# Patient Record
Sex: FEMALE | Race: OTHER | HISPANIC OR LATINO | Employment: UNEMPLOYED | URBAN - METROPOLITAN AREA
[De-identification: names, ages, dates, MRNs, and addresses within clinical notes are randomized per-mention and may not be internally consistent; named-entity substitution may affect disease eponyms.]

---

## 2017-01-30 ENCOUNTER — GENERIC CONVERSION - ENCOUNTER (OUTPATIENT)
Dept: OTHER | Facility: OTHER | Age: 37
End: 2017-01-30

## 2017-01-30 ENCOUNTER — APPOINTMENT (EMERGENCY)
Dept: RADIOLOGY | Facility: HOSPITAL | Age: 37
End: 2017-01-30
Payer: COMMERCIAL

## 2017-01-30 ENCOUNTER — HOSPITAL ENCOUNTER (OUTPATIENT)
Facility: HOSPITAL | Age: 37
Setting detail: OBSERVATION
LOS: 4 days | Discharge: HOME | End: 2017-02-04
Attending: INTERNAL MEDICINE | Admitting: INTERNAL MEDICINE
Payer: COMMERCIAL

## 2017-01-30 ENCOUNTER — APPOINTMENT (EMERGENCY)
Dept: CT IMAGING | Facility: HOSPITAL | Age: 37
End: 2017-01-30
Payer: COMMERCIAL

## 2017-01-30 DIAGNOSIS — R79.89 ELEVATED LIVER FUNCTION TESTS: Primary | ICD-10-CM

## 2017-01-30 DIAGNOSIS — J06.9 UPPER RESPIRATORY INFECTION: ICD-10-CM

## 2017-01-30 DIAGNOSIS — R10.9 ABDOMINAL PAIN: ICD-10-CM

## 2017-01-30 PROBLEM — R74.01 TRANSAMINITIS: Status: ACTIVE | Noted: 2017-01-30

## 2017-01-30 LAB
ALBUMIN SERPL BCP-MCNC: 3 G/DL (ref 3.5–5)
ALP SERPL-CCNC: 192 U/L (ref 46–116)
ALT SERPL W P-5'-P-CCNC: 667 U/L (ref 12–78)
ANION GAP SERPL CALCULATED.3IONS-SCNC: 7 MMOL/L (ref 4–13)
APTT PPP: 43 SECONDS (ref 24–36)
AST SERPL W P-5'-P-CCNC: 350 U/L (ref 5–45)
ATRIAL RATE: 89 BPM
BASOPHILS # BLD AUTO: 0.02 THOUSANDS/ΜL (ref 0–0.1)
BASOPHILS NFR BLD AUTO: 0 % (ref 0–1)
BILIRUB SERPL-MCNC: 0.68 MG/DL (ref 0.2–1)
BUN SERPL-MCNC: 9 MG/DL (ref 5–25)
CALCIUM SERPL-MCNC: 8.9 MG/DL (ref 8.3–10.1)
CHLORIDE SERPL-SCNC: 100 MMOL/L (ref 100–108)
CO2 SERPL-SCNC: 28 MMOL/L (ref 21–32)
CREAT SERPL-MCNC: 0.64 MG/DL (ref 0.6–1.3)
EOSINOPHIL # BLD AUTO: 0.09 THOUSAND/ΜL (ref 0–0.61)
EOSINOPHIL NFR BLD AUTO: 1 % (ref 0–6)
ERYTHROCYTE [DISTWIDTH] IN BLOOD BY AUTOMATED COUNT: 13.5 % (ref 11.6–15.1)
FLUAV AG SPEC QL IA: NEGATIVE
FLUBV AG SPEC QL IA: NEGATIVE
GFR SERPL CREATININE-BSD FRML MDRD: >60 ML/MIN/1.73SQ M
GLUCOSE SERPL-MCNC: 104 MG/DL (ref 65–140)
GLUCOSE SERPL-MCNC: 190 MG/DL (ref 65–140)
GLUCOSE SERPL-MCNC: 217 MG/DL (ref 65–140)
HCT VFR BLD AUTO: 36.9 % (ref 34.8–46.1)
HGB BLD-MCNC: 12.7 G/DL (ref 11.5–15.4)
INR PPP: 1.07 (ref 0.86–1.16)
LIPASE SERPL-CCNC: 115 U/L (ref 73–393)
LYMPHOCYTES # BLD AUTO: 1.82 THOUSANDS/ΜL (ref 0.6–4.47)
LYMPHOCYTES NFR BLD AUTO: 21 % (ref 14–44)
MCH RBC QN AUTO: 30 PG (ref 26.8–34.3)
MCHC RBC AUTO-ENTMCNC: 34.4 G/DL (ref 31.4–37.4)
MCV RBC AUTO: 87 FL (ref 82–98)
MONOCYTES # BLD AUTO: 0.69 THOUSAND/ΜL (ref 0.17–1.22)
MONOCYTES NFR BLD AUTO: 8 % (ref 4–12)
NEUTROPHILS # BLD AUTO: 5.99 THOUSANDS/ΜL (ref 1.85–7.62)
NEUTS SEG NFR BLD AUTO: 70 % (ref 43–75)
NRBC BLD AUTO-RTO: 0 /100 WBCS
P AXIS: 11 DEGREES
PLATELET # BLD AUTO: 249 THOUSANDS/UL (ref 149–390)
PMV BLD AUTO: 10.3 FL (ref 8.9–12.7)
POTASSIUM SERPL-SCNC: 4.1 MMOL/L (ref 3.5–5.3)
PR INTERVAL: 118 MS
PROT SERPL-MCNC: 8.2 G/DL (ref 6.4–8.2)
PROTHROMBIN TIME: 13.9 SECONDS (ref 12–14.3)
QRS AXIS: 39 DEGREES
QRSD INTERVAL: 74 MS
QT INTERVAL: 346 MS
QTC INTERVAL: 420 MS
RBC # BLD AUTO: 4.24 MILLION/UL (ref 3.81–5.12)
SODIUM SERPL-SCNC: 135 MMOL/L (ref 136–145)
SPECIMEN SOURCE: NORMAL
T WAVE AXIS: 6 DEGREES
TROPONIN I BLD-MCNC: 0 NG/ML (ref 0–0.08)
VENTRICULAR RATE: 89 BPM
WBC # BLD AUTO: 8.61 THOUSAND/UL (ref 4.31–10.16)

## 2017-01-30 PROCEDURE — 36415 COLL VENOUS BLD VENIPUNCTURE: CPT | Performed by: PHYSICIAN ASSISTANT

## 2017-01-30 PROCEDURE — 85025 COMPLETE CBC W/AUTO DIFF WBC: CPT | Performed by: PHYSICIAN ASSISTANT

## 2017-01-30 PROCEDURE — 74177 CT ABD & PELVIS W/CONTRAST: CPT

## 2017-01-30 PROCEDURE — 99285 EMERGENCY DEPT VISIT HI MDM: CPT

## 2017-01-30 PROCEDURE — 71020 HB CHEST X-RAY 2VW FRONTAL&LATL: CPT

## 2017-01-30 PROCEDURE — 85730 THROMBOPLASTIN TIME PARTIAL: CPT | Performed by: PHYSICIAN ASSISTANT

## 2017-01-30 PROCEDURE — 93005 ELECTROCARDIOGRAM TRACING: CPT | Performed by: PHYSICIAN ASSISTANT

## 2017-01-30 PROCEDURE — 84484 ASSAY OF TROPONIN QUANT: CPT

## 2017-01-30 PROCEDURE — 87798 DETECT AGENT NOS DNA AMP: CPT | Performed by: PHYSICIAN ASSISTANT

## 2017-01-30 PROCEDURE — 85610 PROTHROMBIN TIME: CPT | Performed by: PHYSICIAN ASSISTANT

## 2017-01-30 PROCEDURE — 80053 COMPREHEN METABOLIC PANEL: CPT | Performed by: PHYSICIAN ASSISTANT

## 2017-01-30 PROCEDURE — 83690 ASSAY OF LIPASE: CPT | Performed by: PHYSICIAN ASSISTANT

## 2017-01-30 PROCEDURE — 82948 REAGENT STRIP/BLOOD GLUCOSE: CPT

## 2017-01-30 PROCEDURE — 86308 HETEROPHILE ANTIBODY SCREEN: CPT | Performed by: PHYSICIAN ASSISTANT

## 2017-01-30 PROCEDURE — 87400 INFLUENZA A/B EACH AG IA: CPT | Performed by: PHYSICIAN ASSISTANT

## 2017-01-30 PROCEDURE — 93005 ELECTROCARDIOGRAM TRACING: CPT

## 2017-01-30 RX ORDER — MAGNESIUM HYDROXIDE/ALUMINUM HYDROXICE/SIMETHICONE 120; 1200; 1200 MG/30ML; MG/30ML; MG/30ML
30 SUSPENSION ORAL EVERY 6 HOURS PRN
Status: DISCONTINUED | OUTPATIENT
Start: 2017-01-30 | End: 2017-02-04 | Stop reason: HOSPADM

## 2017-01-30 RX ORDER — IBUPROFEN 600 MG/1
600 TABLET ORAL EVERY 6 HOURS PRN
Status: DISCONTINUED | OUTPATIENT
Start: 2017-01-30 | End: 2017-02-02

## 2017-01-30 RX ORDER — TRAZODONE HYDROCHLORIDE 100 MG/1
100 TABLET ORAL
Status: DISCONTINUED | OUTPATIENT
Start: 2017-01-30 | End: 2017-02-04 | Stop reason: HOSPADM

## 2017-01-30 RX ORDER — TRAMADOL HYDROCHLORIDE 50 MG/1
50 TABLET ORAL EVERY 6 HOURS PRN
COMMUNITY
End: 2017-02-21

## 2017-01-30 RX ORDER — SODIUM CHLORIDE 9 MG/ML
75 INJECTION, SOLUTION INTRAVENOUS CONTINUOUS
Status: DISCONTINUED | OUTPATIENT
Start: 2017-01-30 | End: 2017-02-03

## 2017-01-30 RX ORDER — GABAPENTIN 400 MG/1
400 CAPSULE ORAL 2 TIMES DAILY
Status: DISCONTINUED | OUTPATIENT
Start: 2017-01-30 | End: 2017-02-04 | Stop reason: HOSPADM

## 2017-01-30 RX ORDER — IPRATROPIUM BROMIDE AND ALBUTEROL SULFATE 2.5; .5 MG/3ML; MG/3ML
3 SOLUTION RESPIRATORY (INHALATION) ONCE
Status: COMPLETED | OUTPATIENT
Start: 2017-01-30 | End: 2017-01-30

## 2017-01-30 RX ORDER — ONDANSETRON 2 MG/ML
4 INJECTION INTRAMUSCULAR; INTRAVENOUS EVERY 6 HOURS PRN
Status: DISCONTINUED | OUTPATIENT
Start: 2017-01-30 | End: 2017-02-04 | Stop reason: HOSPADM

## 2017-01-30 RX ORDER — CITALOPRAM 20 MG/1
10 TABLET ORAL DAILY
Status: DISCONTINUED | OUTPATIENT
Start: 2017-01-31 | End: 2017-02-04 | Stop reason: HOSPADM

## 2017-01-30 RX ORDER — IBUPROFEN 400 MG/1
400 TABLET ORAL EVERY 6 HOURS PRN
Status: DISCONTINUED | OUTPATIENT
Start: 2017-01-30 | End: 2017-01-30

## 2017-01-30 RX ORDER — ASPIRIN 325 MG
325 TABLET ORAL ONCE
Status: COMPLETED | OUTPATIENT
Start: 2017-01-30 | End: 2017-01-30

## 2017-01-30 RX ORDER — IPRATROPIUM BROMIDE AND ALBUTEROL SULFATE 2.5; .5 MG/3ML; MG/3ML
3 SOLUTION RESPIRATORY (INHALATION)
Status: DISCONTINUED | OUTPATIENT
Start: 2017-01-30 | End: 2017-01-30

## 2017-01-30 RX ORDER — BUSPIRONE HYDROCHLORIDE 10 MG/1
10 TABLET ORAL 3 TIMES DAILY
Status: DISCONTINUED | OUTPATIENT
Start: 2017-01-30 | End: 2017-02-04 | Stop reason: HOSPADM

## 2017-01-30 RX ORDER — ATORVASTATIN CALCIUM 10 MG/1
20 TABLET, FILM COATED ORAL
Status: DISCONTINUED | OUTPATIENT
Start: 2017-01-31 | End: 2017-02-01

## 2017-01-30 RX ORDER — INSULIN GLARGINE 100 [IU]/ML
50 INJECTION, SOLUTION SUBCUTANEOUS
Status: DISCONTINUED | OUTPATIENT
Start: 2017-01-30 | End: 2017-02-04 | Stop reason: HOSPADM

## 2017-01-30 RX ADMIN — TRAZODONE HYDROCHLORIDE 100 MG: 100 TABLET ORAL at 21:35

## 2017-01-30 RX ADMIN — INSULIN LISPRO 2 UNITS: 100 INJECTION, SOLUTION INTRAVENOUS; SUBCUTANEOUS at 21:35

## 2017-01-30 RX ADMIN — BUSPIRONE HYDROCHLORIDE 10 MG: 10 TABLET ORAL at 21:34

## 2017-01-30 RX ADMIN — IOHEXOL 100 ML: 350 INJECTION, SOLUTION INTRAVENOUS at 12:41

## 2017-01-30 RX ADMIN — IPRATROPIUM BROMIDE AND ALBUTEROL SULFATE 3 ML: .5; 3 SOLUTION RESPIRATORY (INHALATION) at 11:48

## 2017-01-30 RX ADMIN — IBUPROFEN 400 MG: 400 TABLET ORAL at 22:09

## 2017-01-30 RX ADMIN — SODIUM CHLORIDE 75 ML/HR: 0.9 INJECTION, SOLUTION INTRAVENOUS at 17:30

## 2017-01-30 RX ADMIN — BUSPIRONE HYDROCHLORIDE 10 MG: 10 TABLET ORAL at 17:33

## 2017-01-30 RX ADMIN — ASPIRIN 325 MG: 325 TABLET ORAL at 09:54

## 2017-01-30 RX ADMIN — INSULIN GLARGINE 50 UNITS: 100 INJECTION, SOLUTION SUBCUTANEOUS at 21:35

## 2017-01-30 RX ADMIN — SODIUM CHLORIDE 1000 ML: 0.9 INJECTION, SOLUTION INTRAVENOUS at 13:48

## 2017-01-30 RX ADMIN — GABAPENTIN 400 MG: 100 CAPSULE ORAL at 19:21

## 2017-01-31 LAB
ALBUMIN SERPL BCP-MCNC: 2.9 G/DL (ref 3.5–5)
ALP SERPL-CCNC: 180 U/L (ref 46–116)
ALT SERPL W P-5'-P-CCNC: 632 U/L (ref 12–78)
ANION GAP SERPL CALCULATED.3IONS-SCNC: 6 MMOL/L (ref 4–13)
AST SERPL W P-5'-P-CCNC: 324 U/L (ref 5–45)
BILIRUB SERPL-MCNC: 0.65 MG/DL (ref 0.2–1)
BUN SERPL-MCNC: 10 MG/DL (ref 5–25)
CALCIUM SERPL-MCNC: 8.8 MG/DL (ref 8.3–10.1)
CHLORIDE SERPL-SCNC: 104 MMOL/L (ref 100–108)
CO2 SERPL-SCNC: 28 MMOL/L (ref 21–32)
CREAT SERPL-MCNC: 0.62 MG/DL (ref 0.6–1.3)
ERYTHROCYTE [DISTWIDTH] IN BLOOD BY AUTOMATED COUNT: 13.7 % (ref 11.6–15.1)
EST. AVERAGE GLUCOSE BLD GHB EST-MCNC: 200 MG/DL
FERRITIN SERPL-MCNC: 144 NG/ML (ref 8–388)
FLUAV AG SPEC QL: NORMAL
FLUBV AG SPEC QL: NORMAL
GFR SERPL CREATININE-BSD FRML MDRD: >60 ML/MIN/1.73SQ M
GLUCOSE SERPL-MCNC: 152 MG/DL (ref 65–140)
GLUCOSE SERPL-MCNC: 160 MG/DL (ref 65–140)
GLUCOSE SERPL-MCNC: 233 MG/DL (ref 65–140)
GLUCOSE SERPL-MCNC: 292 MG/DL (ref 65–140)
GLUCOSE SERPL-MCNC: 317 MG/DL (ref 65–140)
HAV IGM SER QL: NORMAL
HBA1C MFR BLD: 8.6 % (ref 4.2–6.3)
HBV CORE IGM SER QL: NORMAL
HBV SURFACE AG SER QL: NORMAL
HCT VFR BLD AUTO: 36.9 % (ref 34.8–46.1)
HCV AB SER QL: NORMAL
HETEROPH AB SER QL: NEGATIVE
HGB BLD-MCNC: 12.3 G/DL (ref 11.5–15.4)
IRON SERPL-MCNC: 50 UG/DL (ref 50–170)
MCH RBC QN AUTO: 29.1 PG (ref 26.8–34.3)
MCHC RBC AUTO-ENTMCNC: 33.3 G/DL (ref 31.4–37.4)
MCV RBC AUTO: 87 FL (ref 82–98)
PLATELET # BLD AUTO: 248 THOUSANDS/UL (ref 149–390)
PMV BLD AUTO: 10.1 FL (ref 8.9–12.7)
POTASSIUM SERPL-SCNC: 3.4 MMOL/L (ref 3.5–5.3)
PROT SERPL-MCNC: 8.4 G/DL (ref 6.4–8.2)
RBC # BLD AUTO: 4.22 MILLION/UL (ref 3.81–5.12)
RSV B RNA SPEC QL NAA+PROBE: NORMAL
SODIUM SERPL-SCNC: 138 MMOL/L (ref 136–145)
TIBC SERPL-MCNC: 341 UG/DL (ref 250–450)
WBC # BLD AUTO: 7.41 THOUSAND/UL (ref 4.31–10.16)

## 2017-01-31 PROCEDURE — 83550 IRON BINDING TEST: CPT | Performed by: PHYSICIAN ASSISTANT

## 2017-01-31 PROCEDURE — 80053 COMPREHEN METABOLIC PANEL: CPT | Performed by: PHYSICIAN ASSISTANT

## 2017-01-31 PROCEDURE — 82948 REAGENT STRIP/BLOOD GLUCOSE: CPT

## 2017-01-31 PROCEDURE — 83036 HEMOGLOBIN GLYCOSYLATED A1C: CPT | Performed by: INTERNAL MEDICINE

## 2017-01-31 PROCEDURE — 83540 ASSAY OF IRON: CPT | Performed by: PHYSICIAN ASSISTANT

## 2017-01-31 PROCEDURE — 80074 ACUTE HEPATITIS PANEL: CPT | Performed by: PHYSICIAN ASSISTANT

## 2017-01-31 PROCEDURE — 85027 COMPLETE CBC AUTOMATED: CPT | Performed by: PHYSICIAN ASSISTANT

## 2017-01-31 PROCEDURE — 82728 ASSAY OF FERRITIN: CPT | Performed by: PHYSICIAN ASSISTANT

## 2017-01-31 RX ORDER — HYDROCODONE POLISTIREX AND CHLORPHENIRAMINE POLISTIREX 10; 8 MG/5ML; MG/5ML
5 SUSPENSION, EXTENDED RELEASE ORAL EVERY 12 HOURS
Status: DISCONTINUED | OUTPATIENT
Start: 2017-01-31 | End: 2017-02-04 | Stop reason: HOSPADM

## 2017-01-31 RX ORDER — PANTOPRAZOLE SODIUM 40 MG/1
40 TABLET, DELAYED RELEASE ORAL
Status: DISCONTINUED | OUTPATIENT
Start: 2017-01-31 | End: 2017-02-04 | Stop reason: HOSPADM

## 2017-01-31 RX ORDER — HYDROCODONE POLISTIREX AND CHLORPHENIRAMINE POLISTIREX 10; 8 MG/5ML; MG/5ML
5 SUSPENSION, EXTENDED RELEASE ORAL EVERY 12 HOURS PRN
Status: DISCONTINUED | OUTPATIENT
Start: 2017-01-31 | End: 2017-01-31

## 2017-01-31 RX ADMIN — CITALOPRAM 10 MG: 10 TABLET ORAL at 08:43

## 2017-01-31 RX ADMIN — INSULIN LISPRO 3 UNITS: 100 INJECTION, SOLUTION INTRAVENOUS; SUBCUTANEOUS at 12:42

## 2017-01-31 RX ADMIN — SODIUM CHLORIDE 75 ML/HR: 0.9 INJECTION, SOLUTION INTRAVENOUS at 18:57

## 2017-01-31 RX ADMIN — BUSPIRONE HYDROCHLORIDE 10 MG: 10 TABLET ORAL at 08:44

## 2017-01-31 RX ADMIN — ALUMINUM HYDROXIDE, MAGNESIUM HYDROXIDE, AND SIMETHICONE 30 ML: 200; 200; 20 SUSPENSION ORAL at 21:05

## 2017-01-31 RX ADMIN — ATORVASTATIN CALCIUM 20 MG: 10 TABLET, FILM COATED ORAL at 17:32

## 2017-01-31 RX ADMIN — PANTOPRAZOLE SODIUM 40 MG: 40 TABLET, DELAYED RELEASE ORAL at 12:42

## 2017-01-31 RX ADMIN — INSULIN GLARGINE 50 UNITS: 100 INJECTION, SOLUTION SUBCUTANEOUS at 21:45

## 2017-01-31 RX ADMIN — SODIUM CHLORIDE 75 ML/HR: 0.9 INJECTION, SOLUTION INTRAVENOUS at 05:26

## 2017-01-31 RX ADMIN — BUSPIRONE HYDROCHLORIDE 10 MG: 10 TABLET ORAL at 21:45

## 2017-01-31 RX ADMIN — IBUPROFEN 600 MG: 600 TABLET ORAL at 21:05

## 2017-01-31 RX ADMIN — INSULIN LISPRO 4 UNITS: 100 INJECTION, SOLUTION INTRAVENOUS; SUBCUTANEOUS at 17:32

## 2017-01-31 RX ADMIN — INSULIN LISPRO 1 UNITS: 100 INJECTION, SOLUTION INTRAVENOUS; SUBCUTANEOUS at 08:44

## 2017-01-31 RX ADMIN — BUSPIRONE HYDROCHLORIDE 10 MG: 10 TABLET ORAL at 17:32

## 2017-01-31 RX ADMIN — HYDROCODONE POLISTIREX AND CHLORPHENIRAMINE POLISTIREX 5 ML: 10; 8 SUSPENSION, EXTENDED RELEASE ORAL at 21:46

## 2017-01-31 RX ADMIN — GABAPENTIN 400 MG: 100 CAPSULE ORAL at 17:32

## 2017-01-31 RX ADMIN — INSULIN LISPRO 5 UNITS: 100 INJECTION, SOLUTION INTRAVENOUS; SUBCUTANEOUS at 21:46

## 2017-01-31 RX ADMIN — GABAPENTIN 400 MG: 100 CAPSULE ORAL at 08:44

## 2017-01-31 RX ADMIN — TRAZODONE HYDROCHLORIDE 100 MG: 100 TABLET ORAL at 21:45

## 2017-01-31 RX ADMIN — HYDROCODONE POLISTIREX AND CHLORPHENIRAMINE POLISTIREX 5 ML: 10; 8 SUSPENSION, EXTENDED RELEASE ORAL at 12:46

## 2017-01-31 RX ADMIN — IBUPROFEN 600 MG: 600 TABLET ORAL at 11:38

## 2017-02-01 ENCOUNTER — APPOINTMENT (INPATIENT)
Dept: ULTRASOUND IMAGING | Facility: HOSPITAL | Age: 37
End: 2017-02-01
Payer: COMMERCIAL

## 2017-02-01 LAB
ALBUMIN SERPL BCP-MCNC: 2.9 G/DL (ref 3.5–5)
ALP SERPL-CCNC: 165 U/L (ref 46–116)
ALT SERPL W P-5'-P-CCNC: 562 U/L (ref 12–78)
ANION GAP SERPL CALCULATED.3IONS-SCNC: 8 MMOL/L (ref 4–13)
AST SERPL W P-5'-P-CCNC: 260 U/L (ref 5–45)
BILIRUB SERPL-MCNC: 0.78 MG/DL (ref 0.2–1)
BUN SERPL-MCNC: 7 MG/DL (ref 5–25)
CALCIUM SERPL-MCNC: 8.3 MG/DL (ref 8.3–10.1)
CHLORIDE SERPL-SCNC: 104 MMOL/L (ref 100–108)
CO2 SERPL-SCNC: 28 MMOL/L (ref 21–32)
CREAT SERPL-MCNC: 0.56 MG/DL (ref 0.6–1.3)
ERYTHROCYTE [DISTWIDTH] IN BLOOD BY AUTOMATED COUNT: 13.8 % (ref 11.6–15.1)
GFR SERPL CREATININE-BSD FRML MDRD: >60 ML/MIN/1.73SQ M
GLUCOSE SERPL-MCNC: 136 MG/DL (ref 65–140)
GLUCOSE SERPL-MCNC: 137 MG/DL (ref 65–140)
GLUCOSE SERPL-MCNC: 235 MG/DL (ref 65–140)
GLUCOSE SERPL-MCNC: 236 MG/DL (ref 65–140)
GLUCOSE SERPL-MCNC: 256 MG/DL (ref 65–140)
HCT VFR BLD AUTO: 36.3 % (ref 34.8–46.1)
HGB BLD-MCNC: 12.3 G/DL (ref 11.5–15.4)
MCH RBC QN AUTO: 29.9 PG (ref 26.8–34.3)
MCHC RBC AUTO-ENTMCNC: 33.9 G/DL (ref 31.4–37.4)
MCV RBC AUTO: 88 FL (ref 82–98)
PLATELET # BLD AUTO: 259 THOUSANDS/UL (ref 149–390)
PMV BLD AUTO: 10.2 FL (ref 8.9–12.7)
POTASSIUM SERPL-SCNC: 3.4 MMOL/L (ref 3.5–5.3)
PROT SERPL-MCNC: 8.2 G/DL (ref 6.4–8.2)
RBC # BLD AUTO: 4.12 MILLION/UL (ref 3.81–5.12)
SODIUM SERPL-SCNC: 140 MMOL/L (ref 136–145)
WBC # BLD AUTO: 7.52 THOUSAND/UL (ref 4.31–10.16)

## 2017-02-01 PROCEDURE — 86038 ANTINUCLEAR ANTIBODIES: CPT | Performed by: INTERNAL MEDICINE

## 2017-02-01 PROCEDURE — 86039 ANTINUCLEAR ANTIBODIES (ANA): CPT | Performed by: INTERNAL MEDICINE

## 2017-02-01 PROCEDURE — 76705 ECHO EXAM OF ABDOMEN: CPT

## 2017-02-01 PROCEDURE — 80053 COMPREHEN METABOLIC PANEL: CPT | Performed by: INTERNAL MEDICINE

## 2017-02-01 PROCEDURE — 86645 CMV ANTIBODY IGM: CPT | Performed by: INTERNAL MEDICINE

## 2017-02-01 PROCEDURE — 82948 REAGENT STRIP/BLOOD GLUCOSE: CPT

## 2017-02-01 PROCEDURE — 86644 CMV ANTIBODY: CPT | Performed by: INTERNAL MEDICINE

## 2017-02-01 PROCEDURE — 85027 COMPLETE CBC AUTOMATED: CPT | Performed by: INTERNAL MEDICINE

## 2017-02-01 RX ORDER — OXYCODONE HYDROCHLORIDE 5 MG/1
5 TABLET ORAL EVERY 4 HOURS PRN
Status: DISCONTINUED | OUTPATIENT
Start: 2017-02-01 | End: 2017-02-04 | Stop reason: HOSPADM

## 2017-02-01 RX ORDER — MORPHINE SULFATE 2 MG/ML
2 INJECTION, SOLUTION INTRAMUSCULAR; INTRAVENOUS EVERY 4 HOURS PRN
Status: DISCONTINUED | OUTPATIENT
Start: 2017-02-01 | End: 2017-02-04 | Stop reason: HOSPADM

## 2017-02-01 RX ADMIN — GABAPENTIN 400 MG: 100 CAPSULE ORAL at 17:53

## 2017-02-01 RX ADMIN — SODIUM CHLORIDE 75 ML/HR: 0.9 INJECTION, SOLUTION INTRAVENOUS at 20:36

## 2017-02-01 RX ADMIN — OXYCODONE HYDROCHLORIDE 5 MG: 5 TABLET ORAL at 15:38

## 2017-02-01 RX ADMIN — HYDROCODONE POLISTIREX AND CHLORPHENIRAMINE POLISTIREX 5 ML: 10; 8 SUSPENSION, EXTENDED RELEASE ORAL at 08:43

## 2017-02-01 RX ADMIN — PANTOPRAZOLE SODIUM 40 MG: 40 TABLET, DELAYED RELEASE ORAL at 06:03

## 2017-02-01 RX ADMIN — BUSPIRONE HYDROCHLORIDE 10 MG: 10 TABLET ORAL at 20:42

## 2017-02-01 RX ADMIN — INSULIN LISPRO 3 UNITS: 100 INJECTION, SOLUTION INTRAVENOUS; SUBCUTANEOUS at 17:52

## 2017-02-01 RX ADMIN — INSULIN LISPRO 3 UNITS: 100 INJECTION, SOLUTION INTRAVENOUS; SUBCUTANEOUS at 21:59

## 2017-02-01 RX ADMIN — BUSPIRONE HYDROCHLORIDE 10 MG: 10 TABLET ORAL at 08:43

## 2017-02-01 RX ADMIN — INSULIN GLARGINE 50 UNITS: 100 INJECTION, SOLUTION SUBCUTANEOUS at 21:59

## 2017-02-01 RX ADMIN — CITALOPRAM 10 MG: 10 TABLET ORAL at 08:43

## 2017-02-01 RX ADMIN — BUSPIRONE HYDROCHLORIDE 10 MG: 10 TABLET ORAL at 15:38

## 2017-02-01 RX ADMIN — SODIUM CHLORIDE 75 ML/HR: 0.9 INJECTION, SOLUTION INTRAVENOUS at 06:05

## 2017-02-01 RX ADMIN — GABAPENTIN 400 MG: 100 CAPSULE ORAL at 08:43

## 2017-02-01 RX ADMIN — MORPHINE SULFATE 2 MG: 2 INJECTION, SOLUTION INTRAMUSCULAR; INTRAVENOUS at 12:20

## 2017-02-01 RX ADMIN — IBUPROFEN 600 MG: 600 TABLET ORAL at 08:44

## 2017-02-01 RX ADMIN — MORPHINE SULFATE 2 MG: 2 INJECTION, SOLUTION INTRAMUSCULAR; INTRAVENOUS at 18:25

## 2017-02-01 RX ADMIN — HYDROCODONE POLISTIREX AND CHLORPHENIRAMINE POLISTIREX 5 ML: 10; 8 SUSPENSION, EXTENDED RELEASE ORAL at 21:59

## 2017-02-01 RX ADMIN — IBUPROFEN 600 MG: 600 TABLET ORAL at 20:11

## 2017-02-02 LAB
ALBUMIN SERPL BCP-MCNC: 2.6 G/DL (ref 3.5–5)
ALP SERPL-CCNC: 160 U/L (ref 46–116)
ALT SERPL W P-5'-P-CCNC: 459 U/L (ref 12–78)
ANION GAP SERPL CALCULATED.3IONS-SCNC: 5 MMOL/L (ref 4–13)
AST SERPL W P-5'-P-CCNC: 224 U/L (ref 5–45)
BILIRUB SERPL-MCNC: 0.75 MG/DL (ref 0.2–1)
BUN SERPL-MCNC: 5 MG/DL (ref 5–25)
CALCIUM SERPL-MCNC: 8.3 MG/DL (ref 8.3–10.1)
CHLORIDE SERPL-SCNC: 100 MMOL/L (ref 100–108)
CMV IGG SERPL IA-ACNC: <0.6 U/ML (ref 0–0.59)
CMV IGM SERPL IA-ACNC: <30 AU/ML (ref 0–29.9)
CO2 SERPL-SCNC: 28 MMOL/L (ref 21–32)
CREAT SERPL-MCNC: 0.64 MG/DL (ref 0.6–1.3)
GFR SERPL CREATININE-BSD FRML MDRD: >60 ML/MIN/1.73SQ M
GLUCOSE SERPL-MCNC: 193 MG/DL (ref 65–140)
GLUCOSE SERPL-MCNC: 194 MG/DL (ref 65–140)
GLUCOSE SERPL-MCNC: 219 MG/DL (ref 65–140)
GLUCOSE SERPL-MCNC: 235 MG/DL (ref 65–140)
GLUCOSE SERPL-MCNC: 245 MG/DL (ref 65–140)
POTASSIUM SERPL-SCNC: 4 MMOL/L (ref 3.5–5.3)
PROT SERPL-MCNC: 7.6 G/DL (ref 6.4–8.2)
SODIUM SERPL-SCNC: 133 MMOL/L (ref 136–145)

## 2017-02-02 PROCEDURE — 80053 COMPREHEN METABOLIC PANEL: CPT | Performed by: INTERNAL MEDICINE

## 2017-02-02 PROCEDURE — 82948 REAGENT STRIP/BLOOD GLUCOSE: CPT

## 2017-02-02 RX ORDER — ACETAMINOPHEN 325 MG/1
325 TABLET ORAL EVERY 6 HOURS PRN
Status: DISCONTINUED | OUTPATIENT
Start: 2017-02-02 | End: 2017-02-02

## 2017-02-02 RX ORDER — IBUPROFEN 600 MG/1
600 TABLET ORAL EVERY 6 HOURS PRN
Status: DISCONTINUED | OUTPATIENT
Start: 2017-02-02 | End: 2017-02-04 | Stop reason: HOSPADM

## 2017-02-02 RX ORDER — TIZANIDINE 4 MG/1
4 TABLET ORAL 3 TIMES DAILY
Status: DISCONTINUED | OUTPATIENT
Start: 2017-02-02 | End: 2017-02-04 | Stop reason: HOSPADM

## 2017-02-02 RX ADMIN — BUSPIRONE HYDROCHLORIDE 10 MG: 10 TABLET ORAL at 08:49

## 2017-02-02 RX ADMIN — TRAZODONE HYDROCHLORIDE 100 MG: 100 TABLET ORAL at 00:08

## 2017-02-02 RX ADMIN — IBUPROFEN 600 MG: 600 TABLET, FILM COATED ORAL at 17:36

## 2017-02-02 RX ADMIN — TIZANIDINE 4 MG: 4 TABLET ORAL at 17:36

## 2017-02-02 RX ADMIN — INSULIN LISPRO 2 UNITS: 100 INJECTION, SOLUTION INTRAVENOUS; SUBCUTANEOUS at 21:25

## 2017-02-02 RX ADMIN — INSULIN LISPRO 2 UNITS: 100 INJECTION, SOLUTION INTRAVENOUS; SUBCUTANEOUS at 07:58

## 2017-02-02 RX ADMIN — TRAZODONE HYDROCHLORIDE 100 MG: 100 TABLET ORAL at 21:25

## 2017-02-02 RX ADMIN — GABAPENTIN 400 MG: 100 CAPSULE ORAL at 08:48

## 2017-02-02 RX ADMIN — INSULIN LISPRO 2 UNITS: 100 INJECTION, SOLUTION INTRAVENOUS; SUBCUTANEOUS at 17:37

## 2017-02-02 RX ADMIN — HYDROCODONE POLISTIREX AND CHLORPHENIRAMINE POLISTIREX 5 ML: 10; 8 SUSPENSION, EXTENDED RELEASE ORAL at 08:49

## 2017-02-02 RX ADMIN — SODIUM CHLORIDE 75 ML/HR: 0.9 INJECTION, SOLUTION INTRAVENOUS at 10:03

## 2017-02-02 RX ADMIN — INSULIN LISPRO 3 UNITS: 100 INJECTION, SOLUTION INTRAVENOUS; SUBCUTANEOUS at 11:17

## 2017-02-02 RX ADMIN — BUSPIRONE HYDROCHLORIDE 10 MG: 10 TABLET ORAL at 21:24

## 2017-02-02 RX ADMIN — PANTOPRAZOLE SODIUM 40 MG: 40 TABLET, DELAYED RELEASE ORAL at 05:16

## 2017-02-02 RX ADMIN — ACETAMINOPHEN 325 MG: 325 TABLET, FILM COATED ORAL at 10:02

## 2017-02-02 RX ADMIN — CITALOPRAM 10 MG: 10 TABLET ORAL at 08:48

## 2017-02-02 RX ADMIN — SODIUM CHLORIDE 75 ML/HR: 0.9 INJECTION, SOLUTION INTRAVENOUS at 23:45

## 2017-02-02 RX ADMIN — HYDROCODONE POLISTIREX AND CHLORPHENIRAMINE POLISTIREX 5 ML: 10; 8 SUSPENSION, EXTENDED RELEASE ORAL at 21:25

## 2017-02-02 RX ADMIN — INSULIN GLARGINE 50 UNITS: 100 INJECTION, SOLUTION SUBCUTANEOUS at 21:25

## 2017-02-02 RX ADMIN — BUSPIRONE HYDROCHLORIDE 10 MG: 10 TABLET ORAL at 17:35

## 2017-02-02 RX ADMIN — GABAPENTIN 400 MG: 100 CAPSULE ORAL at 17:36

## 2017-02-02 RX ADMIN — OXYCODONE HYDROCHLORIDE 5 MG: 5 TABLET ORAL at 04:48

## 2017-02-03 LAB
ALBUMIN SERPL BCP-MCNC: 2.6 G/DL (ref 3.5–5)
ALP SERPL-CCNC: 150 U/L (ref 46–116)
ALT SERPL W P-5'-P-CCNC: 394 U/L (ref 12–78)
ANION GAP SERPL CALCULATED.3IONS-SCNC: 5 MMOL/L (ref 4–13)
AST SERPL W P-5'-P-CCNC: 178 U/L (ref 5–45)
BILIRUB SERPL-MCNC: 0.56 MG/DL (ref 0.2–1)
BUN SERPL-MCNC: 7 MG/DL (ref 5–25)
CALCIUM SERPL-MCNC: 8.3 MG/DL (ref 8.3–10.1)
CHLORIDE SERPL-SCNC: 104 MMOL/L (ref 100–108)
CO2 SERPL-SCNC: 30 MMOL/L (ref 21–32)
CREAT SERPL-MCNC: 0.62 MG/DL (ref 0.6–1.3)
GFR SERPL CREATININE-BSD FRML MDRD: >60 ML/MIN/1.73SQ M
GLUCOSE SERPL-MCNC: 116 MG/DL (ref 65–140)
GLUCOSE SERPL-MCNC: 132 MG/DL (ref 65–140)
GLUCOSE SERPL-MCNC: 246 MG/DL (ref 65–140)
GLUCOSE SERPL-MCNC: 289 MG/DL (ref 65–140)
GLUCOSE SERPL-MCNC: 314 MG/DL (ref 65–140)
POTASSIUM SERPL-SCNC: 4.3 MMOL/L (ref 3.5–5.3)
PROT SERPL-MCNC: 7.8 G/DL (ref 6.4–8.2)
SODIUM SERPL-SCNC: 139 MMOL/L (ref 136–145)

## 2017-02-03 PROCEDURE — 82948 REAGENT STRIP/BLOOD GLUCOSE: CPT

## 2017-02-03 PROCEDURE — 80053 COMPREHEN METABOLIC PANEL: CPT | Performed by: PHYSICIAN ASSISTANT

## 2017-02-03 RX ADMIN — HYDROCODONE POLISTIREX AND CHLORPHENIRAMINE POLISTIREX 5 ML: 10; 8 SUSPENSION, EXTENDED RELEASE ORAL at 10:23

## 2017-02-03 RX ADMIN — INSULIN LISPRO 3 UNITS: 100 INJECTION, SOLUTION INTRAVENOUS; SUBCUTANEOUS at 13:02

## 2017-02-03 RX ADMIN — INSULIN LISPRO 5 UNITS: 100 INJECTION, SOLUTION INTRAVENOUS; SUBCUTANEOUS at 21:18

## 2017-02-03 RX ADMIN — CITALOPRAM 10 MG: 10 TABLET ORAL at 10:22

## 2017-02-03 RX ADMIN — TIZANIDINE 4 MG: 4 TABLET ORAL at 10:23

## 2017-02-03 RX ADMIN — SODIUM CHLORIDE 75 ML/HR: 0.9 INJECTION, SOLUTION INTRAVENOUS at 10:23

## 2017-02-03 RX ADMIN — IBUPROFEN 600 MG: 600 TABLET, FILM COATED ORAL at 10:22

## 2017-02-03 RX ADMIN — GABAPENTIN 400 MG: 100 CAPSULE ORAL at 17:28

## 2017-02-03 RX ADMIN — OXYCODONE HYDROCHLORIDE 5 MG: 5 TABLET ORAL at 16:01

## 2017-02-03 RX ADMIN — INSULIN GLARGINE 50 UNITS: 100 INJECTION, SOLUTION SUBCUTANEOUS at 21:18

## 2017-02-03 RX ADMIN — BUSPIRONE HYDROCHLORIDE 10 MG: 10 TABLET ORAL at 10:22

## 2017-02-03 RX ADMIN — TIZANIDINE 4 MG: 4 TABLET ORAL at 21:17

## 2017-02-03 RX ADMIN — PANTOPRAZOLE SODIUM 40 MG: 40 TABLET, DELAYED RELEASE ORAL at 05:25

## 2017-02-03 RX ADMIN — GABAPENTIN 400 MG: 100 CAPSULE ORAL at 10:23

## 2017-02-03 RX ADMIN — TIZANIDINE 4 MG: 4 TABLET ORAL at 16:01

## 2017-02-03 RX ADMIN — HYDROCODONE POLISTIREX AND CHLORPHENIRAMINE POLISTIREX 5 ML: 10; 8 SUSPENSION, EXTENDED RELEASE ORAL at 21:18

## 2017-02-03 RX ADMIN — BUSPIRONE HYDROCHLORIDE 10 MG: 10 TABLET ORAL at 16:01

## 2017-02-03 RX ADMIN — INSULIN LISPRO 4 UNITS: 100 INJECTION, SOLUTION INTRAVENOUS; SUBCUTANEOUS at 17:28

## 2017-02-03 RX ADMIN — TRAZODONE HYDROCHLORIDE 100 MG: 100 TABLET ORAL at 21:17

## 2017-02-03 RX ADMIN — BUSPIRONE HYDROCHLORIDE 10 MG: 10 TABLET ORAL at 21:17

## 2017-02-03 RX ADMIN — OXYCODONE HYDROCHLORIDE 5 MG: 5 TABLET ORAL at 21:17

## 2017-02-03 RX ADMIN — OXYCODONE HYDROCHLORIDE 5 MG: 5 TABLET ORAL at 03:34

## 2017-02-04 VITALS
HEART RATE: 91 BPM | WEIGHT: 224.21 LBS | TEMPERATURE: 99.6 F | HEIGHT: 62 IN | SYSTOLIC BLOOD PRESSURE: 146 MMHG | RESPIRATION RATE: 20 BRPM | DIASTOLIC BLOOD PRESSURE: 67 MMHG | BODY MASS INDEX: 41.26 KG/M2 | OXYGEN SATURATION: 95 %

## 2017-02-04 LAB
ALBUMIN SERPL BCP-MCNC: 2.5 G/DL (ref 3.5–5)
ALP SERPL-CCNC: 150 U/L (ref 46–116)
ALT SERPL W P-5'-P-CCNC: 318 U/L (ref 12–78)
ANION GAP SERPL CALCULATED.3IONS-SCNC: 6 MMOL/L (ref 4–13)
AST SERPL W P-5'-P-CCNC: 133 U/L (ref 5–45)
BILIRUB SERPL-MCNC: 0.39 MG/DL (ref 0.2–1)
BUN SERPL-MCNC: 8 MG/DL (ref 5–25)
CALCIUM SERPL-MCNC: 8.3 MG/DL (ref 8.3–10.1)
CHLORIDE SERPL-SCNC: 104 MMOL/L (ref 100–108)
CO2 SERPL-SCNC: 30 MMOL/L (ref 21–32)
CREAT SERPL-MCNC: 0.61 MG/DL (ref 0.6–1.3)
GFR SERPL CREATININE-BSD FRML MDRD: >60 ML/MIN/1.73SQ M
GLUCOSE SERPL-MCNC: 239 MG/DL (ref 65–140)
GLUCOSE SERPL-MCNC: 86 MG/DL (ref 65–140)
GLUCOSE SERPL-MCNC: 89 MG/DL (ref 65–140)
POTASSIUM SERPL-SCNC: 3.6 MMOL/L (ref 3.5–5.3)
PROT SERPL-MCNC: 7.8 G/DL (ref 6.4–8.2)
SODIUM SERPL-SCNC: 140 MMOL/L (ref 136–145)

## 2017-02-04 PROCEDURE — 80053 COMPREHEN METABOLIC PANEL: CPT | Performed by: PHYSICIAN ASSISTANT

## 2017-02-04 PROCEDURE — 82948 REAGENT STRIP/BLOOD GLUCOSE: CPT

## 2017-02-04 RX ORDER — PANTOPRAZOLE SODIUM 40 MG/1
40 TABLET, DELAYED RELEASE ORAL
Qty: 30 TABLET | Refills: 0 | Status: SHIPPED | OUTPATIENT
Start: 2017-02-04 | End: 2017-03-14

## 2017-02-04 RX ORDER — TRAZODONE HYDROCHLORIDE 100 MG/1
100 TABLET ORAL
Qty: 30 TABLET | Refills: 0
Start: 2017-02-04

## 2017-02-04 RX ORDER — TIZANIDINE 4 MG/1
4 TABLET ORAL EVERY 8 HOURS PRN
Qty: 30 TABLET | Refills: 0 | Status: SHIPPED | OUTPATIENT
Start: 2017-02-04 | End: 2017-11-17

## 2017-02-04 RX ORDER — OXYCODONE HYDROCHLORIDE 5 MG/1
5 TABLET ORAL EVERY 4 HOURS PRN
Qty: 20 TABLET | Refills: 0 | Status: SHIPPED | OUTPATIENT
Start: 2017-02-04 | End: 2017-02-14

## 2017-02-04 RX ADMIN — HYDROCODONE POLISTIREX AND CHLORPHENIRAMINE POLISTIREX 5 ML: 10; 8 SUSPENSION, EXTENDED RELEASE ORAL at 09:20

## 2017-02-04 RX ADMIN — OXYCODONE HYDROCHLORIDE 5 MG: 5 TABLET ORAL at 06:26

## 2017-02-04 RX ADMIN — BUSPIRONE HYDROCHLORIDE 10 MG: 10 TABLET ORAL at 09:19

## 2017-02-04 RX ADMIN — CITALOPRAM 10 MG: 10 TABLET ORAL at 09:19

## 2017-02-04 RX ADMIN — GABAPENTIN 400 MG: 100 CAPSULE ORAL at 09:20

## 2017-02-04 RX ADMIN — PANTOPRAZOLE SODIUM 40 MG: 40 TABLET, DELAYED RELEASE ORAL at 05:48

## 2017-02-04 RX ADMIN — TIZANIDINE 4 MG: 4 TABLET ORAL at 09:19

## 2017-02-04 RX ADMIN — IBUPROFEN 600 MG: 600 TABLET, FILM COATED ORAL at 09:19

## 2017-02-06 LAB
ANA HOMOGEN SER QL IF: NORMAL
ANA HOMOGEN TITR SER: NORMAL {TITER}
RYE IGE QN: POSITIVE

## 2017-02-09 ENCOUNTER — APPOINTMENT (OUTPATIENT)
Dept: LAB | Facility: MEDICAL CENTER | Age: 37
End: 2017-02-09
Attending: INTERNAL MEDICINE
Payer: COMMERCIAL

## 2017-02-09 ENCOUNTER — ALLSCRIPTS OFFICE VISIT (OUTPATIENT)
Dept: OTHER | Facility: OTHER | Age: 37
End: 2017-02-09

## 2017-02-09 ENCOUNTER — TRANSCRIBE ORDERS (OUTPATIENT)
Dept: ADMINISTRATIVE | Facility: HOSPITAL | Age: 37
End: 2017-02-09

## 2017-02-09 ENCOUNTER — GENERIC CONVERSION - ENCOUNTER (OUTPATIENT)
Dept: OTHER | Facility: OTHER | Age: 37
End: 2017-02-09

## 2017-02-09 DIAGNOSIS — R94.5 ABNORMAL RESULTS OF LIVER FUNCTION STUDIES: ICD-10-CM

## 2017-02-09 DIAGNOSIS — K59.00 CONSTIPATION: ICD-10-CM

## 2017-02-09 LAB
ALBUMIN SERPL BCP-MCNC: 3 G/DL (ref 3.5–5)
ALP SERPL-CCNC: 134 U/L (ref 46–116)
ALT SERPL W P-5'-P-CCNC: 181 U/L (ref 12–78)
ANION GAP SERPL CALCULATED.3IONS-SCNC: 6 MMOL/L (ref 4–13)
AST SERPL W P-5'-P-CCNC: 86 U/L (ref 5–45)
BASOPHILS # BLD AUTO: 0.03 THOUSANDS/ΜL (ref 0–0.1)
BASOPHILS NFR BLD AUTO: 0 % (ref 0–1)
BILIRUB DIRECT SERPL-MCNC: 0.14 MG/DL (ref 0–0.2)
BILIRUB SERPL-MCNC: 0.31 MG/DL (ref 0.2–1)
BUN SERPL-MCNC: 10 MG/DL (ref 5–25)
CALCIUM SERPL-MCNC: 9.6 MG/DL (ref 8.3–10.1)
CHLORIDE SERPL-SCNC: 99 MMOL/L (ref 100–108)
CO2 SERPL-SCNC: 30 MMOL/L (ref 21–32)
CREAT SERPL-MCNC: 0.66 MG/DL (ref 0.6–1.3)
EOSINOPHIL # BLD AUTO: 0.17 THOUSAND/ΜL (ref 0–0.61)
EOSINOPHIL NFR BLD AUTO: 2 % (ref 0–6)
ERYTHROCYTE [DISTWIDTH] IN BLOOD BY AUTOMATED COUNT: 13.7 % (ref 11.6–15.1)
FERRITIN SERPL-MCNC: 74 NG/ML (ref 8–388)
GFR SERPL CREATININE-BSD FRML MDRD: >60 ML/MIN/1.73SQ M
GLUCOSE SERPL-MCNC: 213 MG/DL (ref 65–140)
HCT VFR BLD AUTO: 36.5 % (ref 34.8–46.1)
HGB BLD-MCNC: 11.8 G/DL (ref 11.5–15.4)
IRON SATN MFR SERPL: 16 %
IRON SERPL-MCNC: 57 UG/DL (ref 50–170)
LYMPHOCYTES # BLD AUTO: 2.42 THOUSANDS/ΜL (ref 0.6–4.47)
LYMPHOCYTES NFR BLD AUTO: 30 % (ref 14–44)
MCH RBC QN AUTO: 28.7 PG (ref 26.8–34.3)
MCHC RBC AUTO-ENTMCNC: 32.3 G/DL (ref 31.4–37.4)
MCV RBC AUTO: 89 FL (ref 82–98)
MONOCYTES # BLD AUTO: 0.51 THOUSAND/ΜL (ref 0.17–1.22)
MONOCYTES NFR BLD AUTO: 6 % (ref 4–12)
NEUTROPHILS # BLD AUTO: 4.89 THOUSANDS/ΜL (ref 1.85–7.62)
NEUTS SEG NFR BLD AUTO: 62 % (ref 43–75)
NRBC BLD AUTO-RTO: 0 /100 WBCS
PLATELET # BLD AUTO: 297 THOUSANDS/UL (ref 149–390)
PMV BLD AUTO: 10.1 FL (ref 8.9–12.7)
POTASSIUM SERPL-SCNC: 4.5 MMOL/L (ref 3.5–5.3)
PROT SERPL-MCNC: 8.7 G/DL (ref 6.4–8.2)
RBC # BLD AUTO: 4.11 MILLION/UL (ref 3.81–5.12)
SODIUM SERPL-SCNC: 135 MMOL/L (ref 136–145)
TIBC SERPL-MCNC: 348 UG/DL (ref 250–450)
WBC # BLD AUTO: 8.08 THOUSAND/UL (ref 4.31–10.16)

## 2017-02-09 PROCEDURE — 86255 FLUORESCENT ANTIBODY SCREEN: CPT

## 2017-02-09 PROCEDURE — 82784 ASSAY IGA/IGD/IGG/IGM EACH: CPT

## 2017-02-09 PROCEDURE — 36415 COLL VENOUS BLD VENIPUNCTURE: CPT

## 2017-02-09 PROCEDURE — 86376 MICROSOMAL ANTIBODY EACH: CPT

## 2017-02-09 PROCEDURE — 82103 ALPHA-1-ANTITRYPSIN TOTAL: CPT

## 2017-02-09 PROCEDURE — 83550 IRON BINDING TEST: CPT

## 2017-02-09 PROCEDURE — 83540 ASSAY OF IRON: CPT

## 2017-02-09 PROCEDURE — 86256 FLUORESCENT ANTIBODY TITER: CPT

## 2017-02-09 PROCEDURE — 80048 BASIC METABOLIC PNL TOTAL CA: CPT

## 2017-02-09 PROCEDURE — 82390 ASSAY OF CERULOPLASMIN: CPT

## 2017-02-09 PROCEDURE — 82728 ASSAY OF FERRITIN: CPT

## 2017-02-09 PROCEDURE — 86235 NUCLEAR ANTIGEN ANTIBODY: CPT

## 2017-02-09 PROCEDURE — 85025 COMPLETE CBC W/AUTO DIFF WBC: CPT

## 2017-02-09 PROCEDURE — 83516 IMMUNOASSAY NONANTIBODY: CPT

## 2017-02-09 PROCEDURE — 80076 HEPATIC FUNCTION PANEL: CPT

## 2017-02-10 LAB
A1AT SERPL-MCNC: 192 MG/DL (ref 90–200)
ACTIN IGG SERPL-ACNC: 108 UNITS (ref 0–19)
ENDOMYSIUM IGA SER QL: NEGATIVE
GLIADIN PEPTIDE IGA SER-ACNC: 6 UNITS (ref 0–19)
GLIADIN PEPTIDE IGG SER-ACNC: 4 UNITS (ref 0–19)
IGA SERPL-MCNC: 227 MG/DL (ref 87–352)
MITOCHONDRIA M2 IGG SER-ACNC: 22 UNITS (ref 0–20)
TTG IGA SER-ACNC: <2 U/ML (ref 0–3)
TTG IGG SER-ACNC: 2 U/ML (ref 0–5)

## 2017-02-11 LAB
CERULOPLASMIN SERPL-MCNC: 36.9 MG/DL (ref 19–39)
LKM-1 AB SER-ACNC: 1.4 UNITS (ref 0–20)

## 2017-02-13 ENCOUNTER — GENERIC CONVERSION - ENCOUNTER (OUTPATIENT)
Dept: OTHER | Facility: OTHER | Age: 37
End: 2017-02-13

## 2017-02-21 ENCOUNTER — HOSPITAL ENCOUNTER (EMERGENCY)
Facility: HOSPITAL | Age: 37
Discharge: HOME | End: 2017-02-22
Attending: EMERGENCY MEDICINE | Admitting: EMERGENCY MEDICINE
Payer: COMMERCIAL

## 2017-02-21 DIAGNOSIS — R10.11 RIGHT UPPER QUADRANT ABDOMINAL PAIN: Primary | ICD-10-CM

## 2017-02-21 DIAGNOSIS — E11.65 HYPERGLYCEMIA DUE TO TYPE 2 DIABETES MELLITUS (HCC): ICD-10-CM

## 2017-02-21 LAB
BASOPHILS # BLD AUTO: 0.01 THOUSANDS/ΜL (ref 0–0.1)
BASOPHILS NFR BLD AUTO: 0 % (ref 0–1)
BILIRUB UR QL STRIP: NEGATIVE
CLARITY UR: CLEAR
COLOR UR: YELLOW
EOSINOPHIL # BLD AUTO: 0.24 THOUSAND/ΜL (ref 0–0.61)
EOSINOPHIL NFR BLD AUTO: 2 % (ref 0–6)
ERYTHROCYTE [DISTWIDTH] IN BLOOD BY AUTOMATED COUNT: 13.4 % (ref 11.6–15.1)
GLUCOSE SERPL-MCNC: 376 MG/DL (ref 65–140)
GLUCOSE UR STRIP-MCNC: ABNORMAL MG/DL
HCG UR QL: NEGATIVE
HCT VFR BLD AUTO: 33.8 % (ref 34.8–46.1)
HGB BLD-MCNC: 11.4 G/DL (ref 11.5–15.4)
HGB UR QL STRIP.AUTO: NEGATIVE
KETONES UR STRIP-MCNC: NEGATIVE MG/DL
LEUKOCYTE ESTERASE UR QL STRIP: ABNORMAL
LYMPHOCYTES # BLD AUTO: 3.82 THOUSANDS/ΜL (ref 0.6–4.47)
LYMPHOCYTES NFR BLD AUTO: 38 % (ref 14–44)
MCH RBC QN AUTO: 29.5 PG (ref 26.8–34.3)
MCHC RBC AUTO-ENTMCNC: 33.7 G/DL (ref 31.4–37.4)
MCV RBC AUTO: 88 FL (ref 82–98)
MONOCYTES # BLD AUTO: 0.77 THOUSAND/ΜL (ref 0.17–1.22)
MONOCYTES NFR BLD AUTO: 8 % (ref 4–12)
NEUTROPHILS # BLD AUTO: 5.34 THOUSANDS/ΜL (ref 1.85–7.62)
NEUTS SEG NFR BLD AUTO: 52 % (ref 43–75)
NITRITE UR QL STRIP: NEGATIVE
NRBC BLD AUTO-RTO: 0 /100 WBCS
PH UR STRIP.AUTO: 7 [PH] (ref 4.5–8)
PLATELET # BLD AUTO: 283 THOUSANDS/UL (ref 149–390)
PMV BLD AUTO: 10.6 FL (ref 8.9–12.7)
PROT UR STRIP-MCNC: NEGATIVE MG/DL
RBC # BLD AUTO: 3.86 MILLION/UL (ref 3.81–5.12)
SP GR UR STRIP.AUTO: 1.01 (ref 1–1.03)
UROBILINOGEN UR QL STRIP.AUTO: 0.2 E.U./DL
WBC # BLD AUTO: 10.18 THOUSAND/UL (ref 4.31–10.16)

## 2017-02-21 PROCEDURE — 81001 URINALYSIS AUTO W/SCOPE: CPT

## 2017-02-21 PROCEDURE — 85025 COMPLETE CBC W/AUTO DIFF WBC: CPT

## 2017-02-21 PROCEDURE — 80053 COMPREHEN METABOLIC PANEL: CPT

## 2017-02-21 PROCEDURE — 96361 HYDRATE IV INFUSION ADD-ON: CPT

## 2017-02-21 PROCEDURE — 82948 REAGENT STRIP/BLOOD GLUCOSE: CPT

## 2017-02-21 PROCEDURE — 81002 URINALYSIS NONAUTO W/O SCOPE: CPT | Performed by: EMERGENCY MEDICINE

## 2017-02-21 PROCEDURE — 81025 URINE PREGNANCY TEST: CPT | Performed by: EMERGENCY MEDICINE

## 2017-02-21 PROCEDURE — 96374 THER/PROPH/DIAG INJ IV PUSH: CPT

## 2017-02-21 PROCEDURE — 83690 ASSAY OF LIPASE: CPT

## 2017-02-21 PROCEDURE — 36415 COLL VENOUS BLD VENIPUNCTURE: CPT

## 2017-02-21 RX ORDER — KETOROLAC TROMETHAMINE 30 MG/ML
30 INJECTION, SOLUTION INTRAMUSCULAR; INTRAVENOUS ONCE
Status: COMPLETED | OUTPATIENT
Start: 2017-02-22 | End: 2017-02-21

## 2017-02-21 RX ADMIN — SODIUM CHLORIDE 1000 ML: 0.9 INJECTION, SOLUTION INTRAVENOUS at 23:41

## 2017-02-21 RX ADMIN — KETOROLAC TROMETHAMINE 30 MG: 30 INJECTION, SOLUTION INTRAMUSCULAR at 23:56

## 2017-02-22 VITALS
TEMPERATURE: 98.5 F | SYSTOLIC BLOOD PRESSURE: 132 MMHG | OXYGEN SATURATION: 100 % | DIASTOLIC BLOOD PRESSURE: 65 MMHG | HEART RATE: 81 BPM | RESPIRATION RATE: 17 BRPM

## 2017-02-22 LAB
ALBUMIN SERPL BCP-MCNC: 3.2 G/DL (ref 3.5–5)
ALP SERPL-CCNC: 75 U/L (ref 46–116)
ALT SERPL W P-5'-P-CCNC: 58 U/L (ref 12–78)
ANION GAP SERPL CALCULATED.3IONS-SCNC: 6 MMOL/L (ref 4–13)
AST SERPL W P-5'-P-CCNC: 24 U/L (ref 5–45)
BACTERIA UR QL AUTO: ABNORMAL /HPF
BILIRUB SERPL-MCNC: 0.23 MG/DL (ref 0.2–1)
BUN SERPL-MCNC: 12 MG/DL (ref 5–25)
CALCIUM SERPL-MCNC: 9 MG/DL (ref 8.3–10.1)
CHLORIDE SERPL-SCNC: 98 MMOL/L (ref 100–108)
CO2 SERPL-SCNC: 30 MMOL/L (ref 21–32)
CREAT SERPL-MCNC: 0.8 MG/DL (ref 0.6–1.3)
GFR SERPL CREATININE-BSD FRML MDRD: >60 ML/MIN/1.73SQ M
GLUCOSE SERPL-MCNC: 329 MG/DL (ref 65–140)
GLUCOSE SERPL-MCNC: 346 MG/DL (ref 65–140)
GLUCOSE SERPL-MCNC: 440 MG/DL (ref 65–140)
LIPASE SERPL-CCNC: 304 U/L (ref 73–393)
NON-SQ EPI CELLS URNS QL MICRO: ABNORMAL /HPF
POTASSIUM SERPL-SCNC: 4 MMOL/L (ref 3.5–5.3)
PROT SERPL-MCNC: 8.5 G/DL (ref 6.4–8.2)
RBC #/AREA URNS AUTO: ABNORMAL /HPF
SODIUM SERPL-SCNC: 134 MMOL/L (ref 136–145)
WBC #/AREA URNS AUTO: ABNORMAL /HPF

## 2017-02-22 PROCEDURE — 99283 EMERGENCY DEPT VISIT LOW MDM: CPT

## 2017-02-22 PROCEDURE — 82948 REAGENT STRIP/BLOOD GLUCOSE: CPT

## 2017-02-22 RX ORDER — TRAMADOL HYDROCHLORIDE 50 MG/1
50 TABLET ORAL EVERY 6 HOURS PRN
Qty: 15 TABLET | Refills: 0 | Status: SHIPPED | OUTPATIENT
Start: 2017-02-22 | End: 2017-11-17

## 2017-02-28 ENCOUNTER — HOSPITAL ENCOUNTER (OUTPATIENT)
Dept: RADIOLOGY | Facility: HOSPITAL | Age: 37
Discharge: HOME | End: 2017-02-28
Attending: INTERNAL MEDICINE | Admitting: RADIOLOGY
Payer: COMMERCIAL

## 2017-02-28 VITALS
HEART RATE: 90 BPM | DIASTOLIC BLOOD PRESSURE: 53 MMHG | RESPIRATION RATE: 20 BRPM | BODY MASS INDEX: 41.59 KG/M2 | WEIGHT: 226 LBS | SYSTOLIC BLOOD PRESSURE: 110 MMHG | OXYGEN SATURATION: 96 % | TEMPERATURE: 98.9 F | HEIGHT: 62 IN

## 2017-02-28 DIAGNOSIS — R94.5 ABNORMAL RESULTS OF LIVER FUNCTION STUDIES: ICD-10-CM

## 2017-02-28 LAB — EXT PREGNANCY TEST URINE: NEGATIVE

## 2017-02-28 PROCEDURE — 88313 SPECIAL STAINS GROUP 2: CPT | Performed by: INTERNAL MEDICINE

## 2017-02-28 PROCEDURE — 76942 ECHO GUIDE FOR BIOPSY: CPT

## 2017-02-28 PROCEDURE — 99153 MOD SED SAME PHYS/QHP EA: CPT

## 2017-02-28 PROCEDURE — 81025 URINE PREGNANCY TEST: CPT | Performed by: RADIOLOGY

## 2017-02-28 PROCEDURE — 47000 NEEDLE BIOPSY OF LIVER PERQ: CPT

## 2017-02-28 PROCEDURE — 99152 MOD SED SAME PHYS/QHP 5/>YRS: CPT

## 2017-02-28 PROCEDURE — 88307 TISSUE EXAM BY PATHOLOGIST: CPT | Performed by: INTERNAL MEDICINE

## 2017-02-28 RX ORDER — MIDAZOLAM HYDROCHLORIDE 1 MG/ML
INJECTION INTRAMUSCULAR; INTRAVENOUS CODE/TRAUMA/SEDATION MEDICATION
Status: COMPLETED | OUTPATIENT
Start: 2017-02-28 | End: 2017-02-28

## 2017-02-28 RX ORDER — SODIUM CHLORIDE 9 MG/ML
75 INJECTION, SOLUTION INTRAVENOUS CONTINUOUS
Status: DISCONTINUED | OUTPATIENT
Start: 2017-02-28 | End: 2017-03-01 | Stop reason: HOSPADM

## 2017-02-28 RX ORDER — FENTANYL CITRATE 50 UG/ML
INJECTION, SOLUTION INTRAMUSCULAR; INTRAVENOUS CODE/TRAUMA/SEDATION MEDICATION
Status: COMPLETED | OUTPATIENT
Start: 2017-02-28 | End: 2017-02-28

## 2017-02-28 RX ORDER — OXYCODONE HYDROCHLORIDE AND ACETAMINOPHEN 5; 325 MG/1; MG/1
2 TABLET ORAL EVERY 4 HOURS PRN
Status: DISCONTINUED | OUTPATIENT
Start: 2017-02-28 | End: 2017-03-01 | Stop reason: HOSPADM

## 2017-02-28 RX ORDER — OXYCODONE HYDROCHLORIDE AND ACETAMINOPHEN 5; 325 MG/1; MG/1
1 TABLET ORAL ONCE
Status: COMPLETED | OUTPATIENT
Start: 2017-02-28 | End: 2017-02-28

## 2017-02-28 RX ADMIN — SODIUM CHLORIDE 75 ML/HR: 0.9 INJECTION, SOLUTION INTRAVENOUS at 12:50

## 2017-02-28 RX ADMIN — OXYCODONE HYDROCHLORIDE AND ACETAMINOPHEN 2 TABLET: 5; 325 TABLET ORAL at 16:21

## 2017-02-28 RX ADMIN — MIDAZOLAM 1 MG: 1 INJECTION INTRAMUSCULAR; INTRAVENOUS at 15:05

## 2017-02-28 RX ADMIN — FENTANYL CITRATE 50 MCG: 50 INJECTION INTRAMUSCULAR; INTRAVENOUS at 14:57

## 2017-02-28 RX ADMIN — MIDAZOLAM 1 MG: 1 INJECTION INTRAMUSCULAR; INTRAVENOUS at 14:57

## 2017-02-28 RX ADMIN — OXYCODONE HYDROCHLORIDE AND ACETAMINOPHEN 1 TABLET: 5; 325 TABLET ORAL at 19:34

## 2017-02-28 RX ADMIN — FENTANYL CITRATE 50 MCG: 50 INJECTION INTRAMUSCULAR; INTRAVENOUS at 15:06

## 2017-03-07 ENCOUNTER — GENERIC CONVERSION - ENCOUNTER (OUTPATIENT)
Dept: OTHER | Facility: OTHER | Age: 37
End: 2017-03-07

## 2017-03-09 ENCOUNTER — ALLSCRIPTS OFFICE VISIT (OUTPATIENT)
Dept: OTHER | Facility: OTHER | Age: 37
End: 2017-03-09

## 2017-03-09 ENCOUNTER — APPOINTMENT (OUTPATIENT)
Dept: LAB | Facility: HOSPITAL | Age: 37
End: 2017-03-09
Attending: INTERNAL MEDICINE
Payer: COMMERCIAL

## 2017-03-09 DIAGNOSIS — K59.00 CONSTIPATION: ICD-10-CM

## 2017-03-09 LAB
ALBUMIN SERPL BCP-MCNC: 3.1 G/DL (ref 3.5–5)
ALP SERPL-CCNC: 62 U/L (ref 46–116)
ALT SERPL W P-5'-P-CCNC: 43 U/L (ref 12–78)
ANION GAP SERPL CALCULATED.3IONS-SCNC: 7 MMOL/L (ref 4–13)
AST SERPL W P-5'-P-CCNC: 22 U/L (ref 5–45)
BASOPHILS # BLD AUTO: 0.02 THOUSANDS/ΜL (ref 0–0.1)
BASOPHILS NFR BLD AUTO: 0 % (ref 0–1)
BILIRUB DIRECT SERPL-MCNC: 0.1 MG/DL (ref 0–0.2)
BILIRUB SERPL-MCNC: 0.27 MG/DL (ref 0.2–1)
BUN SERPL-MCNC: 14 MG/DL (ref 5–25)
CALCIUM SERPL-MCNC: 9.2 MG/DL (ref 8.3–10.1)
CHLORIDE SERPL-SCNC: 102 MMOL/L (ref 100–108)
CO2 SERPL-SCNC: 28 MMOL/L (ref 21–32)
CREAT SERPL-MCNC: 0.68 MG/DL (ref 0.6–1.3)
EOSINOPHIL # BLD AUTO: 0.09 THOUSAND/ΜL (ref 0–0.61)
EOSINOPHIL NFR BLD AUTO: 1 % (ref 0–6)
ERYTHROCYTE [DISTWIDTH] IN BLOOD BY AUTOMATED COUNT: 13.1 % (ref 11.6–15.1)
GFR SERPL CREATININE-BSD FRML MDRD: >60 ML/MIN/1.73SQ M
GLUCOSE SERPL-MCNC: 238 MG/DL (ref 65–140)
HCT VFR BLD AUTO: 34.8 % (ref 34.8–46.1)
HGB BLD-MCNC: 11.4 G/DL (ref 11.5–15.4)
INR PPP: 1.07 (ref 0.86–1.16)
LYMPHOCYTES # BLD AUTO: 2.26 THOUSANDS/ΜL (ref 0.6–4.47)
LYMPHOCYTES NFR BLD AUTO: 27 % (ref 14–44)
MCH RBC QN AUTO: 28.9 PG (ref 26.8–34.3)
MCHC RBC AUTO-ENTMCNC: 32.8 G/DL (ref 31.4–37.4)
MCV RBC AUTO: 88 FL (ref 82–98)
MONOCYTES # BLD AUTO: 0.52 THOUSAND/ΜL (ref 0.17–1.22)
MONOCYTES NFR BLD AUTO: 6 % (ref 4–12)
NEUTROPHILS # BLD AUTO: 5.62 THOUSANDS/ΜL (ref 1.85–7.62)
NEUTS SEG NFR BLD AUTO: 66 % (ref 43–75)
NRBC BLD AUTO-RTO: 0 /100 WBCS
PLATELET # BLD AUTO: 273 THOUSANDS/UL (ref 149–390)
PMV BLD AUTO: 10.8 FL (ref 8.9–12.7)
POTASSIUM SERPL-SCNC: 4.3 MMOL/L (ref 3.5–5.3)
PROT SERPL-MCNC: 8 G/DL (ref 6.4–8.2)
PROTHROMBIN TIME: 14 SECONDS (ref 12–14.3)
RBC # BLD AUTO: 3.95 MILLION/UL (ref 3.81–5.12)
SODIUM SERPL-SCNC: 137 MMOL/L (ref 136–145)
WBC # BLD AUTO: 8.53 THOUSAND/UL (ref 4.31–10.16)

## 2017-03-09 PROCEDURE — 80048 BASIC METABOLIC PNL TOTAL CA: CPT

## 2017-03-09 PROCEDURE — 85025 COMPLETE CBC W/AUTO DIFF WBC: CPT

## 2017-03-09 PROCEDURE — 36415 COLL VENOUS BLD VENIPUNCTURE: CPT

## 2017-03-09 PROCEDURE — 85610 PROTHROMBIN TIME: CPT

## 2017-03-09 PROCEDURE — 80076 HEPATIC FUNCTION PANEL: CPT

## 2017-03-10 ENCOUNTER — GENERIC CONVERSION - ENCOUNTER (OUTPATIENT)
Dept: OTHER | Facility: OTHER | Age: 37
End: 2017-03-10

## 2017-03-13 ENCOUNTER — HOSPITAL ENCOUNTER (EMERGENCY)
Facility: HOSPITAL | Age: 37
Discharge: HOME | End: 2017-03-14
Attending: EMERGENCY MEDICINE
Payer: COMMERCIAL

## 2017-03-13 ENCOUNTER — APPOINTMENT (EMERGENCY)
Dept: CT IMAGING | Facility: HOSPITAL | Age: 37
End: 2017-03-13
Payer: COMMERCIAL

## 2017-03-13 DIAGNOSIS — Z98.890 S/P BIOPSY: ICD-10-CM

## 2017-03-13 DIAGNOSIS — R10.11 RIGHT UPPER QUADRANT ABDOMINAL PAIN: Primary | ICD-10-CM

## 2017-03-13 LAB
BACTERIA UR QL AUTO: ABNORMAL /HPF
BASOPHILS # BLD AUTO: 0.03 THOUSANDS/ΜL (ref 0–0.1)
BASOPHILS NFR BLD AUTO: 0 % (ref 0–1)
BILIRUB UR QL STRIP: NEGATIVE
CLARITY UR: ABNORMAL
COLOR UR: YELLOW
EOSINOPHIL # BLD AUTO: 0.14 THOUSAND/ΜL (ref 0–0.61)
EOSINOPHIL NFR BLD AUTO: 1 % (ref 0–6)
ERYTHROCYTE [DISTWIDTH] IN BLOOD BY AUTOMATED COUNT: 12.9 % (ref 11.6–15.1)
GLUCOSE UR STRIP-MCNC: NEGATIVE MG/DL
HCG UR QL: NEGATIVE
HCT VFR BLD AUTO: 33.7 % (ref 34.8–46.1)
HGB BLD-MCNC: 11.6 G/DL (ref 11.5–15.4)
HGB UR QL STRIP.AUTO: NEGATIVE
KETONES UR STRIP-MCNC: NEGATIVE MG/DL
LEUKOCYTE ESTERASE UR QL STRIP: NEGATIVE
LYMPHOCYTES # BLD AUTO: 3.73 THOUSANDS/ΜL (ref 0.6–4.47)
LYMPHOCYTES NFR BLD AUTO: 38 % (ref 14–44)
MCH RBC QN AUTO: 30.2 PG (ref 26.8–34.3)
MCHC RBC AUTO-ENTMCNC: 34.4 G/DL (ref 31.4–37.4)
MCV RBC AUTO: 88 FL (ref 82–98)
MONOCYTES # BLD AUTO: 0.71 THOUSAND/ΜL (ref 0.17–1.22)
MONOCYTES NFR BLD AUTO: 7 % (ref 4–12)
NEUTROPHILS # BLD AUTO: 5.11 THOUSANDS/ΜL (ref 1.85–7.62)
NEUTS SEG NFR BLD AUTO: 54 % (ref 43–75)
NITRITE UR QL STRIP: NEGATIVE
NON-SQ EPI CELLS URNS QL MICRO: ABNORMAL /HPF
NRBC BLD AUTO-RTO: 0 /100 WBCS
PH UR STRIP.AUTO: 7.5 [PH] (ref 4.5–8)
PLATELET # BLD AUTO: 244 THOUSANDS/UL (ref 149–390)
PMV BLD AUTO: 9.8 FL (ref 8.9–12.7)
PROT UR STRIP-MCNC: ABNORMAL MG/DL
RBC # BLD AUTO: 3.84 MILLION/UL (ref 3.81–5.12)
RBC #/AREA URNS AUTO: ABNORMAL /HPF
SP GR UR STRIP.AUTO: 1.02 (ref 1–1.03)
UROBILINOGEN UR QL STRIP.AUTO: 1 E.U./DL
WBC # BLD AUTO: 9.72 THOUSAND/UL (ref 4.31–10.16)
WBC #/AREA URNS AUTO: ABNORMAL /HPF

## 2017-03-13 PROCEDURE — 96374 THER/PROPH/DIAG INJ IV PUSH: CPT

## 2017-03-13 PROCEDURE — 83690 ASSAY OF LIPASE: CPT | Performed by: EMERGENCY MEDICINE

## 2017-03-13 PROCEDURE — 36415 COLL VENOUS BLD VENIPUNCTURE: CPT | Performed by: EMERGENCY MEDICINE

## 2017-03-13 PROCEDURE — 85025 COMPLETE CBC W/AUTO DIFF WBC: CPT | Performed by: EMERGENCY MEDICINE

## 2017-03-13 PROCEDURE — 80053 COMPREHEN METABOLIC PANEL: CPT | Performed by: EMERGENCY MEDICINE

## 2017-03-13 PROCEDURE — 81001 URINALYSIS AUTO W/SCOPE: CPT

## 2017-03-13 PROCEDURE — 81025 URINE PREGNANCY TEST: CPT

## 2017-03-13 PROCEDURE — 81002 URINALYSIS NONAUTO W/O SCOPE: CPT

## 2017-03-13 PROCEDURE — 74177 CT ABD & PELVIS W/CONTRAST: CPT

## 2017-03-13 PROCEDURE — 96361 HYDRATE IV INFUSION ADD-ON: CPT

## 2017-03-13 RX ORDER — ONDANSETRON 2 MG/ML
4 INJECTION INTRAMUSCULAR; INTRAVENOUS ONCE
Status: COMPLETED | OUTPATIENT
Start: 2017-03-13 | End: 2017-03-13

## 2017-03-13 RX ORDER — KETOROLAC TROMETHAMINE 30 MG/ML
15 INJECTION, SOLUTION INTRAMUSCULAR; INTRAVENOUS ONCE
Status: COMPLETED | OUTPATIENT
Start: 2017-03-14 | End: 2017-03-14

## 2017-03-13 RX ADMIN — ONDANSETRON 4 MG: 2 INJECTION INTRAMUSCULAR; INTRAVENOUS at 23:47

## 2017-03-13 RX ADMIN — SODIUM CHLORIDE 1000 ML: 0.9 INJECTION, SOLUTION INTRAVENOUS at 23:46

## 2017-03-14 VITALS
HEART RATE: 86 BPM | WEIGHT: 209 LBS | OXYGEN SATURATION: 98 % | BODY MASS INDEX: 38.23 KG/M2 | RESPIRATION RATE: 16 BRPM | TEMPERATURE: 98.8 F | DIASTOLIC BLOOD PRESSURE: 63 MMHG | SYSTOLIC BLOOD PRESSURE: 129 MMHG

## 2017-03-14 LAB
ALBUMIN SERPL BCP-MCNC: 3.1 G/DL (ref 3.5–5)
ALP SERPL-CCNC: 58 U/L (ref 46–116)
ALT SERPL W P-5'-P-CCNC: 51 U/L (ref 12–78)
ANION GAP SERPL CALCULATED.3IONS-SCNC: 4 MMOL/L (ref 4–13)
AST SERPL W P-5'-P-CCNC: 24 U/L (ref 5–45)
BILIRUB SERPL-MCNC: 0.16 MG/DL (ref 0.2–1)
BUN SERPL-MCNC: 13 MG/DL (ref 5–25)
CALCIUM SERPL-MCNC: 9 MG/DL (ref 8.3–10.1)
CHLORIDE SERPL-SCNC: 100 MMOL/L (ref 100–108)
CO2 SERPL-SCNC: 33 MMOL/L (ref 21–32)
CREAT SERPL-MCNC: 0.67 MG/DL (ref 0.6–1.3)
GFR SERPL CREATININE-BSD FRML MDRD: >60 ML/MIN/1.73SQ M
GLUCOSE SERPL-MCNC: 146 MG/DL (ref 65–140)
LIPASE SERPL-CCNC: 206 U/L (ref 73–393)
POTASSIUM SERPL-SCNC: 3.9 MMOL/L (ref 3.5–5.3)
PROT SERPL-MCNC: 7.8 G/DL (ref 6.4–8.2)
SODIUM SERPL-SCNC: 137 MMOL/L (ref 136–145)

## 2017-03-14 PROCEDURE — 99284 EMERGENCY DEPT VISIT MOD MDM: CPT

## 2017-03-14 PROCEDURE — 96375 TX/PRO/DX INJ NEW DRUG ADDON: CPT

## 2017-03-14 RX ORDER — PANTOPRAZOLE SODIUM 40 MG/1
40 TABLET, DELAYED RELEASE ORAL
Qty: 30 TABLET | Refills: 0 | Status: ON HOLD | OUTPATIENT
Start: 2017-03-14 | End: 2018-02-07 | Stop reason: ALTCHOICE

## 2017-03-14 RX ORDER — NAPROXEN 500 MG/1
500 TABLET ORAL 2 TIMES DAILY PRN
Qty: 20 TABLET | Refills: 0 | Status: SHIPPED | OUTPATIENT
Start: 2017-03-14 | End: 2017-11-17

## 2017-03-14 RX ADMIN — KETOROLAC TROMETHAMINE 15 MG: 30 INJECTION, SOLUTION INTRAMUSCULAR at 00:55

## 2017-03-14 RX ADMIN — IOHEXOL 100 ML: 350 INJECTION, SOLUTION INTRAVENOUS at 00:10

## 2017-03-16 ENCOUNTER — ALLSCRIPTS OFFICE VISIT (OUTPATIENT)
Dept: OTHER | Facility: OTHER | Age: 37
End: 2017-03-16

## 2017-03-16 DIAGNOSIS — E11.65 TYPE 2 DIABETES MELLITUS WITH HYPERGLYCEMIA (HCC): ICD-10-CM

## 2017-03-22 ENCOUNTER — HOSPITAL ENCOUNTER (EMERGENCY)
Facility: HOSPITAL | Age: 37
Discharge: HOME | End: 2017-03-22
Attending: EMERGENCY MEDICINE | Admitting: EMERGENCY MEDICINE
Payer: COMMERCIAL

## 2017-03-22 VITALS
OXYGEN SATURATION: 100 % | HEART RATE: 73 BPM | SYSTOLIC BLOOD PRESSURE: 138 MMHG | BODY MASS INDEX: 42.07 KG/M2 | RESPIRATION RATE: 16 BRPM | WEIGHT: 230 LBS | DIASTOLIC BLOOD PRESSURE: 71 MMHG | TEMPERATURE: 98.7 F

## 2017-03-22 DIAGNOSIS — R10.11 RIGHT UPPER QUADRANT PAIN: Primary | ICD-10-CM

## 2017-03-22 LAB
ALBUMIN SERPL BCP-MCNC: 3.1 G/DL (ref 3.5–5)
ALP SERPL-CCNC: 60 U/L (ref 46–116)
ALT SERPL W P-5'-P-CCNC: 46 U/L (ref 12–78)
ANION GAP SERPL CALCULATED.3IONS-SCNC: 6 MMOL/L (ref 4–13)
AST SERPL W P-5'-P-CCNC: 18 U/L (ref 5–45)
BACTERIA UR QL AUTO: ABNORMAL /HPF
BASOPHILS # BLD AUTO: 0.02 THOUSANDS/ΜL (ref 0–0.1)
BASOPHILS NFR BLD AUTO: 0 % (ref 0–1)
BILIRUB SERPL-MCNC: 0.14 MG/DL (ref 0.2–1)
BILIRUB UR QL STRIP: ABNORMAL
BUN SERPL-MCNC: 14 MG/DL (ref 5–25)
CALCIUM SERPL-MCNC: 9.1 MG/DL (ref 8.3–10.1)
CHLORIDE SERPL-SCNC: 99 MMOL/L (ref 100–108)
CLARITY UR: CLEAR
CO2 SERPL-SCNC: 30 MMOL/L (ref 21–32)
COLOR UR: YELLOW
COLOR, POC: YELLOW
CREAT SERPL-MCNC: 0.81 MG/DL (ref 0.6–1.3)
EOSINOPHIL # BLD AUTO: 0.18 THOUSAND/ΜL (ref 0–0.61)
EOSINOPHIL NFR BLD AUTO: 2 % (ref 0–6)
ERYTHROCYTE [DISTWIDTH] IN BLOOD BY AUTOMATED COUNT: 13 % (ref 11.6–15.1)
GFR SERPL CREATININE-BSD FRML MDRD: >60 ML/MIN/1.73SQ M
GLUCOSE SERPL-MCNC: 152 MG/DL (ref 65–140)
GLUCOSE UR STRIP-MCNC: NEGATIVE MG/DL
HCG UR QL: NEGATIVE
HCT VFR BLD AUTO: 34.5 % (ref 34.8–46.1)
HGB BLD-MCNC: 11.5 G/DL (ref 11.5–15.4)
HGB UR QL STRIP.AUTO: NEGATIVE
KETONES UR STRIP-MCNC: ABNORMAL MG/DL
LEUKOCYTE ESTERASE UR QL STRIP: NEGATIVE
LIPASE SERPL-CCNC: 148 U/L (ref 73–393)
LYMPHOCYTES # BLD AUTO: 3.67 THOUSANDS/ΜL (ref 0.6–4.47)
LYMPHOCYTES NFR BLD AUTO: 30 % (ref 14–44)
MAGNESIUM SERPL-MCNC: 1.5 MG/DL (ref 1.6–2.6)
MCH RBC QN AUTO: 29.6 PG (ref 26.8–34.3)
MCHC RBC AUTO-ENTMCNC: 33.3 G/DL (ref 31.4–37.4)
MCV RBC AUTO: 89 FL (ref 82–98)
MONOCYTES # BLD AUTO: 0.75 THOUSAND/ΜL (ref 0.17–1.22)
MONOCYTES NFR BLD AUTO: 6 % (ref 4–12)
NEUTROPHILS # BLD AUTO: 7.62 THOUSANDS/ΜL (ref 1.85–7.62)
NEUTS SEG NFR BLD AUTO: 62 % (ref 43–75)
NITRITE UR QL STRIP: NEGATIVE
NON-SQ EPI CELLS URNS QL MICRO: ABNORMAL /HPF
NRBC BLD AUTO-RTO: 0 /100 WBCS
PH UR STRIP.AUTO: 5.5 [PH] (ref 4.5–8)
PLATELET # BLD AUTO: 259 THOUSANDS/UL (ref 149–390)
PMV BLD AUTO: 10.2 FL (ref 8.9–12.7)
POTASSIUM SERPL-SCNC: 3.8 MMOL/L (ref 3.5–5.3)
PROT SERPL-MCNC: 7.8 G/DL (ref 6.4–8.2)
PROT UR STRIP-MCNC: ABNORMAL MG/DL
RBC # BLD AUTO: 3.89 MILLION/UL (ref 3.81–5.12)
RBC #/AREA URNS AUTO: ABNORMAL /HPF
SODIUM SERPL-SCNC: 135 MMOL/L (ref 136–145)
SP GR UR STRIP.AUTO: >=1.03 (ref 1–1.03)
UROBILINOGEN UR QL STRIP.AUTO: 1 E.U./DL
WBC # BLD AUTO: 12.24 THOUSAND/UL (ref 4.31–10.16)
WBC #/AREA URNS AUTO: ABNORMAL /HPF

## 2017-03-22 PROCEDURE — 99284 EMERGENCY DEPT VISIT MOD MDM: CPT

## 2017-03-22 PROCEDURE — 36415 COLL VENOUS BLD VENIPUNCTURE: CPT | Performed by: EMERGENCY MEDICINE

## 2017-03-22 PROCEDURE — 81002 URINALYSIS NONAUTO W/O SCOPE: CPT | Performed by: EMERGENCY MEDICINE

## 2017-03-22 PROCEDURE — 96376 TX/PRO/DX INJ SAME DRUG ADON: CPT

## 2017-03-22 PROCEDURE — 81025 URINE PREGNANCY TEST: CPT | Performed by: EMERGENCY MEDICINE

## 2017-03-22 PROCEDURE — 96374 THER/PROPH/DIAG INJ IV PUSH: CPT

## 2017-03-22 PROCEDURE — 83690 ASSAY OF LIPASE: CPT | Performed by: EMERGENCY MEDICINE

## 2017-03-22 PROCEDURE — 83735 ASSAY OF MAGNESIUM: CPT | Performed by: EMERGENCY MEDICINE

## 2017-03-22 PROCEDURE — 80053 COMPREHEN METABOLIC PANEL: CPT | Performed by: EMERGENCY MEDICINE

## 2017-03-22 PROCEDURE — 96361 HYDRATE IV INFUSION ADD-ON: CPT

## 2017-03-22 PROCEDURE — 85025 COMPLETE CBC W/AUTO DIFF WBC: CPT | Performed by: EMERGENCY MEDICINE

## 2017-03-22 PROCEDURE — 81001 URINALYSIS AUTO W/SCOPE: CPT

## 2017-03-22 RX ORDER — MORPHINE SULFATE 2 MG/ML
2 INJECTION, SOLUTION INTRAMUSCULAR; INTRAVENOUS ONCE
Status: COMPLETED | OUTPATIENT
Start: 2017-03-22 | End: 2017-03-22

## 2017-03-22 RX ORDER — MORPHINE SULFATE 4 MG/ML
4 INJECTION, SOLUTION INTRAMUSCULAR; INTRAVENOUS ONCE
Status: COMPLETED | OUTPATIENT
Start: 2017-03-22 | End: 2017-03-22

## 2017-03-22 RX ADMIN — MORPHINE SULFATE 2 MG: 2 INJECTION, SOLUTION INTRAMUSCULAR; INTRAVENOUS at 21:27

## 2017-03-22 RX ADMIN — SODIUM CHLORIDE 1000 ML: 0.9 INJECTION, SOLUTION INTRAVENOUS at 21:26

## 2017-03-22 RX ADMIN — MORPHINE SULFATE 4 MG: 4 INJECTION, SOLUTION INTRAMUSCULAR; INTRAVENOUS at 22:37

## 2017-04-06 ENCOUNTER — GENERIC CONVERSION - ENCOUNTER (OUTPATIENT)
Dept: OTHER | Facility: OTHER | Age: 37
End: 2017-04-06

## 2017-04-13 ENCOUNTER — APPOINTMENT (EMERGENCY)
Dept: RADIOLOGY | Facility: HOSPITAL | Age: 37
End: 2017-04-13
Payer: COMMERCIAL

## 2017-04-13 ENCOUNTER — HOSPITAL ENCOUNTER (EMERGENCY)
Facility: HOSPITAL | Age: 37
Discharge: HOME | End: 2017-04-13
Admitting: EMERGENCY MEDICINE
Payer: COMMERCIAL

## 2017-04-13 VITALS
HEART RATE: 95 BPM | WEIGHT: 230 LBS | DIASTOLIC BLOOD PRESSURE: 83 MMHG | OXYGEN SATURATION: 98 % | SYSTOLIC BLOOD PRESSURE: 139 MMHG | BODY MASS INDEX: 42.07 KG/M2 | TEMPERATURE: 97.9 F | RESPIRATION RATE: 19 BRPM

## 2017-04-13 DIAGNOSIS — J06.9 URI (UPPER RESPIRATORY INFECTION): Primary | ICD-10-CM

## 2017-04-13 PROCEDURE — 71020 HB CHEST X-RAY 2VW FRONTAL&LATL: CPT

## 2017-04-13 PROCEDURE — 99283 EMERGENCY DEPT VISIT LOW MDM: CPT

## 2017-04-19 ENCOUNTER — APPOINTMENT (OUTPATIENT)
Dept: LAB | Facility: HOSPITAL | Age: 37
End: 2017-04-19
Attending: INTERNAL MEDICINE
Payer: COMMERCIAL

## 2017-04-19 DIAGNOSIS — T50.901A POISONING BY UNSPECIFIED DRUGS, MEDICAMENTS AND BIOLOGICAL SUBSTANCES, ACCIDENTAL (UNINTENTIONAL), INITIAL ENCOUNTER: ICD-10-CM

## 2017-04-19 LAB
ALBUMIN SERPL BCP-MCNC: 3 G/DL (ref 3.5–5)
ALP SERPL-CCNC: 54 U/L (ref 46–116)
ALT SERPL W P-5'-P-CCNC: 24 U/L (ref 12–78)
AST SERPL W P-5'-P-CCNC: 11 U/L (ref 5–45)
BILIRUB DIRECT SERPL-MCNC: 0.08 MG/DL (ref 0–0.2)
BILIRUB SERPL-MCNC: 0.3 MG/DL (ref 0.2–1)
PROT SERPL-MCNC: 7.9 G/DL (ref 6.4–8.2)

## 2017-04-19 PROCEDURE — 80076 HEPATIC FUNCTION PANEL: CPT

## 2017-04-19 PROCEDURE — 36415 COLL VENOUS BLD VENIPUNCTURE: CPT

## 2017-04-24 ENCOUNTER — APPOINTMENT (OUTPATIENT)
Dept: LAB | Facility: HOSPITAL | Age: 37
End: 2017-04-24
Payer: COMMERCIAL

## 2017-04-24 DIAGNOSIS — E11.65 TYPE 2 DIABETES MELLITUS WITH HYPERGLYCEMIA (HCC): ICD-10-CM

## 2017-04-24 LAB
CHOLEST SERPL-MCNC: 256 MG/DL (ref 50–200)
CREAT UR-MCNC: 128 MG/DL
EST. AVERAGE GLUCOSE BLD GHB EST-MCNC: 200 MG/DL
HBA1C MFR BLD: 8.6 % (ref 4.2–6.3)
HDLC SERPL-MCNC: 87 MG/DL (ref 40–60)
LDLC SERPL CALC-MCNC: 148 MG/DL (ref 0–100)
MICROALBUMIN UR-MCNC: 53.8 MG/L (ref 0–20)
MICROALBUMIN/CREAT 24H UR: 42 MG/G CREATININE (ref 0–30)
TRIGL SERPL-MCNC: 106 MG/DL

## 2017-04-24 PROCEDURE — 82570 ASSAY OF URINE CREATININE: CPT

## 2017-04-24 PROCEDURE — 83036 HEMOGLOBIN GLYCOSYLATED A1C: CPT

## 2017-04-24 PROCEDURE — 36415 COLL VENOUS BLD VENIPUNCTURE: CPT

## 2017-04-24 PROCEDURE — 82043 UR ALBUMIN QUANTITATIVE: CPT

## 2017-04-24 PROCEDURE — 80061 LIPID PANEL: CPT

## 2017-04-25 ENCOUNTER — GENERIC CONVERSION - ENCOUNTER (OUTPATIENT)
Dept: OTHER | Facility: OTHER | Age: 37
End: 2017-04-25

## 2017-05-02 ENCOUNTER — ALLSCRIPTS OFFICE VISIT (OUTPATIENT)
Dept: OTHER | Facility: OTHER | Age: 37
End: 2017-05-02

## 2017-05-04 DIAGNOSIS — T50.901A POISONING BY UNSPECIFIED DRUGS, MEDICAMENTS AND BIOLOGICAL SUBSTANCES, ACCIDENTAL (UNINTENTIONAL), INITIAL ENCOUNTER: ICD-10-CM

## 2017-05-05 ENCOUNTER — ALLSCRIPTS OFFICE VISIT (OUTPATIENT)
Dept: OTHER | Facility: OTHER | Age: 37
End: 2017-05-05

## 2017-05-07 ENCOUNTER — HOSPITAL ENCOUNTER (EMERGENCY)
Facility: HOSPITAL | Age: 37
Discharge: HOME | End: 2017-05-07
Payer: COMMERCIAL

## 2017-05-07 VITALS
WEIGHT: 230 LBS | OXYGEN SATURATION: 100 % | BODY MASS INDEX: 42.07 KG/M2 | RESPIRATION RATE: 16 BRPM | DIASTOLIC BLOOD PRESSURE: 64 MMHG | HEART RATE: 72 BPM | TEMPERATURE: 98.3 F | SYSTOLIC BLOOD PRESSURE: 136 MMHG

## 2017-05-07 DIAGNOSIS — G89.29 ACUTE EXACERBATION OF CHRONIC LOW BACK PAIN: Primary | ICD-10-CM

## 2017-05-07 DIAGNOSIS — M54.50 ACUTE EXACERBATION OF CHRONIC LOW BACK PAIN: Primary | ICD-10-CM

## 2017-05-07 LAB
BACTERIA UR QL AUTO: ABNORMAL /HPF
BILIRUB UR QL STRIP: ABNORMAL
CLARITY UR: CLEAR
COLOR UR: YELLOW
GLUCOSE UR STRIP-MCNC: NEGATIVE MG/DL
HCG UR QL: NEGATIVE
HGB UR QL STRIP.AUTO: NEGATIVE
KETONES UR STRIP-MCNC: ABNORMAL MG/DL
LEUKOCYTE ESTERASE UR QL STRIP: NEGATIVE
NITRITE UR QL STRIP: NEGATIVE
NON-SQ EPI CELLS URNS QL MICRO: ABNORMAL /HPF
PH UR STRIP.AUTO: 5.5 [PH] (ref 4.5–8)
PROT UR STRIP-MCNC: ABNORMAL MG/DL
RBC #/AREA URNS AUTO: ABNORMAL /HPF
SP GR UR STRIP.AUTO: >=1.03 (ref 1–1.03)
UROBILINOGEN UR QL STRIP.AUTO: 1 E.U./DL
WBC #/AREA URNS AUTO: ABNORMAL /HPF

## 2017-05-07 PROCEDURE — 81002 URINALYSIS NONAUTO W/O SCOPE: CPT | Performed by: PHYSICIAN ASSISTANT

## 2017-05-07 PROCEDURE — 81025 URINE PREGNANCY TEST: CPT | Performed by: PHYSICIAN ASSISTANT

## 2017-05-07 PROCEDURE — 81001 URINALYSIS AUTO W/SCOPE: CPT

## 2017-05-07 PROCEDURE — 99283 EMERGENCY DEPT VISIT LOW MDM: CPT

## 2017-05-07 RX ORDER — DIAZEPAM 5 MG/1
5 TABLET ORAL ONCE
Status: COMPLETED | OUTPATIENT
Start: 2017-05-07 | End: 2017-05-07

## 2017-05-07 RX ORDER — CYCLOBENZAPRINE HCL 5 MG
5 TABLET ORAL 3 TIMES DAILY PRN
Qty: 15 TABLET | Refills: 0 | Status: SHIPPED | OUTPATIENT
Start: 2017-05-07 | End: 2017-12-11

## 2017-05-07 RX ADMIN — DIAZEPAM 5 MG: 5 TABLET ORAL at 22:37

## 2017-05-11 ENCOUNTER — GENERIC CONVERSION - ENCOUNTER (OUTPATIENT)
Dept: OTHER | Facility: OTHER | Age: 37
End: 2017-05-11

## 2017-05-24 ENCOUNTER — APPOINTMENT (OUTPATIENT)
Dept: LAB | Facility: HOSPITAL | Age: 37
End: 2017-05-24
Payer: COMMERCIAL

## 2017-05-24 ENCOUNTER — TRANSCRIBE ORDERS (OUTPATIENT)
Dept: ADMINISTRATIVE | Facility: HOSPITAL | Age: 37
End: 2017-05-24

## 2017-05-24 DIAGNOSIS — Z79.899 ENCOUNTER FOR LONG-TERM (CURRENT) USE OF OTHER MEDICATIONS: ICD-10-CM

## 2017-05-24 DIAGNOSIS — E78.5 HYPERLIPIDEMIA, UNSPECIFIED HYPERLIPIDEMIA TYPE: ICD-10-CM

## 2017-05-24 DIAGNOSIS — E11.21 DIABETIC GLOMERULOPATHY (HCC): Primary | ICD-10-CM

## 2017-05-24 DIAGNOSIS — E66.01 MORBID OBESITY, UNSPECIFIED OBESITY TYPE (HCC): ICD-10-CM

## 2017-05-24 DIAGNOSIS — E11.21 DIABETIC GLOMERULOPATHY (HCC): ICD-10-CM

## 2017-05-24 LAB
25(OH)D3 SERPL-MCNC: 20.3 NG/ML (ref 30–100)
ALBUMIN SERPL BCP-MCNC: 3.4 G/DL (ref 3.5–5)
ALP SERPL-CCNC: 47 U/L (ref 46–116)
ALT SERPL W P-5'-P-CCNC: 25 U/L (ref 12–78)
ANION GAP SERPL CALCULATED.3IONS-SCNC: 6 MMOL/L (ref 4–13)
AST SERPL W P-5'-P-CCNC: 12 U/L (ref 5–45)
BILIRUB SERPL-MCNC: 0.27 MG/DL (ref 0.2–1)
BUN SERPL-MCNC: 18 MG/DL (ref 5–25)
CALCIUM SERPL-MCNC: 9 MG/DL (ref 8.3–10.1)
CHLORIDE SERPL-SCNC: 102 MMOL/L (ref 100–108)
CO2 SERPL-SCNC: 29 MMOL/L (ref 21–32)
CREAT SERPL-MCNC: 0.81 MG/DL (ref 0.6–1.3)
GFR SERPL CREATININE-BSD FRML MDRD: >60 ML/MIN/1.73SQ M
GLUCOSE P FAST SERPL-MCNC: 229 MG/DL (ref 65–99)
POTASSIUM SERPL-SCNC: 4.4 MMOL/L (ref 3.5–5.3)
PROT SERPL-MCNC: 7.9 G/DL (ref 6.4–8.2)
SODIUM SERPL-SCNC: 137 MMOL/L (ref 136–145)
TSH SERPL DL<=0.05 MIU/L-ACNC: 6.32 UIU/ML (ref 0.36–3.74)

## 2017-05-24 PROCEDURE — 84443 ASSAY THYROID STIM HORMONE: CPT

## 2017-05-24 PROCEDURE — 82306 VITAMIN D 25 HYDROXY: CPT

## 2017-05-24 PROCEDURE — 80053 COMPREHEN METABOLIC PANEL: CPT

## 2017-05-24 PROCEDURE — 36415 COLL VENOUS BLD VENIPUNCTURE: CPT

## 2017-05-25 ENCOUNTER — GENERIC CONVERSION - ENCOUNTER (OUTPATIENT)
Dept: OTHER | Facility: OTHER | Age: 37
End: 2017-05-25

## 2017-05-30 ENCOUNTER — GENERIC CONVERSION - ENCOUNTER (OUTPATIENT)
Dept: OTHER | Facility: OTHER | Age: 37
End: 2017-05-30

## 2017-05-30 LAB
LEFT EYE DIABETIC RETINOPATHY: NORMAL
RIGHT EYE DIABETIC RETINOPATHY: NORMAL

## 2017-05-31 ENCOUNTER — GENERIC CONVERSION - ENCOUNTER (OUTPATIENT)
Dept: OTHER | Facility: OTHER | Age: 37
End: 2017-05-31

## 2017-06-01 ENCOUNTER — ALLSCRIPTS OFFICE VISIT (OUTPATIENT)
Dept: OTHER | Facility: OTHER | Age: 37
End: 2017-06-01

## 2017-06-12 ENCOUNTER — HOSPITAL ENCOUNTER (EMERGENCY)
Facility: HOSPITAL | Age: 37
Discharge: HOME | End: 2017-06-13
Attending: EMERGENCY MEDICINE | Admitting: EMERGENCY MEDICINE
Payer: COMMERCIAL

## 2017-06-12 DIAGNOSIS — R52 BODY ACHES: ICD-10-CM

## 2017-06-12 DIAGNOSIS — R11.2 NAUSEA VOMITING AND DIARRHEA: Primary | ICD-10-CM

## 2017-06-12 DIAGNOSIS — K52.9 GASTROENTERITIS: ICD-10-CM

## 2017-06-12 DIAGNOSIS — E86.0 DEHYDRATION: ICD-10-CM

## 2017-06-12 DIAGNOSIS — R19.7 NAUSEA VOMITING AND DIARRHEA: Primary | ICD-10-CM

## 2017-06-12 LAB
BASOPHILS # BLD AUTO: 0.01 THOUSANDS/ΜL (ref 0–0.1)
BASOPHILS NFR BLD AUTO: 0 % (ref 0–1)
BILIRUB UR QL STRIP: ABNORMAL
CLARITY UR: CLEAR
COLOR UR: YELLOW
EOSINOPHIL # BLD AUTO: 0.08 THOUSAND/ΜL (ref 0–0.61)
EOSINOPHIL NFR BLD AUTO: 1 % (ref 0–6)
ERYTHROCYTE [DISTWIDTH] IN BLOOD BY AUTOMATED COUNT: 13.5 % (ref 11.6–15.1)
GLUCOSE UR STRIP-MCNC: ABNORMAL MG/DL
HCG UR QL: NEGATIVE
HCT VFR BLD AUTO: 34.7 % (ref 34.8–46.1)
HGB BLD-MCNC: 11.8 G/DL (ref 11.5–15.4)
HGB UR QL STRIP.AUTO: NEGATIVE
KETONES UR STRIP-MCNC: ABNORMAL MG/DL
LEUKOCYTE ESTERASE UR QL STRIP: ABNORMAL
LYMPHOCYTES # BLD AUTO: 1.93 THOUSANDS/ΜL (ref 0.6–4.47)
LYMPHOCYTES NFR BLD AUTO: 14 % (ref 14–44)
MCH RBC QN AUTO: 30.1 PG (ref 26.8–34.3)
MCHC RBC AUTO-ENTMCNC: 34 G/DL (ref 31.4–37.4)
MCV RBC AUTO: 89 FL (ref 82–98)
MONOCYTES # BLD AUTO: 1.04 THOUSAND/ΜL (ref 0.17–1.22)
MONOCYTES NFR BLD AUTO: 7 % (ref 4–12)
NEUTROPHILS # BLD AUTO: 10.9 THOUSANDS/ΜL (ref 1.85–7.62)
NEUTS SEG NFR BLD AUTO: 78 % (ref 43–75)
NITRITE UR QL STRIP: NEGATIVE
NRBC BLD AUTO-RTO: 0 /100 WBCS
PH UR STRIP.AUTO: 5 [PH] (ref 4.5–8)
PLATELET # BLD AUTO: 235 THOUSANDS/UL (ref 149–390)
PMV BLD AUTO: 9.7 FL (ref 8.9–12.7)
PROT UR STRIP-MCNC: NEGATIVE MG/DL
RBC # BLD AUTO: 3.92 MILLION/UL (ref 3.81–5.12)
SP GR UR STRIP.AUTO: 1.01 (ref 1–1.03)
UROBILINOGEN UR QL STRIP.AUTO: 0.2 E.U./DL
WBC # BLD AUTO: 13.96 THOUSAND/UL (ref 4.31–10.16)

## 2017-06-12 PROCEDURE — 80053 COMPREHEN METABOLIC PANEL: CPT | Performed by: EMERGENCY MEDICINE

## 2017-06-12 PROCEDURE — 81001 URINALYSIS AUTO W/SCOPE: CPT

## 2017-06-12 PROCEDURE — 96361 HYDRATE IV INFUSION ADD-ON: CPT

## 2017-06-12 PROCEDURE — 81025 URINE PREGNANCY TEST: CPT | Performed by: EMERGENCY MEDICINE

## 2017-06-12 PROCEDURE — 81002 URINALYSIS NONAUTO W/O SCOPE: CPT | Performed by: EMERGENCY MEDICINE

## 2017-06-12 PROCEDURE — 96375 TX/PRO/DX INJ NEW DRUG ADDON: CPT

## 2017-06-12 PROCEDURE — 36415 COLL VENOUS BLD VENIPUNCTURE: CPT | Performed by: EMERGENCY MEDICINE

## 2017-06-12 PROCEDURE — 83690 ASSAY OF LIPASE: CPT | Performed by: EMERGENCY MEDICINE

## 2017-06-12 PROCEDURE — 85025 COMPLETE CBC W/AUTO DIFF WBC: CPT | Performed by: EMERGENCY MEDICINE

## 2017-06-12 RX ORDER — KETOROLAC TROMETHAMINE 30 MG/ML
15 INJECTION, SOLUTION INTRAMUSCULAR; INTRAVENOUS ONCE
Status: COMPLETED | OUTPATIENT
Start: 2017-06-13 | End: 2017-06-13

## 2017-06-12 RX ORDER — ONDANSETRON 2 MG/ML
4 INJECTION INTRAMUSCULAR; INTRAVENOUS ONCE
Status: COMPLETED | OUTPATIENT
Start: 2017-06-12 | End: 2017-06-12

## 2017-06-12 RX ADMIN — ONDANSETRON 4 MG: 2 INJECTION INTRAMUSCULAR; INTRAVENOUS at 23:30

## 2017-06-12 RX ADMIN — SODIUM CHLORIDE 1000 ML: 0.9 INJECTION, SOLUTION INTRAVENOUS at 23:30

## 2017-06-13 ENCOUNTER — APPOINTMENT (EMERGENCY)
Dept: CT IMAGING | Facility: HOSPITAL | Age: 37
End: 2017-06-13
Payer: COMMERCIAL

## 2017-06-13 VITALS
SYSTOLIC BLOOD PRESSURE: 135 MMHG | RESPIRATION RATE: 18 BRPM | TEMPERATURE: 98.6 F | OXYGEN SATURATION: 99 % | DIASTOLIC BLOOD PRESSURE: 60 MMHG | HEART RATE: 109 BPM

## 2017-06-13 LAB
ALBUMIN SERPL BCP-MCNC: 3.4 G/DL (ref 3.5–5)
ALP SERPL-CCNC: 44 U/L (ref 46–116)
ALT SERPL W P-5'-P-CCNC: 25 U/L (ref 12–78)
ANION GAP SERPL CALCULATED.3IONS-SCNC: 9 MMOL/L (ref 4–13)
AST SERPL W P-5'-P-CCNC: 10 U/L (ref 5–45)
BACTERIA UR QL AUTO: ABNORMAL /HPF
BILIRUB SERPL-MCNC: 0.53 MG/DL (ref 0.2–1)
BUN SERPL-MCNC: 14 MG/DL (ref 5–25)
CALCIUM SERPL-MCNC: 9.4 MG/DL (ref 8.3–10.1)
CHLORIDE SERPL-SCNC: 101 MMOL/L (ref 100–108)
CO2 SERPL-SCNC: 29 MMOL/L (ref 21–32)
CREAT SERPL-MCNC: 0.77 MG/DL (ref 0.6–1.3)
GFR SERPL CREATININE-BSD FRML MDRD: >60 ML/MIN/1.73SQ M
GLUCOSE SERPL-MCNC: 140 MG/DL (ref 65–140)
GLUCOSE SERPL-MCNC: 147 MG/DL (ref 65–140)
LACTATE SERPL-SCNC: 1 MMOL/L (ref 0.5–2)
LIPASE SERPL-CCNC: 232 U/L (ref 73–393)
NON-SQ EPI CELLS URNS QL MICRO: ABNORMAL /HPF
POTASSIUM SERPL-SCNC: 4.1 MMOL/L (ref 3.5–5.3)
PROT SERPL-MCNC: 7.7 G/DL (ref 6.4–8.2)
RBC #/AREA URNS AUTO: ABNORMAL /HPF
SODIUM SERPL-SCNC: 139 MMOL/L (ref 136–145)
WBC #/AREA URNS AUTO: ABNORMAL /HPF

## 2017-06-13 PROCEDURE — 96365 THER/PROPH/DIAG IV INF INIT: CPT

## 2017-06-13 PROCEDURE — 96375 TX/PRO/DX INJ NEW DRUG ADDON: CPT

## 2017-06-13 PROCEDURE — 74177 CT ABD & PELVIS W/CONTRAST: CPT

## 2017-06-13 PROCEDURE — 82948 REAGENT STRIP/BLOOD GLUCOSE: CPT

## 2017-06-13 PROCEDURE — 83605 ASSAY OF LACTIC ACID: CPT | Performed by: EMERGENCY MEDICINE

## 2017-06-13 PROCEDURE — 36415 COLL VENOUS BLD VENIPUNCTURE: CPT | Performed by: EMERGENCY MEDICINE

## 2017-06-13 PROCEDURE — 99284 EMERGENCY DEPT VISIT MOD MDM: CPT

## 2017-06-13 RX ORDER — DICYCLOMINE HCL 20 MG
20 TABLET ORAL EVERY 6 HOURS PRN
Qty: 20 TABLET | Refills: 0 | Status: SHIPPED | OUTPATIENT
Start: 2017-06-13 | End: 2018-01-30

## 2017-06-13 RX ORDER — ONDANSETRON 4 MG/1
4 TABLET, ORALLY DISINTEGRATING ORAL ONCE
Status: COMPLETED | OUTPATIENT
Start: 2017-06-13 | End: 2017-06-13

## 2017-06-13 RX ORDER — ONDANSETRON 4 MG/1
4 TABLET, ORALLY DISINTEGRATING ORAL EVERY 8 HOURS PRN
Qty: 20 TABLET | Refills: 0 | Status: SHIPPED | OUTPATIENT
Start: 2017-06-13 | End: 2017-11-29

## 2017-06-13 RX ORDER — DICYCLOMINE HCL 20 MG
20 TABLET ORAL ONCE
Status: COMPLETED | OUTPATIENT
Start: 2017-06-13 | End: 2017-06-13

## 2017-06-13 RX ADMIN — DICYCLOMINE HYDROCHLORIDE 20 MG: 20 TABLET ORAL at 01:42

## 2017-06-13 RX ADMIN — ONDANSETRON 4 MG: 4 TABLET, ORALLY DISINTEGRATING ORAL at 01:42

## 2017-06-13 RX ADMIN — IOHEXOL 100 ML: 350 INJECTION, SOLUTION INTRAVENOUS at 00:41

## 2017-06-13 RX ADMIN — KETOROLAC TROMETHAMINE 15 MG: 30 INJECTION, SOLUTION INTRAMUSCULAR at 00:06

## 2017-06-13 RX ADMIN — PIPERACILLIN SODIUM,TAZOBACTAM SODIUM 2.25 G: 2; .25 INJECTION, POWDER, FOR SOLUTION INTRAVENOUS at 00:07

## 2017-06-15 ENCOUNTER — ALLSCRIPTS OFFICE VISIT (OUTPATIENT)
Dept: OTHER | Facility: OTHER | Age: 37
End: 2017-06-15

## 2017-06-15 DIAGNOSIS — M54.50 LOW BACK PAIN: ICD-10-CM

## 2017-06-22 ENCOUNTER — GENERIC CONVERSION - ENCOUNTER (OUTPATIENT)
Dept: OTHER | Facility: OTHER | Age: 37
End: 2017-06-22

## 2017-06-22 ENCOUNTER — APPOINTMENT (OUTPATIENT)
Dept: PHYSICAL THERAPY | Facility: CLINIC | Age: 37
End: 2017-06-22
Payer: COMMERCIAL

## 2017-06-22 DIAGNOSIS — M54.50 LOW BACK PAIN: ICD-10-CM

## 2017-06-22 PROCEDURE — 97161 PT EVAL LOW COMPLEX 20 MIN: CPT

## 2017-06-28 ENCOUNTER — APPOINTMENT (OUTPATIENT)
Dept: PHYSICAL THERAPY | Facility: CLINIC | Age: 37
End: 2017-06-28
Payer: COMMERCIAL

## 2017-06-28 PROCEDURE — 97010 HOT OR COLD PACKS THERAPY: CPT

## 2017-06-28 PROCEDURE — 97140 MANUAL THERAPY 1/> REGIONS: CPT

## 2017-06-28 PROCEDURE — 97110 THERAPEUTIC EXERCISES: CPT

## 2017-07-24 ENCOUNTER — APPOINTMENT (OUTPATIENT)
Dept: PHYSICAL THERAPY | Facility: CLINIC | Age: 37
End: 2017-07-24
Payer: COMMERCIAL

## 2017-07-24 PROCEDURE — 97164 PT RE-EVAL EST PLAN CARE: CPT

## 2017-07-26 ENCOUNTER — APPOINTMENT (OUTPATIENT)
Dept: PHYSICAL THERAPY | Facility: CLINIC | Age: 37
End: 2017-07-26
Payer: COMMERCIAL

## 2017-07-26 PROCEDURE — 97110 THERAPEUTIC EXERCISES: CPT

## 2017-07-26 PROCEDURE — 97140 MANUAL THERAPY 1/> REGIONS: CPT

## 2017-07-27 ENCOUNTER — ALLSCRIPTS OFFICE VISIT (OUTPATIENT)
Dept: OTHER | Facility: OTHER | Age: 37
End: 2017-07-27

## 2017-07-31 ENCOUNTER — APPOINTMENT (OUTPATIENT)
Dept: PHYSICAL THERAPY | Facility: CLINIC | Age: 37
End: 2017-07-31
Payer: COMMERCIAL

## 2017-07-31 PROCEDURE — 97110 THERAPEUTIC EXERCISES: CPT

## 2017-07-31 PROCEDURE — 97010 HOT OR COLD PACKS THERAPY: CPT

## 2017-07-31 PROCEDURE — 97140 MANUAL THERAPY 1/> REGIONS: CPT

## 2017-08-02 ENCOUNTER — APPOINTMENT (OUTPATIENT)
Dept: PHYSICAL THERAPY | Facility: CLINIC | Age: 37
End: 2017-08-02
Payer: COMMERCIAL

## 2017-08-03 ENCOUNTER — APPOINTMENT (OUTPATIENT)
Dept: PHYSICAL THERAPY | Facility: CLINIC | Age: 37
End: 2017-08-03
Payer: COMMERCIAL

## 2017-08-03 PROCEDURE — 97140 MANUAL THERAPY 1/> REGIONS: CPT

## 2017-08-03 PROCEDURE — 97110 THERAPEUTIC EXERCISES: CPT

## 2017-08-07 ENCOUNTER — APPOINTMENT (OUTPATIENT)
Dept: PHYSICAL THERAPY | Facility: CLINIC | Age: 37
End: 2017-08-07
Payer: COMMERCIAL

## 2017-08-07 PROCEDURE — 97110 THERAPEUTIC EXERCISES: CPT

## 2017-08-07 PROCEDURE — 97140 MANUAL THERAPY 1/> REGIONS: CPT

## 2017-08-09 ENCOUNTER — APPOINTMENT (OUTPATIENT)
Dept: PHYSICAL THERAPY | Facility: CLINIC | Age: 37
End: 2017-08-09
Payer: COMMERCIAL

## 2017-08-09 PROCEDURE — 97140 MANUAL THERAPY 1/> REGIONS: CPT

## 2017-08-09 PROCEDURE — 97110 THERAPEUTIC EXERCISES: CPT

## 2017-08-10 ENCOUNTER — APPOINTMENT (OUTPATIENT)
Dept: PHYSICAL THERAPY | Facility: CLINIC | Age: 37
End: 2017-08-10
Payer: COMMERCIAL

## 2017-08-10 PROCEDURE — 97010 HOT OR COLD PACKS THERAPY: CPT

## 2017-08-10 PROCEDURE — 97110 THERAPEUTIC EXERCISES: CPT

## 2017-08-10 PROCEDURE — 97140 MANUAL THERAPY 1/> REGIONS: CPT

## 2017-08-11 ENCOUNTER — GENERIC CONVERSION - ENCOUNTER (OUTPATIENT)
Dept: OTHER | Facility: OTHER | Age: 37
End: 2017-08-11

## 2017-08-14 ENCOUNTER — APPOINTMENT (OUTPATIENT)
Dept: PHYSICAL THERAPY | Facility: CLINIC | Age: 37
End: 2017-08-14
Payer: COMMERCIAL

## 2017-08-14 PROCEDURE — 97010 HOT OR COLD PACKS THERAPY: CPT

## 2017-08-14 PROCEDURE — 97110 THERAPEUTIC EXERCISES: CPT

## 2017-08-16 ENCOUNTER — APPOINTMENT (OUTPATIENT)
Dept: PHYSICAL THERAPY | Facility: CLINIC | Age: 37
End: 2017-08-16
Payer: COMMERCIAL

## 2017-08-16 PROCEDURE — 97010 HOT OR COLD PACKS THERAPY: CPT

## 2017-08-16 PROCEDURE — 97110 THERAPEUTIC EXERCISES: CPT

## 2017-08-17 ENCOUNTER — APPOINTMENT (OUTPATIENT)
Dept: PHYSICAL THERAPY | Facility: CLINIC | Age: 37
End: 2017-08-17
Payer: COMMERCIAL

## 2017-08-17 PROCEDURE — 97010 HOT OR COLD PACKS THERAPY: CPT

## 2017-08-17 PROCEDURE — 97110 THERAPEUTIC EXERCISES: CPT

## 2017-08-18 ENCOUNTER — TRANSCRIBE ORDERS (OUTPATIENT)
Dept: ADMINISTRATIVE | Facility: HOSPITAL | Age: 37
End: 2017-08-18

## 2017-08-18 ENCOUNTER — APPOINTMENT (OUTPATIENT)
Dept: LAB | Facility: HOSPITAL | Age: 37
End: 2017-08-18
Payer: COMMERCIAL

## 2017-08-18 DIAGNOSIS — Z79.899 ENCOUNTER FOR LONG-TERM (CURRENT) USE OF OTHER MEDICATIONS: ICD-10-CM

## 2017-08-18 DIAGNOSIS — E11.21 DIABETIC GLOMERULOPATHY (HCC): ICD-10-CM

## 2017-08-18 DIAGNOSIS — E11.21 DIABETIC GLOMERULOPATHY (HCC): Primary | ICD-10-CM

## 2017-08-18 DIAGNOSIS — E78.00 PURE HYPERCHOLESTEROLEMIA: ICD-10-CM

## 2017-08-18 DIAGNOSIS — E55.9 UNSPECIFIED VITAMIN D DEFICIENCY: ICD-10-CM

## 2017-08-18 DIAGNOSIS — E66.01 MORBID OBESITY, UNSPECIFIED OBESITY TYPE (HCC): ICD-10-CM

## 2017-08-18 LAB
ALBUMIN SERPL BCP-MCNC: 3.4 G/DL (ref 3.5–5)
ALP SERPL-CCNC: 53 U/L (ref 46–116)
ALT SERPL W P-5'-P-CCNC: 25 U/L (ref 12–78)
ANION GAP SERPL CALCULATED.3IONS-SCNC: -3 MMOL/L (ref 4–13)
AST SERPL W P-5'-P-CCNC: 14 U/L (ref 5–45)
BASOPHILS # BLD AUTO: 0.03 THOUSANDS/ΜL (ref 0–0.1)
BASOPHILS NFR BLD AUTO: 0 % (ref 0–1)
BILIRUB SERPL-MCNC: 0.37 MG/DL (ref 0.2–1)
BUN SERPL-MCNC: 14 MG/DL (ref 5–25)
CALCIUM SERPL-MCNC: 9.3 MG/DL (ref 8.3–10.1)
CHLORIDE SERPL-SCNC: 102 MMOL/L (ref 100–108)
CHOLEST SERPL-MCNC: 171 MG/DL (ref 50–200)
CO2 SERPL-SCNC: 34 MMOL/L (ref 21–32)
CREAT SERPL-MCNC: 0.69 MG/DL (ref 0.6–1.3)
EOSINOPHIL # BLD AUTO: 0.17 THOUSAND/ΜL (ref 0–0.61)
EOSINOPHIL NFR BLD AUTO: 2 % (ref 0–6)
ERYTHROCYTE [DISTWIDTH] IN BLOOD BY AUTOMATED COUNT: 13.6 % (ref 11.6–15.1)
EST. AVERAGE GLUCOSE BLD GHB EST-MCNC: 154 MG/DL
GFR SERPL CREATININE-BSD FRML MDRD: 113 ML/MIN/1.73SQ M
GLUCOSE P FAST SERPL-MCNC: 113 MG/DL (ref 65–99)
HBA1C MFR BLD: 7 % (ref 4.2–6.3)
HCT VFR BLD AUTO: 37 % (ref 34.8–46.1)
HDLC SERPL-MCNC: 69 MG/DL (ref 40–60)
HGB BLD-MCNC: 12.4 G/DL (ref 11.5–15.4)
LDLC SERPL CALC-MCNC: 84 MG/DL (ref 0–100)
LYMPHOCYTES # BLD AUTO: 2.91 THOUSANDS/ΜL (ref 0.6–4.47)
LYMPHOCYTES NFR BLD AUTO: 34 % (ref 14–44)
MCH RBC QN AUTO: 29.7 PG (ref 26.8–34.3)
MCHC RBC AUTO-ENTMCNC: 33.5 G/DL (ref 31.4–37.4)
MCV RBC AUTO: 89 FL (ref 82–98)
MONOCYTES # BLD AUTO: 0.62 THOUSAND/ΜL (ref 0.17–1.22)
MONOCYTES NFR BLD AUTO: 7 % (ref 4–12)
NEUTROPHILS # BLD AUTO: 4.95 THOUSANDS/ΜL (ref 1.85–7.62)
NEUTS SEG NFR BLD AUTO: 57 % (ref 43–75)
NRBC BLD AUTO-RTO: 0 /100 WBCS
PLATELET # BLD AUTO: 245 THOUSANDS/UL (ref 149–390)
PMV BLD AUTO: 9.7 FL (ref 8.9–12.7)
POTASSIUM SERPL-SCNC: 4 MMOL/L (ref 3.5–5.3)
PROT SERPL-MCNC: 8.1 G/DL (ref 6.4–8.2)
RBC # BLD AUTO: 4.18 MILLION/UL (ref 3.81–5.12)
SODIUM SERPL-SCNC: 133 MMOL/L (ref 136–145)
T4 FREE SERPL-MCNC: 0.79 NG/DL (ref 0.76–1.46)
TRIGL SERPL-MCNC: 88 MG/DL
TSH SERPL DL<=0.05 MIU/L-ACNC: 3.62 UIU/ML (ref 0.36–3.74)
WBC # BLD AUTO: 8.68 THOUSAND/UL (ref 4.31–10.16)

## 2017-08-18 PROCEDURE — 36415 COLL VENOUS BLD VENIPUNCTURE: CPT

## 2017-08-18 PROCEDURE — 80053 COMPREHEN METABOLIC PANEL: CPT

## 2017-08-18 PROCEDURE — 84439 ASSAY OF FREE THYROXINE: CPT

## 2017-08-18 PROCEDURE — 83036 HEMOGLOBIN GLYCOSYLATED A1C: CPT

## 2017-08-18 PROCEDURE — 80061 LIPID PANEL: CPT

## 2017-08-18 PROCEDURE — 84443 ASSAY THYROID STIM HORMONE: CPT

## 2017-08-18 PROCEDURE — 85025 COMPLETE CBC W/AUTO DIFF WBC: CPT

## 2017-08-21 ENCOUNTER — GENERIC CONVERSION - ENCOUNTER (OUTPATIENT)
Dept: OTHER | Facility: OTHER | Age: 37
End: 2017-08-21

## 2017-08-21 ENCOUNTER — APPOINTMENT (OUTPATIENT)
Dept: PHYSICAL THERAPY | Facility: CLINIC | Age: 37
End: 2017-08-21
Payer: COMMERCIAL

## 2017-08-21 PROCEDURE — 97010 HOT OR COLD PACKS THERAPY: CPT

## 2017-08-21 PROCEDURE — 97110 THERAPEUTIC EXERCISES: CPT

## 2017-08-21 PROCEDURE — 97140 MANUAL THERAPY 1/> REGIONS: CPT

## 2017-08-24 ENCOUNTER — ALLSCRIPTS OFFICE VISIT (OUTPATIENT)
Dept: OTHER | Facility: OTHER | Age: 37
End: 2017-08-24

## 2017-08-25 ENCOUNTER — APPOINTMENT (OUTPATIENT)
Dept: PHYSICAL THERAPY | Facility: CLINIC | Age: 37
End: 2017-08-25
Payer: COMMERCIAL

## 2017-08-28 ENCOUNTER — APPOINTMENT (OUTPATIENT)
Dept: PHYSICAL THERAPY | Facility: CLINIC | Age: 37
End: 2017-08-28
Payer: COMMERCIAL

## 2017-08-30 ENCOUNTER — APPOINTMENT (OUTPATIENT)
Dept: PHYSICAL THERAPY | Facility: CLINIC | Age: 37
End: 2017-08-30
Payer: COMMERCIAL

## 2017-08-31 ENCOUNTER — GENERIC CONVERSION - ENCOUNTER (OUTPATIENT)
Dept: OTHER | Facility: OTHER | Age: 37
End: 2017-08-31

## 2017-09-05 ENCOUNTER — GENERIC CONVERSION - ENCOUNTER (OUTPATIENT)
Dept: OTHER | Facility: OTHER | Age: 37
End: 2017-09-05

## 2017-09-14 ENCOUNTER — HOSPITAL ENCOUNTER (EMERGENCY)
Facility: HOSPITAL | Age: 37
Discharge: HOME | End: 2017-09-14
Attending: EMERGENCY MEDICINE | Admitting: EMERGENCY MEDICINE
Payer: COMMERCIAL

## 2017-09-14 VITALS
BODY MASS INDEX: 40.24 KG/M2 | HEART RATE: 107 BPM | TEMPERATURE: 98.4 F | RESPIRATION RATE: 16 BRPM | DIASTOLIC BLOOD PRESSURE: 59 MMHG | OXYGEN SATURATION: 94 % | WEIGHT: 220 LBS | SYSTOLIC BLOOD PRESSURE: 126 MMHG

## 2017-09-14 DIAGNOSIS — E86.0 DEHYDRATION, MILD: ICD-10-CM

## 2017-09-14 DIAGNOSIS — R19.7 NAUSEA, VOMITING AND DIARRHEA: Primary | ICD-10-CM

## 2017-09-14 DIAGNOSIS — R11.2 NAUSEA, VOMITING AND DIARRHEA: Primary | ICD-10-CM

## 2017-09-14 DIAGNOSIS — R10.9 ABDOMINAL CRAMPING: ICD-10-CM

## 2017-09-14 LAB
ALBUMIN SERPL BCP-MCNC: 3.4 G/DL (ref 3.5–5)
ALP SERPL-CCNC: 61 U/L (ref 46–116)
ALT SERPL W P-5'-P-CCNC: 27 U/L (ref 12–78)
ANION GAP SERPL CALCULATED.3IONS-SCNC: 5 MMOL/L (ref 4–13)
AST SERPL W P-5'-P-CCNC: 15 U/L (ref 5–45)
BASOPHILS # BLD AUTO: 0.01 THOUSANDS/ΜL (ref 0–0.1)
BASOPHILS NFR BLD AUTO: 0 % (ref 0–1)
BILIRUB SERPL-MCNC: 0.34 MG/DL (ref 0.2–1)
BUN SERPL-MCNC: 14 MG/DL (ref 5–25)
CALCIUM SERPL-MCNC: 9.1 MG/DL (ref 8.3–10.1)
CHLORIDE SERPL-SCNC: 102 MMOL/L (ref 100–108)
CO2 SERPL-SCNC: 31 MMOL/L (ref 21–32)
CREAT SERPL-MCNC: 0.72 MG/DL (ref 0.6–1.3)
EOSINOPHIL # BLD AUTO: 0.1 THOUSAND/ΜL (ref 0–0.61)
EOSINOPHIL NFR BLD AUTO: 1 % (ref 0–6)
ERYTHROCYTE [DISTWIDTH] IN BLOOD BY AUTOMATED COUNT: 13.9 % (ref 11.6–15.1)
GFR SERPL CREATININE-BSD FRML MDRD: 109 ML/MIN/1.73SQ M
GLUCOSE SERPL-MCNC: 136 MG/DL (ref 65–140)
HCT VFR BLD AUTO: 37.3 % (ref 34.8–46.1)
HGB BLD-MCNC: 12.7 G/DL (ref 11.5–15.4)
LIPASE SERPL-CCNC: 185 U/L (ref 73–393)
LYMPHOCYTES # BLD AUTO: 2 THOUSANDS/ΜL (ref 0.6–4.47)
LYMPHOCYTES NFR BLD AUTO: 13 % (ref 14–44)
MCH RBC QN AUTO: 29.9 PG (ref 26.8–34.3)
MCHC RBC AUTO-ENTMCNC: 34 G/DL (ref 31.4–37.4)
MCV RBC AUTO: 88 FL (ref 82–98)
MONOCYTES # BLD AUTO: 1.09 THOUSAND/ΜL (ref 0.17–1.22)
MONOCYTES NFR BLD AUTO: 7 % (ref 4–12)
NEUTROPHILS # BLD AUTO: 12.27 THOUSANDS/ΜL (ref 1.85–7.62)
NEUTS SEG NFR BLD AUTO: 79 % (ref 43–75)
NRBC BLD AUTO-RTO: 0 /100 WBCS
PLATELET # BLD AUTO: 280 THOUSANDS/UL (ref 149–390)
PMV BLD AUTO: 10 FL (ref 8.9–12.7)
POTASSIUM SERPL-SCNC: 4 MMOL/L (ref 3.5–5.3)
PROT SERPL-MCNC: 8.2 G/DL (ref 6.4–8.2)
RBC # BLD AUTO: 4.25 MILLION/UL (ref 3.81–5.12)
SODIUM SERPL-SCNC: 138 MMOL/L (ref 136–145)
WBC # BLD AUTO: 15.47 THOUSAND/UL (ref 4.31–10.16)

## 2017-09-14 PROCEDURE — 83690 ASSAY OF LIPASE: CPT | Performed by: EMERGENCY MEDICINE

## 2017-09-14 PROCEDURE — 85025 COMPLETE CBC W/AUTO DIFF WBC: CPT | Performed by: EMERGENCY MEDICINE

## 2017-09-14 PROCEDURE — 96376 TX/PRO/DX INJ SAME DRUG ADON: CPT

## 2017-09-14 PROCEDURE — 96375 TX/PRO/DX INJ NEW DRUG ADDON: CPT

## 2017-09-14 PROCEDURE — 80053 COMPREHEN METABOLIC PANEL: CPT | Performed by: EMERGENCY MEDICINE

## 2017-09-14 PROCEDURE — 36415 COLL VENOUS BLD VENIPUNCTURE: CPT | Performed by: EMERGENCY MEDICINE

## 2017-09-14 PROCEDURE — 99284 EMERGENCY DEPT VISIT MOD MDM: CPT

## 2017-09-14 PROCEDURE — 96374 THER/PROPH/DIAG INJ IV PUSH: CPT

## 2017-09-14 PROCEDURE — 96361 HYDRATE IV INFUSION ADD-ON: CPT

## 2017-09-14 RX ORDER — ONDANSETRON 4 MG/1
4 TABLET, ORALLY DISINTEGRATING ORAL EVERY 8 HOURS PRN
Qty: 20 TABLET | Refills: 0 | Status: SHIPPED | OUTPATIENT
Start: 2017-09-14 | End: 2017-11-12

## 2017-09-14 RX ORDER — ONDANSETRON 2 MG/ML
4 INJECTION INTRAMUSCULAR; INTRAVENOUS ONCE
Status: COMPLETED | OUTPATIENT
Start: 2017-09-14 | End: 2017-09-14

## 2017-09-14 RX ORDER — OMEPRAZOLE 20 MG/1
20 CAPSULE, DELAYED RELEASE ORAL DAILY
Qty: 30 CAPSULE | Refills: 0 | Status: SHIPPED | OUTPATIENT
Start: 2017-09-14 | End: 2018-06-14 | Stop reason: DRUGHIGH

## 2017-09-14 RX ORDER — MAGNESIUM HYDROXIDE/ALUMINUM HYDROXICE/SIMETHICONE 120; 1200; 1200 MG/30ML; MG/30ML; MG/30ML
30 SUSPENSION ORAL ONCE
Status: COMPLETED | OUTPATIENT
Start: 2017-09-14 | End: 2017-09-14

## 2017-09-14 RX ORDER — DICYCLOMINE HCL 20 MG
20 TABLET ORAL EVERY 6 HOURS PRN
Qty: 20 TABLET | Refills: 0 | Status: SHIPPED | OUTPATIENT
Start: 2017-09-14 | End: 2017-11-12

## 2017-09-14 RX ORDER — DICYCLOMINE HCL 20 MG
20 TABLET ORAL ONCE
Status: COMPLETED | OUTPATIENT
Start: 2017-09-14 | End: 2017-09-14

## 2017-09-14 RX ORDER — KETOROLAC TROMETHAMINE 30 MG/ML
30 INJECTION, SOLUTION INTRAMUSCULAR; INTRAVENOUS ONCE
Status: COMPLETED | OUTPATIENT
Start: 2017-09-14 | End: 2017-09-14

## 2017-09-14 RX ADMIN — ONDANSETRON 4 MG: 2 INJECTION INTRAMUSCULAR; INTRAVENOUS at 22:37

## 2017-09-14 RX ADMIN — SODIUM CHLORIDE 1000 ML: 0.9 INJECTION, SOLUTION INTRAVENOUS at 21:33

## 2017-09-14 RX ADMIN — DICYCLOMINE HYDROCHLORIDE 20 MG: 20 TABLET ORAL at 21:35

## 2017-09-14 RX ADMIN — LIDOCAINE HYDROCHLORIDE 15 ML: 20 SOLUTION ORAL; TOPICAL at 22:40

## 2017-09-14 RX ADMIN — KETOROLAC TROMETHAMINE 30 MG: 30 INJECTION, SOLUTION INTRAMUSCULAR at 22:38

## 2017-09-14 RX ADMIN — ONDANSETRON 4 MG: 2 INJECTION INTRAMUSCULAR; INTRAVENOUS at 21:34

## 2017-09-14 RX ADMIN — ALUMINUM HYDROXIDE, MAGNESIUM HYDROXIDE, AND SIMETHICONE 30 ML: 200; 200; 20 SUSPENSION ORAL at 22:40

## 2017-09-14 RX ADMIN — SODIUM CHLORIDE 1000 ML: 0.9 INJECTION, SOLUTION INTRAVENOUS at 22:41

## 2017-09-27 ENCOUNTER — HOSPITAL ENCOUNTER (EMERGENCY)
Facility: HOSPITAL | Age: 37
Discharge: HOME | End: 2017-09-27
Admitting: EMERGENCY MEDICINE
Payer: COMMERCIAL

## 2017-09-27 VITALS
SYSTOLIC BLOOD PRESSURE: 124 MMHG | BODY MASS INDEX: 41.9 KG/M2 | RESPIRATION RATE: 16 BRPM | WEIGHT: 229.06 LBS | DIASTOLIC BLOOD PRESSURE: 60 MMHG | TEMPERATURE: 98.6 F | OXYGEN SATURATION: 97 % | HEART RATE: 96 BPM

## 2017-09-27 DIAGNOSIS — J30.9 ALLERGIC RHINITIS: ICD-10-CM

## 2017-09-27 DIAGNOSIS — M76.62 ACHILLES TENDINITIS, LEFT LEG: Primary | ICD-10-CM

## 2017-09-27 PROCEDURE — 99283 EMERGENCY DEPT VISIT LOW MDM: CPT

## 2017-09-27 RX ORDER — CETIRIZINE HYDROCHLORIDE 10 MG/1
10 TABLET, CHEWABLE ORAL DAILY
Qty: 7 TABLET | Refills: 0 | Status: SHIPPED | OUTPATIENT
Start: 2017-09-27 | End: 2017-11-29

## 2017-09-27 NOTE — ED PROVIDER NOTES
History  Chief Complaint   Patient presents with    Foot Pain     Has pain in the left heel    Facial Swelling     Has swelling of the right side of the face  This is a 82-year-old female patient who comes in with her significant other who was a   Patient comes in with 2 complaints  The 1st being pain at the base of her left Achilles tendon without injury  She wears flip flops most of the time  It hurts when she walks feels better when she comes off of her foot  She takes approximate for the pain without significant decreased  No numbness or paresthesias no history of injury no fevers no chills she has not seen the body for this thus far  Differential diagnosis includes not limited to Achilles tendinitis, tendon rupture unlikely, heel spur unlikely  Second complaint is swelling right side of face/cheek bone significant other states that the last few days she has had some itching over her maxillary area without pain  Mild inflammation from scratching no real swelling or erythema  No fever no chills no congestion  Patient admits to having a scratchy throat and a runny nose  No fever no chills no headache no blurred vision or double vision  No chest pain or shortness of breath no nausea vomiting diarrhea abdominal pain no urinary symptoms  Denies dental pain or swelling  No difficulty swallowing  No difficulty speaking smiling does not appear of any cranial deficits  Prior to Admission Medications   Prescriptions Last Dose Informant Patient Reported? Taking?   busPIRone (BUSPAR) 10 mg tablet   Yes No   Sig: Take 10 mg by mouth 3 (three) times a day  citalopram (CeleXA) 10 mg tablet   Yes No   Sig: Take 10 mg by mouth daily     cyclobenzaprine (FLEXERIL) 5 mg tablet   No No   Sig: Take 1 tablet by mouth 3 (three) times a day as needed for muscle spasms for up to 5 days   dicyclomine (BENTYL) 20 mg tablet   No No   Sig: Take 1 tablet by mouth every 6 (six) hours as needed (abd cramping) for up to 5 days   dicyclomine (BENTYL) 20 mg tablet   No No   Sig: Take 1 tablet by mouth every 6 (six) hours as needed (abd cramping) for up to 5 days   gabapentin (NEURONTIN) 400 mg capsule   Yes No   Sig: Take 400 mg by mouth 2 (two) times a day     insulin glargine (LANTUS) 100 units/mL subcutaneous injection   Yes No   Sig: Inject 50 Units under the skin daily at bedtime  naproxen (NAPROSYN) 500 mg tablet   No No   Sig: Take 1 tablet by mouth 2 (two) times a day as needed for mild pain or moderate pain for up to 10 days   omeprazole (PriLOSEC) 20 mg delayed release capsule   No No   Sig: Take 1 capsule by mouth daily   ondansetron (ZOFRAN-ODT) 4 mg disintegrating tablet   No No   Sig: Take 1 tablet by mouth every 8 (eight) hours as needed for nausea or vomiting for up to 7 days   ondansetron (ZOFRAN-ODT) 4 mg disintegrating tablet   No No   Sig: Take 1 tablet by mouth every 8 (eight) hours as needed for nausea or vomiting for up to 7 days   pantoprazole (PROTONIX) 40 mg tablet   No No   Sig: Take 1 tablet by mouth daily in the early morning for 30 days   sitaGLIPtin-metFORMIN (JANUMET)  MG per tablet   Yes No   Sig: Take 1 tablet by mouth 2 (two) times a day with meals  tiZANidine (ZANAFLEX) 4 mg tablet   No No   Sig: Take 1 tablet by mouth every 8 (eight) hours as needed for muscle spasms for up to 30 days   traMADol (ULTRAM) 50 mg tablet   No No   Sig: Take 1 tablet by mouth every 6 (six) hours as needed for moderate pain   traZODone (DESYREL) 100 mg tablet   No No   Sig: Take 1 tablet by mouth daily at bedtime      Facility-Administered Medications: None       Past Medical History:   Diagnosis Date    Anxiety     Back pain     Depression     Diabetes mellitus     Hyperlipidemia     Liver function study, abnormal        Past Surgical History:   Procedure Laterality Date    ABDOMINAL SURGERY       SECTION      TONSILLECTOMY         History reviewed   No pertinent family history  I have reviewed and agree with the history as documented  Social History   Substance Use Topics    Smoking status: Never Smoker    Smokeless tobacco: Never Used    Alcohol use No        Review of Systems   All other systems reviewed and are negative  Physical Exam  ED Triage Vitals [09/27/17 1248]   Temperature Pulse Respirations Blood Pressure SpO2   98 6 °F (37 °C) 96 16 124/60 97 %      Temp Source Heart Rate Source Patient Position - Orthostatic VS BP Location FiO2 (%)   Oral -- Sitting Right arm --      Pain Score       8           Physical Exam   Constitutional: She is oriented to person, place, and time  She appears well-developed and well-nourished  HENT:   Head: Normocephalic and atraumatic  Right Ear: External ear normal    Left Ear: External ear normal    Nose: Nose normal    Patient had mild cobblestoning   Eyes: Conjunctivae are normal  Pupils are equal, round, and reactive to light  Neck: Normal range of motion  Neck supple  Cardiovascular: Normal rate and regular rhythm  Pulmonary/Chest: Effort normal and breath sounds normal    Abdominal: Soft  Bowel sounds are normal  There is no tenderness  Musculoskeletal: Normal range of motion  Feet:    Neurological: She is alert and oriented to person, place, and time  She has normal strength  She displays normal reflexes  No cranial nerve deficit or sensory deficit  She exhibits normal muscle tone  She displays a negative Romberg sign  Coordination normal  GCS eye subscore is 4  GCS verbal subscore is 5  GCS motor subscore is 6  Reflex Scores:       Tricep reflexes are 2+ on the right side and 2+ on the left side  Bicep reflexes are 2+ on the right side and 2+ on the left side  Brachioradialis reflexes are 2+ on the right side and 2+ on the left side  Patellar reflexes are 2+ on the right side and 2+ on the left side         Achilles reflexes are 2+ on the right side and 2+ on the left side   Skin: Skin is warm  Psychiatric: She has a normal mood and affect  Her behavior is normal    Nursing note and vitals reviewed  ED Medications  Medications - No data to display    Diagnostic Studies  Labs Reviewed - No data to display    No orders to display       Procedures  Procedures      Phone Contacts  ED Phone Contact    ED Course  ED Course                                MDM  CritCedilma Time    Disposition  Final diagnoses:   Achilles tendinitis, left leg   Allergic rhinitis     ED Disposition     ED Disposition Condition Comment    Discharge  Darryl Root discharge to home/self care  Condition at discharge: Good        Follow-up Information     Follow up With Specialties Details Why Contact Info    Telma Ortega PA-C Family Medicine Schedule an appointment as soon as possible for a visit  [facility]  [address] [facility] [address] [place] [address]      [address] [facility]   ask for podiatry [facility] [address]  [address] [place] [address]        Patient's Medications   Discharge Prescriptions    CETIRIZINE (ZYRTEC) 10 MG CHEWABLE TABLET    Chew 1 tablet daily       Start Date: 9/27/2017 End Date: --       Order Dose: 10 mg       Quantity: 7 tablet    Refills: 0    LIDOCAINE (XYLOCAINE) 2 % TOPICAL GEL    Apply 1 application topically as needed for mild pain       Start Date: 9/27/2017 End Date: --       Order Dose: 1 application       Quantity: 30 mL    Refills: 0     No discharge procedures on file      ED Provider  Electronically Signed by       Brandan Hernandez PA-C  09/27/17 0889

## 2017-09-27 NOTE — DISCHARGE INSTRUCTIONS
Rinitis alérgica   LO QUE NECESITA SABER:   La rinitis alérgica o fiebre del heno es la inflamación del interior de la [place]  La inflamación es omari reacción a los alérgenos que se encuentran en el aire  Un alérgeno puede ser cualquier cosa que cause omari reacción alérgica  Las alergias a diferentes tipos [address], [employer], [employer] o moho frecuentemente causan la rinitis alérgica  Los [employer] del polvo, las cucarachas, el pelo de las mascotas o el moho del interior de las ugalde también pueden causar rinitis alérgica  INSTRUCCIONES SOBRE EL [facility]ITAL[facility]:   Llame al 911 en laura de presentar lo siguiente:   · Usted tiene dolor en el pecho o falta de aire  Regrese a la patrice de emergencias si:   · Usted tiene dolor intenso  · Usted expectora saad  Pregúntele a stephen [place] vitaminas y minerales son adecuados para usted  · Usted tiene fiebre  · Usted tiene dolor de oído o sinusitis, o dolor de [place]  · Emily síntomas empeoran, aún después del [place]  · A usted le sale de la nariz moco amarillo, verdoso, café o con [place]  · Stephen nariz le sangra o le duele por dentro  · Usted tiene dificultad para dormir debido a emily síntomas  · Usted tiene preguntas o inquietudes acerca de stephen condición o cuidado  Medicamentos:   · Medicamentos,  ayudan a disminuir emily síntomas y aclarar stephen congestión nasal     · [place] emily medicamentos antoine se le haya indicado  Consulte con stephen médico si usted vinny que stephen medicamento no le está ayudando o si presenta efectos secundarios  Infórmele si es alérgico a algún medicamento  Mantenga omari lista actualizada de los [place], las vitaminas y los productos herbales que cynthia  Incluya los siguientes datos de los medicamentos: cantidad, frecuencia y motivo de administración  Traiga con usted la lista o los envases de la píldoras a emily citas de seguimiento  Lleve la lista de los medicamentos con usted en laura de omari emergencia    Cómo manejar la rinitis alérgica:  La mejor forma de manejar la rinitis alérgica es evitar alérgenos que puedan provocar emily síntomas  Cualquiera de los siguientes puede llegar a disminuir emily síntomas:  · Enjuáguese la Iver Miguel y los senos paranasales  con omari solución de agua con sal o use un espray nasal de agua salina  [place] ayudará a diluir la mucosidad en la nariz eliminando el polen y la suciedad  También ayudará a reducir la inflamación para que usted pueda respirar normalmente  Pregúntele a solano médico con cuánta frecuencia debe usted enjuagarse la nariz con el espray  · Reduzca los ácaros del polvo  Cynda Rouleau y toallas en [place] omari vez por semana  [place] emily almohadas y colchones con fundas libres de alérgenos  Limite el número de [address] de shyann y muñecos blandos que tiene solano moise  Lave periódicamente en [place] los juguetes de solano moise  Use omari aspiradora que tenga filtro de aire y aspire semanalmente  Si es posible, deshágase de alfombras y elsa  Estas recogen el polvo y los ácaros del polvo  · Reduzca el polen  Messedamm 28 y pushpa cerradas en la casa y [place]  Permanezca adentro cuando el conteo de polen esté muy elevado  [place] solano aire acondicionado en reciclar y [employer] filtros de aire con frecuencia  [place]áñ[place] y lávese el mina antes de dormir todas las noches para renita el polen que haya acumulado en el mina  · Reduzca la caspa animal   Si es posible, no tenga gatos, perros, pájaros u otras mascotas  Si ya tiene mascotas en el hogar, manténgalas lejos de los dormitorios y habitaciones alfombradas  Báñelos con frecuencia  · [employer]  No pase tiempo en sótanos  Compre plantas artificiales en vez de plantas de verdad  Mantenga la humedad de solano hogar a menos de 45%  Asegúrese que no haya estanques y charcos en solano casa o patio  · No fume  Evite estar con otras personas que fumen   Pida información a solano médico si usted actualmente fuma y necesita ayuda para dejar de Liyah Lucia a emily consultas de control con stephen médico según le indicaron  Es probable que usted tenga que veena un especialista en alergias frecuentemente y para controlar emily síntomas  Anote emily preguntas para que se acuerde de hacerlas viv emily visitas  © 2017 [address] Information is for End User's use only and may not be sold, redistributed or otherwise used for commercial purposes  All illustrations and images included in CareNotes® are the copyrighted property of A D A M , Inc  or Benny Patton  Esta información es sólo para uso en educación  Stephen intención no es darle un consejo médico sobre enfermedades o tratamientos  Colsulte con stephen Benuel Odom farmacéutico antes de seguir cualquier régimen médico para saber si es seguro y efectivo para usted  Tendinitis de Enrrique   LO QUE NECESITA SABER:   La tendinitis de Enrrique es la inflamación del tendón que une al [place] [employer] de stephen pantorrilla con el hueso del talón  Podría suceder repentinamente o volverse omari condición crónica  Stephen riesgo de tendinitis de Enrrique aumenta con la edad  INSTRUCCIONES SOBRE EL [facility]PITALARIA:   Pregúntele a stephen Clotilde Punch vitaminas y minerales son adecuados para usted  · Usted tiene fiebre  · Stephen inflamación o dolor empeoran  · Usted siente u oye un crujido repentino cerca de stephen tobillo  · Usted no puede doblar stephen tobillo o poner presión en stephen pierna  · [place] alguna pregunta acerca de stephen condición o cuidado  Medicamentos:   · AINEs (Analgésicos antiinflamatorios no esteroides) antoine el ibuprofeno, ayudan a disminuir la inflamación, el dolor y la fiebre  Deidre medicamento esta disponible con o sin omari receta médica  Los AINEs pueden causar sangrado estomacal o problemas renales en ciertas personas  Si usted cynthia un medicamento anticoagulante, siempre pregúntele a stephen médico si los CORNELL son seguros para usted   Siempre alejo la etiqueta de deidre medicamento y [place] instrucciones  · [place] emily medicamentos antoine se le haya indicado  Consulte con solano médico si usted vinny que solano medicamento no le está ayudando o si presenta efectos secundarios  Infórmele si es alérgico a cualquier medicamento  Mantenga omari lista actualizada de los [place], las vitaminas y los productos herbales que cynthia  Incluya los siguientes datos de los medicamentos: cantidad, frecuencia y motivo de administración  Traiga con usted la lista o los envases de la píldoras a emily citas de seguimiento  Lleve la lista de los medicamentos con usted en laura de omari emergencia  Controle solano tendinitis de [place]rrique:   · [address] indicaciones  El reposo disminuye la inflamación y kieran que solano tendinitis empeore  Solano médico podría indicarle que suspenda emily entrenamientos y emily ejercicios acostumbrados  Pregúntele cuándo puede regresar a emily actividades normales o a solano rutina de ejercicios  · Aplique hielo  en el tendón de Enrrique de 15 a 20 minutos cada hora o antoine se le indique  Use un paquete de hielo o ponga hielo molido dentro de [employer]  Cúbrala con omari toalla  El hielo ayuda a evitar daño al tejido y a disminuir la inflamación y el dolor  · Use un vendaje de compresión o use omari cinta médica  según las indicaciones  [place] ayudará a aliviar la inflamación y el dolor  Pregunte a solano médico cómo poner el vendaje de compresión o la cinta  Si usted [place] un dispositivo de soporte, pregunte si debería usar un vendaje de compresión o la cinta  · Eleve  solano talón por encima del nivel de solano corazón con la mayor frecuencia posible  [place] va a disminuir inflamación y el dolor  Coloque solano tobillo sobre almohadas o cobijas para mantenerlo elevado cómodamente  · Estírese  antoine se le indique cuando regrese a solano rutina de ejercicios  Siempre wu ejercicios para calentar los músculos y estírese antes de ejercitarse  Stephanie Slicker de enfriamiento y estiramiento cuando termine   Wynema Keen a emily músculos relajados y disminuirá el estrés de solano tendón de Enrrique  · Christina el ejercicio de flexión del talón bilateral según las indicaciones  La flexión o caída del talón bilateral (usando ambos pies) fortalece el tendón de Enrrique  No christina los siguientes ejercicios a menos que solano proveedor le indique que no hay peligro en hacerlos  ¨ De pie y enfrente del borde de un escalón o banquillo  Debe agárrese de omari baranda para mantener el equilibrio  ¨ Coloque la mitad de la parte delantera de emily pies en el escalón  Deje que la parte trasera de solano pie esté por fuera del escalón o banquillo  ¨ Muy lento impulse hacia arriba emily talones y [place] descienda lentamente los talones sobrepasando el escalón  No se deben impulsar los talones con rapidez  Debido a que un movimiento brusco podría empeorar solano Arvid Busman  Repita el ejercicio 20 veces o según las indicaciones  · Aumente el tiempo y la intensidad lentamente cuando regrese a solano rutina de ejercicios  Comience con ejercicios cortos y de baja intensidad  Pregunte a solano médico cómo y cuándo aumentar el tiempo y la intensidad de emily ejercicios  Use dispositivos de soporte o calzado de soporte antoine se le indique:  Los dispositivos de soporte podrían incluir omari [place], omari órtesis o un aparato ortopédico  Estos dispositivos disminuirán la presión de solano tendón de Enrrique y ayudarán a aliviar el dolor  El calzado de soporte amortiguará solano talón y protegerá al tendón de Enrrique  Reemplace los zapatos o tenis que están muy gastados  Acuda a fisioterapia y practique los ejercicios antoine se le indique: Un fisioterapeuta le enseña ejercicios para ayudarlo a mejorar el movimiento y la fuerza, y a disminuir el dolor  Practique estos ejercicios en solano hogar antoine se le indique  Acuda a emily consultas de control con solano médico según le indicaron  Anote emily preguntas para que se acuerde de hacerlas viv emily visitas     © 2017 [address] Information is for End User's use only and may not be sold, redistributed or otherwise used for commercial purposes  All illustrations and images included in CareNotes® are the copyrighted property of A D A M , Inc  or Benny Patton  Esta información es sólo para uso en educación  Solano intención no es darle un consejo médico sobre enfermedades o tratamientos  Colsulte con solano [place] farmacéutico antes de seguir cualquier régimen médico para saber si es seguro y efectivo para usted

## 2017-10-03 ENCOUNTER — GENERIC CONVERSION - ENCOUNTER (OUTPATIENT)
Dept: OTHER | Facility: OTHER | Age: 37
End: 2017-10-03

## 2017-10-03 DIAGNOSIS — E78.5 HYPERLIPIDEMIA: ICD-10-CM

## 2017-10-03 DIAGNOSIS — M77.52 OTHER ENTHESOPATHY OF LEFT FOOT: ICD-10-CM

## 2017-10-03 DIAGNOSIS — E78.00 PURE HYPERCHOLESTEROLEMIA: ICD-10-CM

## 2017-10-03 DIAGNOSIS — R94.31 ABNORMAL ELECTROCARDIOGRAM: ICD-10-CM

## 2017-10-03 DIAGNOSIS — Z01.419 ENCOUNTER FOR GYNECOLOGICAL EXAMINATION WITHOUT ABNORMAL FINDING: ICD-10-CM

## 2017-10-04 ENCOUNTER — HOSPITAL ENCOUNTER (OUTPATIENT)
Dept: RADIOLOGY | Facility: HOSPITAL | Age: 37
Discharge: HOME | End: 2017-10-04
Payer: COMMERCIAL

## 2017-10-04 DIAGNOSIS — M77.52 OTHER ENTHESOPATHY OF LEFT FOOT: ICD-10-CM

## 2017-10-04 PROCEDURE — 73610 X-RAY EXAM OF ANKLE: CPT

## 2017-10-09 ENCOUNTER — GENERIC CONVERSION - ENCOUNTER (OUTPATIENT)
Dept: OTHER | Facility: OTHER | Age: 37
End: 2017-10-09

## 2017-10-11 ENCOUNTER — APPOINTMENT (OUTPATIENT)
Dept: PHYSICAL THERAPY | Facility: CLINIC | Age: 37
End: 2017-10-11
Payer: COMMERCIAL

## 2017-10-11 ENCOUNTER — GENERIC CONVERSION - ENCOUNTER (OUTPATIENT)
Dept: OTHER | Facility: OTHER | Age: 37
End: 2017-10-11

## 2017-10-11 DIAGNOSIS — M77.52 OTHER ENTHESOPATHY OF LEFT FOOT: ICD-10-CM

## 2017-10-11 PROCEDURE — G8979 MOBILITY GOAL STATUS: HCPCS

## 2017-10-11 PROCEDURE — 97161 PT EVAL LOW COMPLEX 20 MIN: CPT

## 2017-10-11 PROCEDURE — G8978 MOBILITY CURRENT STATUS: HCPCS

## 2017-10-16 ENCOUNTER — APPOINTMENT (OUTPATIENT)
Dept: PHYSICAL THERAPY | Facility: CLINIC | Age: 37
End: 2017-10-16
Payer: COMMERCIAL

## 2017-10-16 PROCEDURE — 97010 HOT OR COLD PACKS THERAPY: CPT

## 2017-10-16 PROCEDURE — 97140 MANUAL THERAPY 1/> REGIONS: CPT

## 2017-10-16 PROCEDURE — 97110 THERAPEUTIC EXERCISES: CPT

## 2017-10-17 ENCOUNTER — GENERIC CONVERSION - ENCOUNTER (OUTPATIENT)
Dept: OTHER | Facility: OTHER | Age: 37
End: 2017-10-17

## 2017-10-17 ENCOUNTER — ALLSCRIPTS OFFICE VISIT (OUTPATIENT)
Dept: OTHER | Facility: OTHER | Age: 37
End: 2017-10-17

## 2017-10-18 NOTE — CONSULTS
Assessment  Assessed    1  Chronic pain syndrome (338 4) (G89 4)   2  Chronic low back pain (724 2,338 29) (M54 5,G89 29)   3  Lumbar radiculopathy (724 4) (M54 16)   4  Lumbar degenerative disc disease (722 52) (M51 36)    Plan  Chronic low back pain, Chronic pain syndrome, Lumbar degenerative disc disease,  Lumbar radiculopathy    · Lumbar Interlaminar Epidural Steroid Injection; Status:Active; Requested RAIZA:06ILB6919;    Perform:[facility]; Due:61Dpw8900; Ordered; For:Chronic low back pain, Chronic pain syndrome, Lumbar degenerative disc disease, Lumbar radiculopathy; Ordered By:Shaye Abdullahi;   · Follow-up Visit in 4 Weeks Evaluation and Treatment  Follow-up  Status: Hold For -  Scheduling  Requested for: 26CVE2204   Ordered; For: Chronic low back pain, Chronic pain syndrome, Lumbar degenerative disc disease, Lumbar radiculopathy; Ordered By: Michael Butts Performed:  Due: 65JAR5942    Discussion/Summary    My impressions and treatment recommendations were discussed in detail with the patient, who verbalized understanding and had no further questions  that the patient has signs and symptoms of low back pain and bilateral lower extremity radicular symptoms in what appears to be the bilateral S1 distribution in the context of lumbar degenerative disc disease, discussed the rationale of undergoing a L5-S1 lumbar epidural steroid injection since this could be potentially therapeutic   procedures, its risks, and benefits were explained in detail to the patient  Risks include but are not limited to bleeding, infection, hematoma formation, abscess formation, weakness, headache, failure the pain to improve, nerve irritation or damage, and potential worsening of the pain  The patient verbalized understanding and wished to proceed with the procedure  patient is also complaining of a significant amount of coccygeal pain   In the future, she may benefit from a coccygeal injection and ganglion impar block if her coccygeal pain does not improve following the L5-S1 lumbar epidural steroid injection  I will discuss this with her at a future office visit  is planned with the patient in 4 weeks time or sooner as warranted  Discharge instructions were provided  I personally saw and examined the patient and I agree with the above discussed plan of care  Patient is able to Self-Care  Chief Complaint  Chief Complaints    1  Back Pain    History of Present Illness  Ms Rigoberto Chow is a pleasant 82-year-old  female who presents to [facility] for initial evaluation of the above-stated pain complaints  The patient has a past medical and chronic pain history as outlined in the impression section below  She was referred to our office by Dr Heriberto Tyson  The patient reports a 3 year history of low back pain and lower extremity radicular symptoms in what appears to be the bilateral S1 distribution  She states that her pain is moderate to severe an 8 to 9/10 on the verbal numerical pain rating scale  Her pain is nearly constant in nature and worse in the morning, afternoon, evening, and night  She describes her pain as vision burning, sharp, pressure-like, cramping, throbbing, numbness, and pins and needles   She reports weakness in her lower extremities  The patient reports that there are no pain relieving factors  Lying down, standing, bending, sitting, walking, exercise, relaxation, bowel movements, and menstruation increases pain  She does report that nerve block/injections, physical therapy, home exercises, and heat/ice treatment not provide any pain relief  The patient does report that gabapentin 400 mg 3 times daily and cyclobenzaprine 10 mg 3 times daily as needed for muscle spasms is not giving her any pain relief   The patient does also complain of chronic coccyx pain and stated that she has fallen on her tailbone in the past      Pauline Wolff presents with complaints of gradual onset of constant episodes of severe bilateral lower back pain, described as burning and throbbing, radiating to the bilateral lower leg  On a scale of 1 to 10, the patient rates the pain as 8  Symptoms are not improved by heat and NSAIDs  Symptoms are made worse by standing, sitting, bending and walking down stairs  Review of Systems    Constitutional: no fever,-- no recent weight gain-- and-- no recent weight loss  Eyes: no double vision-- and-- no blurry vision  Cardiovascular: no chest pain,-- no palpitations-- and-- no lower extremity edema  Respiratory: no complaints of shortness of breath-- and-- no wheezing  Musculoskeletal: difficulty walking-- and-- decreased range of motion, but-- no joint stiffness,-- no joint swelling,-- no limb swelling-- and-- no pain in extremity  Neurological: memory loss, but-- no dizziness,-- no difficulty swallowing,-- no loss of consciousness-- and-- no seizures  Gastrointestinal: no nausea,-- no vomiting,-- no constipation-- and-- no diarrhea  Genitourinary: no difficulty initiating urine stream,-- no genital pain-- and-- no frequent urination  Integumentary: no complaints of skin rash  Psychiatric: depression  Endocrine: no excessive thirst,-- no adrenal disease,-- no hypothyroidism-- and-- no hyperthyroidism  Hematologic/Lymphatic: a tendency for easy bruising, but-- no tendency for easy bleeding  ROS reviewed  Active Problems  Problems    1  Anxiety (300 00) (F41 9)   2  Chronic low back pain (724 2,338 29) (M54 5,G89 29)   3  Constipation (564 00) (K59 00)   4  Contact dermatitis (692 9) (L25 9)   5  Depression (311) (F32 9)   6  Depression screening (V79 0) (Z13 89)   7  Dermoid cyst of right lower extremity (216 7) (D23 71)   8  Drug-induced hepatic toxicity (573 3,E947 9) (K71 6,T50 905A)   9  Elevated liver function tests (790 6) (R79 89)   10  Encounter for diabetic foot exam (250 00) (E11 9)   11   Encounter for routine gynecological examination with Papanicolaou smear of cervix    (V72 31,V76 2) (Z01 419)   12  Hyperlipidemia (272 4) (E78 5)   13  Morbidly obese (278 01) (E66 01)   14  Tendinitis of left ankle (727 06) (M77 52)   15  Uncontrolled type 2 diabetes with neuropathy (250 62,357 2) (E11 40,E11 65)    Past Medical History  Problems    1  History of diabetes mellitus (V12 29) (Z86 39)   2  History of Normal vaginal delivery (650) (O80)    The active problems and past medical history were reviewed and updated today  Surgical History  Problems    1  History of  Section   2  History of Tonsillectomy    The surgical history was reviewed and updated today  Family History  Mother    1  Family history of diabetes mellitus (V18 0) (Z83 3)    The family history was reviewed and updated today  Social History  Problems    · Never a smoker   · No drug use   · Non-smoker (V49 89) (Z78 9)   · Social alcohol use (Z78 9)  The social history was reviewed and updated today  The social history was reviewed and is unchanged  Current Meds   1  Alcohol Pads 70 % Pad; use twice daily; Therapy: 45HEU8729 to (Evaluate:89Nlg1814)  Requested for: 50LHT9150; Last   Rx:2017 Ordered   2  BD Pen Needle Mini U/F 31G X 5 MM Miscellaneous; USE AS DIRECTED; Therapy: 04PTB7659 to (Evaluate:2018)  Requested for: 44CUW7010; Last   Rx:2017 Ordered   3  BuSpar 10 MG TABS; TAKE 1 TABLET 3 TIMES DAILY; Therapy: (Recorded:2017) to Recorded   4  BusPIRone HCl - 15 MG Oral Tablet; TAKE 1 TABLET AT BEDTIME; Therapy: 02OTQ2912 to Recorded   5  Citalopram Hydrobromide 40 MG Oral Tablet; TAKE 1 TABLET DAILY; Therapy: 63HBO6739-; Last Rx:2017; Status: NEED INFORMATION - Problem   Ordered   6  Cyclobenzaprine HCl - 10 MG Oral Tablet; TAKE ONE-HALF TO 1 TABLET 3 TIMES  DAILY   AS NEEDED; Therapy: 43LLB3504 to (Last Rx:2017)  Requested for: 2017 Ordered   7  FreeStyle Lancets Miscellaneous;  Check blood sugar twice daily as directed; Therapy: 57WIN9002 to (Sumit Number)  Requested for: 36NNH6296; Last   Rx:31Mar2017 Ordered   8  FreeStyle Lite Device; TEST BLOOD SUGAR TWICE DAILY; Therapy: 46KOL7792 to (Last Rx:31Mar2017)  Requested for: 00BVV5373 Ordered   9  FreeStyle Lite Test In [employer]; test twice daily; Therapy: 54MBL0511 to (Last Rx:31Mar2017)  Requested for: 80TQM6409 Ordered   10  Gabapentin 400 MG Oral Capsule; TAKE 1 CAPSULE 3 times daily; Therapy: 24XVQ5581 to (Evaluate:14Apr2018)  Requested for: 78UCI6835; Last    Rx:16Oct2017 Ordered   11  Lantus SOLN;    Therapy: (Recorded:29Jun2015) to Recorded   12  Lantus SoloStar 100 UNIT/ML Subcutaneous Solution Pen-injector; Inject 50 units at    bedtime; Therapy: 45VIV5185 to (Evaluate:68Rel3434); Last Rx:16Mar2017 Ordered   13  Simvastatin 40 MG Oral Tablet; Therapy: (Recorded:55Zqz9595) to Recorded   14  Synjardy 5-1000 MG Oral Tablet; Therapy: (Recorded:18May2017) to Recorded   15  TiZANidine HCl - 4 MG Oral Tablet; Therapy: (Recorded:16Mar2017) to Recorded   16  TraZODone HCl - 100 MG Oral Tablet; TAKE 2 TABLETS AT BEDTIME; Therapy: (Recorded:16Mar2017) to Recorded   17  Victoza 18 MG/3ML Subcutaneous Solution Pen-injector; Therapy: (Recorded:14Oui1661) to Recorded    The medication list was reviewed and updated today  Allergies  Medication    1  Atorvastatin Calcium TABS    Vitals  Vital Signs    Recorded: 17NDT6158 07:40AM   Temperature 98 6 F   Heart Rate 98   Systolic 306   Diastolic 85   Height 5 ft 2 in   Weight 230 lb    BMI Calculated 42 07   BSA Calculated 2 03   Pain Scale 8     Physical Exam    Constitutional   General appearance: Abnormal   morbidly obese  Eyes   Sclera: anicteric   HEENT   Hearing grossly intact  Pulmonary   Respiratory effort: Even and unlabored  Cardiovascular   Examination of extremities: No edema or pitting edema present          Skin   Skin and subcutaneous tissue: Normal without rashes or lesions, well hydrated  Psychiatric   Mood and affect: Mood and affect appropriate  Neurologic   Cranial nerves: Cranial nerves II-XII grossly intact  Musculoskeletal   Gait and station: Normal     Lumbar/Sacral Spine examination demonstrates Lumbosacral Spine:   Appearance: Normal  Spinal alignment exhibits normal lordosis  Tenderness: None except at lumbar spine-- and-- both sacroiliac joints  Tenderness noted over the sacroiliac joints  FLORENTINO testing is positive on the right  Lumbosacral Spine Sensory:   -- Decreased sensation noted in the right L5 distribution as compared to the left  Flexion was not restricted-- and-- was painful  Extension was not restricted-- and-- was painful  Left lateral flexion was not restricted-- and-- was painless  Right lateral flexion was not restricted-- and-- was painless  Rotation to the left was not restricted-- and-- was painless  Rotation to the right was not restricted-- and-- was painless  Right Foot Plantar Flexion: 5/5  Left Foot Plantar Flexion: 5/5  Right Foot Dorsiflexion: 5/5  Left Foot Dorsiflexion: 5/5  Right Knee Flexion: 5/5  Left Knee Flexion: 5/5  Right Knee Extension: 5/5  Left Knee Extension: 5/5  Right Hip Flexion: 5/5  Left Hip Flexion: 5/5  Right Hip Extension: 5/5  Left Hip Extension: 5/5  Right Hip Abduction: 5/5  Left Hip Abduction: 5/5  Right Hip Adduction: 5/5-- and-- 5/5  Evaluation of Muscle Stretch Reflexes on the right side demonstrates 2/4 Knee Jerk Reflex-- and-- 2/4 Ankle Jerk Reflex  Evaluation of Muscle Stretch Reflexes on the left side demonstrates 2/4 Knee Jerk Reflex-- and-- 2/4 Ankle Jerk Reflex  Special Tests: negative Straight Leg Raise on right-- and-- negative Straight Leg Raise on left  Results/Data    Results     MRI:  Lumbar No compressive cord or root disease   partial sacralization of left greater than right L5 transverse process, a condition which has been demonstrated to predisposed patients to back pain        Future Appointments    Date/Time Provider Specialty Site   10/17/2017 01:00 PM Bariatric 1, Nurse Schedule  [facility]   10/24/2017 09:00 AM Specialty Clinic, Podiatry  [facility]   10/31/2017 08:15 AM [facility] MIMA Mcdonough Schedule  [facility]     Signatures   Electronically signed by : JAMSHID Gomez ; Oct 17 2017  8:19AM EST                       (Author)

## 2017-10-19 ENCOUNTER — APPOINTMENT (OUTPATIENT)
Dept: PHYSICAL THERAPY | Facility: CLINIC | Age: 37
End: 2017-10-19
Payer: COMMERCIAL

## 2017-10-19 PROCEDURE — 97140 MANUAL THERAPY 1/> REGIONS: CPT

## 2017-10-19 PROCEDURE — 97112 NEUROMUSCULAR REEDUCATION: CPT

## 2017-10-19 PROCEDURE — 97110 THERAPEUTIC EXERCISES: CPT

## 2017-10-19 PROCEDURE — 97010 HOT OR COLD PACKS THERAPY: CPT

## 2017-10-23 ENCOUNTER — APPOINTMENT (OUTPATIENT)
Dept: PHYSICAL THERAPY | Facility: CLINIC | Age: 37
End: 2017-10-23
Payer: COMMERCIAL

## 2017-10-23 PROCEDURE — 97110 THERAPEUTIC EXERCISES: CPT

## 2017-10-23 PROCEDURE — 97010 HOT OR COLD PACKS THERAPY: CPT

## 2017-10-23 PROCEDURE — 97140 MANUAL THERAPY 1/> REGIONS: CPT

## 2017-10-24 ENCOUNTER — ALLSCRIPTS OFFICE VISIT (OUTPATIENT)
Dept: RADIOLOGY | Facility: CLINIC | Age: 37
End: 2017-10-24
Payer: COMMERCIAL

## 2017-10-25 ENCOUNTER — ALLSCRIPTS OFFICE VISIT (OUTPATIENT)
Dept: OTHER | Facility: OTHER | Age: 37
End: 2017-10-25

## 2017-10-26 ENCOUNTER — APPOINTMENT (OUTPATIENT)
Dept: PHYSICAL THERAPY | Facility: CLINIC | Age: 37
End: 2017-10-26
Payer: COMMERCIAL

## 2017-10-26 NOTE — PROGRESS NOTES
Assessment  1  Morbidly obese (278 01) (E66 01)   2  Hyperlipidemia (272 4) (E78 5)   3  Hypercholesterolemia (272 0) (E78 00)   4  Abnormal EKG (794 31) (R94 31)    Plan  1  Follow-up PRN Evaluation and Treatment  Follow-up  Status: Complete  Done:   45MZN5470 04:26PM  2  STRESS TEST ONLY, EXERCISE; Status:Hold For - Scheduling; Requested   URJ:61GWP8250;   3  EKG/ECG- POC; Status:Complete;   Done: 85DBZ8630    Discussion/Summary  Patient Clearance: Surgical Clearance: She is at a LOW risk from a cardiovascular standpoint at this time without any additional cardiac testing  Reevaluation needed, if she should present with symptoms prior to surgery/procedure  Discussion Summary:   1  Cardiovascular pre-op clearance:cardiac historymost obvious risk factor is her weight alonenot readilly achieve 6 METS of activity so will check regular stress test- I am going to clear her as she has no significant symptoms or signs of CAD  Chief Complaint  Chief Complaint Free Text Note Form: CV clearance for bariatric surgery      History of Present Illness  HPI: 31 yo female presents for cardiovascular clearance for bariatric surgery  She has hx DM, hyperlipidemia, morbidly obese  No history of cardiac abnormalities  no history of chest pain or SOB  She denies any significant family history of CAD  She gets tired going up stairs  Review of Systems  Cardiology Female ROS:     Cardiac: No complaints of chest pain, no palpitations, no fainting  Skin: No complaints of nonhealing sores or skin rash  Genitourinary: No complaints of recurrent urinary tract infections, frequent urination at night, difficult urination, blood in urine, kidney stones, loss of bladder control, kidney problems, denies any birth control or hormone replacement, is not post menopausal, not currently pregnant  Psychological: No complaints of feeling depressed, anxiety, panic attacks, or difficulty concentrating     General: No complaints of trouble sleeping, lack of energy, fatigue, appetite changes, weight changes, fever, frequent infections, or night sweats  Respiratory: No complaints of shortness of breath, cough with sputum, or wheezing  HEENT: No complaints of serious problems, hearing problems, nose problems, throat problems, or snoring  Gastrointestinal: No complaints of liver problems, nausea, vomiting, heartburn, constipation, bloody stools, diarrhea, problems swallowing, adbominal pain, or rectal bleeding  Hematologic: No complaints of bleeding disorders, anemia, blood clots, or excessive brusing  Neurological: No complaints of numbness, tingling, dizziness, weakness, seizures, headaches, syncope or fainting, AM fatigue, daytime sleepiness, no witnessed apnea episodes  Musculoskeletal: No complaints of arthritis, back pain, or painfull swelling  Active Problems  1  Anxiety (300 00) (F41 9)   2  Chronic low back pain (724 2,338 29) (M54 5,G89 29)   3  Chronic pain syndrome (338 4) (G89 4)   4  Constipation (564 00) (K59 00)   5  Contact dermatitis (692 9) (L25 9)   6  Depression (311) (F32 9)   7  Depression screening (V79 0) (Z13 89)   8  Dermoid cyst of right lower extremity (216 7) (D23 71)   9  Diabetes mellitus (250 00) (E11 9)   10  Drug-induced hepatic toxicity (573 3,E947 9) (K71 6,T50 905A)   11  Elevated liver function tests (790 6) (R79 89)   12  Encounter for diabetic foot exam (250 00) (E11 9)   13  Encounter for routine gynecological examination with Papanicolaou smear of cervix    (V72 31,V76 2) (Z01 419)   14  Hypercholesterolemia (272 0) (E78 00)   15  Hyperlipidemia (272 4) (E78 5)   16  Lumbar degenerative disc disease (722 52) (M51 36)   17  Lumbar radiculopathy (724 4) (M54 16)   18  Morbidly obese (278 01) (E66 01)   19  Tendinitis of left ankle (727 06) (M77 52)   20  Uncontrolled type 2 diabetes with neuropathy (250 62,357 2) (E11 40,E11 65)    Past Medical History  1   History of depression (V11 8) (Z86 59) 2  History of diabetes mellitus (V12 29) (Z86 39)   3  History of Normal vaginal delivery (650) (O80)  Active Problems And Past Medical History Reviewed: The active problems and past medical history were reviewed and updated today  Surgical History  1  History of  Section   2  History of Tonsillectomy  Surgical History Reviewed: The surgical history was reviewed and updated today  Family History  Mother    1  Family history of diabetes mellitus (V18 0) (Z83 3)  Family History Reviewed: The family history was reviewed and updated today  Social History   · Never a smoker   · No drug use   · Non-smoker (V49 89) (Z78 9)   · Social alcohol use (Z78 9)  Social History Reviewed: The social history was reviewed and updated today  Current Meds   1  Alcohol Pads 70 % Pad; use twice daily; Therapy: 75UVA1137 to (Evaluate:60Shi0054)  Requested for: 84XMJ4309; Last   Rx:2017 Ordered   2  BD Pen Needle Mini U/F 31G X 5 MM Miscellaneous; USE AS DIRECTED; Therapy: 25KXN9528 to (Evaluate:2018)  Requested for: 68SLV6964; Last   Rx:2017 Ordered   3  BuSpar 10 MG TABS; TAKE 1 TABLET 3 TIMES DAILY; Therapy: (Recorded:2017) to Recorded   4  Citalopram Hydrobromide 40 MG Oral Tablet; TAKE 1 TABLET DAILY; Therapy: 79VTH5300-; Last Rx:2017; Status: NEED INFORMATION - Problem   Ordered   5  Cyclobenzaprine HCl - 10 MG Oral Tablet; TAKE ONE-HALF TO 1 TABLET 3 TIMES  DAILY   AS NEEDED; Therapy: 67YEQ3215 to (Last Rx:2017)  Requested for: 2017 Ordered   6  FreeStyle Lancets Miscellaneous; Check blood sugar twice daily as directed; Therapy: 06LUL2278 to 729 114 594)  Requested for: 44NMA3709; Last   Rx:2017 Ordered   7  FreeStyle Lite Device; TEST BLOOD SUGAR TWICE DAILY; Therapy: 10JJT5721 to (Last Rx:2017)  Requested for: 60XXM0794 Ordered   8  FreeStyle Lite Test In [employer]; test twice daily;    Therapy: 91ATA5022 to (Last Rx:2017) Requested for: 96OGU8460 Ordered   9  Gabapentin 400 MG Oral Capsule; TAKE 1 CAPSULE 3 times daily; Therapy: 62NXX5244 to (Evaluate:14Apr2018)  Requested for: 35WYO1507; Last   Rx:16Oct2017 Ordered   10  Lantus SOLN;    Therapy: (Recorded:29Jun2015) to Recorded   11  Lantus SoloStar 100 UNIT/ML Subcutaneous Solution Pen-injector; Inject 50 units at    bedtime; Therapy: 89KMJ7378 to (Evaluate:75Wvf2601); Last Rx:16Mar2017 Ordered   12  Simvastatin 40 MG Oral Tablet; Therapy: (Recorded:27Yyw7579) to Recorded   13  Synjardy 5-1000 MG Oral Tablet; Therapy: (Recorded:18May2017) to Recorded   14  TraZODone HCl - 100 MG Oral Tablet; TAKE 2 TABLETS AT BEDTIME; Therapy: (Recorded:16Mar2017) to Recorded   15  Victoza 18 MG/3ML Subcutaneous Solution Pen-injector; Therapy: (Recorded:18May2017) to Recorded  Medication List Reviewed: The medication list was reviewed and updated today  Allergies  1  Atorvastatin Calcium TABS    Vitals  Signs   Recorded: 90RAI2000 04:15PM   Heart Rate: 89, Apical  Systolic: 593, RUE, Sitting  Diastolic: 76, RUE, Sitting  BP Cuff Size: Large  Height: 5 ft 2 5 in  Weight: 224 lb 8 oz  BMI Calculated: 40 41  BSA Calculated: 2 02  O2 Saturation: 99    Physical Exam    Constitutional   General appearance: No acute distress, well appearing and well nourished  Eyes   Conjunctiva and Sclera examination: Conjunctiva pink, sclera anicteric  Ears, Nose, Mouth, and Throat - Oropharynx: Clear, nares are clear, mucous membranes are moist    Neck   Neck and thyroid: Normal, supple, trachea midline, no thyromegaly  Pulmonary   Respiratory effort: No increased work of breathing or signs of respiratory distress  Auscultation of lungs: Clear to auscultation, no rales, no rhonchi, no wheezing, good air movement  Cardiovascular   Auscultation of heart: Normal rate and rhythm, normal S1 and S2, no murmurs  Carotid pulses: Normal, 2+ bilaterally      Peripheral vascular exam: Normal pulses throughout, no tenderness, erythema or swelling  Pedal pulses: Normal, 2+ bilaterally  Examination of extremities for edema and/or varicosities: Normal     Abdomen   Abdomen: Non-tender and no distention  Liver and spleen: No hepatomegaly or splenomegaly  Musculoskeletal Gait and station: Normal gait  -- Digits and nails: Normal without clubbing or cyanosis  -- Inspection/palpation of joints, bones, and muscles: Normal, ROM normal     Skin - Skin and subcutaneous tissue: Normal without rashes or lesions  Skin is warm and well perfused, normal turgor  Neurologic - Cranial nerves: II - XII intact  -- Speech: Normal     Psychiatric - Orientation to person, place, and time: Normal -- Mood and affect: Normal       Results/Data  Diagnostic Studies Reviewed Cardio:   Lab Review: Aug 2017:133, K 4 0, Cr 9 [id number] 2%   ECG Report: SR 88 bpm       Future Appointments    Date/Time Provider Specialty Site   11/17/2017 08:30 AM Jovanni Hunt [facility] Bariatric Medicine [facility]   11/06/2017 02:40 PM Specialty Clinic, Podiatry  [place] [address]   10/31/2017 08:15 AM [facility] MIMA Mcdonough Schedule  [facility]     Signatures   Electronically signed by : Sam Marie DO; Oct 25 2017  4:28PM EST                       (Author)

## 2017-10-30 ENCOUNTER — APPOINTMENT (OUTPATIENT)
Dept: PHYSICAL THERAPY | Facility: CLINIC | Age: 37
End: 2017-10-30
Payer: COMMERCIAL

## 2017-10-30 ENCOUNTER — GENERIC CONVERSION - ENCOUNTER (OUTPATIENT)
Dept: OTHER | Facility: OTHER | Age: 37
End: 2017-10-30

## 2017-10-30 PROCEDURE — 97010 HOT OR COLD PACKS THERAPY: CPT

## 2017-10-30 PROCEDURE — 97110 THERAPEUTIC EXERCISES: CPT

## 2017-10-30 PROCEDURE — 97112 NEUROMUSCULAR REEDUCATION: CPT

## 2017-10-30 PROCEDURE — 97140 MANUAL THERAPY 1/> REGIONS: CPT

## 2017-10-31 ENCOUNTER — GENERIC CONVERSION - ENCOUNTER (OUTPATIENT)
Dept: OTHER | Facility: OTHER | Age: 37
End: 2017-10-31

## 2017-10-31 ENCOUNTER — HOSPITAL ENCOUNTER (OUTPATIENT)
Dept: NON INVASIVE DIAGNOSTICS | Facility: CLINIC | Age: 37
Discharge: HOME | End: 2017-10-31
Payer: COMMERCIAL

## 2017-10-31 DIAGNOSIS — R94.31 ABNORMAL ELECTROCARDIOGRAM: ICD-10-CM

## 2017-10-31 DIAGNOSIS — E78.00 PURE HYPERCHOLESTEROLEMIA: ICD-10-CM

## 2017-10-31 DIAGNOSIS — E78.5 HYPERLIPIDEMIA: ICD-10-CM

## 2017-10-31 LAB
CHEST PAIN STATEMENT: NORMAL
MAX DIASTOLIC BP: 72 MMHG
MAX HEART RATE: 179 BPM
MAX PREDICTED HEART RATE: 185 BPM
MAX. SYSTOLIC BP: 176 MMHG
PROTOCOL NAME: NORMAL
TARGET HR FORMULA: NORMAL
TEST INDICATION: NORMAL
TIME IN EXERCISE PHASE: 421 S

## 2017-10-31 PROCEDURE — G0145 SCR C/V CYTO,THINLAYER,RESCR: HCPCS | Performed by: NURSE PRACTITIONER

## 2017-10-31 PROCEDURE — 93017 CV STRESS TEST TRACING ONLY: CPT

## 2017-10-31 PROCEDURE — 87624 HPV HI-RISK TYP POOLED RSLT: CPT | Performed by: NURSE PRACTITIONER

## 2017-11-01 ENCOUNTER — LAB REQUISITION (OUTPATIENT)
Dept: LAB | Facility: HOSPITAL | Age: 37
End: 2017-11-01
Payer: COMMERCIAL

## 2017-11-01 DIAGNOSIS — Z01.419 ENCOUNTER FOR GYNECOLOGICAL EXAMINATION WITHOUT ABNORMAL FINDING: ICD-10-CM

## 2017-11-02 ENCOUNTER — APPOINTMENT (OUTPATIENT)
Dept: PHYSICAL THERAPY | Facility: CLINIC | Age: 37
End: 2017-11-02
Payer: COMMERCIAL

## 2017-11-02 PROCEDURE — 97112 NEUROMUSCULAR REEDUCATION: CPT

## 2017-11-02 PROCEDURE — 97140 MANUAL THERAPY 1/> REGIONS: CPT

## 2017-11-02 PROCEDURE — 97110 THERAPEUTIC EXERCISES: CPT

## 2017-11-03 ENCOUNTER — GENERIC CONVERSION - ENCOUNTER (OUTPATIENT)
Dept: OTHER | Facility: OTHER | Age: 37
End: 2017-11-03

## 2017-11-03 LAB — HPV RRNA GENITAL QL NAA+PROBE: ABNORMAL

## 2017-11-06 ENCOUNTER — ALLSCRIPTS OFFICE VISIT (OUTPATIENT)
Dept: OTHER | Facility: OTHER | Age: 37
End: 2017-11-06

## 2017-11-07 LAB
LAB AP GYN PRIMARY INTERPRETATION: NORMAL
Lab: NORMAL
PATH INTERP SPEC-IMP: NORMAL

## 2017-11-07 NOTE — PROGRESS NOTES
Surgery Scheduling Form  Pre-Operative Risk Assessment:   Date Last Seen: 10/25/17   Date of Surgery: TBD   Hospital: [facility]   Type of Surgery: Bariatric   Surgeon Name: Torie Nice   Pulmonary: No Pulmonary Contraindication for planned surgical procedure   Cardiac: No Cardiac Contraindication for planned surgical procedure   Vascular: No Vascular Contraindication for planned surgical procedure   Physician Comments: Negative stress test completing 7 METS   Further Testing Required: No   Anticoagulation: No   Anesthesia: Choice   Patient Approved for Surgery: Yes   Clearing Doctor: Julio César CHURCH        Signatures   Electronically signed by : Julio César Turner DO; Nov 6 2017  9:24AM EST                       (Author)

## 2017-11-08 ENCOUNTER — GENERIC CONVERSION - ENCOUNTER (OUTPATIENT)
Dept: OTHER | Facility: OTHER | Age: 37
End: 2017-11-08

## 2017-11-09 ENCOUNTER — GENERIC CONVERSION - ENCOUNTER (OUTPATIENT)
Dept: OTHER | Facility: OTHER | Age: 37
End: 2017-11-09

## 2017-11-09 ENCOUNTER — APPOINTMENT (OUTPATIENT)
Dept: PHYSICAL THERAPY | Facility: CLINIC | Age: 37
End: 2017-11-09
Payer: COMMERCIAL

## 2017-11-09 PROCEDURE — 97112 NEUROMUSCULAR REEDUCATION: CPT

## 2017-11-09 PROCEDURE — 97110 THERAPEUTIC EXERCISES: CPT

## 2017-11-09 PROCEDURE — 97140 MANUAL THERAPY 1/> REGIONS: CPT

## 2017-11-12 ENCOUNTER — HOSPITAL ENCOUNTER (EMERGENCY)
Facility: HOSPITAL | Age: 37
Discharge: HOME | End: 2017-11-12
Attending: EMERGENCY MEDICINE | Admitting: EMERGENCY MEDICINE
Payer: COMMERCIAL

## 2017-11-12 VITALS
HEART RATE: 94 BPM | DIASTOLIC BLOOD PRESSURE: 79 MMHG | HEIGHT: 62 IN | TEMPERATURE: 97.2 F | RESPIRATION RATE: 19 BRPM | OXYGEN SATURATION: 99 % | SYSTOLIC BLOOD PRESSURE: 132 MMHG

## 2017-11-12 DIAGNOSIS — R19.7 DIARRHEA: Primary | ICD-10-CM

## 2017-11-12 LAB
ALBUMIN SERPL BCP-MCNC: 3.1 G/DL (ref 3.5–5)
ALP SERPL-CCNC: 74 U/L (ref 46–116)
ALT SERPL W P-5'-P-CCNC: 32 U/L (ref 12–78)
ANION GAP SERPL CALCULATED.3IONS-SCNC: 6 MMOL/L (ref 4–13)
AST SERPL W P-5'-P-CCNC: 14 U/L (ref 5–45)
BACTERIA UR QL AUTO: ABNORMAL /HPF
BASOPHILS # BLD AUTO: 0.02 THOUSANDS/ΜL (ref 0–0.1)
BASOPHILS NFR BLD AUTO: 0 % (ref 0–1)
BILIRUB SERPL-MCNC: 0.49 MG/DL (ref 0.2–1)
BILIRUB UR QL STRIP: NEGATIVE
BUN SERPL-MCNC: 16 MG/DL (ref 5–25)
CALCIUM SERPL-MCNC: 8.7 MG/DL (ref 8.3–10.1)
CHLORIDE SERPL-SCNC: 100 MMOL/L (ref 100–108)
CLARITY UR: CLEAR
CO2 SERPL-SCNC: 28 MMOL/L (ref 21–32)
COLOR UR: YELLOW
CREAT SERPL-MCNC: 0.79 MG/DL (ref 0.6–1.3)
EOSINOPHIL # BLD AUTO: 0.21 THOUSAND/ΜL (ref 0–0.61)
EOSINOPHIL NFR BLD AUTO: 2 % (ref 0–6)
ERYTHROCYTE [DISTWIDTH] IN BLOOD BY AUTOMATED COUNT: 13.8 % (ref 11.6–15.1)
EXT PREG TEST URINE: NEGATIVE
GFR SERPL CREATININE-BSD FRML MDRD: 97 ML/MIN/1.73SQ M
GLUCOSE SERPL-MCNC: 313 MG/DL (ref 65–140)
GLUCOSE UR STRIP-MCNC: ABNORMAL MG/DL
HCT VFR BLD AUTO: 36.5 % (ref 34.8–46.1)
HGB BLD-MCNC: 12.1 G/DL (ref 11.5–15.4)
HGB UR QL STRIP.AUTO: NEGATIVE
KETONES UR STRIP-MCNC: NEGATIVE MG/DL
LEUKOCYTE ESTERASE UR QL STRIP: ABNORMAL
LIPASE SERPL-CCNC: 234 U/L (ref 73–393)
LYMPHOCYTES # BLD AUTO: 2.23 THOUSANDS/ΜL (ref 0.6–4.47)
LYMPHOCYTES NFR BLD AUTO: 21 % (ref 14–44)
MCH RBC QN AUTO: 29.4 PG (ref 26.8–34.3)
MCHC RBC AUTO-ENTMCNC: 33.2 G/DL (ref 31.4–37.4)
MCV RBC AUTO: 89 FL (ref 82–98)
MONOCYTES # BLD AUTO: 0.87 THOUSAND/ΜL (ref 0.17–1.22)
MONOCYTES NFR BLD AUTO: 8 % (ref 4–12)
NEUTROPHILS # BLD AUTO: 7.16 THOUSANDS/ΜL (ref 1.85–7.62)
NEUTS SEG NFR BLD AUTO: 69 % (ref 43–75)
NITRITE UR QL STRIP: NEGATIVE
NON-SQ EPI CELLS URNS QL MICRO: ABNORMAL /HPF
NRBC BLD AUTO-RTO: 0 /100 WBCS
PH UR STRIP.AUTO: 5.5 [PH] (ref 4.5–8)
PLATELET # BLD AUTO: 227 THOUSANDS/UL (ref 149–390)
PMV BLD AUTO: 10.3 FL (ref 8.9–12.7)
POTASSIUM SERPL-SCNC: 3.8 MMOL/L (ref 3.5–5.3)
PROT SERPL-MCNC: 8 G/DL (ref 6.4–8.2)
PROT UR STRIP-MCNC: NEGATIVE MG/DL
RBC # BLD AUTO: 4.11 MILLION/UL (ref 3.81–5.12)
RBC #/AREA URNS AUTO: ABNORMAL /HPF
SODIUM SERPL-SCNC: 134 MMOL/L (ref 136–145)
SP GR UR STRIP.AUTO: <=1.005 (ref 1–1.03)
UROBILINOGEN UR QL STRIP.AUTO: 0.2 E.U./DL
WBC # BLD AUTO: 10.49 THOUSAND/UL (ref 4.31–10.16)
WBC #/AREA URNS AUTO: ABNORMAL /HPF

## 2017-11-12 PROCEDURE — 99284 EMERGENCY DEPT VISIT MOD MDM: CPT

## 2017-11-12 PROCEDURE — 96361 HYDRATE IV INFUSION ADD-ON: CPT

## 2017-11-12 PROCEDURE — 96374 THER/PROPH/DIAG INJ IV PUSH: CPT

## 2017-11-12 PROCEDURE — 81025 URINE PREGNANCY TEST: CPT | Performed by: EMERGENCY MEDICINE

## 2017-11-12 PROCEDURE — 85025 COMPLETE CBC W/AUTO DIFF WBC: CPT | Performed by: EMERGENCY MEDICINE

## 2017-11-12 PROCEDURE — 81002 URINALYSIS NONAUTO W/O SCOPE: CPT | Performed by: EMERGENCY MEDICINE

## 2017-11-12 PROCEDURE — 36415 COLL VENOUS BLD VENIPUNCTURE: CPT | Performed by: EMERGENCY MEDICINE

## 2017-11-12 PROCEDURE — 81001 URINALYSIS AUTO W/SCOPE: CPT

## 2017-11-12 PROCEDURE — 80053 COMPREHEN METABOLIC PANEL: CPT | Performed by: EMERGENCY MEDICINE

## 2017-11-12 PROCEDURE — 83690 ASSAY OF LIPASE: CPT | Performed by: EMERGENCY MEDICINE

## 2017-11-12 RX ORDER — ONDANSETRON 4 MG/1
4 TABLET, ORALLY DISINTEGRATING ORAL EVERY 6 HOURS PRN
Qty: 10 TABLET | Refills: 0 | Status: SHIPPED | OUTPATIENT
Start: 2017-11-12 | End: 2017-11-17

## 2017-11-12 RX ORDER — ONDANSETRON 2 MG/ML
4 INJECTION INTRAMUSCULAR; INTRAVENOUS ONCE
Status: COMPLETED | OUTPATIENT
Start: 2017-11-12 | End: 2017-11-12

## 2017-11-12 RX ORDER — MAGNESIUM HYDROXIDE/ALUMINUM HYDROXICE/SIMETHICONE 120; 1200; 1200 MG/30ML; MG/30ML; MG/30ML
30 SUSPENSION ORAL ONCE
Status: COMPLETED | OUTPATIENT
Start: 2017-11-12 | End: 2017-11-12

## 2017-11-12 RX ORDER — DICYCLOMINE HCL 20 MG
20 TABLET ORAL EVERY 6 HOURS PRN
Qty: 15 TABLET | Refills: 0 | Status: SHIPPED | OUTPATIENT
Start: 2017-11-12 | End: 2017-11-17

## 2017-11-12 RX ADMIN — SODIUM CHLORIDE 1000 ML: 0.9 INJECTION, SOLUTION INTRAVENOUS at 17:41

## 2017-11-12 RX ADMIN — ALUMINUM HYDROXIDE, MAGNESIUM HYDROXIDE, AND SIMETHICONE 30 ML: 200; 200; 20 SUSPENSION ORAL at 18:06

## 2017-11-12 RX ADMIN — ONDANSETRON 4 MG: 2 INJECTION INTRAMUSCULAR; INTRAVENOUS at 17:42

## 2017-11-12 RX ADMIN — LIDOCAINE HYDROCHLORIDE 10 ML: 20 SOLUTION ORAL; TOPICAL at 18:06

## 2017-11-12 NOTE — ED ATTENDING ATTESTATION
Lis Mason DO, saw and evaluated the patient  I have discussed the patient with the resident/non-physician practitioner and agree with the resident's/non-physician practitioner's findings, Plan of Care, and MDM as documented in the resident's/non-physician practitioner's note, except where noted  All available labs and Radiology studies were reviewed  At this point I agree with the current assessment done in the Emergency Department  I have conducted an independent evaluation of this patient a history and physical is as follows:      Critical Care Time  CritCare Time  51-year-old female presents to the emergency department with multiple episodes of nonbloody diarrhea that started about 1:00 a m  Mariaelena Ross She complains of some epigastric burning  No nausea or vomiting, fever or chills  No known ill contacts, recent travel, bad food exposures or recent antibiotics  On exam she is awake and alert and in no distress  Mucous membranes are moist   Heart is regular without murmur  Lungs are clear  Abdomen is soft with mild epigastric tenderness without rebound or guarding  No distention  No hernia  Skin is warm and dry, normal color no rash

## 2017-11-12 NOTE — ED PROVIDER NOTES
History  Chief Complaint   Patient presents with    Diarrhea     pt had episode of diarrhea while sleeping last night (0100) and continues  pt also c/o body aches     A 45-year-old female with past history of depression, anxiety, diabetes and hyperlipidemia; presents with diarrhea since 1:00 a m  this morning  Patient reports at least 10 episodes of nonbloody diarrhea  Patient reports having nausea but no vomiting  Patient also complains of epigastric abdominal pain, which is nonradiating  Patient did take Zofran, Bentyl and Prilosec at symptom onset without relief of symptoms  Patient denies associated fever, chills, chest pain, shortness of breath, flank pain, dysuria, urinary frequency/urgency, peripheral edema and rashes  Patient denies sick contacts, recent travel or antibiotic use  A/P:  Diarrhea, associated with nausea and epigastric abdominal pain  Will check abdominal labs to evaluate for electrolyte abnormality, renal function, hepatitis, pancreatitis and biliary etiology  Will give IV fluids and Zofran  Once nausea is better controlled, we will give GI cocktail  Will check urine for infection and pregnancy  History provided by:  Patient   used: Yes        Prior to Admission Medications   Prescriptions Last Dose Informant Patient Reported? Taking?   busPIRone (BUSPAR) 10 mg tablet   Yes No   Sig: Take 10 mg by mouth 3 (three) times a day  cetirizine (ZyrTEC) 10 MG chewable tablet   No No   Sig: Chew 1 tablet daily   citalopram (CeleXA) 10 mg tablet   Yes No   Sig: Take 10 mg by mouth daily     cyclobenzaprine (FLEXERIL) 5 mg tablet   No No   Sig: Take 1 tablet by mouth 3 (three) times a day as needed for muscle spasms for up to 5 days   dicyclomine (BENTYL) 20 mg tablet   No No   Sig: Take 1 tablet by mouth every 6 (six) hours as needed (abd cramping) for up to 5 days   dicyclomine (BENTYL) 20 mg tablet   No No   Sig: Take 1 tablet by mouth every 6 (six) hours as needed (abd cramping) for up to 5 days   gabapentin (NEURONTIN) 400 mg capsule   Yes No   Sig: Take 400 mg by mouth 2 (two) times a day     insulin glargine (LANTUS) 100 units/mL subcutaneous injection   Yes No   Sig: Inject 50 Units under the skin daily at bedtime  lidocaine (XYLOCAINE) 2 % topical gel   No No   Sig: Apply 1 application topically as needed for mild pain   naproxen (NAPROSYN) 500 mg tablet   No No   Sig: Take 1 tablet by mouth 2 (two) times a day as needed for mild pain or moderate pain for up to 10 days   omeprazole (PriLOSEC) 20 mg delayed release capsule   No No   Sig: Take 1 capsule by mouth daily   ondansetron (ZOFRAN-ODT) 4 mg disintegrating tablet   No No   Sig: Take 1 tablet by mouth every 8 (eight) hours as needed for nausea or vomiting for up to 7 days   ondansetron (ZOFRAN-ODT) 4 mg disintegrating tablet   No No   Sig: Take 1 tablet by mouth every 8 (eight) hours as needed for nausea or vomiting for up to 7 days   pantoprazole (PROTONIX) 40 mg tablet   No No   Sig: Take 1 tablet by mouth daily in the early morning for 30 days   sitaGLIPtin-metFORMIN (JANUMET)  MG per tablet   Yes No   Sig: Take 1 tablet by mouth 2 (two) times a day with meals     tiZANidine (ZANAFLEX) 4 mg tablet   No No   Sig: Take 1 tablet by mouth every 8 (eight) hours as needed for muscle spasms for up to 30 days   traMADol (ULTRAM) 50 mg tablet   No No   Sig: Take 1 tablet by mouth every 6 (six) hours as needed for moderate pain   traZODone (DESYREL) 100 mg tablet   No No   Sig: Take 1 tablet by mouth daily at bedtime      Facility-Administered Medications: None       Past Medical History:   Diagnosis Date    Anxiety     Back pain     Depression     Diabetes mellitus ([facility] [address] )     Hyperlipidemia     Liver function study, abnormal        Past Surgical History:   Procedure Laterality Date    ABDOMINAL SURGERY       SECTION      TONSILLECTOMY      TUBAL LIGATION         No family history on file   I have reviewed and agree with the history as documented  Social History   Substance Use Topics    Smoking status: Never Smoker    Smokeless tobacco: Never Used    Alcohol use No        Review of Systems   Gastrointestinal: Positive for abdominal pain, diarrhea and nausea  All other systems reviewed and are negative        Physical Exam  ED Triage Vitals   Temperature Pulse Respirations Blood Pressure SpO2   11/12/17 1718 11/12/17 1718 11/12/17 1718 11/12/17 1718 11/12/17 1718   (!) 97 2 °F (36 2 °C) 104 18 119/58 99 %      Temp Source Heart Rate Source Patient Position - Orthostatic VS BP Location FiO2 (%)   11/12/17 1718 11/12/17 1718 11/12/17 1718 11/12/17 1718 --   Temporal Monitor Sitting Right arm       Pain Score       11/12/17 1914       No Pain           Orthostatic Vital Signs  Vitals:    11/12/17 1718 11/12/17 1914   BP: 119/58 132/79   Pulse: 104 94   Patient Position - Orthostatic VS: Sitting Sitting       Physical Exam   General Appearance: alert and oriented, nad, non toxic appearing  Skin:  Warm, dry, intact  HEENT: atraumatic, normocephalic  Neck: Supple, trachea midline  Cardiac: RRR; no murmurs, rub, gallops  Pulmonary: lungs CTAB; no wheezes, rales, rhonchi  Gastrointestinal: abdomen soft, epigastric tenderness, nondistended; no guarding or rebound tenderness; good bowel sounds, no mass or bruits; no CVA tenderness  Extremities:  no pedal edema, 2+ pulses; no calf tenderness, no clubbing, no cyanosis  Neuro:  no focal motor or sensory deficits, CN 2-12 grossly intact  Psych:  Normal mood and affect, normal judgement and insight      ED Medications  Medications   sodium chloride 0 9 % bolus 1,000 mL (0 mL Intravenous Stopped 11/12/17 1914)   ondansetron (ZOFRAN) injection 4 mg (4 mg Intravenous Given 11/12/17 1742)   aluminum-magnesium hydroxide-simethicone (MYLANTA) 200-200-20 mg/5 mL oral suspension 30 mL (30 mL Oral Given 11/12/17 1806)   lidocaine viscous (XYLOCAINE) 2 % mucosal solution 10 mL (10 mL Swish & Swallow Given 11/12/17 1806)       Diagnostic Studies  Results Reviewed     Procedure Component Value Units Date/Time    Urine Microscopic [[id number]]  (Abnormal) Collected:  11/12/17 1959    Lab Status:  Final result Specimen:  Urine from Urine, Clean Catch Updated:  11/12/17 1902     RBC, UA None Seen /hpf      WBC, UA 0-1 (A) /hpf      Epithelial Cells Occasional /hpf      Bacteria, UA None Seen /hpf     POCT urinalysis dipstick [[id number]]  (Abnormal) Resulted:  11/12/17 1846    Lab Status:  Final result Specimen:  Urine Updated:  11/12/17 1846    POCT pregnancy, urine [[id number]]  (Normal) Resulted:  11/12/17 1845    Lab Status:  Final result Updated:  11/12/17 1845     EXT PREG TEST UR (Ref: Negative) negative    ED Urine Macroscopic [[id number]]  (Abnormal) Collected:  11/12/17 1959    Lab Status:  Final result Specimen:  Urine Updated:  11/12/17 1844     Color, UA Yellow     Clarity, UA Clear     pH, UA 5 5     Leukocytes, UA Elevated glucose may cause decreased leukocyte values   See urine microscopic for [facility] result/ (A)     Nitrite, UA Negative     Protein, UA Negative mg/dl      Glucose, UA >=1000 (1%) (A) mg/dl      Ketones, UA Negative mg/dl      Urobilinogen, UA 0 2 E U /dl      Bilirubin, UA Negative     Blood, UA Negative     Specific Gravity, UA <=1 005    Narrative:       CLINITEK RESULT    Comprehensive metabolic panel [[id number]]  (Abnormal) Collected:  11/12/17 1741    Lab Status:  Final result Specimen:  Blood from Arm, Left Updated:  11/12/17 1808     Sodium 134 (L) mmol/L      Potassium 3 8 mmol/L      Chloride 100 mmol/L      CO2 28 mmol/L      Anion Gap 6 mmol/L      BUN 16 mg/dL      Creatinine 0 79 mg/dL      Glucose 313 (H) mg/dL      Calcium 8 7 mg/dL      AST 14 U/L      ALT 32 U/L      Alkaline Phosphatase 74 U/L      Total Protein 8 0 g/dL      Albumin 3 1 (L) g/dL      Total Bilirubin 0 49 mg/dL      eGFR 97 ml/min/1 73sq m     Narrative:         Consolidated [employer] Kidney Disease Education Program recommendations are as follows:  GFR calculation is accurate only with a steady state creatinine  Chronic Kidney disease less than 60 ml/min/1 73 sq  meters  Kidney failure less than 15 ml/min/1 73 sq  meters  Lipase [[id number]]  (Normal) Collected:  11/12/17 1741    Lab Status:  Final result Specimen:  Blood from Arm, Left Updated:  11/12/17 1808     Lipase 234 u/L     CBC and differential [[id number]]  (Abnormal) Collected:  11/12/17 1741    Lab Status:  Final result Specimen:  Blood from Arm, Left Updated:  11/12/17 1752     WBC 10 49 (H) Thousand/uL      RBC 4 11 Million/uL      Hemoglobin 12 1 g/dL      Hematocrit 36 5 %      MCV 89 fL      MCH 29 4 pg      MCHC 33 2 g/dL      RDW 13 8 %      MPV 10 3 fL      Platelets 616 Thousands/uL      nRBC 0 /100 WBCs      Neutrophils Relative 69 %      Lymphocytes Relative 21 %      Monocytes Relative 8 %      Eosinophils Relative 2 %      Basophils Relative 0 %      Neutrophils Absolute 7 16 Thousands/µL      Lymphocytes Absolute 2 23 Thousands/µL      Monocytes Absolute 0 87 Thousand/µL      Eosinophils Absolute 0 21 Thousand/µL      Basophils Absolute 0 02 Thousands/µL                  No orders to display         Procedures  Abdominal Ultrasound  Performed by: Kenneth Ronquillo  Authorized by: Torey Nunes     Procedure date/time:  11/12/2017 5:56 PM  Patient location:  ED  Procedure details:     Indications: abdominal pain      Assessment for:  Gallstones    Hepatobiliary:  Visualized  Hepatobiliary findings:     Gallbladder wall:  Normal (0 31 mm)    Gallbladder stones: not identified      Sonographic Yee's sign: negative              Phone Consults  ED Phone Contact    ED Course  ED Course as of Nov 12 1924   Sun Nov 12, 2017   1846 Labs within normal limits    1905 Pt feeling better following IVF and medications  Updated on lab findings    Will discharge home with PCP follow  up MDM  CritCare Time    Disposition  Final diagnoses:   Diarrhea     Time reflects when diagnosis was documented in both MDM as applicable and the Disposition within this note     Time User Action Codes Description Comment    11/12/2017  7:06 PM Kane Garcia Add [R19 7] Diarrhea       ED Disposition     ED Disposition Condition Comment    Discharge  Jin Deluca discharge to home/self care  Condition at discharge: Good        Follow-up Information     Follow up With Specialties Details Why Contact Info    Telma Ortega PA-C Family Medicine Schedule an appointment as soon as possible for a visit in 2 days For re-evaluation [email or web address] [place]  [address] [facility] [address]  [phone]          Discharge Medication List as of 11/12/2017  7:07 PM      CONTINUE these medications which have CHANGED    Details   dicyclomine (BENTYL) 20 mg tablet Take 1 tablet by mouth every 6 (six) hours as needed (abd cramping) for up to 5 days, Starting Sun 11/12/2017, Until Fri 11/17/2017, Print      ondansetron (ZOFRAN-ODT) 4 mg disintegrating tablet Take 1 tablet by mouth every 6 (six) hours as needed for nausea or vomiting for up to 7 days, Starting Sun 11/12/2017, Until Sun 11/19/2017, Print         CONTINUE these medications which have NOT CHANGED    Details   busPIRone (BUSPAR) 10 mg tablet Take 10 mg by mouth 3 (three) times a day , Until Discontinued, Historical Med      cetirizine (ZyrTEC) 10 MG chewable tablet Chew 1 tablet daily, Starting Wed 9/27/2017, Normal      citalopram (CeleXA) 10 mg tablet Take 10 mg by mouth daily  , Until Discontinued, Historical Med      cyclobenzaprine (FLEXERIL) 5 mg tablet Take 1 tablet by mouth 3 (three) times a day as needed for muscle spasms for up to 5 days, Starting 5/7/2017, Until Fri 5/12/17, Normal      gabapentin (NEURONTIN) 400 mg capsule Take 400 mg by mouth 2 (two) times a day  , Until Discontinued, Historical Med      insulin glargine (LANTUS) 100 units/mL subcutaneous injection Inject 50 Units under the skin daily at bedtime  , Until Discontinued, Historical Med      lidocaine (XYLOCAINE) 2 % topical gel Apply 1 application topically as needed for mild pain, Starting Wed 9/27/2017, Normal      naproxen (NAPROSYN) 500 mg tablet Take 1 tablet by mouth 2 (two) times a day as needed for mild pain or moderate pain for up to 10 days, Starting 3/14/2017, Until Fri 3/24/17, Print      omeprazole (PriLOSEC) 20 mg delayed release capsule Take 1 capsule by mouth daily, Starting Thu 9/14/2017, Print      pantoprazole (PROTONIX) 40 mg tablet Take 1 tablet by mouth daily in the early morning for 30 days, Starting 3/14/2017, Until Thu 4/13/17, Print      sitaGLIPtin-metFORMIN (JANUMET)  MG per tablet Take 1 tablet by mouth 2 (two) times a day with meals  , Until Discontinued, Historical Med      tiZANidine (ZANAFLEX) 4 mg tablet Take 1 tablet by mouth every 8 (eight) hours as needed for muscle spasms for up to 30 days, Starting 2/4/2017, Until Mon 3/6/17, Print      traMADol (ULTRAM) 50 mg tablet Take 1 tablet by mouth every 6 (six) hours as needed for moderate pain, Starting 2/22/2017, Until Discontinued, Print      traZODone (DESYREL) 100 mg tablet Take 1 tablet by mouth daily at bedtime, Starting 2/4/2017, Until Discontinued, No Print           No discharge procedures on file  ED Provider  Attending physically available and evaluated Sam Batista I managed the patient along with the ED Attending      Electronically Signed by         Lilly Nguyễn DO  Resident  11/12/17 7139

## 2017-11-13 NOTE — DISCHARGE INSTRUCTIONS
Continue taking the prilosec to help alleviate the abdominal pain  Diarrea aguda   LO QUE NECESITA SABER:   La diarrea aguda comienza rápidamente y dura poco tiempo, usualmente de 1 a 3 días  Puede durar hasta 2 semanas  Es posible que usted no pueda controlar solano diarrea  La diarrea aguda por lo general se detiene por sí nick  INSTRUCCIONES SOBRE EL SANDRA HOSPITALARIA:   Regrese a la patrice de emergencias si:   · Se siente confundido  · Solano ritmo cardíaco es más lento que lo normal      · Nerissa ojos se stephen demasiado hundidos o no le salen lágrimas cuando llora  · Usted orina menos de lo normal o solano orina es de color amarillo oscuro  · Nerissa evacuaciones intestinales tienen mucosidad o [place]  · Usted tiene dolor abdominal intenso  · Usted no puede jerri ningún líquido  Pregúntele a solano [place] Mighty vitaminas y minerales son adecuados para usted  · Nerissa síntomas no mejoran con el tratamiento  · Usted tiene fiebre por encima de los 38 50? (38 5?)  · Tiene dificultad para ingerir alimentos y líquidos porque tiene vómitos  · Usted tiene sed o la boca seca  · Solano diarrea no mejora dentro de 7 días  · Usted tiene preguntas o inquietudes acerca de solano condición o cuidado  Acuda a nerissa consultas de control con solano médico según le indicaron  Anote nerissa preguntas para que se acuerde de hacerlas viv nerissa visitas  Medicamentos:  · El medicamento para la diarrea  es un medicamento de venta nona que ayuda a disminuir o a detener solano diarrea  Si usted cynthia otros medicamentos, hable con solano médico antes de jerri un medicamento para la diarrea  · Antibióticos  podrían ser administrados para tratar omari infección causada por bacterias  · Antiparasitarios  podrían ser administrados para tratar omari infección causada por parásitos  · [place] nerissa medicamentos antoine se le haya indicado    Consulte con solano médico si usted vinny que solano medicamento no le está ayudando o si presenta [employer] secundarios  Infórmele si es alérgico a algún medicamento  Mantenga omari lista actualizada de los [employer], las vitaminas y los productos herbales que cynthia  Incluya los siguientes datos de los medicamentos: cantidad, frecuencia y motivo de administración  Traiga con usted la lista o los envases de la píldoras a emily citas de seguimiento  Lleve la lista de los medicamentos con usted en laura de omari emergencia  Cuidados personales:   · [place] líquidos antoine se le haya indicado  Los líquidos evitarán la deshidratación que es provocada por la diarrea  Pregunte a solano médico sobre la cantidad de líquido que necesita jerri todos los días y cuáles le recomienda  Es posible que necesite jerri omari solución de rehidratación oral (SRO)  [address] cantidades exactas de agua, sal y azúcar que usted necesita para reemplazar los líquidos corporales  Se puede comprar sales para rehidratación oral en la mayoría de los supermercados y [place]  · Coma alimentos que alok fáciles de digerir  Algunos ejemplos incluyen arroz, lentejas, cereales, plátanos, patatas y pan  También se incluyen algunas frutas (plátano, melón), verduras bob cocidas y valerie [place]  Evite alimentos altos en Heraclio Fridge y azúcar  También evite la cafeína, el alcohol, los lácteos y las valerie jacobs hasta que la diarrea haya desaparecido  Prevenga la diarrea aguda:   · Lávese las beth frecuentemente  Utilice agua y [place]  Lávese las beth antes de comer o preparar alimentos  También lávese emily beth después del ir al baño  Utilice alcohol en gel si no tiene [place] y [place]  · Mantenga las superficies del baño limpias  para ayudar a evitar la propagación de gérmenes que provocan la diarrea [facility]  · [address] frutas y verduras antes de consumirlas  Tensed puede ayudar a [facility] gérmenes causantes de diarrea  Si es posible, retire la piel de frutas y verduras o cocínelas bob antes de comerlas  · Cocine la carne antoine se indica  ¨ Cocine la carne picada  a 160 °F      ¨ Cocine la carne de ave picada, la carne de ave entera o los wick de carne de ave  a 165 °F antoine mínimo  Retire la carne del kash  Deje reposar viv 3 minutos antes de comerla  ¨ Cocine los wick enteros de carne que no sea de ave  a 145 °F antoine mínimo  Retire la carne del kash  Deje reposar viv 3 minutos antes de comerla  · [employer] platos que tocaron la carne cruda con [place] con jabón  [place] incluye tablas de cortar, utensilios, platos y recipientes  · Coloque carne cruda o cocida en el refrigerador tan pronto antoine sea posible  Las bacterias pueden crecer en la carne que permanece a temperatura ambiente viv [place]  · No coma ostras, almejas o mejillones crudos o poco cocidos  Estos alimentos pueden estar contaminados y causar infección  · Mara agua filtrada o tratada solo cuando viaja  No ponga hielo en emily bebidas  Mara agua embotellada siempre que sea posible  © 2017 [address] Information is for End User's use only and may not be sold, redistributed or otherwise used for commercial purposes  All illustrations and images included in CareNotes® are the copyrighted property of A D A M , Inc  or Benny Patton  Esta información es sólo para uso en educación  Stephen intención no es darle un consejo médico sobre enfermedades o tratamientos  Colsulte con stephen [place] farmacéutico antes de seguir cualquier régimen médico para saber si es seguro y efectivo para usted

## 2017-11-14 ENCOUNTER — GENERIC CONVERSION - ENCOUNTER (OUTPATIENT)
Dept: OTHER | Facility: OTHER | Age: 37
End: 2017-11-14

## 2017-11-14 ENCOUNTER — APPOINTMENT (OUTPATIENT)
Dept: PHYSICAL THERAPY | Facility: CLINIC | Age: 37
End: 2017-11-14
Payer: COMMERCIAL

## 2017-11-14 PROCEDURE — 97140 MANUAL THERAPY 1/> REGIONS: CPT

## 2017-11-14 PROCEDURE — 97110 THERAPEUTIC EXERCISES: CPT

## 2017-11-16 ENCOUNTER — APPOINTMENT (OUTPATIENT)
Dept: PHYSICAL THERAPY | Facility: CLINIC | Age: 37
End: 2017-11-16
Payer: COMMERCIAL

## 2017-11-17 ENCOUNTER — ALLSCRIPTS OFFICE VISIT (OUTPATIENT)
Dept: OTHER | Facility: OTHER | Age: 37
End: 2017-11-17

## 2017-11-17 ENCOUNTER — HOSPITAL ENCOUNTER (EMERGENCY)
Facility: HOSPITAL | Age: 37
Discharge: HOME | End: 2017-11-17
Attending: EMERGENCY MEDICINE | Admitting: EMERGENCY MEDICINE
Payer: COMMERCIAL

## 2017-11-17 VITALS
RESPIRATION RATE: 18 BRPM | TEMPERATURE: 98.7 F | WEIGHT: 227 LBS | OXYGEN SATURATION: 100 % | BODY MASS INDEX: 41.52 KG/M2 | HEART RATE: 87 BPM | SYSTOLIC BLOOD PRESSURE: 113 MMHG | DIASTOLIC BLOOD PRESSURE: 64 MMHG

## 2017-11-17 DIAGNOSIS — E11.9 TYPE 2 DIABETES MELLITUS WITHOUT COMPLICATIONS (HCC): ICD-10-CM

## 2017-11-17 DIAGNOSIS — E55.9 VITAMIN D DEFICIENCY: ICD-10-CM

## 2017-11-17 DIAGNOSIS — R10.2 PELVIC PAIN: Primary | ICD-10-CM

## 2017-11-17 DIAGNOSIS — N87.9 DYSPLASIA OF CERVIX UTERI: ICD-10-CM

## 2017-11-17 DIAGNOSIS — E66.01 MORBID (SEVERE) OBESITY DUE TO EXCESS CALORIES (HCC): ICD-10-CM

## 2017-11-17 DIAGNOSIS — R10.9 ABDOMINAL PAIN: ICD-10-CM

## 2017-11-17 DIAGNOSIS — E78.5 HYPERLIPIDEMIA: ICD-10-CM

## 2017-11-17 LAB
ALBUMIN SERPL BCP-MCNC: 3.4 G/DL (ref 3.5–5)
ALP SERPL-CCNC: 68 U/L (ref 46–116)
ALT SERPL W P-5'-P-CCNC: 34 U/L (ref 12–78)
ANION GAP SERPL CALCULATED.3IONS-SCNC: 9 MMOL/L (ref 4–13)
AST SERPL W P-5'-P-CCNC: 16 U/L (ref 5–45)
BACTERIA UR QL AUTO: NORMAL /HPF
BASOPHILS # BLD AUTO: 0.03 THOUSANDS/ΜL (ref 0–0.1)
BASOPHILS NFR BLD AUTO: 0 % (ref 0–1)
BILIRUB DIRECT SERPL-MCNC: 0.05 MG/DL (ref 0–0.2)
BILIRUB SERPL-MCNC: 0.16 MG/DL (ref 0.2–1)
BILIRUB UR QL STRIP: NEGATIVE
BUN SERPL-MCNC: 11 MG/DL (ref 5–25)
CALCIUM SERPL-MCNC: 9.9 MG/DL (ref 8.3–10.1)
CHLORIDE SERPL-SCNC: 102 MMOL/L (ref 100–108)
CLARITY UR: CLEAR
CO2 SERPL-SCNC: 31 MMOL/L (ref 21–32)
COLOR UR: YELLOW
COLOR, POC: YELLOW
CREAT SERPL-MCNC: 0.82 MG/DL (ref 0.6–1.3)
EOSINOPHIL # BLD AUTO: 0.2 THOUSAND/ΜL (ref 0–0.61)
EOSINOPHIL NFR BLD AUTO: 2 % (ref 0–6)
ERYTHROCYTE [DISTWIDTH] IN BLOOD BY AUTOMATED COUNT: 13.8 % (ref 11.6–15.1)
EXT PREG TEST URINE: NORMAL
GFR SERPL CREATININE-BSD FRML MDRD: 93 ML/MIN/1.73SQ M
GLUCOSE SERPL-MCNC: 230 MG/DL (ref 65–140)
GLUCOSE UR STRIP-MCNC: ABNORMAL MG/DL
HCG, QUALITATIVE (HISTORICAL): NEGATIVE
HCT VFR BLD AUTO: 38 % (ref 34.8–46.1)
HGB BLD-MCNC: 12.9 G/DL (ref 11.5–15.4)
HGB UR QL STRIP.AUTO: NEGATIVE
KETONES UR STRIP-MCNC: NEGATIVE MG/DL
LEUKOCYTE ESTERASE UR QL STRIP: ABNORMAL
LIPASE SERPL-CCNC: 229 U/L (ref 73–393)
LYMPHOCYTES # BLD AUTO: 3.41 THOUSANDS/ΜL (ref 0.6–4.47)
LYMPHOCYTES NFR BLD AUTO: 36 % (ref 14–44)
MAGNESIUM SERPL-MCNC: 1.8 MG/DL (ref 1.6–2.6)
MCH RBC QN AUTO: 30 PG (ref 26.8–34.3)
MCHC RBC AUTO-ENTMCNC: 33.9 G/DL (ref 31.4–37.4)
MCV RBC AUTO: 88 FL (ref 82–98)
MONOCYTES # BLD AUTO: 0.72 THOUSAND/ΜL (ref 0.17–1.22)
MONOCYTES NFR BLD AUTO: 8 % (ref 4–12)
NEUTROPHILS # BLD AUTO: 5.13 THOUSANDS/ΜL (ref 1.85–7.62)
NEUTS SEG NFR BLD AUTO: 54 % (ref 43–75)
NITRITE UR QL STRIP: NEGATIVE
NON-SQ EPI CELLS URNS QL MICRO: NORMAL /HPF
NRBC BLD AUTO-RTO: 0 /100 WBCS
PH UR STRIP.AUTO: 5 [PH] (ref 4.5–8)
PLATELET # BLD AUTO: 248 THOUSANDS/UL (ref 149–390)
PMV BLD AUTO: 10.3 FL (ref 8.9–12.7)
POTASSIUM SERPL-SCNC: 3.9 MMOL/L (ref 3.5–5.3)
PROT SERPL-MCNC: 8.1 G/DL (ref 6.4–8.2)
PROT UR STRIP-MCNC: NEGATIVE MG/DL
RBC # BLD AUTO: 4.3 MILLION/UL (ref 3.81–5.12)
RBC #/AREA URNS AUTO: NORMAL /HPF
SODIUM SERPL-SCNC: 142 MMOL/L (ref 136–145)
SP GR UR STRIP.AUTO: 1.01 (ref 1–1.03)
UROBILINOGEN UR QL STRIP.AUTO: 0.2 E.U./DL
WBC # BLD AUTO: 9.49 THOUSAND/UL (ref 4.31–10.16)
WBC #/AREA URNS AUTO: NORMAL /HPF

## 2017-11-17 PROCEDURE — 83690 ASSAY OF LIPASE: CPT | Performed by: EMERGENCY MEDICINE

## 2017-11-17 PROCEDURE — 80048 BASIC METABOLIC PNL TOTAL CA: CPT | Performed by: EMERGENCY MEDICINE

## 2017-11-17 PROCEDURE — 81001 URINALYSIS AUTO W/SCOPE: CPT

## 2017-11-17 PROCEDURE — 96374 THER/PROPH/DIAG INJ IV PUSH: CPT

## 2017-11-17 PROCEDURE — 99284 EMERGENCY DEPT VISIT MOD MDM: CPT

## 2017-11-17 PROCEDURE — 81002 URINALYSIS NONAUTO W/O SCOPE: CPT

## 2017-11-17 PROCEDURE — 36415 COLL VENOUS BLD VENIPUNCTURE: CPT | Performed by: EMERGENCY MEDICINE

## 2017-11-17 PROCEDURE — 96375 TX/PRO/DX INJ NEW DRUG ADDON: CPT

## 2017-11-17 PROCEDURE — 81025 URINE PREGNANCY TEST: CPT

## 2017-11-17 PROCEDURE — 85025 COMPLETE CBC W/AUTO DIFF WBC: CPT | Performed by: EMERGENCY MEDICINE

## 2017-11-17 PROCEDURE — 83735 ASSAY OF MAGNESIUM: CPT | Performed by: EMERGENCY MEDICINE

## 2017-11-17 PROCEDURE — 80076 HEPATIC FUNCTION PANEL: CPT | Performed by: EMERGENCY MEDICINE

## 2017-11-17 PROCEDURE — 96361 HYDRATE IV INFUSION ADD-ON: CPT

## 2017-11-17 RX ORDER — KETOROLAC TROMETHAMINE 30 MG/ML
15 INJECTION, SOLUTION INTRAMUSCULAR; INTRAVENOUS ONCE
Status: COMPLETED | OUTPATIENT
Start: 2017-11-17 | End: 2017-11-17

## 2017-11-17 RX ORDER — NAPROXEN 500 MG/1
500 TABLET ORAL 2 TIMES DAILY WITH MEALS
Qty: 20 TABLET | Refills: 0 | Status: ON HOLD | OUTPATIENT
Start: 2017-11-17 | End: 2018-02-07 | Stop reason: ALTCHOICE

## 2017-11-17 RX ORDER — ONDANSETRON 2 MG/ML
4 INJECTION INTRAMUSCULAR; INTRAVENOUS ONCE
Status: COMPLETED | OUTPATIENT
Start: 2017-11-17 | End: 2017-11-17

## 2017-11-17 RX ORDER — ONDANSETRON 4 MG/1
4 TABLET, ORALLY DISINTEGRATING ORAL EVERY 6 HOURS PRN
Qty: 20 TABLET | Refills: 0 | Status: SHIPPED | OUTPATIENT
Start: 2017-11-17 | End: 2018-01-30

## 2017-11-17 RX ADMIN — SODIUM CHLORIDE 1000 ML: 0.9 INJECTION, SOLUTION INTRAVENOUS at 19:48

## 2017-11-17 RX ADMIN — KETOROLAC TROMETHAMINE 15 MG: 30 INJECTION, SOLUTION INTRAMUSCULAR at 19:45

## 2017-11-17 RX ADMIN — ONDANSETRON 4 MG: 2 INJECTION INTRAMUSCULAR; INTRAVENOUS at 19:46

## 2017-11-18 ENCOUNTER — APPOINTMENT (OUTPATIENT)
Dept: LAB | Facility: HOSPITAL | Age: 37
End: 2017-11-18
Attending: PHYSICIAN ASSISTANT
Payer: COMMERCIAL

## 2017-11-18 DIAGNOSIS — E55.9 VITAMIN D DEFICIENCY: ICD-10-CM

## 2017-11-18 DIAGNOSIS — E78.5 HYPERLIPIDEMIA: ICD-10-CM

## 2017-11-18 DIAGNOSIS — E11.9 TYPE 2 DIABETES MELLITUS WITHOUT COMPLICATIONS (HCC): ICD-10-CM

## 2017-11-18 DIAGNOSIS — E66.01 MORBID (SEVERE) OBESITY DUE TO EXCESS CALORIES (HCC): ICD-10-CM

## 2017-11-18 LAB
25(OH)D3 SERPL-MCNC: 29.2 NG/ML (ref 30–100)
ANION GAP SERPL CALCULATED.3IONS-SCNC: 8 MMOL/L (ref 4–13)
BUN SERPL-MCNC: 12 MG/DL (ref 5–25)
CALCIUM SERPL-MCNC: 9.3 MG/DL (ref 8.3–10.1)
CHLORIDE SERPL-SCNC: 104 MMOL/L (ref 100–108)
CO2 SERPL-SCNC: 29 MMOL/L (ref 21–32)
CREAT SERPL-MCNC: 0.66 MG/DL (ref 0.6–1.3)
GFR SERPL CREATININE-BSD FRML MDRD: 115 ML/MIN/1.73SQ M
GLUCOSE P FAST SERPL-MCNC: 155 MG/DL (ref 65–99)
POTASSIUM SERPL-SCNC: 4.5 MMOL/L (ref 3.5–5.3)
SODIUM SERPL-SCNC: 141 MMOL/L (ref 136–145)

## 2017-11-18 PROCEDURE — 80048 BASIC METABOLIC PNL TOTAL CA: CPT

## 2017-11-18 PROCEDURE — 82306 VITAMIN D 25 HYDROXY: CPT

## 2017-11-18 PROCEDURE — 36415 COLL VENOUS BLD VENIPUNCTURE: CPT

## 2017-11-18 NOTE — ED PROVIDER NOTES
History  Chief Complaint   Patient presents with    Abdominal Pain     C/O lower bad pain radiated to pubis x-3-4 days  Was seen at her GYN on 10/31 had pap told abmormality found and to follow up with then on 11/30, also c/o nausea and lower back pain     31 YO female presents with 3-4 days of pelvic discomfort  Pt states this has been cramping in nature, constant, non-radiating, no known exacerbating or alleviating factors  Pt has had associated nausea, no vomiting, diarrhea last week which has resolved  Pt states pain feels similar to previous menses though it has been more intense, she does note she is ~1 5 weeks late for her period  Pt denies sick contacts  She is concerned because she was recently seen by her gynecologist, had a Pap smear performed and was called, told there was an abnormal finding and she needed further testing, this is to be done at the end of the month  Pt denies CP/SOB/F/C/D/C, no dysuria, burning on urination or blood in urine  History provided by:  Patient and spouse   used: Yes    Abdominal Pain   Pain location:  Suprapubic  Pain quality: cramping    Pain radiates to:  Does not radiate  Pain severity:  Moderate  Onset quality:  Gradual  Duration:  3 days  Timing:  Constant  Progression:  Unchanged  Chronicity:  New  Relieved by:  Nothing  Worsened by:  Nothing  Ineffective treatments:  None tried  Associated symptoms: nausea    Associated symptoms: no anorexia, no belching, no chest pain, no chills, no constipation, no cough, no diarrhea, no dysuria, no fatigue, no fever, no shortness of breath, no vaginal bleeding and no vomiting    Nausea:     Severity:  Moderate    Onset quality:  Gradual    Duration:  3 days    Timing:  Constant    Progression:  Waxing and waning      Prior to Admission Medications   Prescriptions Last Dose Informant Patient Reported? Taking?    Dexbromphen-Pseudoephed-APAP (SINADRIN PLUS PO)   Yes Yes   Sig: Take by mouth   Insulin Glargine (TOUJEO SOLOSTAR SC)   Yes Yes   Sig: Inject under the skin   busPIRone (BUSPAR) 10 mg tablet   Yes Yes   Sig: Take 10 mg by mouth 3 (three) times a day  cetirizine (ZyrTEC) 10 MG chewable tablet   No Yes   Sig: Chew 1 tablet daily   cyclobenzaprine (FLEXERIL) 5 mg tablet   No Yes   Sig: Take 1 tablet by mouth 3 (three) times a day as needed for muscle spasms for up to 5 days   dicyclomine (BENTYL) 20 mg tablet   No Yes   Sig: Take 1 tablet by mouth every 6 (six) hours as needed (abd cramping) for up to 5 days   gabapentin (NEURONTIN) 400 mg capsule   Yes Yes   Sig: Take 400 mg by mouth 2 (two) times a day     lidocaine (XYLOCAINE) 2 % topical gel   No No   Sig: Apply 1 application topically as needed for mild pain   omeprazole (PriLOSEC) 20 mg delayed release capsule   No Yes   Sig: Take 1 capsule by mouth daily   ondansetron (ZOFRAN-ODT) 4 mg disintegrating tablet   No Yes   Sig: Take 1 tablet by mouth every 8 (eight) hours as needed for nausea or vomiting for up to 7 days   pantoprazole (PROTONIX) 40 mg tablet   No Yes   Sig: Take 1 tablet by mouth daily in the early morning for 30 days   traZODone (DESYREL) 100 mg tablet   No Yes   Sig: Take 1 tablet by mouth daily at bedtime      Facility-Administered Medications: None       Past Medical History:   Diagnosis Date    Anxiety     Back pain     Depression     Diabetes mellitus (HCC)     Hyperlipidemia     Liver function study, abnormal        Past Surgical History:   Procedure Laterality Date    ABDOMINAL SURGERY       SECTION      TONSILLECTOMY      TUBAL LIGATION         History reviewed  No pertinent family history  I have reviewed and agree with the history as documented  Social History   Substance Use Topics    Smoking status: Never Smoker    Smokeless tobacco: Never Used    Alcohol use No        Review of Systems   Constitutional: Negative for chills, fatigue and fever  HENT: Negative for dental problem      Eyes: Negative for visual disturbance  Respiratory: Negative for cough and shortness of breath  Cardiovascular: Negative for chest pain  Gastrointestinal: Positive for abdominal pain and nausea  Negative for anorexia, constipation, diarrhea and vomiting  Genitourinary: Negative for dysuria, frequency and vaginal bleeding  Musculoskeletal: Negative for arthralgias  Skin: Negative for rash  Neurological: Negative for dizziness, weakness and light-headedness  Psychiatric/Behavioral: Negative for agitation, behavioral problems and confusion  All other systems reviewed and are negative  Physical Exam  ED Triage Vitals [11/17/17 1814]   Temperature Pulse Respirations Blood Pressure SpO2   98 7 °F (37 1 °C) 100 20 122/68 100 %      Temp Source Heart Rate Source Patient Position - Orthostatic VS BP Location FiO2 (%)   Oral Monitor Sitting Right arm --      Pain Score       9           Orthostatic Vital Signs  Vitals:    11/17/17 1814 11/17/17 2040 11/17/17 2136   BP: 122/68 126/67 113/64   Pulse: 100 88 87   Patient Position - Orthostatic VS: Sitting  Lying       Physical Exam   Constitutional: She is oriented to person, place, and time  She appears well-developed and well-nourished  HENT:   Head: Normocephalic and atraumatic  Eyes: EOM are normal    Neck: Normal range of motion  Cardiovascular: Normal rate, regular rhythm and normal heart sounds  Pulmonary/Chest: Effort normal and breath sounds normal    Abdominal: Soft  There is tenderness in the suprapubic area  There is no rigidity, no rebound, no guarding and no tenderness at McBurney's point  Musculoskeletal: Normal range of motion  Neurological: She is alert and oriented to person, place, and time  Skin: Skin is warm and dry  Psychiatric: She has a normal mood and affect  Her behavior is normal  Thought content normal    Nursing note and vitals reviewed        ED Medications  Medications   sodium chloride 0 9 % bolus 1,000 mL (0 mL Intravenous Stopped 11/17/17 2136)   ketorolac (TORADOL) 30 mg/mL injection 15 mg (15 mg Intravenous Given 11/17/17 1945)   ondansetron (ZOFRAN) injection 4 mg (4 mg Intravenous Given 11/17/17 1946)       Diagnostic Studies  Results Reviewed     Procedure Component Value Units Date/Time    Basic metabolic panel [[id number]]  (Abnormal) Collected:  11/17/17 1949    Lab Status:  Final result Specimen:  Blood from Arm, Right Updated:  11/17/17 2015     Sodium 142 mmol/L      Potassium 3 9 mmol/L      Chloride 102 mmol/L      CO2 31 mmol/L      Anion Gap 9 mmol/L      BUN 11 mg/dL      Creatinine 0 82 mg/dL      Glucose 230 (H) mg/dL      Calcium 9 9 mg/dL      eGFR 93 ml/min/1 73sq m     Narrative:         National Kidney Disease Education Program recommendations are as follows:  GFR calculation is accurate only with a steady state creatinine  Chronic Kidney disease less than 60 ml/min/1 73 sq  meters  Kidney failure less than 15 ml/min/1 73 sq  meters      Hepatic function panel [[id number]]  (Abnormal) Collected:  11/17/17 1949    Lab Status:  Final result Specimen:  Blood from Arm, Right Updated:  11/17/17 2015     Total Bilirubin 0 16 (L) mg/dL      Bilirubin, Direct 0 05 mg/dL      Alkaline Phosphatase 68 U/L      AST 16 U/L      ALT 34 U/L      Total Protein 8 1 g/dL      Albumin 3 4 (L) g/dL     Magnesium [[id number]]  (Normal) Collected:  11/17/17 1949    Lab Status:  Final result Specimen:  Blood from Arm, Right Updated:  11/17/17 2015     Magnesium 1 8 mg/dL     Lipase [[id number]]  (Normal) Collected:  11/17/17 1949    Lab Status:  Final result Specimen:  Blood from Arm, Right Updated:  11/17/17 2015     Lipase 229 u/L     CBC and differential [[id number]]  (Normal) Collected:  11/17/17 1949    Lab Status:  Final result Specimen:  Blood from Arm, Right Updated:  11/17/17 1959     WBC 9 49 Thousand/uL      RBC 4 30 Million/uL      Hemoglobin 12 9 g/dL      Hematocrit 38 0 %      MCV 88 fL      MCH 30 0 pg      MCHC 33 9 g/dL      RDW 13 8 %      MPV 10 3 fL      Platelets 589 Thousands/uL      nRBC 0 /100 WBCs      Neutrophils Relative 54 %      Lymphocytes Relative 36 %      Monocytes Relative 8 %      Eosinophils Relative 2 %      Basophils Relative 0 %      Neutrophils Absolute 5 13 Thousands/µL      Lymphocytes Absolute 3 41 Thousands/µL      Monocytes Absolute 0 72 Thousand/µL      Eosinophils Absolute 0 20 Thousand/µL      Basophils Absolute 0 03 Thousands/µL     Urine Microscopic [[id number]]  (Normal) Collected:  11/17/17 2034    Lab Status:  Final result Specimen:  Urine from Urine, Clean Catch Updated:  11/17/17 1949     RBC, UA None Seen /hpf      WBC, UA None Seen /hpf      Epithelial Cells None Seen /hpf      Bacteria, UA None Seen /hpf     POCT pregnancy, urine [[id number]]  (Normal) Resulted:  11/17/17 1923    Lab Status:  Final result Updated:  11/17/17 1923     EXT PREG TEST UR (Ref: Negative) neg    POCT urinalysis dipstick [[id number]]  (Normal) Resulted:  11/17/17 1923    Lab Status:  Final result Updated:  11/17/17 1923     Color, UA yellow    ED Urine Macroscopic [[id number]]  (Abnormal) Collected:  11/17/17 2034    Lab Status:  Final result Specimen:  Urine Updated:  11/17/17 1918     Color, UA Yellow     Clarity, UA Clear     pH, UA 5 0     Leukocytes, UA Elevated glucose may cause decreased leukocyte values  See urine microscopic for [facility] result/ (A)     Nitrite, UA Negative     Protein, UA Negative mg/dl      Glucose, UA >=1000 (1%) (A) mg/dl      Ketones, UA Negative mg/dl      Urobilinogen, UA 0 2 E U /dl      Bilirubin, UA Negative     Blood, UA Negative     Specific Gravity, UA 1 010    Narrative:       CLINITEK RESULT                 No orders to display              Procedures  Procedures       Phone Contacts  ED Phone Contact    ED Course  ED Course                                MDM  Number of Diagnoses or Management Options  Pelvic pain: new and requires workup  Diagnosis management comments: 1   Pelvic pain - Pt with cramping pain similar to menses, noting she is currently late  Will obtain urine for infection and pregnancy, check electrolytes, CBC  Will give NSAIDs, anti-emetics  Amount and/or Complexity of Data Reviewed  Clinical lab tests: ordered and reviewed  Obtain history from someone other than the patient: yes    Patient Progress  Patient progress: stable    CritCare Time    Disposition  Final diagnoses:   Pelvic pain     Time reflects when diagnosis was documented in both MDM as applicable and the Disposition within this note     Time User Action Codes Description Comment    11/17/2017  9:18 PM Modena Councilman E Add [R10 2] Pelvic pain       ED Disposition     ED Disposition Condition Comment    Discharge  Terisa Cons discharge to home/self care  Condition at discharge: Stable        Follow-up Information     Follow up With Specialties Details Why Contact Info    Dante Mendoza PA-C Family Medicine Schedule an appointment as soon as possible for a visit  [address]      Petros Padilla MD General Surgery Schedule an appointment as soon as possible for a visit  [address] [place] [address] Place          Discharge Medication List as of 11/17/2017  9:26 PM      START taking these medications    Details   naproxen (NAPROSYN) 500 mg tablet Take 1 tablet by mouth 2 (two) times a day with meals for 10 days, Starting Fri 11/17/2017, Until Mon 11/27/2017, Print      !! ondansetron (ZOFRAN-ODT) 4 mg disintegrating tablet Take 1 tablet by mouth every 6 (six) hours as needed for nausea or vomiting, Starting Fri 11/17/2017, Print       !! - Potential duplicate medications found  Please discuss with provider        CONTINUE these medications which have NOT CHANGED    Details   busPIRone (BUSPAR) 10 mg tablet Take 10 mg by mouth 3 (three) times a day , Until Discontinued, Historical Med      cetirizine (ZyrTEC) 10 MG chewable tablet Chew 1 tablet daily, Starting Wed 9/27/2017, Normal      cyclobenzaprine (FLEXERIL) 5 mg tablet Take 1 tablet by mouth 3 (three) times a day as needed for muscle spasms for up to 5 days, Starting Sun 5/7/2017, Until Fri 11/17/2017, Normal      Dexbromphen-Pseudoephed-APAP (SINADRIN PLUS PO) Take by mouth, Historical Med      dicyclomine (BENTYL) 20 mg tablet Take 1 tablet by mouth every 6 (six) hours as needed (abd cramping) for up to 5 days, Starting Tue 6/13/2017, Until Fri 11/17/2017, Print      gabapentin (NEURONTIN) 400 mg capsule Take 400 mg by mouth 2 (two) times a day  , Until Discontinued, Historical Med      Insulin Glargine (TOUJEO SOLOSTAR SC) Inject under the skin, Historical Med      omeprazole (PriLOSEC) 20 mg delayed release capsule Take 1 capsule by mouth daily, Starting u 9/14/2017, Print      !! ondansetron (ZOFRAN-ODT) 4 mg disintegrating tablet Take 1 tablet by mouth every 8 (eight) hours as needed for nausea or vomiting for up to 7 days, Starting Tue 6/13/2017, Until Fri 11/17/2017, Print      pantoprazole (PROTONIX) 40 mg tablet Take 1 tablet by mouth daily in the early morning for 30 days, Starting Tue 3/14/2017, Until Fri 11/17/2017, Print      traZODone (DESYREL) 100 mg tablet Take 1 tablet by mouth daily at bedtime, Starting 2/4/2017, Until Discontinued, No Print      lidocaine (XYLOCAINE) 2 % topical gel Apply 1 application topically as needed for mild pain, Starting Wed 9/27/2017, Normal       !! - Potential duplicate medications found  Please discuss with provider  No discharge procedures on file      ED Provider  Electronically Signed by           Julia Bowen MD  11/18/17 8619

## 2017-11-18 NOTE — DISCHARGE INSTRUCTIONS
[place] el Naprosyn según sea necesario para la incomodidad  Use Zofran para las náuseas, esto se puede colocar debajo de la lengua para disolverlo si está vomitando  Llame al ginecólogo el lunes, se lo debe volver a visitar en la oficina para Emiliano Lord y administración adicionales  Asegúrate de beber mucha agua  El número de un cirujano general está incluido en estas instrucciones para el iraida; llame para programar omari rosa para omari evaluación adicional de solano hernia  Dolor pélvico en mujeres   LO QUE NECESITA SABER:   ¿Qué necesito saber sobre el dolor pélvico?  Usted puede tener dolor en amita o ambos lados de la pelvis  El dolor pélvico puede ocurrir cuando se encuentra en determinada posición o cuando realiza ciertas actividades, antoine cuando lleva acabo el acto sexual o al Breana Sa deposición intestinal  El dolor también puede empeorar viv la menstruación o después de estar sentada o de pie por [place]  El dolor pélvico crónico es un dolor que continúa por más de 6 meses  ¿Qué causa el dolor pélvico en las mujeres? · Condiciones ginecológicas , antoine la enfermedad pélvica inflamatoria (EPI), endometriosis o fibroides uterina    · Condiciones con los intestinos o la vejiga , antoine síndrome de intestino irritable, inflamación de la vejiga o tumores     · Afecciones de los músculos y nervios , antoine inflamación o debilidad en los músculos pélvicos o daño a los nervios en el área pélvica (neuropatía)    · Problemas psicológicos antoine resultado de un abuso físico o sexual, o por el abuso de drogas  ¿Cómo se trata el dolor pélvico?   · Los analgésicos  podría administrarse en forma de píldoras, inyecciones o cremas para aliviar el dolor  · Hormonas  se administran si solano dolor empeora con el ciclo menstrual     · Antibióticos  podrían administrarse si el dolor se debe a omari infección  · Cirugía  se puede realizar si otros tratamientos no alivian el dolor    ¿Cómo puedo controlar mi dolor pélvico?   · Mantenga un diario del dolor  [place] nota cuando siente dolor, la intensidad del dolor y cualquier otro síntoma que acompañe al Gaby Dmitry  El diario le ayudará a identificar cómo y cuándo se presenta el dolor  También podría ayudarle a ross médico a encontrar la causa del dolor  · Aprenda métodos de relajación  La respiración profunda, la meditación y las técnicas de relajación pueden ayudarlo a disminuir el dolor  Es posible que sienta más dolor si está tenso  · Cambie ross dieta si tiene síndrome del intestino irritable  Pregunte a ross médico qué alimentos le recomienda  Llame al 911 en laura de presentar lo siguiente:   · Usted siente dolor tita en el pecho y dificultad repentina para respirar  ¿Cuándo todd buscar atención inmediata? · Usted tiene sangrado vaginal profuso o inusual y se siente mareada o se desmaya  ¿Cuándo todd comunicarme con mi médico?   · Usted tiene dolor pélvico que no desaparece aun después de jerri medicamentos para el dolor  · Usted tiene nuevos síntomas o emily síntomas empeoran  · Usted tiene preguntas o inquietudes acerca de ross condición o cuidado  ACUERDOS SOBRE ROSS CUIDADO:   Usted tiene el derecho de ayudar a planear ross cuidado  Aprenda todo lo que pueda sobre ross condición y antoine darle tratamiento  Discuta emily opciones de tratamiento con emily médicos para decidir el cuidado que usted desea recibir  Usted siempre tiene el derecho de rechazar el tratamiento  Esta información es sólo para uso en educación  Ross intención no es darle un consejo médico sobre enfermedades o tratamientos  Colsulte con ross Evelyn Stephen farmacéutico antes de seguir cualquier régimen médico para saber si es seguro y efectivo para usted  © 2017 [address] Information is for End User's use only and may not be sold, redistributed or otherwise used for commercial purposes   All illustrations and images included in CareNotes® are the copyrighted property of [employer]  or Benny Patton

## 2017-11-20 ENCOUNTER — GENERIC CONVERSION - ENCOUNTER (OUTPATIENT)
Dept: OTHER | Facility: OTHER | Age: 37
End: 2017-11-20

## 2017-11-21 ENCOUNTER — APPOINTMENT (OUTPATIENT)
Dept: PHYSICAL THERAPY | Facility: CLINIC | Age: 37
End: 2017-11-21
Payer: COMMERCIAL

## 2017-11-21 PROCEDURE — 97110 THERAPEUTIC EXERCISES: CPT

## 2017-11-21 PROCEDURE — G8980 MOBILITY D/C STATUS: HCPCS

## 2017-11-21 PROCEDURE — G8978 MOBILITY CURRENT STATUS: HCPCS

## 2017-11-21 PROCEDURE — G8979 MOBILITY GOAL STATUS: HCPCS

## 2017-11-22 ENCOUNTER — APPOINTMENT (OUTPATIENT)
Dept: PHYSICAL THERAPY | Facility: CLINIC | Age: 37
End: 2017-11-22
Payer: COMMERCIAL

## 2017-11-28 ENCOUNTER — HOSPITAL ENCOUNTER (EMERGENCY)
Facility: HOSPITAL | Age: 37
Discharge: HOME | End: 2017-11-29
Attending: EMERGENCY MEDICINE | Admitting: EMERGENCY MEDICINE
Payer: COMMERCIAL

## 2017-11-28 ENCOUNTER — APPOINTMENT (OUTPATIENT)
Dept: PHYSICAL THERAPY | Facility: CLINIC | Age: 37
End: 2017-11-28
Payer: COMMERCIAL

## 2017-11-28 DIAGNOSIS — R10.11 RIGHT UPPER QUADRANT PAIN: Primary | ICD-10-CM

## 2017-11-28 PROCEDURE — 81025 URINE PREGNANCY TEST: CPT | Performed by: EMERGENCY MEDICINE

## 2017-11-28 PROCEDURE — 81002 URINALYSIS NONAUTO W/O SCOPE: CPT | Performed by: EMERGENCY MEDICINE

## 2017-11-29 VITALS
WEIGHT: 226 LBS | DIASTOLIC BLOOD PRESSURE: 70 MMHG | HEART RATE: 94 BPM | BODY MASS INDEX: 41.34 KG/M2 | SYSTOLIC BLOOD PRESSURE: 110 MMHG | OXYGEN SATURATION: 100 % | TEMPERATURE: 98.5 F | RESPIRATION RATE: 18 BRPM

## 2017-11-29 LAB
ALBUMIN SERPL BCP-MCNC: 3.3 G/DL (ref 3.5–5)
ALP SERPL-CCNC: 74 U/L (ref 46–116)
ALT SERPL W P-5'-P-CCNC: 41 U/L (ref 12–78)
ANION GAP SERPL CALCULATED.3IONS-SCNC: 3 MMOL/L (ref 4–13)
AST SERPL W P-5'-P-CCNC: 22 U/L (ref 5–45)
BACTERIA UR QL AUTO: ABNORMAL /HPF
BASOPHILS # BLD AUTO: 0.03 THOUSANDS/ΜL (ref 0–0.1)
BASOPHILS NFR BLD AUTO: 0 % (ref 0–1)
BILIRUB SERPL-MCNC: 0.19 MG/DL (ref 0.2–1)
BILIRUB UR QL STRIP: NEGATIVE
BUN SERPL-MCNC: 11 MG/DL (ref 5–25)
CALCIUM SERPL-MCNC: 9.6 MG/DL (ref 8.3–10.1)
CHLORIDE SERPL-SCNC: 101 MMOL/L (ref 100–108)
CLARITY UR: CLEAR
CO2 SERPL-SCNC: 34 MMOL/L (ref 21–32)
COLOR UR: YELLOW
CREAT SERPL-MCNC: 0.75 MG/DL (ref 0.6–1.3)
EOSINOPHIL # BLD AUTO: 0.17 THOUSAND/ΜL (ref 0–0.61)
EOSINOPHIL NFR BLD AUTO: 2 % (ref 0–6)
ERYTHROCYTE [DISTWIDTH] IN BLOOD BY AUTOMATED COUNT: 13.4 % (ref 11.6–15.1)
EXT PREG TEST URINE: NEGATIVE
GFR SERPL CREATININE-BSD FRML MDRD: 104 ML/MIN/1.73SQ M
GLUCOSE SERPL-MCNC: 227 MG/DL (ref 65–140)
GLUCOSE UR STRIP-MCNC: ABNORMAL MG/DL
HCT VFR BLD AUTO: 36.7 % (ref 34.8–46.1)
HGB BLD-MCNC: 12 G/DL (ref 11.5–15.4)
HGB UR QL STRIP.AUTO: ABNORMAL
KETONES UR STRIP-MCNC: NEGATIVE MG/DL
LEUKOCYTE ESTERASE UR QL STRIP: NEGATIVE
LIPASE SERPL-CCNC: 177 U/L (ref 73–393)
LYMPHOCYTES # BLD AUTO: 2.98 THOUSANDS/ΜL (ref 0.6–4.47)
LYMPHOCYTES NFR BLD AUTO: 37 % (ref 14–44)
MCH RBC QN AUTO: 29.2 PG (ref 26.8–34.3)
MCHC RBC AUTO-ENTMCNC: 32.7 G/DL (ref 31.4–37.4)
MCV RBC AUTO: 89 FL (ref 82–98)
MONOCYTES # BLD AUTO: 0.67 THOUSAND/ΜL (ref 0.17–1.22)
MONOCYTES NFR BLD AUTO: 8 % (ref 4–12)
NEUTROPHILS # BLD AUTO: 4.23 THOUSANDS/ΜL (ref 1.85–7.62)
NEUTS SEG NFR BLD AUTO: 53 % (ref 43–75)
NITRITE UR QL STRIP: NEGATIVE
NON-SQ EPI CELLS URNS QL MICRO: ABNORMAL /HPF
NRBC BLD AUTO-RTO: 0 /100 WBCS
PH UR STRIP.AUTO: 7 [PH] (ref 4.5–8)
PLATELET # BLD AUTO: 282 THOUSANDS/UL (ref 149–390)
PMV BLD AUTO: 10.2 FL (ref 8.9–12.7)
POTASSIUM SERPL-SCNC: 3.8 MMOL/L (ref 3.5–5.3)
PROT SERPL-MCNC: 8.2 G/DL (ref 6.4–8.2)
PROT UR STRIP-MCNC: NEGATIVE MG/DL
RBC # BLD AUTO: 4.11 MILLION/UL (ref 3.81–5.12)
RBC #/AREA URNS AUTO: ABNORMAL /HPF
SODIUM SERPL-SCNC: 138 MMOL/L (ref 136–145)
SP GR UR STRIP.AUTO: 1.01 (ref 1–1.03)
UROBILINOGEN UR QL STRIP.AUTO: 1 E.U./DL
WBC # BLD AUTO: 8.08 THOUSAND/UL (ref 4.31–10.16)
WBC #/AREA URNS AUTO: ABNORMAL /HPF

## 2017-11-29 PROCEDURE — 87086 URINE CULTURE/COLONY COUNT: CPT

## 2017-11-29 PROCEDURE — C9113 INJ PANTOPRAZOLE SODIUM, VIA: HCPCS | Performed by: EMERGENCY MEDICINE

## 2017-11-29 PROCEDURE — 83690 ASSAY OF LIPASE: CPT | Performed by: EMERGENCY MEDICINE

## 2017-11-29 PROCEDURE — 99284 EMERGENCY DEPT VISIT MOD MDM: CPT

## 2017-11-29 PROCEDURE — 87077 CULTURE AEROBIC IDENTIFY: CPT

## 2017-11-29 PROCEDURE — 80053 COMPREHEN METABOLIC PANEL: CPT | Performed by: EMERGENCY MEDICINE

## 2017-11-29 PROCEDURE — 96374 THER/PROPH/DIAG INJ IV PUSH: CPT

## 2017-11-29 PROCEDURE — 85025 COMPLETE CBC W/AUTO DIFF WBC: CPT | Performed by: EMERGENCY MEDICINE

## 2017-11-29 PROCEDURE — 36415 COLL VENOUS BLD VENIPUNCTURE: CPT | Performed by: EMERGENCY MEDICINE

## 2017-11-29 PROCEDURE — 81001 URINALYSIS AUTO W/SCOPE: CPT

## 2017-11-29 PROCEDURE — 96375 TX/PRO/DX INJ NEW DRUG ADDON: CPT

## 2017-11-29 RX ORDER — DICYCLOMINE HCL 20 MG
20 TABLET ORAL 2 TIMES DAILY
Qty: 20 TABLET | Refills: 0 | Status: SHIPPED | OUTPATIENT
Start: 2017-11-29 | End: 2017-11-29

## 2017-11-29 RX ORDER — ONDANSETRON 4 MG/1
4 TABLET, ORALLY DISINTEGRATING ORAL EVERY 8 HOURS PRN
Qty: 15 TABLET | Refills: 0 | Status: SHIPPED | OUTPATIENT
Start: 2017-11-29 | End: 2017-11-29

## 2017-11-29 RX ORDER — PANTOPRAZOLE SODIUM 40 MG/1
40 INJECTION, POWDER, FOR SOLUTION INTRAVENOUS ONCE
Status: COMPLETED | OUTPATIENT
Start: 2017-11-29 | End: 2017-11-29

## 2017-11-29 RX ORDER — ONDANSETRON 2 MG/ML
4 INJECTION INTRAMUSCULAR; INTRAVENOUS ONCE
Status: COMPLETED | OUTPATIENT
Start: 2017-11-29 | End: 2017-11-29

## 2017-11-29 RX ORDER — DICYCLOMINE HCL 20 MG
20 TABLET ORAL 2 TIMES DAILY
Qty: 20 TABLET | Refills: 0 | Status: SHIPPED | OUTPATIENT
Start: 2017-11-29 | End: 2018-01-30

## 2017-11-29 RX ORDER — ONDANSETRON 4 MG/1
4 TABLET, ORALLY DISINTEGRATING ORAL EVERY 8 HOURS PRN
Qty: 15 TABLET | Refills: 0 | Status: SHIPPED | OUTPATIENT
Start: 2017-11-29 | End: 2018-01-30

## 2017-11-29 RX ADMIN — PANTOPRAZOLE SODIUM 40 MG: 40 INJECTION, POWDER, FOR SOLUTION INTRAVENOUS at 00:16

## 2017-11-29 RX ADMIN — ONDANSETRON 4 MG: 2 INJECTION INTRAMUSCULAR; INTRAVENOUS at 00:15

## 2017-11-29 NOTE — ED PROVIDER NOTES
History  Chief Complaint   Patient presents with    Abdominal Pain     Pt reports RUQ pain  "nausea for 1 hr"       History provided by:  Patient   used: No    Abdominal Pain   Pain location:  RUQ  Pain quality: cramping    Pain radiates to:  Does not radiate  Pain severity:  Moderate  Onset quality:  Gradual  Duration:  12 hours  Timing:  Intermittent  Progression:  Waxing and waning  Chronicity:  New  Context: eating    Relieved by:  Nothing  Worsened by:  Nothing  Ineffective treatments:  None tried  Associated symptoms: nausea    Associated symptoms: no chest pain, no chills, no cough, no diarrhea, no dysuria, no fever, no hematochezia, no shortness of breath, no sore throat and no vomiting    Nausea:     Severity:  Mild    Onset quality:  Gradual    Duration:  12 hours    Timing:  Intermittent    Progression:  Waxing and waning  Risk factors: has not had multiple surgeries        Prior to Admission Medications   Prescriptions Last Dose Informant Patient Reported? Taking? Dexbromphen-Pseudoephed-APAP (SINADRIN PLUS PO)   Yes Yes   Sig: Take by mouth   Insulin Glargine (TOUJEO SOLOSTAR SC)   Yes Yes   Sig: Inject 40 Units under the skin daily at bedtime     Liraglutide (VICTOZA SC)   Yes Yes   Sig: Inject 1 8 Units under the skin daily   SIMVASTATIN PO   Yes Yes   Sig: Take by mouth daily   busPIRone (BUSPAR) 10 mg tablet   Yes Yes   Sig: Take 10 mg by mouth 3 (three) times a day     cyclobenzaprine (FLEXERIL) 5 mg tablet   No Yes   Sig: Take 1 tablet by mouth 3 (three) times a day as needed for muscle spasms for up to 5 days   dicyclomine (BENTYL) 20 mg tablet   No Yes   Sig: Take 1 tablet by mouth every 6 (six) hours as needed (abd cramping) for up to 5 days   gabapentin (NEURONTIN) 400 mg capsule   Yes Yes   Sig: Take 400 mg by mouth 2 (two) times a day     naproxen (NAPROSYN) 500 mg tablet   No Yes   Sig: Take 1 tablet by mouth 2 (two) times a day with meals for 10 days   omeprazole (PriLOSEC) 20 mg delayed release capsule   No Yes   Sig: Take 1 capsule by mouth daily   Patient taking differently: Take 20 mg by mouth as needed     ondansetron (ZOFRAN-ODT) 4 mg disintegrating tablet   No Yes   Sig: Take 1 tablet by mouth every 6 (six) hours as needed for nausea or vomiting   pantoprazole (PROTONIX) 40 mg tablet   No Yes   Sig: Take 1 tablet by mouth daily in the early morning for 30 days   traZODone (DESYREL) 100 mg tablet   No Yes   Sig: Take 1 tablet by mouth daily at bedtime      Facility-Administered Medications: None       Past Medical History:   Diagnosis Date    Anxiety     Back pain     Depression     Diabetes mellitus ([facility] [address] )     Hyperlipidemia     Liver function study, abnormal        Past Surgical History:   Procedure Laterality Date    ABDOMINAL SURGERY       SECTION      TONSILLECTOMY      TUBAL LIGATION         History reviewed  No pertinent family history  I have reviewed and agree with the history as documented  Social History   Substance Use Topics    Smoking status: Never Smoker    Smokeless tobacco: Never Used    Alcohol use No        Review of Systems   Constitutional: Negative for activity change, chills and fever  HENT: Negative for facial swelling, sore throat and trouble swallowing  Eyes: Negative for pain and visual disturbance  Respiratory: Negative for cough, chest tightness and shortness of breath  Cardiovascular: Negative for chest pain and leg swelling  Gastrointestinal: Positive for abdominal pain and nausea  Negative for blood in stool, diarrhea, hematochezia and vomiting  Genitourinary: Negative for dysuria and flank pain  Musculoskeletal: Negative for back pain, neck pain and neck stiffness  Skin: Negative for pallor and rash  Allergic/Immunologic: Negative for environmental allergies and immunocompromised state  Neurological: Negative for dizziness and headaches  Hematological: Negative for adenopathy   Does not bruise/bleed easily  Psychiatric/Behavioral: Negative for agitation and behavioral problems  All other systems reviewed and are negative  Physical Exam  ED Triage Vitals   Temperature Pulse Respirations Blood Pressure SpO2   11/28/17 2220 11/28/17 2220 11/28/17 2220 11/28/17 2220 11/28/17 2220   98 5 °F (36 9 °C) (!) 106 18 141/78 100 %      Temp src Heart Rate Source Patient Position - Orthostatic VS BP Location FiO2 (%)   -- 11/29/17 0027 11/29/17 0027 11/29/17 0027 --    Monitor Lying Right arm       Pain Score       11/28/17 2220       9           Orthostatic Vital Signs  Vitals:    11/28/17 2220 11/29/17 0027 11/29/17 0145   BP: 141/78 119/68 110/70   Pulse: (!) 106 101 94   Patient Position - Orthostatic VS:  Lying Lying       Physical Exam   Constitutional: She is oriented to person, place, and time  She appears well-developed and well-nourished  No distress  HENT:   Head: Normocephalic and atraumatic  Eyes: EOM are normal    Neck: Normal range of motion  Neck supple  Cardiovascular: Normal rate, regular rhythm, normal heart sounds and intact distal pulses  Pulmonary/Chest: Effort normal and breath sounds normal    Abdominal: Soft  Bowel sounds are normal  There is tenderness (RUQ)  There is no rebound and no guarding  Musculoskeletal: Normal range of motion  Neurological: She is alert and oriented to person, place, and time  Skin: Skin is warm and dry  Psychiatric: She has a normal mood and affect  Nursing note and vitals reviewed        ED Medications  Medications   pantoprazole (PROTONIX) injection 40 mg (40 mg Intravenous Given 11/29/17 0016)   ondansetron (ZOFRAN) injection 4 mg (4 mg Intravenous Given 11/29/17 0015)       Diagnostic Studies  Results Reviewed     Procedure Component Value Units Date/Time    Urine Microscopic [[id number]]  (Abnormal) Collected:  11/29/17 0037    Lab Status:  Final result Specimen:  Urine from Urine, Clean Catch Updated:  11/29/17 0054     RBC, UA 2-4 (A) /hpf      WBC, UA 10-20 (A) /hpf      Epithelial Cells Moderate (A) /hpf      Bacteria, UA Occasional /hpf     Urine culture [[id number]] Collected:  11/29/17 0037    Lab Status: In process Specimen:  Urine from Urine, Clean Catch Updated:  11/29/17 0054    Comprehensive metabolic panel [[id number]]  (Abnormal) Collected:  11/29/17 0013    Lab Status:  Final result Specimen:  Blood from Arm, Left Updated:  11/29/17 0040     Sodium 138 mmol/L      Potassium 3 8 mmol/L      Chloride 101 mmol/L      CO2 34 (H) mmol/L      Anion Gap 3 (L) mmol/L      BUN 11 mg/dL      Creatinine 0 75 mg/dL      Glucose 227 (H) mg/dL      Calcium 9 6 mg/dL      AST 22 U/L      ALT 41 U/L      Alkaline Phosphatase 74 U/L      Total Protein 8 2 g/dL      Albumin 3 3 (L) g/dL      Total Bilirubin 0 19 (L) mg/dL      eGFR 104 ml/min/1 73sq m     Narrative:         National Kidney Disease Education Program recommendations are as follows:  GFR calculation is accurate only with a steady state creatinine  Chronic Kidney disease less than 60 ml/min/1 73 sq  meters  Kidney failure less than 15 ml/min/1 73 sq  meters      Lipase [[id number]]  (Normal) Collected:  11/29/17 0013    Lab Status:  Final result Specimen:  Blood from Arm, Left Updated:  11/29/17 0040     Lipase 177 u/L     CBC and differential [[id number]]  (Normal) Collected:  11/29/17 0013    Lab Status:  Final result Specimen:  Blood from Arm, Left Updated:  11/29/17 0025     WBC 8 08 Thousand/uL      RBC 4 11 Million/uL      Hemoglobin 12 0 g/dL      Hematocrit 36 7 %      MCV 89 fL      MCH 29 2 pg      MCHC 32 7 g/dL      RDW 13 4 %      MPV 10 2 fL      Platelets 694 Thousands/uL      nRBC 0 /100 WBCs      Neutrophils Relative 53 %      Lymphocytes Relative 37 %      Monocytes Relative 8 %      Eosinophils Relative 2 %      Basophils Relative 0 %      Neutrophils Absolute 4 23 Thousands/µL      Lymphocytes Absolute 2 98 Thousands/µL      Monocytes Absolute 0 67 Thousand/µL Eosinophils Absolute 0 17 Thousand/µL      Basophils Absolute 0 03 Thousands/µL     POCT urinalysis dipstick [[id number]]  (Abnormal) Resulted:  11/29/17 0022    Lab Status:  Final result Specimen:  Urine Updated:  11/29/17 0022    POCT pregnancy, urine [[id number]]  (Normal) Resulted:  11/29/17 0022    Lab Status:  Final result Updated:  11/29/17 0022     EXT PREG TEST UR (Ref: Negative) negative    ED Urine Macroscopic [[id number]]  (Abnormal) Collected:  11/29/17 0037    Lab Status:  Final result Specimen:  Urine Updated:  11/29/17 0021     Color, UA Yellow     Clarity, UA Clear     pH, UA 7 0     Leukocytes, UA Negative     Nitrite, UA Negative     Protein, UA Negative mg/dl      Glucose,  (1/2%) (A) mg/dl      Ketones, UA Negative mg/dl      Urobilinogen, UA 1 0 E U /dl      Bilirubin, UA Negative     Blood, UA Trace (A)     Specific [place], UA 1 015    Narrative:       CLINITEK RESULT                 No orders to display              Procedures  Procedures       Phone Contacts  ED Phone Contact    ED Course  ED Course as of Nov 29 0258 Wed Nov 29, 2017   0132 Labs, CBC, CMP, Lipase _ wnl, urine no clear infection; patient sleeping in room  We will discharge with PCP follow up  Possibly gastritis as started after food or viral syndrome  MDM  Number of Diagnoses or Management Options  Right upper quadrant pain: new and requires workup  Diagnosis management comments: Patient is a 53-year-old female, comes in with right upper quadrant pain, with associated mild nausea, no vomiting, started for, pressure she 8, had similar pain in June when she had labs and CT without acute findings; no chest pain, dyspnea, fever, diarrhea  On exam no acute distress:  Vital signs stable, tenderness noted in right upper quadrant rest of physical exam within normal limits  Previous records reviewed inclduing recent visit this month and negative CT in Jun '17    Differential diagnosis: Gastritis, pancreatitis, biliary colic, had negative CT, will check labs including CBC, CMP, lipase  We will give PPI, IVF, Zofran  Amount and/or Complexity of Data Reviewed  Clinical lab tests: ordered and reviewed  Tests in the medicine section of CPT®: ordered and reviewed  Review and summarize past medical records: yes      CritCare Time    Disposition  Final diagnoses:   Right upper quadrant pain     Time reflects when diagnosis was documented in both MDM as applicable and the Disposition within this note     Time User Action Codes Description Comment    11/29/2017 12:15 AM Vaishali Frye Add [R10 11] Right upper quadrant pain       ED Disposition     ED Disposition Condition Comment    Discharge  Ángel Jsoe discharge to home/self care  Condition at discharge: Stable        Follow-up Information     Follow up With Specialties Details Why Contact Info    Rayo Garcias PA-C [id number] [id number]  [address] [place] [address]  49  [phone]  [phone]          Discharge Medication List as of 11/29/2017  1:35 AM      START taking these medications    Details   !! dicyclomine (BENTYL) 20 mg tablet Take 1 tablet by mouth 2 (two) times a day, Starting Wed 11/29/2017, Normal      !! ondansetron (ZOFRAN-ODT) 4 mg disintegrating tablet Take 1 tablet by mouth every 8 (eight) hours as needed for nausea or vomiting for up to 5 days, Starting Wed 11/29/2017, Until Mon 12/4/2017, Normal       !! - Potential duplicate medications found  Please discuss with provider        CONTINUE these medications which have NOT CHANGED    Details   busPIRone (BUSPAR) 10 mg tablet Take 10 mg by mouth 3 (three) times a day , Until Discontinued, Historical Med      cyclobenzaprine (FLEXERIL) 5 mg tablet Take 1 tablet by mouth 3 (three) times a day as needed for muscle spasms for up to 5 days, Starting Sun 5/7/2017, Until Wed 11/29/2017, Normal      Dexbromphen-Pseudoephed-APAP (SINADRIN PLUS PO) Take by mouth, Historical Med      !! dicyclomine (BENTYL) 20 mg tablet Take 1 tablet by mouth every 6 (six) hours as needed (abd cramping) for up to 5 days, Starting Tue 6/13/2017, Until Wed 11/29/2017, Print      gabapentin (NEURONTIN) 400 mg capsule Take 400 mg by mouth 2 (two) times a day  , Until Discontinued, Historical Med      Insulin Glargine (TOUJEO SOLOSTAR SC) Inject 40 Units under the skin daily at bedtime  , Historical Med      Liraglutide (VICTOZA SC) Inject 1 8 Units under the skin daily, Historical Med      naproxen (NAPROSYN) 500 mg tablet Take 1 tablet by mouth 2 (two) times a day with meals for 10 days, Starting Fri 11/17/2017, Until Wed 11/29/2017, Print      omeprazole (PriLOSEC) 20 mg delayed release capsule Take 1 capsule by mouth daily, Starting Thu 9/14/2017, Print      !! ondansetron (ZOFRAN-ODT) 4 mg disintegrating tablet Take 1 tablet by mouth every 6 (six) hours as needed for nausea or vomiting, Starting Fri 11/17/2017, Print      pantoprazole (PROTONIX) 40 mg tablet Take 1 tablet by mouth daily in the early morning for 30 days, Starting Tue 3/14/2017, Until Wed 11/29/2017, Print      SIMVASTATIN PO Take by mouth daily, Historical Med      traZODone (DESYREL) 100 mg tablet Take 1 tablet by mouth daily at bedtime, Starting 2/4/2017, Until Discontinued, No Print       !! - Potential duplicate medications found  Please discuss with provider  No discharge procedures on file      ED Provider  Electronically Signed by           Paola Bautista MD  11/29/17 8616

## 2017-11-29 NOTE — DISCHARGE INSTRUCTIONS
Dolor abdominal   LO QUE NECESITA SABER:   El dolor abdominal puede ser sordo, [place], o tim  Usted puede sentir dolor localizado en omari nick área del abdomen o en todo el abdomen  El dolor puede ser causado por omari afección antoine estreñimiento, sensibilidad o intoxicación alimentaria, infección o omari obstrucción  Asimismo, el dolor abdominal puede deberse a omari hernia, apendicitis o Antonietta Ron  Las enfermedades del hígado, la vesícula o el riñón también pueden causar dolor abdominal  La causa del dolor abdominal puede ser desconocida  INSTRUCCIONES SOBRE EL [facility]:   Regrese a la patrice de emergencias si:   · Usted comienza a sentir un dolor en el pecho o dificultad para respirar que antes no sentía  · Usted siente un dolor con pulsaciones en la parte superior del abdomen o en la parte inferior de la espalda que de repente se vuelve guera  · El dolor se localiza en la parte inferior derecha del abdomen y [place] cuando se [place]  · Usted tiene fiebre por encima de los 100 4 °F (38 °C) o escalofríos  · Usted tiene vómitos y no puede retener líquidos ni alimentos en el estómago  · El dolor no mejora o empeora en las próximas 8 a 12 horas  · Usted nota saad en solano vómito o heces, o éstas tienen un aspecto negruzco y alquitranado  · Solano piel o las partes elizabeth de emily ojos se vuelven amarillentas  · Si usted es omari mounika y presenta abundante sangrado vaginal que no es solano menstruación  Pregúntele a solano Abbott Foyer vitaminas y minerales son adecuados para usted  · Usted siente dolor en la parte inferior de la espalda  · Usted es Let[place]  y tiene dolor en los testículos  · Siente dolor al Nithya Liming  · Usted tiene preguntas o inquietudes acerca de solano condición o cuidado  Acuda en 24 horas a omari rosa de seguimiento con solano médico o antoine se le indique:  Anote emily preguntas para que se acuerde de hacerlas viv emily visitas     Medicamentos:   · Fito Warren ser administrados para calmar stephen estómago y prevenir los vómitos o para disminuir el dolor  Pregunte cómo se debe jerri los analgésicos de forma charles  · [place] emily medicamentos antoine se le haya indicado  Consulte con stephen médico si usted vinny que stephen medicamento no le está ayudando o si presenta efectos secundarios  Infórmele si es alérgico a algún medicamento  Mantenga omari lista actualizada de los [place], las vitaminas y los productos herbales que cynthia  Incluya los siguientes datos de los medicamentos: cantidad, frecuencia y motivo de administración  Traiga con usted la lista o los envases de la píldoras a emily citas de seguimiento  Lleve la lista de los medicamentos con usted en laura de omari emergencia  © 2017 [address] Information is for End User's use only and may not be sold, redistributed or otherwise used for commercial purposes  All illustrations and images included in CareNotes® are the copyrighted property of A D A M , Inc  or Benny Patton  Esta información es sólo para uso en educación  Stephen intención no es darle un consejo médico sobre enfermedades o tratamientos  Colsulte con stephen Salina Jose Alberto farmacéutico antes de seguir cualquier régimen médico para saber si es seguro y efectivo para usted

## 2017-11-29 NOTE — ED NOTES
Pt sleeping, respirations spontaneous, even, and unlabored  No apparent distress        Abdulkadirhu Cho RN  11/29/17 5831

## 2017-11-30 ENCOUNTER — APPOINTMENT (OUTPATIENT)
Dept: PHYSICAL THERAPY | Facility: CLINIC | Age: 37
End: 2017-11-30
Payer: COMMERCIAL

## 2017-11-30 ENCOUNTER — GENERIC CONVERSION - ENCOUNTER (OUTPATIENT)
Dept: OTHER | Facility: OTHER | Age: 37
End: 2017-11-30

## 2017-11-30 PROCEDURE — 88305 TISSUE EXAM BY PATHOLOGIST: CPT | Performed by: OBSTETRICS & GYNECOLOGY

## 2017-12-02 LAB — BACTERIA UR CULT: NORMAL

## 2017-12-04 ENCOUNTER — APPOINTMENT (EMERGENCY)
Dept: RADIOLOGY | Facility: HOSPITAL | Age: 37
End: 2017-12-04
Payer: COMMERCIAL

## 2017-12-04 ENCOUNTER — LAB REQUISITION (OUTPATIENT)
Dept: LAB | Facility: HOSPITAL | Age: 37
End: 2017-12-04
Payer: COMMERCIAL

## 2017-12-04 ENCOUNTER — APPOINTMENT (EMERGENCY)
Dept: CT IMAGING | Facility: HOSPITAL | Age: 37
End: 2017-12-04
Payer: COMMERCIAL

## 2017-12-04 ENCOUNTER — HOSPITAL ENCOUNTER (EMERGENCY)
Facility: HOSPITAL | Age: 37
Discharge: HOME | End: 2017-12-04
Attending: EMERGENCY MEDICINE | Admitting: EMERGENCY MEDICINE
Payer: COMMERCIAL

## 2017-12-04 VITALS
SYSTOLIC BLOOD PRESSURE: 127 MMHG | TEMPERATURE: 97.9 F | WEIGHT: 224.5 LBS | OXYGEN SATURATION: 100 % | DIASTOLIC BLOOD PRESSURE: 58 MMHG | BODY MASS INDEX: 41.06 KG/M2 | HEART RATE: 113 BPM | RESPIRATION RATE: 19 BRPM

## 2017-12-04 DIAGNOSIS — R00.0 TACHYCARDIA: ICD-10-CM

## 2017-12-04 DIAGNOSIS — N39.0 UTI (URINARY TRACT INFECTION): ICD-10-CM

## 2017-12-04 DIAGNOSIS — N87.9 DYSPLASIA OF CERVIX UTERI: ICD-10-CM

## 2017-12-04 DIAGNOSIS — R10.9 ABDOMINAL PAIN: Primary | ICD-10-CM

## 2017-12-04 LAB
ACETONE SERPL-MCNC: ABNORMAL MG/DL
ALBUMIN SERPL BCP-MCNC: 3.7 G/DL (ref 3.5–5)
ALP SERPL-CCNC: 76 U/L (ref 46–116)
ALT SERPL W P-5'-P-CCNC: 47 U/L (ref 12–78)
ANION GAP SERPL CALCULATED.3IONS-SCNC: 11 MMOL/L (ref 4–13)
AST SERPL W P-5'-P-CCNC: 28 U/L (ref 5–45)
ATRIAL RATE: 138 BPM
BACTERIA UR QL AUTO: ABNORMAL /HPF
BASE EX.OXY STD BLDV CALC-SCNC: 47.5 % (ref 60–80)
BASE EXCESS BLDV CALC-SCNC: -1.4 MMOL/L
BASOPHILS # BLD AUTO: 0.01 THOUSANDS/ΜL (ref 0–0.1)
BASOPHILS NFR BLD AUTO: 0 % (ref 0–1)
BILIRUB SERPL-MCNC: 0.35 MG/DL (ref 0.2–1)
BILIRUB UR QL STRIP: NEGATIVE
BUN SERPL-MCNC: 13 MG/DL (ref 5–25)
CALCIUM SERPL-MCNC: 9.2 MG/DL (ref 8.3–10.1)
CHLORIDE SERPL-SCNC: 99 MMOL/L (ref 100–108)
CLARITY UR: ABNORMAL
CO2 SERPL-SCNC: 26 MMOL/L (ref 21–32)
COLOR UR: YELLOW
CREAT SERPL-MCNC: 0.75 MG/DL (ref 0.6–1.3)
EOSINOPHIL # BLD AUTO: 0.06 THOUSAND/ΜL (ref 0–0.61)
EOSINOPHIL NFR BLD AUTO: 0 % (ref 0–6)
ERYTHROCYTE [DISTWIDTH] IN BLOOD BY AUTOMATED COUNT: 13.7 % (ref 11.6–15.1)
EXT PREG TEST URINE: NEGATIVE
GFR SERPL CREATININE-BSD FRML MDRD: 104 ML/MIN/1.73SQ M
GLUCOSE SERPL-MCNC: 261 MG/DL (ref 65–140)
GLUCOSE UR STRIP-MCNC: ABNORMAL MG/DL
HCO3 BLDV-SCNC: 24.7 MMOL/L (ref 24–30)
HCT VFR BLD AUTO: 38.8 % (ref 34.8–46.1)
HGB BLD-MCNC: 12.9 G/DL (ref 11.5–15.4)
HGB UR QL STRIP.AUTO: ABNORMAL
KETONES UR STRIP-MCNC: ABNORMAL MG/DL
LACTATE SERPL-SCNC: 1.5 MMOL/L (ref 0.5–2)
LEUKOCYTE ESTERASE UR QL STRIP: ABNORMAL
LIPASE SERPL-CCNC: 124 U/L (ref 73–393)
LYMPHOCYTES # BLD AUTO: 1.61 THOUSANDS/ΜL (ref 0.6–4.47)
LYMPHOCYTES NFR BLD AUTO: 11 % (ref 14–44)
MCH RBC QN AUTO: 29.5 PG (ref 26.8–34.3)
MCHC RBC AUTO-ENTMCNC: 33.2 G/DL (ref 31.4–37.4)
MCV RBC AUTO: 89 FL (ref 82–98)
MONOCYTES # BLD AUTO: 1.16 THOUSAND/ΜL (ref 0.17–1.22)
MONOCYTES NFR BLD AUTO: 8 % (ref 4–12)
NEUTROPHILS # BLD AUTO: 12.33 THOUSANDS/ΜL (ref 1.85–7.62)
NEUTS SEG NFR BLD AUTO: 81 % (ref 43–75)
NITRITE UR QL STRIP: NEGATIVE
NON-SQ EPI CELLS URNS QL MICRO: ABNORMAL /HPF
NRBC BLD AUTO-RTO: 0 /100 WBCS
O2 CT BLDV-SCNC: 9.2 ML/DL
P AXIS: 64 DEGREES
PCO2 BLDV: 47.1 MM HG (ref 42–50)
PH BLDV: 7.34 [PH] (ref 7.3–7.4)
PH UR STRIP.AUTO: 5 [PH] (ref 4.5–8)
PLATELET # BLD AUTO: 247 THOUSANDS/UL (ref 149–390)
PMV BLD AUTO: 10.2 FL (ref 8.9–12.7)
PO2 BLDV: 27.3 MM HG (ref 35–45)
POTASSIUM SERPL-SCNC: 4 MMOL/L (ref 3.5–5.3)
PR INTERVAL: 124 MS
PROT SERPL-MCNC: 8.4 G/DL (ref 6.4–8.2)
PROT UR STRIP-MCNC: ABNORMAL MG/DL
QRS AXIS: 77 DEGREES
QRSD INTERVAL: 72 MS
QT INTERVAL: 306 MS
QTC INTERVAL: 463 MS
RBC # BLD AUTO: 4.37 MILLION/UL (ref 3.81–5.12)
RBC #/AREA URNS AUTO: ABNORMAL /HPF
SODIUM SERPL-SCNC: 136 MMOL/L (ref 136–145)
SP GR UR STRIP.AUTO: 1.01 (ref 1–1.03)
T WAVE AXIS: 3 DEGREES
UROBILINOGEN UR QL STRIP.AUTO: 0.2 E.U./DL
VENTRICULAR RATE: 138 BPM
WBC # BLD AUTO: 15.17 THOUSAND/UL (ref 4.31–10.16)
WBC #/AREA URNS AUTO: ABNORMAL /HPF

## 2017-12-04 PROCEDURE — 83605 ASSAY OF LACTIC ACID: CPT | Performed by: EMERGENCY MEDICINE

## 2017-12-04 PROCEDURE — 85025 COMPLETE CBC W/AUTO DIFF WBC: CPT | Performed by: EMERGENCY MEDICINE

## 2017-12-04 PROCEDURE — 96365 THER/PROPH/DIAG IV INF INIT: CPT

## 2017-12-04 PROCEDURE — 36415 COLL VENOUS BLD VENIPUNCTURE: CPT

## 2017-12-04 PROCEDURE — 82805 BLOOD GASES W/O2 SATURATION: CPT | Performed by: EMERGENCY MEDICINE

## 2017-12-04 PROCEDURE — 81001 URINALYSIS AUTO W/SCOPE: CPT

## 2017-12-04 PROCEDURE — 99284 EMERGENCY DEPT VISIT MOD MDM: CPT

## 2017-12-04 PROCEDURE — 81002 URINALYSIS NONAUTO W/O SCOPE: CPT | Performed by: EMERGENCY MEDICINE

## 2017-12-04 PROCEDURE — 82009 KETONE BODYS QUAL: CPT | Performed by: EMERGENCY MEDICINE

## 2017-12-04 PROCEDURE — 80053 COMPREHEN METABOLIC PANEL: CPT | Performed by: EMERGENCY MEDICINE

## 2017-12-04 PROCEDURE — 93005 ELECTROCARDIOGRAM TRACING: CPT

## 2017-12-04 PROCEDURE — 96375 TX/PRO/DX INJ NEW DRUG ADDON: CPT

## 2017-12-04 PROCEDURE — 71010 HB CHEST X-RAY 1 VIEW FRONTAL (PORTABLE): CPT

## 2017-12-04 PROCEDURE — 74177 CT ABD & PELVIS W/CONTRAST: CPT

## 2017-12-04 PROCEDURE — 83690 ASSAY OF LIPASE: CPT | Performed by: EMERGENCY MEDICINE

## 2017-12-04 PROCEDURE — 96361 HYDRATE IV INFUSION ADD-ON: CPT

## 2017-12-04 PROCEDURE — 93005 ELECTROCARDIOGRAM TRACING: CPT | Performed by: EMERGENCY MEDICINE

## 2017-12-04 PROCEDURE — 81025 URINE PREGNANCY TEST: CPT | Performed by: EMERGENCY MEDICINE

## 2017-12-04 PROCEDURE — 71275 CT ANGIOGRAPHY CHEST: CPT

## 2017-12-04 RX ORDER — MORPHINE SULFATE 4 MG/ML
4 INJECTION, SOLUTION INTRAMUSCULAR; INTRAVENOUS ONCE
Status: COMPLETED | OUTPATIENT
Start: 2017-12-04 | End: 2017-12-04

## 2017-12-04 RX ORDER — ONDANSETRON 2 MG/ML
4 INJECTION INTRAMUSCULAR; INTRAVENOUS ONCE
Status: COMPLETED | OUTPATIENT
Start: 2017-12-04 | End: 2017-12-04

## 2017-12-04 RX ORDER — CEPHALEXIN 500 MG/1
500 CAPSULE ORAL EVERY 12 HOURS SCHEDULED
Qty: 20 CAPSULE | Refills: 0 | Status: SHIPPED | OUTPATIENT
Start: 2017-12-04 | End: 2017-12-14

## 2017-12-04 RX ADMIN — SODIUM CHLORIDE 1000 ML: 0.9 INJECTION, SOLUTION INTRAVENOUS at 19:35

## 2017-12-04 RX ADMIN — CEFTRIAXONE 1000 MG: 1 INJECTION, SOLUTION INTRAVENOUS at 20:51

## 2017-12-04 RX ADMIN — IOHEXOL 100 ML: 350 INJECTION, SOLUTION INTRAVENOUS at 20:26

## 2017-12-04 RX ADMIN — SODIUM CHLORIDE 1000 ML: 0.9 INJECTION, SOLUTION INTRAVENOUS at 20:54

## 2017-12-04 RX ADMIN — ONDANSETRON 4 MG: 2 INJECTION INTRAMUSCULAR; INTRAVENOUS at 20:49

## 2017-12-04 RX ADMIN — MORPHINE SULFATE 4 MG: 4 INJECTION, SOLUTION INTRAMUSCULAR; INTRAVENOUS at 20:00

## 2017-12-04 NOTE — ED NOTES
Pt vomited small amount partially digested food into emesis basin, soiled self while vomiting, pt provided change of underwear and paper scrubs to clean self and change in bathroom        Macho Gay RN  12/04/17 6100

## 2017-12-05 NOTE — ED ATTENDING ATTESTATION
Michelle Encarnacion MD, saw and evaluated the patient  I have discussed the patient with the resident/non-physician practitioner and agree with the resident's/non-physician practitioner's findings, Plan of Care, and MDM as documented in the resident's/non-physician practitioner's note, except where noted  All available labs and Radiology studies were reviewed  At this point I agree with the current assessment done in the Emergency Department  I have conducted an independent evaluation of this patient a history and physical is as follows:  Patient with c/o epigastric pain stared today afternoon, vomited 12 times, denies chest pain, dyspnea  PMH: DM Type 1  On exam, tachycardia noted, Abd soft, tenderness epigastrium without rebound  Impression:  Epigastric pain, tachycardia, vomiting, rule out DKA, gastritis, cholecystitis, pancreatitis, bowel obstruction pulmonary embolism (tachycardia)  Will check labs, stat portable x-ray to rule out free air CT scan abdomen pelvis give IV fluids, VBG, acetone  UPDATE: Labs show mild hyperglycemia, negative acidosis; CT negative for acute pathology  Probable Gastritis/GERD   We will discharge with GI cocktail, PPI, GI referral     Critical Care Time  CritCare Time

## 2017-12-05 NOTE — ED NOTES
Second liter on patient is running in slowly; educated patient on straightening her arm at this time; provider notified      Trish Kelly RN  12/04/17 7234

## 2017-12-05 NOTE — DISCHARGE INSTRUCTIONS
Dolor abdominal, cuidados ambulatorios   INFORMACIÓN GENERAL:   El dolor abdominal  puede ser sordo, molesto o [place]  Puede sentir dolor en omari kain del abdomen o en todo el abdomen  El dolor puede deberse a ciertos estados antoine estreñimiento, sensibilidad o intoxicación alimentaria, infección o omari obstrucción  Asimismo, el dolor abdominal puede deberse a omari hernia, apendicitis o úlcera  La causa del dolor abdominal puede ser desconocida  Busque cuidados inmediatos para los siguientes síntomas:   · Utica dolor de pecho o falta de aliento    · Dolor pulsátil en el abdomen superior o en la parte inferior de la espalda que de repente es guera    · Dolor que se localiza en el abdomen inferior derecho y empeora con el movimiento    · Fiebre por encima de 100 4°F (38°C) o escalofríos amadeo    · Vómito de todo lo que usted come y cynthia    · Dolor que no mejora o más bob empeora viv las siguientes 8 a 12 horas    · Jose E en el vómito o heces que se stephen negras y alquitranadas    · La piel o el peter de los ojos se vuelven amarillentos    · Grandes cantidades de sangrado vaginal que no es ross periodo menstrual  El tratamiento para el dolor abdominal  puede llegar a incluir medicamentos para calmar ross estómago, prevenir el vómito o disminuir el dolor  Programe omari rosa con ross proveedor de [employer] se le haya indicado: Anote emily preguntas para que se acuerde de hacerlas viv emily visitas  ACUERDOS SOBRE ROSS CUIDADO:   Usted tiene el derecho de participar en la planificación de ross cuidado  Aprenda todo lo que pueda sobre ross condición y antoine darle tratamiento  Discuta con emily médicos emily opciones de tratamiento para juntos decidir el cuidado que usted quiere recibir  Usted siempre tiene el derecho a rechazar ross tratamiento  Esta información es sólo para uso en educación  Ross intención no es darle un consejo médico sobre enfermedades o tratamientos   Colsulte con ross Preston Culver farmacéutico antes de seguir cualquier régimen médico para saber si es seguro y efectivo para usted  © 2014 [address] is for End User's use only and may not be sold, redistributed or otherwise used for commercial purposes  All illustrations and images included in CareNotes® are the copyrighted property of A D A M , Inc  or Benny Patton  Dolor abdominal tim   LO QUE NECESITA SABER:   No se puede encontrar la causa del dolor abdominal  Si se encuentra omari causa, el tratamiento dependerá de cuál es sanjeev causa  INSTRUCCIONES SOBRE EL [facility]:   Busque atención médica de inmediato si:   · Usted no puede dejar de vomitar o vomita saad  · Usted tiene [employer] evacuaciones intestinales o estas tienen un aspecto alquitranado  · Usted tiene sangrado por solano recto  · El tamaño del abdomen es más abhishek de lo normal y se siente cedric y [place] doloroso  · Usted tiene dolor abdominal intenso  · Usted casey de tener flatulencias y evacuaciones intestinales  · Usted se siente mareado, débil o tiene sensación de Richey  Pregúntele a solano Maria D Rumps vitaminas y minerales son adecuados para usted  · Usted tiene fiebre  · Tiene nuevos signos y síntomas  · Emily síntomas no mejoran con el tratamiento  · Usted tiene preguntas o inquietudes acerca de solano condición o cuidado  Medicamentos,  estos pueden administrarse para disminuir el dolor, tratar omari infección y [place] emily síntomas  [place]ro los [place] antoine se le haya indicado  Llame a solano médico si usted piensa que el medicamento no está ayudando o si tiene efectos secundarios  Infórmele si es alérgico a cualquier medicamento  Mantenga omari lista actualizada de los [place], las vitaminas y los productos herbales que cynthia  Incluya los siguientes datos de los medicamentos: cantidad, frecuencia y motivo de administración  Traiga con usted la lista o los envases de la píldoras a emily citas de seguimiento   Lleve la [employer] de los medicamentos con usted en laura de omari emergencia  [place] de stephen síntomas:   · La aplicación de calor  sobre el abdomen de 20 a 30 minutos cada 2 horas por los [employer] indiquen  El calor ayuda a disminuir el dolor y los espasmos musculares  · Controle stephen estrés  El estrés puede causar dolor abdominal  Stephen médico puede recomendarle técnicas de relajación y ejercicios de respiración profunda para ayudar a disminuir el estrés  Stephen médico puede recomendarle que hable con alguien sobre stephen estrés o ansiedad, antoine un consejero o un amigo de [place]  Duerma lo suficiente y realice ejercicio regularmente  · Limite o no consuma bebidas alcohólicas  El alcohol puede empeorar el dolor abdominal  Pregunte a stephen médico si usted puede jerri alcohol  Pregunte cuanto es la cantidad charles para usted jerri  · No fume  La nicotina y otros químicos en los cigarrillos pueden dañarle el esófago y [place]  Pida información a stephen médico si usted actualmente fuma y necesita ayuda para dejar de fumar  Los cigarrillos electrónicos o tabaco sin humo todavía contienen nicotina  Consulte con stephen médico antes de [employer]  Realice cambios en los alimentos que consume según se le indique:  No coma alimentos que causan dolor abdominal u otros síntomas  Ingiera comidas pequeñas, más a menudo  · Coma más alimentos ricos en fibra si tiene estreñimiento  Los alimentos altos en fibra incluyen frutas, verduras, alimentos de grano integral y legumbres  · No coma alimentos que causan gas si tiene distensión  Por ejemplo, brócoli, col y coliflor  No sera gaseosas o bebidas carbonadas, ya que también pueden provocarle gases  · No consuma alimentos o bebidas que contienen sorbitol o fructosa si tiene diarrea y distensión  Juliocesar Leather son jugos de frutas, dulces, mermeladas y gomas de mascar sin azúcar       · No coma alimentos altos en grasas, antoine comidas fritas, hamburguesas con Alberto Rodriguez y postres  · Limite o no tome cafeína  La cafeína [employer], antoine la acidez o las náuseas  · Mara suficientes líquidos para evitar la deshidratación causada por la diarrea o los vómitos  Pregunte a stephen médico sobre la cantidad de líquido que necesita jerri todos los días y cuáles le recomienda  Acuda a emily consultas de control con stephen médico según le indicaron  Anote emily preguntas para que se acuerde de hacerlas viv emily visitas  © 2017 [address] Information is for End User's use only and may not be sold, redistributed or otherwise used for commercial purposes  All illustrations and images included in CareNotes® are the copyrighted property of A [employer] M , Inc  or Benny Patton  Esta información es sólo para uso en educación  Stephen intención no es darle un consejo médico sobre enfermedades o tratamientos  Colsulte con stephen Tasha Cabot farmacéutico antes de seguir cualquier régimen médico para saber si es seguro y efectivo para usted

## 2017-12-05 NOTE — ED PROVIDER NOTES
History  Chief Complaint   Patient presents with    Vomiting     Pt reports Vx5, diarrhea, epigastric abdominal pain and subjective fever since today  Patient comes in for evaluation of sudden-onset epigastric abdominal pain  Started around 5:00 p m  Since then at least 12 episodes of emesis  States feels similar to when she had gastritis before; but worse  She has a history of insulin-dependent diabetes type 2  She also history of dyslipidemia and depression  She states the pain is severe and is all throughout abdomen; but worse in the epigastrium  Chief complaint states diarrhea; but she denies diarrhea to me  She denies that she has any fevers; or chills  She notes that she is peeing frequently and has dysuria as well  She denies any melena; or hematochezia  She does state 1 of her episodes of emesis had a small amount of blood in it  Denies chest pain or shortness of breath; although she does feel that her heart is beating quickly  She denies any surgeries besides a  in the past   States she did miss a dose of insulin today and has maybe been noncompliant at times  Patient is tachycardia but per EKG appears to be sinus tachycardia  Sats are 100%; blood pressure stable  Differential includes gastritis versus PUD versus pancreatitis versus DKA versus although less likely appendicitis  Abdominal pain labs plus lactic acid plus VBG  Do CT abdomen pelvis with IV contrast     Has omeprazole at home            Prior to Admission Medications   Prescriptions Last Dose Informant Patient Reported? Taking?    Dexbromphen-Pseudoephed-APAP (SINADRIN PLUS PO)   Yes No   Sig: Take by mouth   Insulin Glargine (TOUJEO SOLOSTAR SC)   Yes No   Sig: Inject 40 Units under the skin daily at bedtime     Liraglutide (VICTOZA SC)   Yes No   Sig: Inject 1 8 Units under the skin daily   SIMVASTATIN PO   Yes No   Sig: Take by mouth daily   busPIRone (BUSPAR) 10 mg tablet   Yes No   Sig: Take 10 mg by mouth 3 (three) times a day  cyclobenzaprine (FLEXERIL) 5 mg tablet   No No   Sig: Take 1 tablet by mouth 3 (three) times a day as needed for muscle spasms for up to 5 days   dicyclomine (BENTYL) 20 mg tablet   No No   Sig: Take 1 tablet by mouth every 6 (six) hours as needed (abd cramping) for up to 5 days   dicyclomine (BENTYL) 20 mg tablet   No No   Sig: Take 1 tablet by mouth 2 (two) times a day   gabapentin (NEURONTIN) 400 mg capsule   Yes No   Sig: Take 400 mg by mouth 2 (two) times a day     naproxen (NAPROSYN) 500 mg tablet   No No   Sig: Take 1 tablet by mouth 2 (two) times a day with meals for 10 days   omeprazole (PriLOSEC) 20 mg delayed release capsule   No No   Sig: Take 1 capsule by mouth daily   Patient taking differently: Take 20 mg by mouth as needed     ondansetron (ZOFRAN-ODT) 4 mg disintegrating tablet   No No   Sig: Take 1 tablet by mouth every 6 (six) hours as needed for nausea or vomiting   ondansetron (ZOFRAN-ODT) 4 mg disintegrating tablet   No No   Sig: Take 1 tablet by mouth every 8 (eight) hours as needed for nausea or vomiting for up to 5 days   pantoprazole (PROTONIX) 40 mg tablet   No No   Sig: Take 1 tablet by mouth daily in the early morning for 30 days   traZODone (DESYREL) 100 mg tablet   No No   Sig: Take 1 tablet by mouth daily at bedtime      Facility-Administered Medications: None       Past Medical History:   Diagnosis Date    Anxiety     Back pain     Depression     Diabetes mellitus (HCC)     Hyperlipidemia     Liver function study, abnormal        Past Surgical History:   Procedure Laterality Date    ABDOMINAL SURGERY       SECTION      TONSILLECTOMY      TUBAL LIGATION         History reviewed  No pertinent family history  I have reviewed and agree with the history as documented      Social History   Substance Use Topics    Smoking status: Never Smoker    Smokeless tobacco: Never Used    Alcohol use No        Review of Systems   Constitutional: Positive for activity change and appetite change  Negative for chills, fever and unexpected weight change  HENT: Negative for congestion, ear pain, rhinorrhea, sore throat and trouble swallowing  Eyes: Negative for photophobia, pain and visual disturbance  Respiratory: Negative for cough, chest tightness, shortness of breath and wheezing  Cardiovascular: Negative for chest pain and palpitations  Gastrointestinal: Positive for abdominal pain, nausea and vomiting  Negative for abdominal distention, blood in stool, constipation and diarrhea  Genitourinary: Positive for difficulty urinating, dysuria and frequency  Negative for flank pain, hematuria and urgency  Musculoskeletal: Negative for arthralgias, back pain, joint swelling, neck pain and neck stiffness  Skin: Negative for color change, pallor and rash  Neurological: Negative for dizziness, seizures, syncope, speech difficulty, weakness, light-headedness and headaches  Hematological: Negative for adenopathy  Psychiatric/Behavioral: Negative for confusion, hallucinations and self-injury  Physical Exam  ED Triage Vitals   Temperature Pulse Respirations Blood Pressure SpO2   12/04/17 1807 12/04/17 1805 12/04/17 1805 12/04/17 1805 12/04/17 1805   97 9 °F (36 6 °C) (!) 156 20 (!) 173/86 98 %      Temp Source Heart Rate Source Patient Position - Orthostatic VS BP Location FiO2 (%)   12/04/17 1807 12/04/17 1805 12/04/17 1805 12/04/17 1805 --   Oral Monitor Standing Right arm       Pain Score       12/04/17 1805       8           Orthostatic Vital Signs  Vitals:    12/04/17 1945 12/04/17 2008 12/04/17 2115 12/04/17 2130   BP: 113/57 127/58     Pulse: (!) 122 (!) 123 102 (!) 113   Patient Position - Orthostatic VS:  Lying         Physical Exam   Constitutional: She is oriented to person, place, and time  She appears well-developed and well-nourished  No distress  Appears uncomfortable   HENT:   Head: Normocephalic and atraumatic     Right Ear: External ear normal    Left Ear: External ear normal    Mouth/Throat: Oropharynx is clear and moist  No oropharyngeal exudate  Eyes: Conjunctivae and EOM are normal  Pupils are equal, round, and reactive to light  Right eye exhibits no discharge  Left eye exhibits no discharge  Neck: Normal range of motion  Neck supple  No tracheal deviation present  No thyromegaly present  Cardiovascular: Normal rate, normal heart sounds and intact distal pulses  No murmur heard  Pulmonary/Chest: Effort normal and breath sounds normal  No respiratory distress  She has no wheezes  She has no rales  Abdominal: Soft  Bowel sounds are normal  She exhibits no distension  There is no tenderness  There is no rebound and no guarding  Significant tenderness throughout  Patient does appear to have rebound tenderness  Not truly peritoneal   Appears uncomfortable  No significant flank discomfort   Musculoskeletal: Normal range of motion  She exhibits no edema or deformity  Moves extremities x4   Lymphadenopathy:     She has no cervical adenopathy  Neurological: She is alert and oriented to person, place, and time  She exhibits normal muscle tone  Skin: Skin is warm  Capillary refill takes less than 2 seconds  No rash noted  She is not diaphoretic  No erythema  Psychiatric: She has a normal mood and affect   Her behavior is normal  Judgment and thought content normal        ED Medications  Medications   sodium chloride 0 9 % bolus 1,000 mL (0 mL Intravenous Stopped 12/4/17 2054)   morphine (PF) 4 mg/mL injection 4 mg (4 mg Intravenous Given 12/4/17 2000)   sodium chloride 0 9 % bolus 1,000 mL (0 mL Intravenous Stopped 12/4/17 2243)   iohexol (OMNIPAQUE) 350 MG/ML injection (MULTI-DOSE) 100 mL (100 mL Intravenous Given 12/4/17 2026)   ondansetron (ZOFRAN) injection 4 mg (4 mg Intravenous Given 12/4/17 2049)   cefTRIAXone (ROCEPHIN) IVPB (premix) 1,000 mg (0 mg Intravenous Stopped 12/4/17 2117)       Diagnostic Studies  Results Reviewed     Procedure Component Value Units Date/Time    Acetone [[id number]]  (Abnormal) Collected:  12/04/17 1958    Lab Status:  Final result Specimen:  Blood from Arm, Right Updated:  12/04/17 2042     Acetone, Bld Trace (A)    Lactic acid, plasma [[id number]]  (Normal) Collected:  12/04/17 1958    Lab Status:  Final result Specimen:  Blood from Arm, Right Updated:  12/04/17 2034     LACTIC ACID 1 5 mmol/L     Narrative:         Result may be elevated if tourniquet was used during collection      Urine Microscopic [[id number]]  (Abnormal) Collected:  12/04/17 1957    Lab Status:  Final result Specimen:  Urine from Urine, Clean Catch Updated:  12/04/17 2023     RBC, UA None Seen /hpf      WBC, UA 1-2 (A) /hpf      Epithelial Cells Occasional /hpf      Bacteria, UA Moderate (A) /hpf     Blood gas, venous [[id number]]  (Abnormal) Collected:  12/04/17 1958    Lab Status:  Final result Specimen:  Blood from Arm, Right Updated:  12/04/17 2007     pH, Julio 7 338     pCO2, Julio 47 1 mm Hg      pO2, Julio 27 3 (L) mm Hg      HCO3, Julio 24 7 mmol/L      Base Excess, Julio -1 4 mmol/L      O2 Content, Julio 9 2 ml/dL      O2 HGB, VENOUS 47 5 (L) %     Comprehensive metabolic panel [[id number]]  (Abnormal) Collected:  12/04/17 1934    Lab Status:  Final result Specimen:  Blood from Arm, Right Updated:  12/04/17 2002     Sodium 136 mmol/L      Potassium 4 0 mmol/L      Chloride 99 (L) mmol/L      CO2 26 mmol/L      Anion Gap 11 mmol/L      BUN 13 mg/dL      Creatinine 0 75 mg/dL      Glucose 261 (H) mg/dL      Calcium 9 2 mg/dL      AST 28 U/L      ALT 47 U/L      Alkaline Phosphatase 76 U/L      Total Protein 8 4 (H) g/dL      Albumin 3 7 g/dL      Total Bilirubin 0 35 mg/dL      eGFR 104 ml/min/1 73sq m     Narrative:         National Kidney Disease Education Program recommendations are as follows:  GFR calculation is accurate only with a steady state creatinine  Chronic Kidney disease less than 60 ml/min/1 73 sq  meters  Kidney failure less than 15 ml/min/1 73 sq  meters  Lipase [[id number]]  (Normal) Collected:  12/04/17 1934    Lab Status:  Final result Specimen:  Blood from Arm, Right Updated:  12/04/17 2002     Lipase 124 u/L     POCT urinalysis dipstick [[id number]]  (Abnormal) Resulted:  12/04/17 1942    Lab Status:  Final result Specimen:  Urine from Urine, Other Updated:  12/04/17 1943    POCT pregnancy, urine [[id number]]  (Normal) Resulted:  12/04/17 1942    Lab Status:  Final result Specimen:  Urine Updated:  12/04/17 1943     EXT PREG TEST UR (Ref: Negative) negative    CBC and differential [[id number]]  (Abnormal) Collected:  12/04/17 1934    Lab Status:  Final result Specimen:  Blood from Arm, Right Updated:  12/04/17 1942     WBC 15 17 (H) Thousand/uL      RBC 4 37 Million/uL      Hemoglobin 12 9 g/dL      Hematocrit 38 8 %      MCV 89 fL      MCH 29 5 pg      MCHC 33 2 g/dL      RDW 13 7 %      MPV 10 2 fL      Platelets 299 Thousands/uL      nRBC 0 /100 WBCs      Neutrophils Relative 81 (H) %      Lymphocytes Relative 11 (L) %      Monocytes Relative 8 %      Eosinophils Relative 0 %      Basophils Relative 0 %      Neutrophils Absolute 12 33 (H) Thousands/µL      Lymphocytes Absolute 1 61 Thousands/µL      Monocytes Absolute 1 16 Thousand/µL      Eosinophils Absolute 0 06 Thousand/µL      Basophils Absolute 0 01 Thousands/µL     ED Urine Macroscopic [[id number]]  (Abnormal) Collected:  12/04/17 1957    Lab Status:  Final result Specimen:  Urine Updated:  12/04/17 1940     Color, UA Yellow     Clarity, UA Slightly Cloudy     pH, UA 5 0     Leukocytes, UA Elevated glucose may cause decreased leukocyte values   See urine microscopic for [facility] result/ (A)     Nitrite, UA Negative     Protein, UA 30 (1+) (A) mg/dl      Glucose, UA >=1000 (1%) (A) mg/dl      Ketones, UA 80 (3+) (A) mg/dl      Urobilinogen, UA 0 2 E U /dl      Bilirubin, UA Negative     Blood, UA Small (A)     Specific [place], UA 1 010    Narrative:       CLINITEK RESULT XR chest 1 view portable   ED Interpretation by Issac Alanis DO (12/04 2019)   Wet read No acute findings  No free air      Final Result by Lacy Redd MD (12/04 2154)      No active pulmonary disease  Workstation performed: [id number]         CTA chest ct abdomen pelvis w contrast   Final Result by Franklyn Rodas MD (12/04 2042)   INDICATION:  "c/o epigastric pain stared today afternoon, vomited 12 times, denies chest pain, dyspnea  PMH: DM Type 1  On exam, tachycardia noted, Abd soft, tenderness epigastrium without rebound  Impression:  Epigastric pain, tachycardia, vomiting, "      COMPARISON: None  TECHNIQUE:  CT examination of the chest, abdomen and pelvis was performed  Thin section CT angiographic technique was used in the chest in order to evaluate for pulmonary embolus and coronal 3D MIP postprocessing was performed on the acquisition    scanner  Reformatted images were created in axial, sagittal, and coronal planes  Radiation dose length product (DLP) for this visit:  8962 mGy-cm   This examination, like all CT scans performed in the [facility], was performed utilizing techniques to minimize radiation dose exposure, including the use of iterative    reconstruction and automated exposure control  IV Contrast:  100 mL of iohexol (OMNIPAQUE)  350   Enteric Contrast:  Enteric contrast was not administered  FINDINGS:      CHEST      PULMONARY ARTERIAL TREE:  No pulmonary embolus is seen  LUNGS:  Lungs are clear  There is no tracheal or endobronchial lesion  PLEURA:  Unremarkable  HEART/AORTA:  Unremarkable for patient's age  MEDIASTINUM AND ALBERT:  Unremarkable  CHEST WALL AND LOWER NECK:       Unremarkable  ABDOMEN      LIVER/BILIARY TREE:  Fatty infiltration  Mild hepatomegaly  GALLBLADDER:  No calcified gallstones  No pericholecystic inflammatory change  SPLEEN:  Unremarkable        PANCREAS: Unremarkable  ADRENAL GLANDS: Unremarkable  KIDNEYS/URETERS:  Unremarkable  No hydronephrosis  STOMACH AND BOWEL:  Unremarkable  APPENDIX:  Noninflamed      ABDOMINOPELVIC CAVITY:  No ascites or free intraperitoneal air  No lymphadenopathy  VESSELS:  Unremarkable for patient's age  PELVIS      REPRODUCTIVE ORGANS:  Unremarkable for patient's age  URINARY BLADDER:  Unremarkable  ABDOMINAL WALL/INGUINAL REGIONS:  Unremarkable  OSSEOUS STRUCTURES:  No acute fracture or destructive osseous lesion  IMPRESSION:        No acute findings within the chest, abdomen or pelvis  Specifically, no pulmonary arterial embolism within the chest   No acute inflammatory changes within the abdomen or the pelvis  Fatty infiltration of the liver  Workstation performed: [id number]               Procedures  ECG 12 Lead Documentation  Date/Time: 12/5/2017 12:01 AM  Performed by: Varghese Yip  Authorized by: Everton Claire     Indications / Diagnosis:  Tachy  ECG reviewed by me, the ED Provider: yes    Patient location:  ED  Previous ECG:     Previous ECG:  Unavailable  Interpretation:     Interpretation: abnormal    Rate:     ECG rate:  138    ECG rate assessment: tachycardic    Rhythm:     Rhythm: sinus rhythm    Ectopy:     Ectopy: none    QRS:     QRS axis:  Normal  Conduction:     Conduction: normal    ST segments:     ST segments:  Normal  T waves:     T waves: normal            Phone Consults  ED Phone Contact    ED Course  ED Course as of Dec 05 0002   Mon Dec 04, 2017   1952 WBC: (!) 15 17   2018 Creatinine: 0 75   2018 Missed insulin dose Glucose: (!) 261   2031 Bacteria, UA: (!) Moderate   2044 No acute findings within the chest, abdomen or pelvis  Specifically, no pulmonary arterial embolism within the chest   No acute inflammatory changes within the abdomen or the pelvis      2157 Looking back at past visits persistently tachycardic throughout past ER visits  MDM  Number of Diagnoses or Management Options  Abdominal pain:   Tachycardia:   UTI (urinary tract infection):   Diagnosis management comments: EKG sinus tachycardia  Past notes to show she is persistently tachycardic outpatient visits and ER visits  Has had similar pain prior  Due to significant discomfort CT chest abdomen pelvis was performed  No evidence of pulmonary embolism or heart strain on CT scan  No evidence of intra-abdominal pathology  Lab work unremarkable  No evidence of dehydration  Urinalysis possibly suggestive of infection  Gave dose of ceftriaxone in department will give course of antibiotics  Gave 2 L IV fluid  Explained to patient if she develops inability to tolerate p o  to return immediately  She has had multiple evaluations for the symptoms  Did recommend she follow up with her PCP as she may need specialist follow-up  Patient voices understanding  Discharge from department  CritCare Time    Disposition  Final diagnoses:   Abdominal pain   Tachycardia   UTI (urinary tract infection)     Time reflects when diagnosis was documented in both MDM as applicable and the Disposition within this note     Time User Action Codes Description Comment    12/4/2017 10:12 PM Gayland Lucina Add [R10 9] Abdominal pain     12/4/2017 10:12 PM Gayland Lucina Add [R00 0] Tachycardia     12/4/2017 10:13 PM Gayland Lucina Add [N39 0] UTI (urinary tract infection)       ED Disposition     ED Disposition Condition Comment    Discharge  Ramona Torito discharge to home/self care      Condition at discharge: Good        Follow-up Information     Follow up With Specialties Details Why Contact Info    Telma Ortega PA-C Family Medicine Schedule an appointment as soon as possible for a visit 1 Day to discuss high blood sugar and follow-up visit [facility]  [address] [place] [address]  49  [facility]          Discharge Medication List as of 12/4/2017 10:16 PM      START taking these medications    Details   cephalexin (KEFLEX) 500 mg capsule Take 1 capsule by mouth every 12 (twelve) hours for 10 days, Starting Mon 12/4/2017, Until Thu 12/14/2017, Print         CONTINUE these medications which have NOT CHANGED    Details   busPIRone (BUSPAR) 10 mg tablet Take 10 mg by mouth 3 (three) times a day , Until Discontinued, Historical Med      cyclobenzaprine (FLEXERIL) 5 mg tablet Take 1 tablet by mouth 3 (three) times a day as needed for muscle spasms for up to 5 days, Starting Sun 5/7/2017, Until Wed 11/29/2017, Normal      Dexbromphen-Pseudoephed-APAP (SINADRIN PLUS PO) Take by mouth, Historical Med      dicyclomine (BENTYL) 20 mg tablet Take 1 tablet by mouth 2 (two) times a day, Starting Wed 11/29/2017, Print      gabapentin (NEURONTIN) 400 mg capsule Take 400 mg by mouth 2 (two) times a day  , Until Discontinued, Historical Med      Insulin Glargine (TOUJEO SOLOSTAR SC) Inject 40 Units under the skin daily at bedtime  , Historical Med      Liraglutide (VICTOZA SC) Inject 1 8 Units under the skin daily, Historical Med      naproxen (NAPROSYN) 500 mg tablet Take 1 tablet by mouth 2 (two) times a day with meals for 10 days, Starting Fri 11/17/2017, Until Wed 11/29/2017, Print      omeprazole (PriLOSEC) 20 mg delayed release capsule Take 1 capsule by mouth daily, Starting Thu 9/14/2017, Print      !! ondansetron (ZOFRAN-ODT) 4 mg disintegrating tablet Take 1 tablet by mouth every 6 (six) hours as needed for nausea or vomiting, Starting Fri 11/17/2017, Print      !! ondansetron (ZOFRAN-ODT) 4 mg disintegrating tablet Take 1 tablet by mouth every 8 (eight) hours as needed for nausea or vomiting for up to 5 days, Starting Wed 11/29/2017, Until Mon 12/4/2017, Print      pantoprazole (PROTONIX) 40 mg tablet Take 1 tablet by mouth daily in the early morning for 30 days, Starting Tue 3/14/2017, Until Wed 11/29/2017, Print      SIMVASTATIN PO Take by mouth daily, Historical Med traZODone (DESYREL) 100 mg tablet Take 1 tablet by mouth daily at bedtime, Starting 2/4/2017, Until Discontinued, No Print       !! - Potential duplicate medications found  Please discuss with provider  No discharge procedures on file  ED Provider  Attending physically available and evaluated Ramona Ugarte  I managed the patient along with the ED Attending      Electronically Signed by         Sam Ayala DO  Resident  12/05/17 4831

## 2017-12-07 ENCOUNTER — GENERIC CONVERSION - ENCOUNTER (OUTPATIENT)
Dept: OTHER | Facility: OTHER | Age: 37
End: 2017-12-07

## 2017-12-08 ENCOUNTER — APPOINTMENT (OUTPATIENT)
Dept: LAB | Facility: HOSPITAL | Age: 37
End: 2017-12-08
Payer: COMMERCIAL

## 2017-12-08 ENCOUNTER — TRANSCRIBE ORDERS (OUTPATIENT)
Dept: ADMINISTRATIVE | Facility: HOSPITAL | Age: 37
End: 2017-12-08

## 2017-12-08 DIAGNOSIS — R94.6 NONSPECIFIC ABNORMAL RESULTS OF THYROID FUNCTION STUDY: ICD-10-CM

## 2017-12-08 DIAGNOSIS — E66.01 MORBID OBESITY (HCC): ICD-10-CM

## 2017-12-08 DIAGNOSIS — E11.8 UNCONTROLLED TYPE 2 DIABETES MELLITUS WITH COMPLICATION, UNSPECIFIED LONG TERM INSULIN USE STATUS: ICD-10-CM

## 2017-12-08 DIAGNOSIS — E55.9 AVITAMINOSIS D: ICD-10-CM

## 2017-12-08 DIAGNOSIS — E11.65 UNCONTROLLED TYPE 2 DIABETES MELLITUS WITH COMPLICATION, UNSPECIFIED LONG TERM INSULIN USE STATUS: Primary | ICD-10-CM

## 2017-12-08 DIAGNOSIS — Z79.899 NEED FOR PROPHYLACTIC CHEMOTHERAPY: ICD-10-CM

## 2017-12-08 DIAGNOSIS — E78.00 PURE HYPERCHOLESTEROLEMIA: ICD-10-CM

## 2017-12-08 DIAGNOSIS — E11.8 UNCONTROLLED TYPE 2 DIABETES MELLITUS WITH COMPLICATION, UNSPECIFIED LONG TERM INSULIN USE STATUS: Primary | ICD-10-CM

## 2017-12-08 DIAGNOSIS — E11.65 UNCONTROLLED TYPE 2 DIABETES MELLITUS WITH COMPLICATION, UNSPECIFIED LONG TERM INSULIN USE STATUS: ICD-10-CM

## 2017-12-08 LAB
ALBUMIN SERPL BCP-MCNC: 3.2 G/DL (ref 3.5–5)
ALP SERPL-CCNC: 52 U/L (ref 46–116)
ALT SERPL W P-5'-P-CCNC: 39 U/L (ref 12–78)
ANION GAP SERPL CALCULATED.3IONS-SCNC: 5 MMOL/L (ref 4–13)
AST SERPL W P-5'-P-CCNC: 20 U/L (ref 5–45)
BILIRUB SERPL-MCNC: 0.31 MG/DL (ref 0.2–1)
BUN SERPL-MCNC: 8 MG/DL (ref 5–25)
CALCIUM SERPL-MCNC: 9.2 MG/DL (ref 8.3–10.1)
CHLORIDE SERPL-SCNC: 105 MMOL/L (ref 100–108)
CO2 SERPL-SCNC: 30 MMOL/L (ref 21–32)
CREAT SERPL-MCNC: 0.58 MG/DL (ref 0.6–1.3)
EST. AVERAGE GLUCOSE BLD GHB EST-MCNC: 189 MG/DL
GFR SERPL CREATININE-BSD FRML MDRD: 120 ML/MIN/1.73SQ M
GLUCOSE P FAST SERPL-MCNC: 188 MG/DL (ref 65–99)
HBA1C MFR BLD: 8.2 % (ref 4.2–6.3)
POTASSIUM SERPL-SCNC: 3.6 MMOL/L (ref 3.5–5.3)
PROT SERPL-MCNC: 7.5 G/DL (ref 6.4–8.2)
SODIUM SERPL-SCNC: 140 MMOL/L (ref 136–145)
T4 FREE SERPL-MCNC: 0.89 NG/DL (ref 0.76–1.46)
TSH SERPL DL<=0.05 MIU/L-ACNC: 4.75 UIU/ML (ref 0.36–3.74)

## 2017-12-08 PROCEDURE — 80053 COMPREHEN METABOLIC PANEL: CPT

## 2017-12-08 PROCEDURE — 83036 HEMOGLOBIN GLYCOSYLATED A1C: CPT

## 2017-12-08 PROCEDURE — 86376 MICROSOMAL ANTIBODY EACH: CPT

## 2017-12-08 PROCEDURE — 84439 ASSAY OF FREE THYROXINE: CPT

## 2017-12-08 PROCEDURE — 36415 COLL VENOUS BLD VENIPUNCTURE: CPT

## 2017-12-08 PROCEDURE — 84443 ASSAY THYROID STIM HORMONE: CPT

## 2017-12-09 LAB — THYROPEROXIDASE AB SERPL-ACNC: 428 IU/ML (ref 0–34)

## 2017-12-11 ENCOUNTER — HOSPITAL ENCOUNTER (EMERGENCY)
Facility: HOSPITAL | Age: 37
Discharge: HOME | End: 2017-12-11
Admitting: EMERGENCY MEDICINE
Payer: COMMERCIAL

## 2017-12-11 VITALS
OXYGEN SATURATION: 98 % | BODY MASS INDEX: 40.97 KG/M2 | HEART RATE: 100 BPM | SYSTOLIC BLOOD PRESSURE: 115 MMHG | DIASTOLIC BLOOD PRESSURE: 59 MMHG | RESPIRATION RATE: 16 BRPM | WEIGHT: 224 LBS | TEMPERATURE: 98.2 F

## 2017-12-11 DIAGNOSIS — G89.29 ACUTE EXACERBATION OF CHRONIC LOW BACK PAIN: Primary | ICD-10-CM

## 2017-12-11 DIAGNOSIS — M54.50 ACUTE EXACERBATION OF CHRONIC LOW BACK PAIN: Primary | ICD-10-CM

## 2017-12-11 LAB
BACTERIA UR QL AUTO: NORMAL /HPF
BILIRUB UR QL STRIP: NEGATIVE
CLARITY UR: CLEAR
COLOR UR: YELLOW
EXT PREG TEST URINE: NEGATIVE
GLUCOSE UR STRIP-MCNC: ABNORMAL MG/DL
HGB UR QL STRIP.AUTO: NEGATIVE
KETONES UR STRIP-MCNC: NEGATIVE MG/DL
LEUKOCYTE ESTERASE UR QL STRIP: ABNORMAL
NITRITE UR QL STRIP: NEGATIVE
NON-SQ EPI CELLS URNS QL MICRO: NORMAL /HPF
PH UR STRIP.AUTO: 6 [PH] (ref 4.5–8)
PROT UR STRIP-MCNC: NEGATIVE MG/DL
RBC #/AREA URNS AUTO: NORMAL /HPF
SP GR UR STRIP.AUTO: 1.01 (ref 1–1.03)
UROBILINOGEN UR QL STRIP.AUTO: 0.2 E.U./DL
WBC #/AREA URNS AUTO: NORMAL /HPF

## 2017-12-11 PROCEDURE — 81025 URINE PREGNANCY TEST: CPT | Performed by: PHYSICIAN ASSISTANT

## 2017-12-11 PROCEDURE — 81001 URINALYSIS AUTO W/SCOPE: CPT

## 2017-12-11 PROCEDURE — 99283 EMERGENCY DEPT VISIT LOW MDM: CPT

## 2017-12-11 PROCEDURE — 96372 THER/PROPH/DIAG INJ SC/IM: CPT

## 2017-12-11 RX ORDER — KETOROLAC TROMETHAMINE 30 MG/ML
15 INJECTION, SOLUTION INTRAMUSCULAR; INTRAVENOUS ONCE
Status: COMPLETED | OUTPATIENT
Start: 2017-12-11 | End: 2017-12-11

## 2017-12-11 RX ORDER — METHOCARBAMOL 500 MG/1
500 TABLET, FILM COATED ORAL ONCE
Status: COMPLETED | OUTPATIENT
Start: 2017-12-11 | End: 2017-12-11

## 2017-12-11 RX ORDER — LIDOCAINE 50 MG/G
1 PATCH TOPICAL DAILY
Qty: 30 PATCH | Refills: 0 | Status: SHIPPED | OUTPATIENT
Start: 2017-12-11 | End: 2018-02-19 | Stop reason: ALTCHOICE

## 2017-12-11 RX ORDER — METHOCARBAMOL 500 MG/1
500 TABLET, FILM COATED ORAL 4 TIMES DAILY
Qty: 40 TABLET | Refills: 0 | Status: ON HOLD | OUTPATIENT
Start: 2017-12-11 | End: 2018-02-07 | Stop reason: ALTCHOICE

## 2017-12-11 RX ORDER — IBUPROFEN 600 MG/1
600 TABLET ORAL EVERY 6 HOURS PRN
Qty: 30 TABLET | Refills: 0 | Status: SHIPPED | OUTPATIENT
Start: 2017-12-11 | End: 2018-02-08 | Stop reason: ALTCHOICE

## 2017-12-11 RX ORDER — LIDOCAINE 50 MG/G
1 PATCH TOPICAL ONCE
Status: DISCONTINUED | OUTPATIENT
Start: 2017-12-11 | End: 2017-12-11 | Stop reason: HOSPADM

## 2017-12-11 RX ADMIN — METHOCARBAMOL 500 MG: 500 TABLET ORAL at 20:18

## 2017-12-11 RX ADMIN — KETOROLAC TROMETHAMINE 15 MG: 30 INJECTION, SOLUTION INTRAMUSCULAR at 20:19

## 2017-12-11 RX ADMIN — LIDOCAINE 1 PATCH: 50 PATCH CUTANEOUS at 20:21

## 2017-12-12 NOTE — DISCHARGE INSTRUCTIONS
Warm compresses to the area  Medication as directed  FU with your family doctor, return to the ED for worsening symptoms  Dolor tim en la parte baja de la espalda, cuidados ambulatorios   INFORMACIÓN GENERAL:   El dolor tim en la parte baja de la espalda  es omari molestia en el área baja de la espalda que dura menos de 12 semanas  La palabra tim se [place] para describir un dolor que empieza repentinamente, empeora rápidamente y dura por un periodo corto de [place]  Síntomas comunes incluyen los siguientes:   · Rigidez o espasmos en la espalda    · Dolor que baja por la espalda o el lado de omari pierna    · Usted se mantiene en omari posición o postura inusual para disminuir el dolor de espalda    · Usted no puede encontrar omari posición cómoda para sentarse     · Solano dolor aumenta lentamente de 24 a 50 horas después de nancy forzado solano espalda    · Sensibilidad en la parte baja de la espalda o dolor tim cuando mueve solano espalda  Busque cuidados inmediatos para los siguientes síntomas:   · Dolor tim    · Eugune Devan y pesadez repentina en ambos glúteos y bajando a ambas piernas     · Adormecimiento o debilidad en omari pierna o dolor en ambas piernas    · Adormecimiento en emily genitales o a través de la parte baja de solano espalda    · Usted no puede controlar solano orina o heces  El tratamiento para el dolor tim en la parte baja de la espalda  puede incluir cualquiera de los siguientes:  · Medicamentos:      ¨ Los antiiflamatorios no esteroideos (AINEs) ayudan a reducir inflamación y dolor o fiebre  Lucinda medicamento esta disponible con o sin omari receta médica  Los AINEs podrían causar sangrado estomacal o problemas en los riñones en [employer]  Si usted esta tomando un anticoágulante, siempre  pregunte a solano proveedor de keyana si los AINEs son seguros para usted  Antes de [employer], alejo siempre la etiqueta de información y siga emily indicaciones       ¨ Los relajantes musculares  ayudan a disminuir el dolor de los espasmos musculares  ¨ Los medicamentos para el dolor con receta  también podrían ser Kenard Sandra  Pregunte cómo jerri deidre medicamento de omari forma charles  · La cirugía  puede ser necesaria si solano dolor es tim y otros tratamientos no funcionan  Es probable que se necesite de [place] para condiciones de la columna dorsal, antoine un disco herniado o estenosis de la columna dorsal   Maneje emily síntomas:   · Duerma sobre un colchón firme  Si usted no tiene un [employer], pídale a alguien que pase solano colchón al suelo por unos días  También puede colocar omari lámina de sher delgada por debajo del colchón para que éste sea [place]  · Aplique hielo  a la parte baja de solano espalda por 15 a 20 minutos cada hora o según indicaciones  Use omari compresa fría o ponga hielo triturado en omari bolsa plástica  [place] la bolsa con omari toalla  El hielo ayuda a prevenir daños a los tejidos y disminuye la inflamación y el dolor  Usted puede [place] frío y calor  · Aplique calor  a la parte baja de solano espalda por 20 a 30 minutos cada 2 horas por los días indicados  El calor ayuda a disminuir el dolor y los espasmos musculares  · Vaya a terapia física  Un terapeuta físico le enseñará ejercicios para ayudar a mejorar el movimiento y la fuerza, y también para disminuir el dolor  Prevenga el dolor tim en la parte baja de la espalda:   · Use movimientos correctos para solano cuerpo  ¨ Cuando minnaa [place], dóblese a nivel de emily caderas y rodillas  No se doble a nivel de la cintura  Use los músculos de emily piernas cuando [employer]  No use solano espalda  Mantenga el objeto cerca de solano pecho mientras lo levanta  Trate de no doblarse o levantar nada por encima de solano cintura  ¨ [place] de posición frecuentemente cuando está de pie por períodos largos de tiempo  Descanse un pie sobre omari caja pequeña o banco y luego cambie al otro pie con frecuencia  ¨ Trate de no sentarse por largos periodos de tiempo   Cuando sí lo tenga que hacer, siéntese en omari silla con el respaldar recto con emily pies planos sobre el suelo  No trate de alcanzar, jalar o empujar algo mientras está sentado  · Ejercítese con regularidad  [place] antes de ejercitarse  Christina ejercicios que fortalecen los músculos de solano espalda  Pregunte sobre el mejor plan de ejercicios para usted  · Mantenga un peso saludable  Pregúntele a solano proveedor de keyana cuánto debe pesar usted y pídale que le ayude a crear un plan de pérdida de peso si usted tiene [employer]  Programe omari rosa con solano proveedor de keyana según indicaciones:  Regrese a solano rosa de seguimiento si usted todavía tiene dolor después de 1 a 3 semanas de [place]  Es probable que necesite ir donde un ortopédico si solano dolor de espalda dura más de 6 a 12 semanas  Anote emily preguntas para que las recuerde viv emily citas  ACUERDOS SOBRE SOLANO CUIDADO:   Usted tiene el derecho de participar en la planificación de solano cuidado  Aprenda todo lo que pueda sobre solano condición y antoine darle tratamiento  Discuta con emily médicos emily opciones de tratamiento para juntos decidir el cuidado que usted quiere recibir  Usted siempre tiene el derecho a rechazar solano tratamiento  Esta información es sólo para uso en educación  Solano intención no es darle un consejo médico sobre enfermedades o tratamientos  Colsulte con solano Melven Rimes farmacéutico antes de seguir cualquier régimen médico para saber si es seguro y efectivo para usted  © 2014 [address] is for End User's use only and may not be sold, redistributed or otherwise used for commercial purposes  All illustrations and images included in CareNotes® are the copyrighted property of A D A M , Inc  or Benny Patton

## 2017-12-12 NOTE — ED PROVIDER NOTES
History  Chief Complaint   Patient presents with    Back Pain     pt has chronic back pain and she states that last night her pain got worse  pt denies any acute injury      Patient presents to the emergency department with right lower back pain  Patient has had problems with her back in the past and has had spinal injections  Patient states that since last night the pain in her lower back flared up again  She has got pain radiating into her right lower leg  Patient has had similar symptoms in the past but now the pain is worse  Patient is not taking anything at home she denies any GI upset  Patient states she did have a UTI about a week ago and is on antibiotics for it  But there has been no new change in bowel or bladder  She is not having any numbness or tingling just pain into the right leg  Prior to Admission Medications   Prescriptions Last Dose Informant Patient Reported? Taking? Dexbromphen-Pseudoephed-APAP (SINADRIN PLUS PO)   Yes No   Sig: Take by mouth   Insulin Glargine (TOUJEO SOLOSTAR SC)   Yes No   Sig: Inject 40 Units under the skin daily at bedtime     Liraglutide (VICTOZA SC)   Yes No   Sig: Inject 1 8 Units under the skin daily   SIMVASTATIN PO   Yes No   Sig: Take by mouth daily   busPIRone (BUSPAR) 10 mg tablet   Yes No   Sig: Take 10 mg by mouth 3 (three) times a day     cephalexin (KEFLEX) 500 mg capsule   No No   Sig: Take 1 capsule by mouth every 12 (twelve) hours for 10 days   cyclobenzaprine (FLEXERIL) 5 mg tablet   No No   Sig: Take 1 tablet by mouth 3 (three) times a day as needed for muscle spasms for up to 5 days   dicyclomine (BENTYL) 20 mg tablet   No No   Sig: Take 1 tablet by mouth every 6 (six) hours as needed (abd cramping) for up to 5 days   dicyclomine (BENTYL) 20 mg tablet   No No   Sig: Take 1 tablet by mouth 2 (two) times a day   gabapentin (NEURONTIN) 400 mg capsule   Yes No   Sig: Take 400 mg by mouth 2 (two) times a day     naproxen (NAPROSYN) 500 mg tablet   No No   Sig: Take 1 tablet by mouth 2 (two) times a day with meals for 10 days   omeprazole (PriLOSEC) 20 mg delayed release capsule   No No   Sig: Take 1 capsule by mouth daily   Patient taking differently: Take 20 mg by mouth as needed     ondansetron (ZOFRAN-ODT) 4 mg disintegrating tablet   No No   Sig: Take 1 tablet by mouth every 6 (six) hours as needed for nausea or vomiting   ondansetron (ZOFRAN-ODT) 4 mg disintegrating tablet   No No   Sig: Take 1 tablet by mouth every 8 (eight) hours as needed for nausea or vomiting for up to 5 days   pantoprazole (PROTONIX) 40 mg tablet   No No   Sig: Take 1 tablet by mouth daily in the early morning for 30 days   traZODone (DESYREL) 100 mg tablet   No No   Sig: Take 1 tablet by mouth daily at bedtime      Facility-Administered Medications: None       Past Medical History:   Diagnosis Date    Anxiety     Back pain     Depression     Diabetes mellitus ([facility] [address] )     Hyperlipidemia     Liver function study, abnormal        Past Surgical History:   Procedure Laterality Date    ABDOMINAL SURGERY       SECTION      TONSILLECTOMY      TUBAL LIGATION         History reviewed  No pertinent family history  I have reviewed and agree with the history as documented  Social History   Substance Use Topics    Smoking status: Never Smoker    Smokeless tobacco: Never Used    Alcohol use No        Review of Systems   All other systems reviewed and are negative        Physical Exam  ED Triage Vitals [17]   Temperature Pulse Respirations Blood Pressure SpO2   98 2 °F (36 8 °C) 100 16 115/59 98 %      Temp Source Heart Rate Source Patient Position - Orthostatic VS BP Location FiO2 (%)   Oral Monitor Sitting Right arm --      Pain Score       8           Orthostatic Vital Signs  Vitals:    17   BP: 115/59   Pulse: 100   Patient Position - Orthostatic VS: Sitting       Physical Exam   Constitutional: She is oriented to person, place, and time  She appears well-developed and well-nourished  HENT:   Head: Normocephalic  Mouth/Throat: Oropharynx is clear and moist    Eyes: Conjunctivae and EOM are normal    Neck: Neck supple  Cardiovascular: Normal rate, regular rhythm and normal heart sounds  Pulmonary/Chest: Effort normal and breath sounds normal    Abdominal: Soft  Bowel sounds are normal    No CVA tenderness   Musculoskeletal:        Lumbar back: She exhibits decreased range of motion, tenderness, pain and spasm  She exhibits no bony tenderness and normal pulse  Back:    Neurological: She is alert and oriented to person, place, and time  Skin: Skin is warm  Psychiatric: She has a normal mood and affect  Her behavior is normal    Nursing note and vitals reviewed  ED Medications  Medications   lidocaine (LIDODERM) 5 % patch 1 patch (not administered)   ketorolac (TORADOL) injection 15 mg (15 mg Intramuscular Given 12/11/17 2019)   methocarbamol (ROBAXIN) tablet 500 mg (500 mg Oral Given 12/11/17 2018)       Diagnostic Studies  Results Reviewed     Procedure Component Value Units Date/Time    Urine Microscopic [[id number]]  (Normal) Collected:  12/11/17 2008    Lab Status:  Final result Specimen:  Urine from Urine, Clean Catch Updated:  12/11/17 2016     RBC, UA None Seen /hpf      WBC, UA None Seen /hpf      Epithelial Cells Occasional /hpf      Bacteria, UA Occasional /hpf     POCT pregnancy, urine [[id number]]  (Normal) Resulted:  12/11/17 2007    Lab Status:  Final result Updated:  12/11/17 2008     EXT PREG TEST UR (Ref: Negative) NEGATIVE    ED Urine Macroscopic [[id number]]  (Abnormal) Collected:  12/11/17 2008    Lab Status:  Final result Specimen:  Urine Updated:  12/11/17 1951     Color, UA Yellow     Clarity, UA Clear     pH, UA 6 0     Leukocytes, UA Elevated glucose may cause decreased leukocyte values   See urine microscopic for [facility] result/ (A)     Nitrite, UA Negative     Protein, UA Negative mg/dl      Glucose, UA >=1000 (1%) (A) mg/dl      Ketones, UA Negative mg/dl      Urobilinogen, UA 0 2 E U /dl      Bilirubin, UA Negative     Blood, UA Negative     Specific Gravity, UA 1 010    Narrative:       CLINITEK RESULT                 No orders to display              Procedures  Procedures       Phone Contacts  ED Phone Contact    ED Course  ED Course                                MDM  Number of Diagnoses or Management Options  Acute exacerbation of chronic low back pain: established and worsening  Diagnosis management comments: Discussed glucose in patient's urine  She is diabetic she is insulin  Patient states that her sugars have been running in the 200s  I discussed importance of following up with her family doctor and watch her diet closely monitoring her sugars closely  Discussed that she is having damage to her kidneys  Risk of Complications, Morbidity, and/or Mortality  General comments: Instructions reviewed with patient and family  Patient Progress  Patient progress: improved    CritCare Time    Disposition  Final diagnoses:   Acute exacerbation of chronic low back pain     Time reflects when diagnosis was documented in both MDM as applicable and the Disposition within this note     Time User Action Codes Description Comment    12/11/2017  8:17 PM Lorene Smith [M54 5,  G89 29] Acute exacerbation of chronic low back pain       ED Disposition     ED Disposition Condition Comment    Discharge  Leo Wolf discharge to home/self care      Condition at discharge: Good        Follow-up Information     Follow up With Specialties Details Why Contact Info Additional Information    Titus Granados, [facility]   [address] [place] [address]  [phone] [facility], [address] [place] [address] [place] , [place], [place], [address] [facility],  Pain Medicine   [address]  [facility]  [phone]           Patient's Medications   Discharge Prescriptions    IBUPROFEN (MOTRIN) 600 MG TABLET    Take 1 tablet by mouth every 6 (six) hours as needed for mild pain for up to 10 days       Start Date: 12/11/2017End Date: 12/21/2017       Order Dose: 600 mg       Quantity: 30 tablet    Refills: 0    LIDOCAINE (LIDODERM) 5 %    Place 1 patch on the skin daily Remove & Discard patch within 12 hours or as directed by MD       Start Date: 12/11/2017End Date: --       Order Dose: 1 patch       Quantity: 30 patch    Refills: 0    METHOCARBAMOL (ROBAXIN) 500 MG TABLET    Take 1 tablet by mouth 4 (four) times a day for 10 days       Start Date: 12/11/2017End Date: 12/21/2017       Order Dose: 500 mg       Quantity: 40 tablet    Refills: 0     No discharge procedures on file      ED Provider  Electronically Signed by           Gaston Jerez PA-C  12/11/17 2020

## 2017-12-14 ENCOUNTER — GENERIC CONVERSION - ENCOUNTER (OUTPATIENT)
Dept: OTHER | Facility: OTHER | Age: 37
End: 2017-12-14

## 2017-12-15 ENCOUNTER — GENERIC CONVERSION - ENCOUNTER (OUTPATIENT)
Dept: OTHER | Facility: OTHER | Age: 37
End: 2017-12-15

## 2017-12-21 ENCOUNTER — GENERIC CONVERSION - ENCOUNTER (OUTPATIENT)
Dept: FAMILY MEDICINE CLINIC | Facility: CLINIC | Age: 37
End: 2017-12-21

## 2017-12-21 ENCOUNTER — HOSPITAL ENCOUNTER (OUTPATIENT)
Dept: ULTRASOUND IMAGING | Facility: HOSPITAL | Age: 37
Discharge: HOME | End: 2017-12-21
Payer: COMMERCIAL

## 2017-12-21 DIAGNOSIS — R10.9 ABDOMINAL PAIN: ICD-10-CM

## 2017-12-21 PROCEDURE — 76700 US EXAM ABDOM COMPLETE: CPT

## 2017-12-26 ENCOUNTER — GENERIC CONVERSION - ENCOUNTER (OUTPATIENT)
Dept: OTHER | Facility: OTHER | Age: 37
End: 2017-12-26

## 2017-12-28 ENCOUNTER — GENERIC CONVERSION - ENCOUNTER (OUTPATIENT)
Dept: OTHER | Facility: OTHER | Age: 37
End: 2017-12-28

## 2018-01-09 ENCOUNTER — GENERIC CONVERSION - ENCOUNTER (OUTPATIENT)
Dept: OTHER | Facility: OTHER | Age: 38
End: 2018-01-09

## 2018-01-09 NOTE — RESULT NOTES
Message   Recorded as Task   Date: 11/01/2017 01:39 PM, Created By: Ruth Cortes   Task Name: Follow Up   Assigned To: [address] [facility], [address] [facility] Procedure,Team   Regarding Patient: Francisco Oates, Status: In Progress   Comment:    Vicenta Boothe - 01 Nov 2017 1:39 PM     TASK CREATED   Patient reports she received 30% relief from her procedure  Her pain level today is a7  Patient aware of the 2wk wait  F/u call next wk  S/p L5-S1 LESI- done 10/24/17   Shaye Abdullahi - 01 Nov 2017 3:11 PM     TASK REPLIED TO: Previously Assigned To [place]  Vicenta Duggan - 10 Nov 2017 12:03 PM     TASK EDITED        Signatures   Electronically signed by : Guyann Castleman, ; Nov 14 2017  9:14AM EST                       (Author)

## 2018-01-09 NOTE — RESULT NOTES
Message   liver function is near normal       Verified Results  (1) CBC/PLT/DIFF [id number] 11:34AM Fabian Smiley Order Number: [id number]     Test Name Result Flag Reference   WBC COUNT 8 53 Thousand/uL  4 31-10 16   RBC COUNT 3 95 Million/uL  3 81-5 12   HEMOGLOBIN 11 4 g/dL L 11 5-15 4   HEMATOCRIT 34 8 %  34 8-46  1   MCV 88 fL  82-98   MCH 28 9 pg  26 8-34 3   MCHC 32 8 g/dL  31 4-37 4   RDW 13 1 %  11 6-15 1   MPV 10 8 fL  8 9-12 7   PLATELET COUNT 091 Thousands/uL  149-390   nRBC AUTOMATED 0 /100 WBCs     NEUTROPHILS RELATIVE PERCENT 66 %  43-75   LYMPHOCYTES RELATIVE PERCENT 27 %  14-44   MONOCYTES RELATIVE PERCENT 6 %  4-12   EOSINOPHILS RELATIVE PERCENT 1 %  0-6   BASOPHILS RELATIVE PERCENT 0 %  0-1   NEUTROPHILS ABSOLUTE COUNT 5 62 Thousands/? ??L  1 85-7 62   LYMPHOCYTES ABSOLUTE COUNT 2 26 Thousands/? ??L  0 60-4 47   MONOCYTES ABSOLUTE COUNT 0 52 Thousand/? ??L  0 17-1 22   EOSINOPHILS ABSOLUTE COUNT 0 09 Thousand/? ??L  0 00-0 61   BASOPHILS ABSOLUTE COUNT 0 02 Thousands/? ??L  0 00-0 10   - Patient Instructions: This bloodwork is non-fasting  Please drink two glasses of water morning of bloodwork  - Patient Instructions: This bloodwork is non-fasting  Please drink two glasses of water morning of bloodwork  (1) BASIC METABOLIC PROFILE [id number] 11:34AM [place] Jaramillo    Order Number: [id number]     Test Name Result Flag Reference   GLUCOSE,RANDM 238 mg/dL H    If the patient is fasting, the ADA then defines impaired fasting glucose as > 100 mg/dL and diabetes as > or equal to 123 mg/dL  SODIUM 137 mmol/L  136-145   POTASSIUM 4 3 mmol/L  3 5-5 3   CHLORIDE 102 mmol/L  100-108   CARBON DIOXIDE 28 mmol/L  21-32   ANION GAP (CALC) 7 mmol/L  4-13   BLOOD UREA NITROGEN 14 mg/dL  5-25   CREATININE 0 68 mg/dL  0 60-1 30   Standardized to IDMS reference method   CALCIUM 9 2 mg/dL  8 3-10 1   eGFR Non-African American      >60 0 ml/min/1 73sq m   - Patient Instructions:  This is a non fasting blood test  Please drink two glasses of water the morning of test   National Kidney Disease Education Program recommendations are as follows:  GFR calculation is accurate only with a steady state creatinine  Chronic Kidney disease less than 60 ml/min/1 73 sq  meters  Kidney failure less than 15 ml/min/1 73 sq  meters  (1) HEPATIC FUNCTION PANEL [id number] 11:34AM Jhon Rodriguez    Order Number: [id number]     Test Name Result Flag Reference   ALBUMIN 3 1 g/dL L 3 5-5 0   - Patient Instructions: This is a non fasting blood test  Please drink two glasses of water the morning of test     - Patient Instructions:  This is a non fasting blood test  Please drink two glasses of water the morning of test    ALK PHOSPHATAS 62 U/L     ALT (SGPT) 43 U/L  12-78   AST(SGOT) 22 U/L  5-45   BILI, DIRECT 0 10 mg/dL  0 00-0 20   BILI, TOTAL 0 27 mg/dL  0 20-1 00   TOTAL PROTEIN 8 0 g/dL  6 4-8 2

## 2018-01-10 NOTE — MISCELLANEOUS
Message   Recorded as Task   Date: 10/27/2017 01:35 PM, Created By: Avelina Blanco   Task Name: Miscellaneous   Assigned To: [facility]   Regarding Patient: Marquita Degree, Status: Active   Comment:    Darya Michaels - 27 Oct 2017 1:35 PM     TASK CREATED  Pt's boyfriend Melvena Capron called stating pt just had a procedure 3 days ago and is still having a lot of pain  He said it is the same pain as before, but stronger  Temi Posada is asking for a call back from someone that speaks [place]  Pt can be reached at [phone]  Brenda Wheeler - 27 Oct 2017 2:42 PM     TASK EDITED  S/w the pt  via the  line with Dalton Toribio #[id number], pt  stated he pain seems increased, more today then the previous day  Reviewed the postop instructions with the pt  stating she might have an increase in pain and encouraged her to alternate ice vs  heat and she may take OTC nsaid's to help decrease the pain  We will call her next week and that she has to give it two weeks to get the full effect  Pt  was appreciative  [employer] 17 - 30 Oct 2017 7:38 AM     TASK REPLIED TO: Previously Assigned To [facility] with plan  Active Problems    1  Abnormal EKG (794 31) (R94 31)   2  Anxiety (300 00) (F41 9)   3  Chronic low back pain (724 2,338 29) (M54 5,G89 29)   4  Chronic pain syndrome (338 4) (G89 4)   5  Constipation (564 00) (K59 00)   6  Contact dermatitis (692 9) (L25 9)   7  Depression (311) (F32 9)   8  Depression screening (V79 0) (Z13 89)   9  Dermoid cyst of right lower extremity (216 7) (D23 71)   10  Diabetes mellitus (250 00) (E11 9)   11  Drug-induced hepatic toxicity (573 3,E947 9) (K71 6,T50 905A)   12  Elevated liver function tests (790 6) (R79 89)   13  Encounter for diabetic foot exam (250 00) (E11 9)   14  Encounter for routine gynecological examination with Papanicolaou smear of cervix    (V72 31,V76 2) (Z01 419)   15  Hypercholesterolemia (272 0) (E78 00)   16  Hyperlipidemia (272 4) (E78 5)   17  Lumbar degenerative disc disease (722 52) (M51 36)   18  Lumbar radiculopathy (724 4) (M54 16)   19  Morbidly obese (278 01) (E66 01)   20  Tendinitis of left ankle (727 06) (M77 52)   21  Uncontrolled type 2 diabetes with neuropathy (250 62,357 2) (E11 40,E11 65)    Current Meds   1  Alcohol Pads 70 % Pad; use twice daily; Therapy: 74ULO7309 to (Evaluate:46Zsy2696)  Requested for: 98UBA6080; Last   Rx:31Mar2017 Ordered   2  BD Pen Needle Mini U/F 31G X 5 MM Miscellaneous; USE AS DIRECTED; Therapy: 31FVX1649 to (Evaluate:11Mar2018)  Requested for: 52HOA9697; Last   Rx:16Mar2017 Ordered   3  BuSpar 10 MG TABS (BusPIRone HCl); TAKE 1 TABLET 3 TIMES DAILY; Therapy: (Recorded:16Mar2017) to Recorded   4  Citalopram Hydrobromide 40 MG Oral Tablet; TAKE 1 TABLET DAILY; Therapy: 70NEU2105-; Last Rx:16Mar2017; Status: NEED INFORMATION - Problem   Ordered   5  Cyclobenzaprine HCl - 10 MG Oral Tablet; TAKE ONE-HALF TO 1 TABLET 3 TIMES    DAILY AS NEEDED; Therapy: 82QBO7253 to (Last Rx:12Oct2017)  Requested for: 12Oct2017 Ordered   6  FreeStyle Lancets Miscellaneous; Check blood sugar twice daily as directed; Therapy: 43DYG6900 to [phone])  Requested for: 78OMS3038; Last   Rx:31Mar2017 Ordered   7  FreeStyle Lite Device; TEST BLOOD SUGAR TWICE DAILY; Therapy: 68APY0393 to (Last Rx:31Mar2017)  Requested for: 46JSN2764 Ordered   8  FreeStyle Lite Test In [employer]; test twice daily; Therapy: 66NPB7552 to (Last Rx:31Mar2017)  Requested for: 49DVU5846 Ordered   9  Gabapentin 400 MG Oral Capsule; TAKE 1 CAPSULE 3 times daily; Therapy: 26JIM7522 to (Evaluate:14Apr2018)  Requested for: 16ZHH2025; Last   Rx:20Off5227 Ordered   10  Lantus SOLN;    Therapy: (Recorded:29Jun2015) to Recorded   11  Simvastatin 40 MG Oral Tablet; Therapy: (Recorded:95Aul5456) to Recorded   12  Synjardy 5-1000 MG Oral Tablet;     Therapy: (Recorded:12Hhs2661) to Recorded   13  TraZODone HCl - 100 MG Oral Tablet; TAKE 2 TABLETS AT BEDTIME; Therapy: (Recorded:16Mar2017) to Recorded   14  Victoza 18 MG/3ML Subcutaneous Solution Pen-injector; Therapy: (Recorded:98Umx2434) to Recorded    Allergies    1   Atorvastatin Calcium TABS    Signatures   Electronically signed by : Giovani Peralta, ; Oct 30 2017  8:24AM EST                       (Author)

## 2018-01-10 NOTE — RESULT NOTES
Verified Results  (1) THIN PREP PAP WITH IMAGING [id number] 10:40AM Cristina Prom     Test Name Result Flag Reference   LAB AP CASE REPORT (Report)     Gynecologic Cytology Report            Case: [id number]                  Authorizing Provider: MIMA Graham Collected:      10/31/2017           First Screen:     ValentineALINE Jamil Received:      11/02/2017 0831        Pathologist:      Mat Duarte MD                              Specimen:  LIQUID-BASED PAP, SCREENING, Endocervical   HPV HIGH RISK RESULT (Report)     HPV, High Risk: HPV POS, HPV16 NEG, HPV18 NEG      Other High Risk HPV Positive, HPV 16 Negative, HPV 18 Negative  Specimen is positive for the DNA of any one of, or combination of the following high risk HPV types: 12,25,57,12,69,15,99,59,15,45,57,71  HPV types 16 and 18 DNA were undetectable or below the pre-set threshold  [employer] FDA approved Ryan 4800 is utilized with strict adherence to the manufacturers instruction  manual to test for the presence of High-Risk HPV DNA, as well as HPV 16 and HPV 18  This instrument  has been validated by our laboratory and/or by the   A negative result does not preclude the presence of HPV infection because results depend on adequate  specimen collection, absence of inhibitors and sufficient DNA to be detected  Additionally, HPV negative  results are not intended to prevent women from proceeding to colposcopy if clinically warranted  Positive HPV test results indicate the presence of any one or more of the high risk types, but since patients  are often co-infected with low-risk types it does not rule out the presence of low-risk types in patients  with mixed infections     LAB AP GYN PRIMARY INTERPRETATION      Epithelial cell abnormality  Electronically signed by Mat Duarte MD on 11/7/2017 at 10:40 AM   LAB AP GYN INTERPRETATION      Low grade squamous intraepithelial lesion   LAB AP GYN SPECIMEN ADEQUACY Satisfactory for evaluation  Endocervical/transformation zone component present  LAB AP GYN ADDITIONAL INFORMATION (Report)     [employer]'s FDA approved ,  and ThinPrep Imaging System are   utilized with strict adherence to the 's instruction manual to   prepare gynecologic and non-gynecologic cytology specimens for the   production of ThinPrep slides as well as for gynecologic ThinPrep imaging  These processes have been validated by our laboratory and/or by the     The Pap test is not a diagnostic procedure and should not be used as the   sole means to detect cervical cancer  It is only a screening procedure to   aid in the detection of cervical cancer and its precursors  Both   false-negative and false-positive results have been experienced  Your   patient's test result should be interpreted in this context together with   the history and clinical findings    - Interpretation performed at [facility], [address] [place] [address]   [id number]

## 2018-01-11 NOTE — RESULT NOTES
Message   LFTs still abnormal but improving compared to prior   blood markers for autoimmune hepatitis panel was positive  still waiting for liver biopsy to confirm  can you call IR and find out when is her liver biopsy scheduled? thanks  Verified Results  (1) CBC/PLT/DIFF [id number] 02:40PM Brittany Olivo     Test Name Result Flag Reference   WBC COUNT 8 08 Thousand/uL  4 31-10 16   RBC COUNT 4 11 Million/uL  3 81-5 12   HEMOGLOBIN 11 8 g/dL  11 5-15 4   HEMATOCRIT 36 5 %  34 8-46  1   MCV 89 fL  82-98   MCH 28 7 pg  26 8-34 3   MCHC 32 3 g/dL  31 4-37 4   RDW 13 7 %  11 6-15 1   MPV 10 1 fL  8 9-12 7   PLATELET COUNT 372 Thousands/uL  149-390   nRBC AUTOMATED 0 /100 WBCs     NEUTROPHILS RELATIVE PERCENT 62 %  43-75   LYMPHOCYTES RELATIVE PERCENT 30 %  14-44   MONOCYTES RELATIVE PERCENT 6 %  4-12   EOSINOPHILS RELATIVE PERCENT 2 %  0-6   BASOPHILS RELATIVE PERCENT 0 %  0-1   NEUTROPHILS ABSOLUTE COUNT 4 89 Thousands/?L  1 85-7 62   LYMPHOCYTES ABSOLUTE COUNT 2 42 Thousands/?L  0 60-4 47   MONOCYTES ABSOLUTE COUNT 0 51 Thousand/?L  0 17-1 22   EOSINOPHILS ABSOLUTE COUNT 0 17 Thousand/?L  0 00-0 61   BASOPHILS ABSOLUTE COUNT 0 03 Thousands/?L  0 00-0 10   - Patient Instructions: This bloodwork is non-fasting  Please drink two glasses of water morning of bloodwork  - Patient Instructions: This bloodwork is non-fasting  Please drink two glasses of water morning of bloodwork  (1) BASIC METABOLIC PROFILE [id number] 02:40PM Brittany Olivo     Test Name Result Flag Reference   GLUCOSE,RANDM 213 mg/dL H    If the patient is fasting, the ADA then defines impaired fasting glucose as > 100 mg/dL and diabetes as > or equal to 123 mg/dL     SODIUM 135 mmol/L L 136-145   POTASSIUM 4 5 mmol/L  3 5-5 3   CHLORIDE 99 mmol/L L 100-108   CARBON DIOXIDE 30 mmol/L  21-32   ANION GAP (CALC) 6 mmol/L  4-13   BLOOD UREA NITROGEN 10 mg/dL  5-25   CREATININE 0 66 mg/dL  0 60-1 30   Standardized to IDMS reference method   CALCIUM 9 6 mg/dL  8 3-10 1   eGFR Non-African American      >60 0 ml/min/1 73sq m   - Patient Instructions: This is a non fasting blood test  Please drink two glasses of water the morning of test   National Kidney Disease Education Program recommendations are as follows:  GFR calculation is accurate only with a steady state creatinine  Chronic Kidney disease less than 60 ml/min/1 73 sq  meters  Kidney failure less than 15 ml/min/1 73 sq  meters  (1) HEPATIC FUNCTION PANEL [id number] 02:40PM Brittanydawn Olivo     Test Name Result Flag Reference   ALBUMIN 3 0 g/dL L 3 5-5 0   - Patient Instructions: This is a non fasting blood test  Please drink two glasses of water the morning of test     - Patient Instructions: This is a non fasting blood test  Please drink two glasses of water the morning of test    ALK PHOSPHATAS 134 U/L H    ALT (SGPT) 181 U/L H 12-78   AST(SGOT) 86 U/L H 5-45   BILI, DIRECT 0 14 mg/dL  0 00-0 20   BILI, TOTAL 0 31 mg/dL  0 20-1 00   TOTAL PROTEIN 8 7 g/dL H 6 4-8 2     (1) SMOOTH MUSCLE ANTIBODY [id number] 02:40PM Brittany Olivo   TW Order Number: [id number]     Test Name Result Flag Reference   SMOOTH MUS  Units H 0 - 19   Negative                     0 - 19                   Weak positive               20 - 30                   Moderate to strong positive     >30   Actin Antibodies are found in 52-85% of patients with   autoimmune hepatitis or chronic active hepatitis and   in 22% of patients with primary biliary cirrhosis      Performed at:  [address] [place] [address]  [id number]  : Beth Walton MD, Phone:  [phone]

## 2018-01-11 NOTE — PROGRESS NOTES
Assessment    1  Acute low back pain (724 2) (M54 5)   2  Chronic low back pain (724 2,338 29) (M54 5,G89 29)    Plan    · Follow-up Visit in 4 Weeks Evaluation and Treatment  Follow-up  Status: Hold For -  Scheduling  Requested for: 23LRO5771   · * MRI LUMBAR SPINE WO CONTRAST; Status:Need Information - Financial  Authorization; Requested [id number];     Discussion/Summary    Patient will have lumbar MRI before her her next clinic visit in 4 weeks  She will continue her prescribed pain medications  Advised to do stretching exercise at home  Chief Complaint  back pain      History of Present Illness  HPI: 36 y/o  female with Hx of back pain for last 4 months is in the clinic for f/u  Hx of back pain started after lifting heavy objects at her work   Her job temporary and did not see a workman comp physician   Her pain radiates to both legs but mostly to right leg with occasional tingling and numbness  Difficulty standing and climbing stairs  She tried PT for 2 months but no pain relief and now her Sx are worsening  She takes Flexeril and Mobic with no help   Had multiple lumbar x ray last year showed no abnormality  Hospital Based Practices Required Assessment:   Pain Assessment   the patient states they have pain  The pain is located in the back  The patient describes the pain as aching  (on a scale of 0 to 10, the patient rates the pain at 7 )    Prefered Language is  [place]  Primary Language is  Telugu  Review of Systems    Musculoskeletal: as noted in HPI  Active Problems    1  Acute low back pain (724 2) (M54 5)   2  Encounter for routine gynecological examination with Papanicolaou smear of cervix   (V72 31,V76 2) (T74 586,M03  4)    Past Medical History    · History of diabetes mellitus (V12 29) (Z86 39)   · History of Normal vaginal delivery (650) (O80)    The active problems and past medical history were reviewed and updated today        Surgical History    · History of  Section    The surgical history was reviewed and updated today  Family History    · Family history of diabetes mellitus (V18 0) (Z83 3)    · Family history of lung cancer (V16 1) (Z80 1)    The family history was reviewed and updated today  Social History    · Never a smoker   · No drug use   · Non-smoker (V49 89) (Z78 9)   · Social alcohol use (F10 99)  The social history was reviewed and updated today  The social history was reviewed and is unchanged  Current Meds   1  Cyclobenzaprine HCl - 5 MG Oral Tablet; TAKE 1 TABLET AT BEDTIME AS NEEDED; Therapy: 78SWT7888 to (Evaluate:63Vni1851)  Requested for: 88XQZ9715; Last   Rx:35Xic1499 Ordered   2  Janumet  MG Oral Tablet; Therapy: (Wayne James) to Recorded   3  Lantus SOLN;   Therapy: (Recorded:2015) to Recorded   4  Meloxicam 15 MG Oral Tablet; TAKE 1 TABLET DAILY AS NEEDED; Therapy: 85RAX3560 to (Evaluate:69Umk4963)  Requested for: 48Jzm8877; Last   Rx:63Abe1764 Ordered    The medication list was reviewed and updated today  Allergies    1  No Known Drug Allergies    Vitals   Recorded: 41FYX6677 09:13AM   Temperature 98 F   Heart Rate 76   Systolic 933   Diastolic 80   Weight 755 lb 14 26 oz   BMI Calculated 25 97   BSA Calculated 2 46     Physical Exam      Lumbosacral Spine:   Appearance: Normal     Palpation/Tenderness:  lumbar spine at level L3 S1  Palpatory Findings include bilateral muscle spasms  Flexion was restricted and was painful  Extension was restricted and was painful  Rotation to the left was restricted and was painful  Rotation to the right was restricted and was painful  Strength Testing:  restricted flexion, restricted extension, restricted left rotation and restricted right rotation  Special Tests:  positive Straight Leg Raise  End of Encounter Meds    1  Cyclobenzaprine HCl - 5 MG Oral Tablet; TAKE 1 TABLET AT BEDTIME AS NEEDED;    Therapy: 90LDU8494 to (Evaluate:99Akm7274) Requested for: 90UIE7233; Last   Rx:54Msu7977 Ordered   2  Meloxicam 15 MG Oral Tablet (Mobic); TAKE 1 TABLET DAILY AS NEEDED; Therapy: 39HCY7310 to (Evaluate:96Gvy1891)  Requested for: 81Ikd7556; Last   Rx:40Imw7768 Ordered    3  Janumet  MG Oral Tablet; Therapy: (21 258 133 ) to Recorded   4   Lantus SOLN;   Therapy: (21 258 133 ) to Recorded    Signatures   Electronically signed by : Raquel Conrad, [facility]; Jan 13 2016 10:11AM EST                       (Author)    Electronically signed by : JAMSHID Navarro ; Jan 18 2016 10:13AM EST                       (Acknowledgement)

## 2018-01-12 VITALS
HEIGHT: 62 IN | WEIGHT: 226 LBS | SYSTOLIC BLOOD PRESSURE: 130 MMHG | TEMPERATURE: 98.7 F | DIASTOLIC BLOOD PRESSURE: 78 MMHG | RESPIRATION RATE: 18 BRPM | OXYGEN SATURATION: 99 % | HEART RATE: 98 BPM | BODY MASS INDEX: 41.59 KG/M2

## 2018-01-12 VITALS
RESPIRATION RATE: 18 BRPM | HEART RATE: 106 BPM | WEIGHT: 234 LBS | BODY MASS INDEX: 41.46 KG/M2 | SYSTOLIC BLOOD PRESSURE: 116 MMHG | TEMPERATURE: 96.9 F | DIASTOLIC BLOOD PRESSURE: 64 MMHG | HEIGHT: 63 IN

## 2018-01-12 VITALS
BODY MASS INDEX: 40.23 KG/M2 | SYSTOLIC BLOOD PRESSURE: 122 MMHG | HEART RATE: 100 BPM | WEIGHT: 227.07 LBS | DIASTOLIC BLOOD PRESSURE: 80 MMHG | TEMPERATURE: 97.8 F | HEIGHT: 63 IN

## 2018-01-12 VITALS
TEMPERATURE: 97.6 F | DIASTOLIC BLOOD PRESSURE: 78 MMHG | RESPIRATION RATE: 16 BRPM | WEIGHT: 226.8 LBS | HEART RATE: 92 BPM | SYSTOLIC BLOOD PRESSURE: 118 MMHG | BODY MASS INDEX: 41.48 KG/M2 | OXYGEN SATURATION: 98 %

## 2018-01-12 NOTE — PROGRESS NOTES
Message  Outreach phone call  I told patient office received clearance from psychiatrist  Patient may proceed with bariatric surgery  NV      Active Problems    1  Abnormal EKG (794 31) (R94 31)   2  Anxiety (300 00) (F41 9)   3  Chronic low back pain (724 2,338 29) (M54 5,G89 29)   4  Chronic pain syndrome (338 4) (G89 4)   5  Constipation (564 00) (K59 00)   6  Contact dermatitis (692 9) (L25 9)   7  Depression (311) (F32 9)   8  Depression screening (V79 0) (Z13 89)   9  Dermoid cyst of right lower extremity (216 7) (D23 71)   10  Diabetes mellitus (250 00) (E11 9)   11  Drug-induced hepatic toxicity (573 3,E947 9) (K71 6,T50 905A)   12  Elevated liver function tests (790 6) (R79 89)   13  Encounter for diabetic foot exam (250 00) (E11 9)   14  Encounter for routine gynecological examination with Papanicolaou smear of cervix    (V72 31,V76 2) (Z01 419)   15  Hypercholesterolemia (272 0) (E78 00)   16  Hyperlipidemia (272 4) (E78 5)   17  Lumbar degenerative disc disease (722 52) (M51 36)   18  Lumbar radiculopathy (724 4) (M54 16)   19  Morbidly obese (278 01) (E66 01)   20  Need for influenza vaccination (V04 81) (Z23)   21  Tendinitis of left ankle (727 06) (M77 52)   22  Uncontrolled type 2 diabetes with neuropathy (250 62,357 2) (E11 40,E11 65)    Current Meds   1  Alcohol Pads 70 % Pad; use twice daily; Therapy: 09CFT8856 to (Evaluate:34Xag9568)  Requested for: 54JBT2515; Last   Rx:31Mar2017 Ordered   2  BD Pen Needle Mini U/F 31G X 5 MM Miscellaneous; USE AS DIRECTED; Therapy: 52MSN2148 to (Evaluate:11Mar2018)  Requested for: 30OKM7934; Last   Rx:16Mar2017 Ordered   3  BuSpar 10 MG TABS (BusPIRone HCl); TAKE 1 TABLET 3 TIMES DAILY; Therapy: (Recorded:16Mar2017) to Recorded   4  Citalopram Hydrobromide 40 MG Oral Tablet; TAKE 1 TABLET DAILY; Therapy: 20ZJG8255-; Last Rx:16Mar2017; Status: NEED INFORMATION - Problem   Ordered   5   Cyclobenzaprine HCl - 10 MG Oral Tablet; TAKE ONE-HALF TO 1 TABLET 3 TIMES    DAILY AS NEEDED; Therapy: 27AQN5389 to (Last Rx:12Oct2017)  Requested for: 12Oct2017 Ordered   6  FreeStyle Lancets Miscellaneous; Check blood sugar twice daily as directed; Therapy: 74QAE6693 to [phone])  Requested for: 93KQV6760; Last   Rx:31Mar2017 Ordered   7  FreeStyle Lite Device; TEST BLOOD SUGAR TWICE DAILY; Therapy: 23MDZ1866 to (Last Rx:31Mar2017)  Requested for: 37BKL4503 Ordered   8  FreeStyle Lite Test In [employer]; test twice daily; Therapy: 03MIX1911 to (Last Rx:31Mar2017)  Requested for: 19EHN1925 Ordered   9  Gabapentin 400 MG Oral Capsule; TAKE 1 CAPSULE 3 times daily; Therapy: 55TZL9771 to (Evaluate:14Apr2018)  Requested for: 33SLI3998; Last   Rx:16Oct2017 Ordered   10  Simvastatin 40 MG Oral Tablet; Therapy: (Recorded:79Jqc5106) to Recorded   11  Synjardy 5-1000 MG Oral Tablet; Therapy: (Recorded:41Uap8765) to Recorded   12  TraZODone HCl - 100 MG Oral Tablet; TAKE 2 TABLETS AT BEDTIME; Therapy: (Recorded:16Mar2017) to Recorded   13  Victoza 18 MG/3ML Subcutaneous Solution Pen-injector; Therapy: (Recorded:74Tqi0389) to Recorded    Allergies    1   Atorvastatin Calcium TABS    Signatures   Electronically signed by : MALCOM Jordan; Nov  3 2017  9:10AM EST                       (Author)

## 2018-01-12 NOTE — RESULT NOTES
Verified Results  (1) TSH 23XCF3631 02:11PM Eduar Valenzuela     Test Name Result Flag Reference   TSH 3 623 uIU/mL  0 358-3 740   This is a patient instruction: This test is non-fasting  Please drink two glasses of water morning of bloodwork  Patients undergoing fluorescein dye angiography may retain small amounts of fluorescein in the body for 48-72 hours post procedure  Samples containing fluorescein can produce falsely depressed TSH values  If the patient had this procedure,a specimen should be resubmitted post fluorescein clearance            The recommended reference ranges for TSH during pregnancy are as follows:  First trimester 0 1 to 2 5 uIU/mL  Second trimester  0 2 to 3 0 uIU/mL  Third trimester 0 3 to 3 0 uIU/m

## 2018-01-12 NOTE — MISCELLANEOUS
Provider Comments  Provider Comments:   Patient is a no-show for her 7:00 appointment today      Signatures   Electronically signed by : Jim Phan, [facility]; Jun 1 2017  7:10PM EST                       (Author)

## 2018-01-12 NOTE — RESULT NOTES
Verified Results  (1) HEMOGLOBIN A1C 24Apr2017 07:54AM Jessica Proper Order Number: [id number]     Test Name Result Flag Reference   HEMOGLOBIN A1C 8 6 % H 4 2-6 3   EST  AVG  GLUCOSE 200 mg/dl       (1) LIPID PANEL, FASTING 24Apr2017 07:54AM Jessica Proper Order Number: [id number]     Test Name Result Flag Reference   CHOLESTEROL 256 mg/dL H    HDL,DIRECT 87 mg/dL H 40-60   Specimen collection should occur prior to Metamizole administration due to the potential for falsely depressed results  LDL CHOLESTEROL CALCULATED 148 mg/dL H 0-100   - Patient Instructions: This is a fasting blood test  Water,black tea or black  coffee only after 9:00pm the night before test   Drink 2 glasses of water the morning of test       Triglyceride:         Normal              <150 mg/dl       Borderline High    150-199 mg/dl       High               200-499 mg/dl       Very High          >499 mg/dl  Cholesterol:         Desirable        <200 mg/dl      Borderline High  200-239 mg/dl      High             >239 mg/dl  HDL Cholesterol:        High    >59 mg/dL      Low     <41 mg/dL  LDL CALCULATED:    This screening LDL is a calculated result  It does not have the accuracy of the Direct Measured LDL in the monitoring of patients with hyperlipidemia and/or statin therapy  Direct Measure LDL (PPH866) must be ordered separately in these patients  TRIGLYCERIDES 106 mg/dL  <=150   Specimen collection should occur prior to N-Acetylcysteine or Metamizole administration due to the potential for falsely depressed results       (1) MICROALBUMIN CREATININE RATIO, RANDOM URINE 24Apr2017 07:54AM Jessica Proper Order Number: [id number]     Test Name Result Flag Reference   MICROALBUMIN/ CREAT R 42 mg/g creatinine H 0-30   MICROALBUMIN,URINE 53 8 mg/L H 0 0-20 0   CREATININE URINE 128 0 mg/dL         Plan  Uncontrolled type 2 diabetes with neuropathy    · 3 Aditi Leach MD, Malaika Gonzalez (Endocrinology) Co-Management  *  Status: Hold For -  Scheduling  Requested for: 25Apr2017  are Referring to a non-SL Preferred Provider : Insurance Coverage  Care Summary provided  : Yes

## 2018-01-13 VITALS
HEIGHT: 63 IN | DIASTOLIC BLOOD PRESSURE: 76 MMHG | SYSTOLIC BLOOD PRESSURE: 120 MMHG | OXYGEN SATURATION: 99 % | BODY MASS INDEX: 39.78 KG/M2 | WEIGHT: 224.5 LBS | HEART RATE: 89 BPM

## 2018-01-13 VITALS
DIASTOLIC BLOOD PRESSURE: 82 MMHG | HEART RATE: 80 BPM | SYSTOLIC BLOOD PRESSURE: 130 MMHG | BODY MASS INDEX: 40.57 KG/M2 | TEMPERATURE: 97.2 F | RESPIRATION RATE: 20 BRPM | HEIGHT: 63 IN | WEIGHT: 229 LBS

## 2018-01-13 VITALS
BODY MASS INDEX: 42.59 KG/M2 | HEIGHT: 62 IN | TEMPERATURE: 97.1 F | RESPIRATION RATE: 18 BRPM | HEART RATE: 101 BPM | DIASTOLIC BLOOD PRESSURE: 60 MMHG | WEIGHT: 231.44 LBS | OXYGEN SATURATION: 98 % | SYSTOLIC BLOOD PRESSURE: 102 MMHG

## 2018-01-13 NOTE — MISCELLANEOUS
Message  Graham Garner spoke to patient on the phone regarding abnormal pap  she is to have a colpo done  transferred to  to schedule an apt      Active Problems    1  Abnormal EKG (794 31) (R94 31)   2  Anxiety (300 00) (F41 9)   3  Chronic low back pain (724 2,338 29) (M54 5,G89 29)   4  Chronic pain syndrome (338 4) (G89 4)   5  Constipation (564 00) (K59 00)   6  Contact dermatitis (692 9) (L25 9)   7  Depression (311) (F32 9)   8  Depression screening (V79 0) (Z13 89)   9  Dermoid cyst of right lower extremity (216 7) (D23 71)   10  Diabetes mellitus (250 00) (E11 9)   11  Drug-induced hepatic toxicity (573 3,E947 9) (K71 6,T50 905A)   12  Elevated liver function tests (790 6) (R79 89)   13  Encounter for diabetic foot exam (250 00) (E11 9)   14  Encounter for routine gynecological examination with Papanicolaou smear of cervix    (V72 31,V76 2) (Z01 419)   15  Hypercholesterolemia (272 0) (E78 00)   16  Hyperlipidemia (272 4) (E78 5)   17  Lumbar degenerative disc disease (722 52) (M51 36)   18  Lumbar radiculopathy (724 4) (M54 16)   19  Morbidly obese (278 01) (E66 01)   20  Need for influenza vaccination (V04 81) (Z23)   21  Tendinitis of left ankle (727 06) (M77 52)   22  Uncontrolled type 2 diabetes with neuropathy (250 62,357 2) (E11 40,E11 65)    Current Meds   1  Alcohol Pads 70 % Pad; use twice daily; Therapy: 74OBS7647 to (Evaluate:65Wuc0674)  Requested for: 33NCF7745; Last   Rx:31Mar2017 Ordered   2  BD Pen Needle Mini U/F 31G X 5 MM Miscellaneous; USE AS DIRECTED; Therapy: 23XMG2592 to (Evaluate:11Mar2018)  Requested for: 51BFL1609; Last   Rx:16Mar2017 Ordered   3  BuSpar 10 MG TABS (BusPIRone HCl); TAKE 1 TABLET 3 TIMES DAILY; Therapy: (Recorded:16Mar2017) to Recorded   4  Citalopram Hydrobromide 40 MG Oral Tablet; TAKE 1 TABLET DAILY; Therapy: 81KZR2613-; Last Rx:16Mar2017; Status: NEED INFORMATION - Problem   Ordered   5   Cyclobenzaprine HCl - 10 MG Oral Tablet; TAKE ONE-HALF TO 1 TABLET 3 TIMES    DAILY AS NEEDED; Therapy: 02FRM5349 to (Last Rx:12Oct2017)  Requested for: 12Oct2017 Ordered   6  FreeStyle Lancets Miscellaneous; Check blood sugar twice daily as directed; Therapy: 76BYV6565 to 459 9584)  Requested for: 75BXY0688; Last   Rx:31Mar2017 Ordered   7  FreeStyle Lite Device; TEST BLOOD SUGAR TWICE DAILY; Therapy: 19XOU2249 to (Last Rx:31Mar2017)  Requested for: 95HQC0416 Ordered   8  FreeStyle Lite Test In [employer]; test twice daily; Therapy: 29KGQ6447 to (Last Rx:31Mar2017)  Requested for: 87NBC5681 Ordered   9  Gabapentin 400 MG Oral Capsule; TAKE 1 CAPSULE 3 times daily; Therapy: 51PFQ6189 to (Evaluate:14Apr2018)  Requested for: 29GTZ6463; Last   Rx:16Oct2017 Ordered   10  Simvastatin 40 MG Oral Tablet; Therapy: (Recorded:98Bjr0386) to Recorded   11  Synjardy 5-1000 MG Oral Tablet; Therapy: (Recorded:91Crq3091) to Recorded   12  TraZODone HCl - 100 MG Oral Tablet; TAKE 2 TABLETS AT BEDTIME; Therapy: (Recorded:16Mar2017) to Recorded   13  Victoza 18 MG/3ML Subcutaneous Solution Pen-injector; Therapy: (Recorded:79Mvb5329) to Recorded    Allergies    1   Atorvastatin Calcium TABS    Signatures   Electronically signed by : Pb Kim RN; Nov 9 2017  9:09AM EST                       (Author)

## 2018-01-14 VITALS
TEMPERATURE: 97.5 F | BODY MASS INDEX: 42.88 KG/M2 | SYSTOLIC BLOOD PRESSURE: 108 MMHG | OXYGEN SATURATION: 100 % | DIASTOLIC BLOOD PRESSURE: 80 MMHG | HEART RATE: 88 BPM | WEIGHT: 233 LBS | RESPIRATION RATE: 20 BRPM | HEIGHT: 62 IN

## 2018-01-14 VITALS
WEIGHT: 230 LBS | SYSTOLIC BLOOD PRESSURE: 133 MMHG | HEIGHT: 62 IN | BODY MASS INDEX: 42.33 KG/M2 | TEMPERATURE: 98.6 F | DIASTOLIC BLOOD PRESSURE: 85 MMHG | HEART RATE: 98 BPM

## 2018-01-14 VITALS
HEART RATE: 92 BPM | BODY MASS INDEX: 42.88 KG/M2 | OXYGEN SATURATION: 99 % | RESPIRATION RATE: 20 BRPM | DIASTOLIC BLOOD PRESSURE: 78 MMHG | WEIGHT: 233 LBS | HEIGHT: 62 IN | SYSTOLIC BLOOD PRESSURE: 110 MMHG | TEMPERATURE: 97.4 F

## 2018-01-14 VITALS
TEMPERATURE: 97.8 F | WEIGHT: 234 LBS | DIASTOLIC BLOOD PRESSURE: 68 MMHG | HEART RATE: 80 BPM | SYSTOLIC BLOOD PRESSURE: 120 MMHG | HEIGHT: 62 IN | BODY MASS INDEX: 43.06 KG/M2 | OXYGEN SATURATION: 98 %

## 2018-01-15 VITALS — BODY MASS INDEX: 40.57 KG/M2 | HEIGHT: 63 IN | WEIGHT: 229 LBS

## 2018-01-15 NOTE — RESULT NOTES
Verified Results  (1) BASIC METABOLIC PROFILE 98YXL9594 10:24AM [facility] [place] Order Number: [id number]     Test Name Result Flag Reference   SODIUM 141 mmol/L  136-145   POTASSIUM 4 5 mmol/L  3 5-5 3   CHLORIDE 104 mmol/L  100-108   CARBON DIOXIDE 29 mmol/L  21-32   ANION GAP (CALC) 8 mmol/L  4-13   BLOOD UREA NITROGEN 12 mg/dL  5-25   CREATININE 0 66 mg/dL  0 60-1 30   Standardized to IDMS reference method   CALCIUM 9 3 mg/dL  8 3-10 1   eGFR 115 ml/min/1 73sq m     National Kidney Disease Education Program recommendations are as follows:  GFR calculation is accurate only with a steady state creatinine  Chronic Kidney disease less than 60 ml/min/1 73 sq  meters  Kidney failure less than 15 ml/min/1 73 sq  meters  GLUCOSE FASTING 155 mg/dL H 65-99   Specimen collection should occur prior to Sulfasalazine administration due to the potential for falsely depressed results  Specimen collection should occur prior to Sulfapyridine administration due to the potential for falsely elevated results  (1) VITAMIN D 25-HYDROXY 42LLE0888 10:24AM [facility] [place] Order Number: [id number]     Test Name Result Flag Reference   VIT D 25-HYDROX 29 2 ng/mL L 30 0-100 0   This assay is a certified procedure of the CDC Vitamin D Standardization Certification Program (VDSCP)     Deficiency <20ng/ml   Insufficiency 20-30ng/ml   Sufficient  ng/ml     *Patients undergoing fluorescein dye angiography may retain small amounts of fluorescein in the body for 48-72 hours post procedure  Samples containing fluorescein can produce falsely elevated Vitamin D values  If the patient had this procedure, a specimen should be resubmitted post fluorescein clearance

## 2018-01-15 NOTE — RESULT NOTES
Verified Results  * XR ANKLE 3+ VIEW LEFT 12KGL6267 12:00PM Drew Tan Order Number: [id number]     Test Name Result Flag Reference   XR ANKLE 3+ VW LEFT (Report)     LEFT ANKLE     INDICATION: M77 52: Other enthesopathy of left foot  History taken directly from the electronic ordering system  COMPARISON: None     VIEWS: 3     IMAGES: 3     FINDINGS:     There is no acute fracture or dislocation  No degenerative changes  No lytic or blastic lesions are seen  Soft tissues are unremarkable  IMPRESSION:     No acute osseous abnormality         Workstation performed: [id number]     Signed by:   Pina Hill MD   10/8/17
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
## 2018-01-16 ENCOUNTER — HOSPITAL ENCOUNTER (EMERGENCY)
Facility: HOSPITAL | Age: 38
Discharge: HOME | End: 2018-01-16
Attending: EMERGENCY MEDICINE
Payer: COMMERCIAL

## 2018-01-16 VITALS
WEIGHT: 220 LBS | SYSTOLIC BLOOD PRESSURE: 130 MMHG | DIASTOLIC BLOOD PRESSURE: 72 MMHG | RESPIRATION RATE: 16 BRPM | OXYGEN SATURATION: 100 % | HEART RATE: 90 BPM | BODY MASS INDEX: 40.24 KG/M2 | TEMPERATURE: 98.3 F

## 2018-01-16 DIAGNOSIS — R68.83 CHILLS: ICD-10-CM

## 2018-01-16 DIAGNOSIS — R19.7 DIARRHEA: Primary | ICD-10-CM

## 2018-01-16 DIAGNOSIS — B34.9 ACUTE VIRAL SYNDROME: ICD-10-CM

## 2018-01-16 DIAGNOSIS — R11.0 NAUSEA: ICD-10-CM

## 2018-01-16 DIAGNOSIS — R52 BODY ACHES: ICD-10-CM

## 2018-01-16 LAB
BILIRUB UR QL STRIP: NEGATIVE
CLARITY UR: CLEAR
COLOR UR: YELLOW
EXT PREG TEST URINE: NEGATIVE
GLUCOSE SERPL-MCNC: 90 MG/DL (ref 65–140)
GLUCOSE UR STRIP-MCNC: ABNORMAL MG/DL
HGB UR QL STRIP.AUTO: NEGATIVE
KETONES UR STRIP-MCNC: NEGATIVE MG/DL
LEUKOCYTE ESTERASE UR QL STRIP: NEGATIVE
NITRITE UR QL STRIP: NEGATIVE
PH UR STRIP.AUTO: 5.5 [PH] (ref 4.5–8)
PROT UR STRIP-MCNC: NEGATIVE MG/DL
SP GR UR STRIP.AUTO: 1.02 (ref 1–1.03)
UROBILINOGEN UR QL STRIP.AUTO: 0.2 E.U./DL

## 2018-01-16 PROCEDURE — 81003 URINALYSIS AUTO W/O SCOPE: CPT

## 2018-01-16 PROCEDURE — 81025 URINE PREGNANCY TEST: CPT | Performed by: EMERGENCY MEDICINE

## 2018-01-16 PROCEDURE — 81002 URINALYSIS NONAUTO W/O SCOPE: CPT | Performed by: EMERGENCY MEDICINE

## 2018-01-16 PROCEDURE — 82948 REAGENT STRIP/BLOOD GLUCOSE: CPT

## 2018-01-16 PROCEDURE — 99284 EMERGENCY DEPT VISIT MOD MDM: CPT

## 2018-01-16 RX ORDER — DICYCLOMINE HCL 20 MG
20 TABLET ORAL ONCE
Status: COMPLETED | OUTPATIENT
Start: 2018-01-16 | End: 2018-01-16

## 2018-01-16 RX ORDER — IBUPROFEN 600 MG/1
600 TABLET ORAL ONCE
Status: COMPLETED | OUTPATIENT
Start: 2018-01-16 | End: 2018-01-16

## 2018-01-16 RX ORDER — DICYCLOMINE HCL 20 MG
20 TABLET ORAL EVERY 6 HOURS PRN
Qty: 20 TABLET | Refills: 0 | Status: ON HOLD | OUTPATIENT
Start: 2018-01-16 | End: 2018-02-07 | Stop reason: ALTCHOICE

## 2018-01-16 RX ORDER — IBUPROFEN 600 MG/1
600 TABLET ORAL EVERY 8 HOURS PRN
Qty: 30 TABLET | Refills: 0 | Status: SHIPPED | OUTPATIENT
Start: 2018-01-16 | End: 2018-02-08 | Stop reason: ALTCHOICE

## 2018-01-16 RX ORDER — ONDANSETRON 4 MG/1
4 TABLET, ORALLY DISINTEGRATING ORAL EVERY 8 HOURS PRN
Qty: 20 TABLET | Refills: 0 | Status: SHIPPED | OUTPATIENT
Start: 2018-01-16 | End: 2018-04-05

## 2018-01-16 RX ADMIN — DICYCLOMINE HYDROCHLORIDE 20 MG: 20 TABLET ORAL at 22:12

## 2018-01-16 RX ADMIN — IBUPROFEN 600 MG: 600 TABLET, FILM COATED ORAL at 22:12

## 2018-01-16 NOTE — RESULT NOTES
Message   Do not call the patient and discuss result  I tried to call patient and inform the result  It seems the phone listed was not her phone  I left a message anyway  Can you try call again and can we schedule her be seen in office on 3/9? thanks  Verified Results  (1) TISSUE EXAM [id number] 03:52PM Jeimy Schmitz     Test Name Result Flag Reference   LAB AP CASE REPORT (Report)     Surgical Pathology Report             Case: [id number]                   Authorizing Provider: Jeronimo Mishra MD       Collected:      02/28/2017 1552        Ordering Location:   [address] [place] [address]    Received:      02/28/2017 30 [facility]  Interventional                                        Radiology                                   Pathologist:      Sarah Marc MD                                 Specimen:  Liver   LAB AP FINAL DIAGNOSIS (Report)     A  Liver (core needle biopsy):  - 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    Comment: The patient's liver enzymes are noted to have returned to within   a normal range based on the data from 2/21/17  Significant elevations in   AST, ALT and to a lesser degree alkaline phosphatase were noted on 2/2/17   and 2/4/17  The RUSSELL titer of 1:320 and positive ASMA are noted as well,   with no indication in the clinical notes of prednisone therapy  The   biopsy with clinical data is indicative of a resolving/resolved acute   hepatitis which may be indicative of an acute exposure to a drug or herbal   supplement  Per clinical notes, viral hepatitis serologies were negative  While the autoimmune serologies are noted, features of an autoimmune   hepatitis are not appreciated in this biopsy  If liver enzymes are noted   to trend upwards over time a repeat biopsy to exclude an AIH may be   warranted     Electronically signed by Sarah Marc MD on 3/3/2017 at 3:41 PM   LAB AP MICROSCOPIC DESCRIPTION (Report)     Histologic sections show three (3) liver cores with intact architecture   demonstrating regularly alternating portal tracts and central veins  Portal tracts are adequate and display minimal to mild inflammatory   infiltrate consisting of lymphocytes and plasma cells  A minority of   portal tracts show interface activity  Occasional portal macrophages are   present  Bile ducts are identified in all portal tracts and show no   destructive lesions or granulomatous inflammation  Minimal steatosis is   present comprising less than 5% of the core volumes  Lobular inflammation   is inconspicuous  The iron stain shows no significant hemosiderin   deposition  The reticulin stain shows hepatic plates of 1-2 cells in   thickness  The trichrome stain shows portal fibrous expansion, along with   a rare focus of centri-zonal petrona-sinusoidal fibrosis  LAB AP SURGICAL ADDITIONAL INFORMATION (Report)     These tests were developed and their performance characteristics   determined by [facility] or [facility]  They may not be cleared or approved by the [place]  Food and   Drug Administration  The FDA has determined that such clearance or   approval is not necessary  These tests are used for clinical purposes  They should not be regarded as investigational or for research  This   laboratory has been approved by [facility], designated as a high-complexity   laboratory and is qualified to perform these tests  LAB AP GROSS DESCRIPTION (Report)     A  The specimen is received in formalin, labeled with the patient's name   and medical record number, and is designated liver core biopsy  The   specimen consists of 3 tan to red-brown soft tissue cores measuring 0 7,   1 0 and 1 6 centimeters in length and measuring no greater than 0 1   centimeters in diameter  Entirely submitted  2 cassettes    1: 2 soft tissue cores  2: Single soft tissue core    Note: The estimated total formalin fixation time based upon information   provided by the submitting clinician and the standard processing schedule   is 22 25 hours        ACL   LAB AP CLINICAL INFORMATION (Report)     abnormal LFTs          2/2 2/21  AST    224   24  ALT    459   58  AP     160   75  TB     0 75   0 23    Anti-LKM negative; AMA 22 (equivocal [facility]), ASMA 108 (positive, [facility]), RUSSELL 1:320; ceruloplasmin and alpha-1 anti-trypsin - WNL

## 2018-01-16 NOTE — PROGRESS NOTES
History of Present Illness  Bariatric MNT [facility] Surgery Screening Preop [facility]:     She was on time  the appointment lasted: 60 minutes  Her surgeon is Dr Teri Sotomayor  The patient was present at the session and her spouse who provided interpretation attended the session  The diagnosis/reason for the appointment is: She has Grade III Obesity with a BMI of 41 2  She has the following comorbidities: diabetes type 2, mixed hyperlipidemia, hypercholesterolemia and constipation, elevated liver function tests  Labs: Lynn Galindo She was reminded to have her labs drawn   She self monitors her blood glucose  Her casual blood glucose is 180 mg/dl was last read last A1c = 7%   Exercise Frequency:  She does not exercise  Relationship to food: She is an emotional eater, is a stress eater and eats large portions  She knows the difference between being comfortably full and uncomfortably full and knows the difference between being stuffed and comfortably full  She felt/thought they felt her best at never felt comfortable pounds She completed a food journal She drinks 6 cups of water daily She drinks 1-2 caffienated beverages daily Her motivators are:wants to be healthy for her daughters, wants resolution of diabetes  Her obesity/being overweight is related to excess energy intake and sedentary lifestlyle and is evidenced by: BMI 41  Knowledge Deficit Prior to Education  She is new to the diet  Barriers to education: language  Medical Nutrition Therapy Intervention: Individualized Meal Plan, Daily Food /Exercise Diary and Lifestyle /Behavior Modification Techniques  Area's Reviewed: Lifestyle Neetu Lockwood Modification Techniques and [employer] in [place]  Brief Review of Vitamins and diet progression for post-op  Her comprehension of the presented material was fair  Her receptivity to the presented material was good  Her motivation was good   Provided: Nutrition Guidelines for Gastric Surgery, Bariatric Program Pre- Surgery Nutrition and Goals  Goals: Her set goal for physical activity is will discuss with pain management   Review Lesson Plans in [place] provided with 1200 calorie meal plan in [place]  Post Surgery: She will adhere to the diet progression: remain hydrated, consume adequate protein; and take vitamins as outlined in guidelines  Patient is a 30year old who is here for nutritional evaluation for weight loss surgery  Patient's spouse present and provided interpretation  I reviewed comorbidities and medications prior to session  She first recalls having problems with weight gain at the age of 27   She has dieted in the past with variable success but would regain the weight back  (refer to diet history for details)  For her personal pre-operative goals she will: eat 3 meals per day and include protein at each meal  She will limit her starch portions to 1 cup total for dinner ( rice and beans) She will complete all 6 lesson plans in her bariatric booklet  She was instructed on the importance of consistent vitamin and mineral intake after her surgery to prevent deficiencies  Reviewed importance of support after surgery and discussed the facebook page, nettie Byrd and support group  Patient states adequate knowledge of nutrition, exercise and behavior modification required for long term success  Pt  is recommended for surgery and is aware that she will be required to follow the 2 week pre operative liver shrinking diet prior to surgery  Pt also understands that she needs to implement lifestyle changes in preparation for surgery  Patient is aware that she has 6 months of weigh ins required by her insurance  Recommendations: She was provided contact information for any questions  She is recommended for surgery  She states adequate knowledge of nutrition, exercise, and behavior modification  She will attend a Team Meeting approximately 2-3 weeks prior to surgery  Active Problems    1  Anxiety (300 00) (F41 9)   2  Chronic low back pain (724 2,338 29) (M54 5,G89 29)   3  Chronic pain syndrome (338 4) (G89 4)   4  Constipation (564 00) (K59 00)   5  Contact dermatitis (692 9) (L25 9)   6  Depression (311) (F32 9)   7  Depression screening (V79 0) (Z13 89)   8  Dermoid cyst of right lower extremity (216 7) (D23 71)   9  Diabetes mellitus (250 00) (E11 9)   10  Drug-induced hepatic toxicity (573 3,E947 9) (K71 6,T50 905A)   11  Elevated liver function tests (790 6) (R79 89)   12  Encounter for diabetic foot exam (250 00) (E11 9)   13  Encounter for routine gynecological examination with Papanicolaou smear of cervix    (V72 31,V76 2) (Z01 419)   14  Hypercholesterolemia (272 0) (E78 00)   15  Hyperlipidemia (272 4) (E78 5)   16  Lumbar degenerative disc disease (722 52) (M51 36)   17  Lumbar radiculopathy (724 4) (M54 16)   18  Morbidly obese (278 01) (E66 01)   19  Tendinitis of left ankle (727 06) (M77 52)   20  Uncontrolled type 2 diabetes with neuropathy (250 62,357 2) (E11 40,E11 65)    Past Medical History    1  History of depression (V11 8) (Z86 59)   2  History of diabetes mellitus (V12 29) (Z86 39)   3  History of Normal vaginal delivery (650) (O80)    Surgical History    1  History of  Section   2  History of Tonsillectomy    Family History  Mother    1  Family history of diabetes mellitus (V18 0) (Z83 3)    Social History    · Never a smoker   · No drug use   · Non-smoker (V49 89) (Z78 9)   · Social alcohol use (Z78 9)    Current Meds   1  Alcohol Pads 70 % Pad; use twice daily; Therapy: 62DJY9854 to (Evaluate:29Ygg1718)  Requested for: 34QZS2128; Last   Rx:2017 Ordered   2  BD Pen Needle Mini U/F 31G X 5 MM Miscellaneous; USE AS DIRECTED; Therapy: 20RKD8315 to (Evaluate:2018)  Requested for: 00XAH0783; Last   Rx:2017 Ordered   3  BuSpar 10 MG TABS (BusPIRone HCl); TAKE 1 TABLET 3 TIMES DAILY; Therapy: (Recorded:2017) to Recorded   4   BusPIRone HCl - 15 MG Oral Tablet; TAKE 1 TABLET AT BEDTIME; Therapy: 74CTD7346 to Recorded   5  Citalopram Hydrobromide 40 MG Oral Tablet; TAKE 1 TABLET DAILY; Therapy: 63PXG8742-; Last Rx:16Mar2017; Status: NEED INFORMATION - Problem   Ordered   6  Cyclobenzaprine HCl - 10 MG Oral Tablet; TAKE ONE-HALF TO 1 TABLET 3 TIMES  DAILY   AS NEEDED; Therapy: 24QSB5130 to (Last Rx:12Oct2017)  Requested for: 12Oct2017 Ordered   7  FreeStyle Lancets Miscellaneous; Check blood sugar twice daily as directed; Therapy: 59ZGK9056 to (Vamshi Valencia)  Requested for: 78EXU0605; Last   Rx:31Mar2017 Ordered   8  FreeStyle Lite Device; TEST BLOOD SUGAR TWICE DAILY; Therapy: 60PKS1300 to (Last Rx:31Mar2017)  Requested for: 03WEY7463 Ordered   9  FreeStyle Lite Test In [employer]; test twice daily; Therapy: 48ZPT6947 to (Last Rx:31Mar2017)  Requested for: 80HEV8331 Ordered   10  Gabapentin 400 MG Oral Capsule; TAKE 1 CAPSULE 3 times daily; Therapy: 64EHJ8002 to (Evaluate:14Apr2018)  Requested for: 09GQW3271; Last    Rx:16Oct2017 Ordered   11  Lantus SOLN;    Therapy: (Recorded:29Jun2015) to Recorded   12  Lantus SoloStar 100 UNIT/ML Subcutaneous Solution Pen-injector; Inject 50 units at    bedtime; Therapy: 63LJW5966 to (Evaluate:75Qfw1664); Last Rx:16Mar2017 Ordered   13  Simvastatin 40 MG Oral Tablet; Therapy: (Recorded:88Nqs3331) to Recorded   14  Synjardy 5-1000 MG Oral Tablet; Therapy: (Recorded:27Bhy1717) to Recorded   15  TraZODone HCl - 100 MG Oral Tablet; TAKE 2 TABLETS AT BEDTIME; Therapy: (Recorded:16Mar2017) to Recorded   16  Victoza 18 MG/3ML Subcutaneous Solution Pen-injector; Therapy: (Recorded:75Abm1343) to Recorded    Allergies    1  Atorvastatin Calcium TABS    Future Appointments    Date/Time Provider Specialty Site   11/17/2017 08:30 AM Karl Mendez [facility] Bariatric Medicine [facility] MANAG[facility]   10/24/2017 11:00 AM Donivan Goldberg, M D   Pain Management [facility] OUTPATIENT   10/24/2017 09:00 AM Specialty Clinic, Podiatry  [facility] MEDICAL AND CARDIAC [facility]   10/31/2017 08:15 AM [facility] MIMA Mcdonough Schedule  [facility]     Signatures   Electronically signed by : SIERRA August; Oct 17 2017  4:13PM EST                       (Author)    Electronically signed by : JAMSHID Hou ; Oct 18 2017  3:07PM EST                       (Validation)
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
## 2018-01-17 NOTE — ED PROVIDER NOTES
History  Chief Complaint   Patient presents with    Generalized Body Aches     Pt c/o body aches "All over" that began 8 hours PTA and abdominal pain with diarrhea that has been ongoing for 3 days; Pt denies n/v and urinary symptoms    Abdominal Pain    Diarrhea      31 yo female who began Sunday with nausea, upset stomach and diarrhea - all have persisted with associated body aches  Pt has been able to eat and drink but "not as much as she usually does" per boyfriend  SG 1020 and appears hydrated  Abd exam benign - mild tenderness epigastric region, no point tenderness  Cousin had similar N /V/D and fever          History provided by:  Patient and significant other   used: Yes    Generalized Body Aches   Severity:  Moderate  Onset quality:  Gradual  Duration:  3 days  Timing:  Constant  Progression:  Unchanged  Chronicity:  New  Associated symptoms: abdominal pain (cramping), diarrhea, fatigue and nausea    Associated symptoms: no chest pain, no congestion, no fever, no headaches, no rash, no shortness of breath, no sore throat, no vomiting and no wheezing    Associated symptoms comment:  Chills  Abdominal pain:     Location:  Generalized    Quality: cramping      Severity:  Mild    Onset quality:  Gradual    Timing:  Constant    Progression:  Waxing and waning    Chronicity:  New  Diarrhea:     Quality:  Watery    Severity:  Moderate    Duration:  3 days    Timing:  Constant    Progression:  Unchanged  Fatigue:     Severity:  Moderate    Duration:  3 days    Timing:  Constant    Progression:  Worsening  Nausea:     Severity:  Mild    Onset quality:  Gradual    Duration:  3 days    Timing:  Intermittent    Progression:  Resolved  Abdominal Pain   Associated symptoms: diarrhea, fatigue and nausea    Associated symptoms: no chest pain, no chills, no dysuria, no fever, no shortness of breath, no sore throat, no vaginal bleeding, no vaginal discharge and no vomiting    Diarrhea   Associated symptoms: abdominal pain (cramping)    Associated symptoms: no chills, no fever, no headaches and no vomiting        Prior to Admission Medications   Prescriptions Last Dose Informant Patient Reported? Taking? Dexbromphen-Pseudoephed-APAP (SINADRIN PLUS PO)   Yes No   Sig: Take by mouth   Insulin Glargine (TOUJEO SOLOSTAR SC)   Yes No   Sig: Inject 40 Units under the skin daily at bedtime     Liraglutide (VICTOZA SC)   Yes No   Sig: Inject 1 8 Units under the skin daily   SIMVASTATIN PO   Yes No   Sig: Take by mouth daily   busPIRone (BUSPAR) 10 mg tablet   Yes No   Sig: Take 10 mg by mouth 3 (three) times a day     dicyclomine (BENTYL) 20 mg tablet   No No   Sig: Take 1 tablet by mouth every 6 (six) hours as needed (abd cramping) for up to 5 days   dicyclomine (BENTYL) 20 mg tablet   No No   Sig: Take 1 tablet by mouth 2 (two) times a day   gabapentin (NEURONTIN) 400 mg capsule   Yes No   Sig: Take 400 mg by mouth 2 (two) times a day     ibuprofen (MOTRIN) 600 mg tablet   No No   Sig: Take 1 tablet by mouth every 6 (six) hours as needed for mild pain for up to 10 days   lidocaine (LIDODERM) 5 %   No No   Sig: Place 1 patch on the skin daily Remove & Discard patch within 12 hours or as directed by MD   methocarbamol (ROBAXIN) 500 mg tablet   No No   Sig: Take 1 tablet by mouth 4 (four) times a day for 10 days   naproxen (NAPROSYN) 500 mg tablet   No No   Sig: Take 1 tablet by mouth 2 (two) times a day with meals for 10 days   omeprazole (PriLOSEC) 20 mg delayed release capsule   No No   Sig: Take 1 capsule by mouth daily   Patient taking differently: Take 20 mg by mouth as needed     ondansetron (ZOFRAN-ODT) 4 mg disintegrating tablet   No No   Sig: Take 1 tablet by mouth every 6 (six) hours as needed for nausea or vomiting   ondansetron (ZOFRAN-ODT) 4 mg disintegrating tablet   No No   Sig: Take 1 tablet by mouth every 8 (eight) hours as needed for nausea or vomiting for up to 5 days   pantoprazole (PROTONIX) 40 mg tablet   No No   Sig: Take 1 tablet by mouth daily in the early morning for 30 days   traZODone (DESYREL) 100 mg tablet   No No   Sig: Take 1 tablet by mouth daily at bedtime      Facility-Administered Medications: None       Past Medical History:   Diagnosis Date    Anxiety     Back pain     Depression     Diabetes mellitus ([facility] [address] )     Hyperlipidemia     Liver function study, abnormal        Past Surgical History:   Procedure Laterality Date    ABDOMINAL SURGERY       SECTION      TONSILLECTOMY      TUBAL LIGATION         History reviewed  No pertinent family history  I have reviewed and agree with the history as documented  Social History   Substance Use Topics    Smoking status: Never Smoker    Smokeless tobacco: Never Used    Alcohol use No        Review of Systems   Constitutional: Positive for fatigue  Negative for appetite change, chills and fever  HENT: Negative for congestion and sore throat  Eyes: Negative for visual disturbance  Respiratory: Negative for shortness of breath and wheezing  Cardiovascular: Negative for chest pain and palpitations  Gastrointestinal: Positive for abdominal pain (cramping), diarrhea and nausea  Negative for vomiting  Genitourinary: Negative for dysuria, frequency, vaginal bleeding and vaginal discharge  Musculoskeletal: Negative for back pain, neck pain and neck stiffness  Skin: Negative for pallor and rash  Allergic/Immunologic: Negative for immunocompromised state  Neurological: Negative for light-headedness and headaches  Psychiatric/Behavioral: Negative for confusion  All other systems reviewed and are negative        Physical Exam  ED Triage Vitals [18 2138]   Temperature Pulse Respirations Blood Pressure SpO2   98 3 °F (36 8 °C) 90 16 130/72 100 %      Temp Source Heart Rate Source Patient Position - Orthostatic VS BP Location FiO2 (%)   Oral Monitor -- -- --      Pain Score       4           Orthostatic Vital Signs  Vitals:    01/16/18 2138   BP: 130/72   Pulse: 90       Physical Exam   Constitutional: She is oriented to person, place, and time  She appears well-developed and well-nourished  No distress  HENT:   Head: Normocephalic and atraumatic  Right Ear: External ear normal    Left Ear: External ear normal    Mouth/Throat: Oropharynx is clear and moist    Eyes: EOM are normal  Pupils are equal, round, and reactive to light  Neck: Normal range of motion  Neck supple  Cardiovascular: Normal rate and regular rhythm  No murmur heard  Pulmonary/Chest: Effort normal and breath sounds normal  No respiratory distress  Abdominal: Soft  Bowel sounds are normal  There is tenderness (mild epigastric)  Musculoskeletal: Normal range of motion  Neurological: She is alert and oriented to person, place, and time  Skin: Skin is warm  Capillary refill takes less than 2 seconds  No rash noted  No pallor  Psychiatric: She has a normal mood and affect  Her behavior is normal    Nursing note and vitals reviewed        ED Medications  Medications   dicyclomine (BENTYL) tablet 20 mg (20 mg Oral Given 1/16/18 2212)   ibuprofen (MOTRIN) tablet 600 mg (600 mg Oral Given 1/16/18 2212)       Diagnostic Studies  Results Reviewed     Procedure Component Value Units Date/Time    Fingerstick Glucose (POCT) [[id number]]  (Normal) Collected:  01/16/18 2213    Lab Status:  Final result Updated:  01/16/18 2215     POC Glucose 90 mg/dl     POCT urinalysis dipstick [[id number]]  (Abnormal) Resulted:  01/16/18 2209    Lab Status:  Final result Specimen:  Urine Updated:  01/16/18 2209    POCT pregnancy, urine [[id number]]  (Normal) Resulted:  01/16/18 2208    Lab Status:  Final result Updated:  01/16/18 2208     EXT PREG TEST UR (Ref: Negative) Negative    ED Urine Macroscopic [[id number]]  (Abnormal) Collected:  01/16/18 2206    Lab Status:  Final result Specimen:  Urine Updated:  01/16/18 2208     Color, UA Yellow     Clarity, UA Clear     pH, UA 5 5     Leukocytes, UA Negative     Nitrite, UA Negative     Protein, UA Negative mg/dl      Glucose,  (1/2%) (A) mg/dl      Ketones, UA Negative mg/dl      Urobilinogen, UA 0 2 E U /dl      Bilirubin, UA Negative     Blood, UA Negative     Specific Gravity, UA 1 020    Narrative:       CLINITEK RESULT                 No orders to display              Procedures  Procedures       Phone Contacts  ED Phone Contact    ED Course  ED Course as of Jan 17 0052   Tue Jan 16, 2018   2152 Pt seen and examined  29 yo female who began Sunday with nausea, upset stomach and diarrhea - all have persisted with associated body aches  Pt has been able to eat and drink but "not as much as she usually does" per boyfriend  SG 1020 and appears hydrated  Abd exam benign - mild tenderness epigastric region, no point tenderness  Glucose 500 in urine - will check BS  Bentyl and motrin ordered, currently not nauseas  2218 BS 90  Pt medicated  Plan to d/c home with motrin, bentyl and zofran for continued supportive care for what is likely viral GI bug  Aware of ability to return if any concerns  MDM  CritCare Time    Disposition  Final diagnoses:   Diarrhea   Chills   Body aches   Nausea   Acute viral syndrome     Time reflects when diagnosis was documented in both MDM as applicable and the Disposition within this note     Time User Action Codes Description Comment    1/16/2018 10:19 PM Kristine Hein Add [R19 7] Diarrhea     1/16/2018 10:19 PM Kristine Hein Add [R68 83] Chills     1/16/2018 10:19 PM Kristine Hein Add [R52] Body aches     1/16/2018 10:19 PM Kristine Hein Add [R11 0] Nausea     1/16/2018 10:19 PM Aaliyah BREEN Add [B34 9] Acute viral syndrome       ED Disposition     ED Disposition Condition Comment    Discharge  [place] discharge to home/self care      Condition at discharge: Good        Follow-up Information     Follow up With Specialties Details Why Contact Info    Eduard Najera PA-C Family Medicine Call As needed [address] [place]  [address]          Discharge Medication List as of 1/16/2018 10:20 PM      CONTINUE these medications which have CHANGED    Details   !! dicyclomine (BENTYL) 20 mg tablet Take 1 tablet by mouth every 6 (six) hours as needed (abd cramping) for up to 5 days, Starting Tue 1/16/2018, Until Sun 1/21/2018, Print      ibuprofen (MOTRIN) 600 mg tablet Take 1 tablet by mouth every 8 (eight) hours as needed for mild pain or fever, Starting Tue 1/16/2018, Print      !! ondansetron (ZOFRAN-ODT) 4 mg disintegrating tablet Take 1 tablet by mouth every 8 (eight) hours as needed for nausea or vomiting for up to 7 days, Starting Tue 1/16/2018, Until Tue 1/23/2018, Print       !! - Potential duplicate medications found  Please discuss with provider        CONTINUE these medications which have NOT CHANGED    Details   busPIRone (BUSPAR) 10 mg tablet Take 10 mg by mouth 3 (three) times a day , Until Discontinued, Historical Med      Dexbromphen-Pseudoephed-APAP (SINADRIN PLUS PO) Take by mouth, Historical Med      !! dicyclomine (BENTYL) 20 mg tablet Take 1 tablet by mouth 2 (two) times a day, Starting Wed 11/29/2017, Print      gabapentin (NEURONTIN) 400 mg capsule Take 400 mg by mouth 2 (two) times a day  , Until Discontinued, Historical Med      Insulin Glargine (TOUJEO SOLOSTAR SC) Inject 40 Units under the skin daily at bedtime  , Historical Med      lidocaine (LIDODERM) 5 % Place 1 patch on the skin daily Remove & Discard patch within 12 hours or as directed by MD, Starting Mon 12/11/2017, Normal      Liraglutide (VICTOZA SC) Inject 1 8 Units under the skin daily, Historical Med      methocarbamol (ROBAXIN) 500 mg tablet Take 1 tablet by mouth 4 (four) times a day for 10 days, Starting Mon 12/11/2017, Until Thu 12/21/2017, Normal      naproxen (NAPROSYN) 500 mg tablet Take 1 tablet by mouth 2 (two) times a day with meals for 10 days, Starting Fri 11/17/2017, Until Wed 11/29/2017, Print      omeprazole (PriLOSEC) 20 mg delayed release capsule Take 1 capsule by mouth daily, Starting Thu 9/14/2017, Print      !! ondansetron (ZOFRAN-ODT) 4 mg disintegrating tablet Take 1 tablet by mouth every 6 (six) hours as needed for nausea or vomiting, Starting Fri 11/17/2017, Print      pantoprazole (PROTONIX) 40 mg tablet Take 1 tablet by mouth daily in the early morning for 30 days, Starting Tue 3/14/2017, Until Wed 11/29/2017, Print      SIMVASTATIN PO Take by mouth daily, Historical Med      traZODone (DESYREL) 100 mg tablet Take 1 tablet by mouth daily at bedtime, Starting 2/4/2017, Until Discontinued, No Print       !! - Potential duplicate medications found  Please discuss with provider  No discharge procedures on file      ED Provider  Electronically Signed by           Contreras Flores DO  01/17/18 7313

## 2018-01-17 NOTE — RESULT NOTES
Verified Results  (1) RUSSELL SCREEN W/REFLEX TO TITER/PATTERN [id number] 06:47AM [facility]     Test Name Result Flag Reference   RUSSELL SCREEN   Positive A Negative   RUSSELL TITER 1 Titer of 320     RUSSELL PATTERN 1 Speckled pattern

## 2018-01-17 NOTE — DISCHARGE INSTRUCTIONS
Diarrea aguda   LO QUE NECESITA SABER:   La diarrea aguda comienza rápidamente y dura poco tiempo, usualmente de 1 a 3 días  Puede durar hasta 2 semanas  Es posible que usted no pueda controlar solano diarrea  La diarrea aguda por lo general se detiene por sí nick  INSTRUCCIONES SOBRE EL [facility]ITAL[facility]:   Regrese a la patrice de emergencias si:   · Se siente confundido  · Solano ritmo cardíaco es más lento que lo normal      · Nerissa ojos se stephen demasiado hundidos o no le salen lágrimas cuando llora  · Usted orina menos de lo normal o solano orina es de color amarillo oscuro  · Nerissa evacuaciones intestinales tienen mucosidad o [place]  · Usted tiene dolor abdominal intenso  · Usted no puede jerri ningún líquido  Pregúntele a solano [place] vitaminas y minerales son adecuados para usted  · Nerissa síntomas no mejoran con el tratamiento  · Usted tiene fiebre por encima de los 38 50? (38 5?)  · Tiene dificultad para ingerir alimentos y líquidos porque tiene vómitos  · Usted tiene sed o la boca seca  · Solano diarrea no mejora dentro de 7 días  · Usted tiene preguntas o inquietudes acerca de solano condición o cuidado  Acuda a nerissa consultas de control con solano médico según le indicaron  Anote nerissa preguntas para que se acuerde de hacerlas viv nerissa visitas  Medicamentos:  · El medicamento para la diarrea  es un medicamento de venta nona que ayuda a disminuir o a detener solano diarrea  Si usted cynthia otros medicamentos, hable con solano médico antes de jerri un medicamento para la diarrea  · Antibióticos  podrían ser administrados para tratar omari infección causada por bacterias  · Antiparasitarios  podrían ser administrados para tratar omari infección causada por parásitos  · Darien nerissa medicamentos antoine se le haya indicado  Consulte con solano médico si usted vinny que solano medicamento no le está ayudando o si presenta efectos secundarios  Infórmele si es alérgico a algún medicamento   [place] actualizada de [employer], las vitaminas y los productos herbales que cynthia  Incluya los siguientes datos de los medicamentos: cantidad, frecuencia y motivo de administración  Traiga con usted la lista o los envases de la píldoras a emily citas de seguimiento  Lleve la lista de los medicamentos con usted en laura de omari emergencia  Cuidados personales:   · [place] líquidos antoine se le haya indicado  Los líquidos evitarán la deshidratación que es provocada por la diarrea  Pregunte a solano médico sobre la cantidad de líquido que necesita jerri todos los días y cuáles le recomienda  Es posible que necesite jerri omari solución de rehidratación oral (SRO)  [address] cantidades exactas de agua, sal y azúcar que usted necesita para reemplazar los líquidos corporales  Se puede comprar sales para rehidratación oral en la mayoría de los supermercados y [place]  · Coma alimentos que alok fáciles de digerir  Algunos ejemplos incluyen arroz, lentejas, cereales, plátanos, patatas y pan  También se incluyen algunas frutas (plátano, melón), verduras bob cocidas y valerie [place]  Evite alimentos altos en Danella Gammons y azúcar  También evite la cafeína, el alcohol, los lácteos y las valerie jacobs hasta que la diarrea haya desaparecido  Prevenga la diarrea aguda:   · Lávese las beth frecuentemente  Utilice agua y [place]  Lávese las beth antes de comer o preparar alimentos  También lávese emily beth después del ir al baño  Utilice alcohol en gel si no tiene [place] y [place]  · Mantenga las superficies del baño limpias  para ayudar a evitar la propagación de gérmenes que provocan la diarrea [place]  · [address] frutas y verduras antes de consumirlas  [place] puede ayudar a [facility] gérmenes causantes de diarrea  Si es posible, retire la piel de frutas y verduras o cocínelas bob antes de comerlas  · Cocine la carne antoine se indica        ¨ Cocine la carne picada  a 160 °F      ¨ Cocine la carne de ave picada, la carne de ave entera o los wick de carne de ave  a 165 °F antoine mínimo  Retire la carne del kash  Deje reposar viv 3 minutos antes de comerla  ¨ Cocine los wick enteros de carne que no sea de ave  a 145 °F antoine mínimo  Retire la carne del kash  Deje reposar viv 3 minutos antes de comerla  · [employer] platos que tocaron la carne cruda con [place] con jabón  [place] incluye tablas de cortar, utensilios, platos y recipientes  · Coloque carne cruda o cocida en el refrigerador tan pronto antoine sea posible  Las bacterias pueden crecer en la carne que permanece a temperatura ambiente viv [place]  · No coma ostras, almejas o mejillones crudos o poco cocidos  Estos alimentos pueden estar contaminados y causar infección  · Mara agua filtrada o tratada solo cuando viaja  No ponga hielo en emily bebidas  Mara agua embotellada siempre que sea posible  © 2017 [address] Information is for End User's use only and may not be sold, redistributed or otherwise used for commercial purposes  All illustrations and images included in CareNotes® are the copyrighted property of [employer]  or Benny Patton  Esta información es sólo para uso en educación  Stephen intención no es darle un consejo médico sobre enfermedades o tratamientos  Colsulte con stephen Hanna Seals farmacéutico antes de seguir cualquier régimen médico para saber si es seguro y efectivo para usted  Náuseas y vómitos agudos   LO QUE NECESITA SABER:   Las náuseas y los vómitos agudos comienzan de forma repentina, [place] rápidamente y whiting un período breve de [place]  INSTRUCCIONES SOBRE EL [facility]A [facility]OSPITALARIA:   Regrese a la patrice de emergencias si:   · Usted nota saad en stephen vómito o en emily evacuaciones  · Usted siente un dolor súbito e intenso en el pecho y la parte superior de stephen abdomen después de tener vómitos amadeo o tratar de vomitar  · Usted tiene el darlene y el pecho inflamados  · [employer], tiene frío, sed y sequedad en los ojos y la boca  · Usted está orinando muy poco o nada en absoluto  · Usted tiene debilidad muscular, calambres en las piernas y dificultad para respirar  · Solano corazón late más rápido que de costumbre  · Usted continúa vomitando por más de 48 horas  Pregúntele a solano [place] vitaminas y minerales son adecuados para usted  · Usted tiene arcadas secas (vómitos sin que salga nada) frecuentes  · Emily náusea y vómitos no mejoran ni desaparecen después de usar el medicamento  · Usted tiene preguntas o inquietudes acerca de solano condición o tratamiento  Medicamentos:  Es posible que usted necesite alguno de los siguientes:  · Medicamentos,  se puede administrar para calmarle el estómago y detener emily vómitos  Usted también puede necesitar medicamentos para ayudarlo a sentirse más relajado o para detener las náuseas y los vómitos causados por el mareo por movimiento  · Se usan los estimulantes gastrointestinales  para ayudar a vaciar solano estómago y los intestinos  Aldrich puede ayudar para reducir las náuseas y el vómito  · [place] emily medicamentos antoine se le haya indicado  Consulte con solano médico si usted vinny que solano medicamento no le está ayudando o si presenta efectos secundarios  Infórmele si es alérgico a cualquier medicamento  Mantenga omari lista actualizada de los [place], las vitaminas y los productos herbales que cynthia  Incluya los siguientes datos de los medicamentos: cantidad, frecuencia y motivo de administración  Traiga con usted la lista o los envases de la píldoras a emily citas de seguimiento  Lleve la lista de los medicamentos con usted en laura de omari emergencia  Evite o controle las náuseas y los vómitos agudos:   · No consuma alcohol  El alcohol podría causarle malestar o irritación estomacal  Omari cantidad elevada de alcohol también puede causar náuseas y vómitos agudos  · Controle el estrés    Los marbella de [place] que son el resultado de tensión nerviosa pueden causar náuseas y vómitos  Busque la manera de relajarse y controlar el estrés  Descanse y [employer]  · [employer] líquidos antoine se le indique  Los vómitos pueden llevar a la deshidratación  Es importante beber más líquidos para ayudar a reemplazar los fluidos corporales perdidos  Pregunte a stephen médico sobre la cantidad de líquido que necesita jerri todos los días y cuáles le recomienda  Stephen médico podría recomendarle que tome omari solución de rehidratación oral (SRO)  Las soluciones de rehidratación oral contienen la cantidad [place] de Lori Raker, sales y azúcar que necesita para restituir los líquidos que perdió stephen organismo  Pregunte qué tipo de solución de rehidratación oral debe usar, qué cantidad debe jerri y dónde puede obtenerla  · Ingiera comidas más pequeñas, más a menudo  Coma pequeñas cantidades de comida cada 2 o 3 horas, incluso si no tiene hambre  Emily náuseas podrían disminuir si tiene comida en el estómago  · Hable con stephen médico antes de jerri medicamentos de [facility]  Estos medicamentos pueden causar problemas serios si los [place] junto con ciertos medicamentos o si tiene determinadas condiciones médicas  Podrían tener problemas si [place] omari dosis demasiado iraida o los [place] viv más tiempo de lo indicado  Siga las indicaciones de la etiqueta al pie de la [place]  Acuda a emily consultas de control con stephen médico según le indicaron  Anote las preguntas que tenga para no olvidarse de [employer] visitas de seguimiento  © 2017 [address] Information is for End User's use only and may not be sold, redistributed or otherwise used for commercial purposes  All illustrations and images included in CareNotes® are the copyrighted property of A D A M , Inc  or Benny Patton  Esta información es sólo para uso en educación  Stephen intención no es darle un consejo médico sobre enfermedades o tratamientos   Colsulte con Gem PEPE' , enfermera o farmacéutico antes de seguir cualquier régimen médico para saber si es seguro y efectivo para usted  Síndrome viral   LO QUE NECESITA SABER:   El síndrome viral es un término general usado para describir omari infección viral que no tiene omari causa definida  Los virus son propagados fácilmente de Erleen Leaf persona a otra a través del aire y [place] los objetos que se comparten  INSTRUCCIONES SOBRE EL [facility]DRA [facility]OSPITALARIA:   Llame al 911 en laura de presentar lo siguiente:   · Usted sufre omari convulsión  · No es posible despertarlo  · Usted tiene dolor torácico y dificultad para respirar  Busque atención médica de inmediato si:   · Usted tiene rigidez en el darlene, dolor de jackeline intenso y sensibilidad a la alvina  · Usted se siente débil, mareado o confundido  · Usted casey de orinar u orina menos de lo normal      · Usted tose saad o omari mucosidad espesa amarilla o macy  · Usted tiene dolor abdominal severo o solano abdomen está más abhishek de lo habitual   Pregúntele a solano médico qué vitaminas y minerales son adecuados para usted  · Nerissa síntomas no mejoran con el tratamiento o empeoran después de 3 días  · Usted tiene salpullido o dolor de oído  · Usted siente ardor al Jimbo Friar  · Usted tiene preguntas o inquietudes acerca de solano condición o cuidado  Medicamentos:  Es posible que usted necesite alguno de los siguientes:  · El acetaminofén  lorenzo el dolor y baja la fiebre  Está disponible sin receta médica  Pregunte cuánto medicamento debe jerri y con qué frecuencia  [address]  El acetaminofén puede causar daño en el hígado cuando no se cynthia de forma correcta  · AINEs (Analgésicos antiinflamatorios no esteroides) antoine el ibuprofeno, ayudan a disminuir la inflamación, el dolor y la [place]  Los AINEs pueden causar sangrado estomacal o problemas renales en ciertas personas   Si usted cynthia un medicamento anticoagulante, siempre pregúntele a solano médico si los CORNELL son seguros para usted  Siempre alejo la etiqueta de deidre medicamento y [place] instrucciones  · El medicamento para el resfriado  ayuda a disminuir la inflamación, a controlar la tos y a aliviar la congestión del pecho o nasal      · El rociador nasal de agua salina  ayuda a disminuir la congestión nasal      · [place] emily medicamentos antoine se le haya indicado  Consulte con solano médico si usted vinny que solano medicamento no le está ayudando o si presenta efectos secundarios  Infórmele si es alérgico a algún medicamento  Mantenga omari lista actualizada de los [employer], las vitaminas y los productos herbales que cynthia  Incluya los siguientes datos de los medicamentos: cantidad, frecuencia y motivo de administración  Traiga con usted la lista o los envases de la píldoras a emily citas de seguimiento  Lleve la lista de los medicamentos con usted en laura de omari emergencia  El manejo de solano síntomas:   · Consuma líquidos según le indicaron  para evitar la deshidratación  Pregunte cuánto líquido debe jerri cada día y cuáles líquidos son los más adecuados para usted  Pregunte si usted debería jerri moari solución de rehidratación oral (SRO)  [address] cantidades exactas de agua, sal y azúcar que usted necesita para reemplazar los líquidos corporales  [place] podría ayudarlo a evitar la deshidratación a causa del vómito y de la diarrea  No tome líquidos con cafeína  Los líquidos con cafeína pueden empeorar la deshidratación  · Descanse lo suficiente  para ayudar a que solano cuerpo sane  [place] siestas viv el día  Pregunte a solano médico cuándo puede regresar a solano trabajo y a emily actividades cotidianas  · Use un humidificador de leslie frío  para ayudarlo a respirar más fácilmente si usted tiene congestión nasal o del pecho  Pregunte a solano médico cómo usar un humidificador de vapor frío  · Coma miel o use caramelos para la tos  para ayudar a disminuir la molestia de la garganta   Pregunte a solano médico cuánta miel debería comer cada día  Los caramelos para la tos están disponibles sin receta médica  Siga las indicaciones para jerri los caramelos para la tos  · No fume y permanezca lejos de otras personas que fuman  La nicotina y otras sustancias químicas que contienen los cigarrillos y cigarros pueden dañar los pulmones  El fumar también puede retrasar la sanación  Pida información a stephen médico si usted actualmente fuma y necesita ayuda para dejar de fumar  Los cigarrillos electrónicos o tabaco sin humo todavía contienen nicotina  Consulte con stephen médico antes de [employer]  · Lávese las beth frecuentemente  para evitar la propagación de gérmenes a otras personas  Utilice agua y [place]  Use gel antibacterial cuando no tenga jabón ni agua disponibles  [employer] las beth después de usar el baño, toser o estornudar  Lávese las beth antes de comer o preparar alimentos  Acuda a emily consultas de control con stephen médico según le indicaron  Anote emily preguntas para que se acuerde de hacerlas viv emily visitas  © 2017 [address] Information is for End User's use only and may not be sold, redistributed or otherwise used for commercial purposes  All illustrations and images included in CareNotes® are the copyrighted property of A D A M , Inc  or Benny Patton  Esta información es sólo para uso en educación  Stephen intención no es darle un consejo médico sobre enfermedades o tratamientos  Colsulte con stephen Patricia [place] farmacéutico antes de seguir cualquier régimen médico para saber si es seguro y efectivo para usted

## 2018-01-18 ENCOUNTER — ALLSCRIPTS OFFICE VISIT (OUTPATIENT)
Dept: OTHER | Facility: OTHER | Age: 38
End: 2018-01-18

## 2018-01-19 NOTE — CONSULTS
Assessment   1  Morbidly obese (278 01) (E66 01)   2  Uncontrolled type 2 diabetes with neuropathy (250 62,357 2) (E11 40,E11 65)   3  Hypercholesterolemia (272 0) (E78 00)   4  Chronic low back pain (724 2,338 29) (M54 5,G89 29)    Review of Systems   Focused-Female:      Constitutional: No fever, no chills, feels well, no tiredness, no recent weight gain or loss  ENT: no ear ache, no loss of hearing, no nosebleeds or nasal discharge, no sore throat or hoarseness  Cardiovascular: no complaints of slow or fast heart rate, no chest pain, no palpitations, no leg claudication or lower extremity edema  Respiratory: no complaints of shortness of breath, no wheezing, no dyspnea on exertion, no orthopnea or PND  Breasts: no complaints of breast pain, breast lump or nipple discharge    The patient presents with complaints of moderate epigastric heartburn  Symptoms are improved by proton pump inhibitors  Symptoms are worsening  Genitourinary: no complaints of dysuria, no incontinence, no pelvic pain, no dysmenorrhea, no vaginal discharge or abnormal vaginal bleeding  Musculoskeletal: no complaints of arthralgia, no myalgia, no joint swelling or stiffness, no limb pain or swelling  Integumentary: no complaints of skin rash or lesion, no itching or dry skin, no skin wounds  Neurological: no complaints of headache, no confusion, no numbness or tingling, no dizziness or fainting  ROS Reviewed:    ROS reviewed        Active Problems    · Abdominal pain (789 00) (R10 9)   · Abnormal cells of cervix (622 10) (N87 9)   · Abnormal EKG (794 31) (R94 31)   · Acute headache (784 0) (R51)   · Amenorrhea (626 0) (N91 2)   · Anxiety (300 00) (F41 9)   · Chronic low back pain (724 2,338 29) (M54 5,G89 29)   · Chronic pain syndrome (338 4) (G89 4)   · Constipation (564 00) (K59 00)   · Depression (311) (F32 9)   · Dermoid cyst of right lower extremity (216 7) (D23 71)   · Diabetes mellitus (250 00) (E11 9)   · Drug-induced hepatic toxicity (573 3,E947 9) (K71 6,T50 905A)   · Elevated liver function tests (790 6) (R79 89)   · Encounter to discuss test results (V65 49) (Z71 89)   · Hypercholesterolemia (272 0) (E78 00)   · Hyperlipidemia (272 4) (E78 5)   · Lumbar degenerative disc disease (722 52) (M51 36)   · Lumbar radiculopathy (724 4) (M54 16)   · Morbidly obese (278 01) (E66 01)   · Tendinitis of left ankle (727 06) (M77 52)   · Uncontrolled type 2 diabetes with neuropathy (250 62,357 2) (E11 40,E11 65)   · Urinary tract infection (599 0) (N39 0)   · Vitamin D deficiency (268 9) (E55 9)    Discussion/Summary   Discussion Summary:    29 yo female with a long standing h/o of obesity and inability to sustain any meaningful weight loss on her own despite several attempts  is interested in the Laparoscopic Gastric Bypass or Sleeve gastrectomy  a part of her pre op evaluation, she will be referred to a cardiologist and for a sleep evaluation and consult  needs an EGD to evaluate the anatomy of her GI tract prior to the operation  have spent over 45 minutes with her face to face in the office today discussing her options and details of the surgery  We have seen an animation of the surgery on the computer that illustrates how the operation is done and how the anatomy will be altered with the procedure  Over 50% of this was coordinating care  was given the opportunity to ask questions and I have answered all of them  have discussed and educated the patient with regards to the components of our multidisciplinary program and the importance of compliance and follow up in the post operative period  The patient was also instructed with regards to the importance of behavior modification, nutritional counseling, support meeting attendance and lifestyle changes that are important to ensure success    there is a great statistical chance of improvement or even resolution of most of her associated comorbidities, the results vary from patient to patient and they largely depend on her commitment and compliance  needs to lose 5 lbs prior to the operation  Chief Complaint   Chief Complaint Free Text Note Form: Patient in office today for consult  4/6 weight checks  History of Present Illness   HPI: 53-year-old female with a longstanding history of morbid obesity  Here today to discuss bariatric options  Bariatric Surgery Evaluation: The patient is being seen for an initial visit for bariatric surgery evaluation  Symptoms:  inability to lose weight  Initial presentation included inability to lose weight  Reported interest in weight loss is high  Diets tried in the past include low calorie, low carbohydrate and low fat  The patient exercises infrequently  Past Medical History   1  History of Depression screening (V79 0) (Z13 89)   2  History of Encounter for diabetic foot exam (250 00) (E11 9)   3  History of Encounter for routine gynecological examination with Papanicolaou smear of     cervix (V72 31,V76 2) (Z01 419)   4  History of Encounter for routine gynecological examination with Papanicolaou smear of     cervix (V72 31,V76 2) (Z01 419)   5  History of Encounter to discuss test results (V65 49) (Z71 89)   6  History of contact dermatitis (V13 3) (Z87 2)   7  History of depression (V11 8) (Z86 59)   8  History of diabetes mellitus (V12 29) (Z86 39)   9  History of influenza vaccination (V49 89) (Z92 29)   10  History of Normal vaginal delivery (650) (O80)  Active Problems And Past Medical History Reviewed: The active problems and past medical history were reviewed and updated today  Surgical History   1  History of  Section   2  History of Tonsillectomy   3  History of Tubal Ligation  Surgical History Reviewed: The surgical history was reviewed and updated today  Family History   Mother    1  Family history of diabetes mellitus (V18 0) (Z83 3)   2   Family history of hyperlipidemia (V18 19) (Z83 49)  Father    3  Family history of pancreatitis (V18 59) (Z83 79)  Maternal Grandfather    4  Family history of lung cancer (V16 1) (Z80 1)  Family History Reviewed: The family history was reviewed and updated today  Social History    · Never a smoker   · No drug use   · Social alcohol use (Z78 9)  Social History Reviewed: The social history was reviewed and updated today  Current Meds    1  Alcohol Pads 70 % Pad; use twice daily; Therapy: 00IAY2727 to (Evaluate:60Zis5027)  Requested for: 95GHD7023; Last     Rx:31Mar2017 Ordered   2  BD Pen Needle Mini U/F 31G X 5 MM Miscellaneous; USE AS DIRECTED; Therapy: 00UAB1281 to (Evaluate:11Mar2018)  Requested for: 21QOP2507; Last     Rx:16Mar2017 Ordered   3  BuSpar 10 MG TABS; TAKE 1 TABLET 3 TIMES DAILY; Therapy: (Recorded:16Mar2017) to Recorded   4  Butalbital-APAP-Caffeine -40 MG Oral Tablet; TAKE 1 TABLET EVERY 4 TO 6     HOURS AS NEEDED FOR PAIN;     Therapy: 98GSG1007 to (Evaluate:80Vht1365)  Requested for: 89Qwt3237; Last     Rx:24Xgk0716 Ordered   5  Cyclobenzaprine HCl - 10 MG Oral Tablet; TAKE ONE-HALF TO 1 TABLET 3 TIMES  DAILY     AS NEEDED; Therapy: 20ODU7899 to (Last Rx:12Oct2017)  Requested for: 12Oct2017 Ordered   6  Diclofenac Sodium 50 MG Oral Tablet Delayed Release; TAKE 1 TABLET PO BID prn; Therapy: 26QPY0976 to 66 91 28)  Requested for: 13PFW1557; Last     Rx:09Jan2018 Ordered   7  FreeStyle Lancets Miscellaneous; Check blood sugar twice daily as directed; Therapy: 27YBD9795 to [phone])  Requested for: 61GKB2335; Last     Rx:31Mar2017 Ordered   8  FreeStyle Lite Device; TEST BLOOD SUGAR TWICE DAILY; Therapy: 06HTH6139 to (Last Rx:31Mar2017)  Requested for: 91DJT0603 Ordered   9  FreeStyle Lite Test In [employer]; test twice daily; Therapy: 37CYI7280 to (Last Rx:31Mar2017)  Requested for: 79GIN8409 Ordered   10   Gabapentin 800 MG Oral Tablet; TAKE 1 TABLET 3 TIMES DAILY; Therapy: 87KIT5044 to 0499 56 37 91)  Requested for: 41RNP4773; Last      Rx:09Jan2018 Ordered   11  Omeprazole 20 MG Oral Capsule Delayed Release; TAKE 1 CAPSULE Bedtime; Therapy: 62XJU9814 to (Evaluate:05Jun2018)  Requested for: 73DXD9691; Last      Rx:10Vgj6791 Ordered   12  PROzac 40 MG Oral Capsule; Therapy: (Recorded:17Nov2017) to Recorded   13  Simvastatin 40 MG Oral Tablet; Therapy: (Recorded:18May2017) to Recorded   14  Synjardy 5-1000 MG Oral Tablet; Therapy: (Recorded:18May2017) to Recorded   15  Toujeo SoloStar 300 UNIT/ML Subcutaneous Solution Pen-injector; Therapy: (Recorded:17Nov2017) to Recorded   16  TraZODone HCl - 100 MG Oral Tablet; TAKE 2 TABLETS AT BEDTIME; Therapy: (Recorded:16Mar2017) to Recorded   17  Victoza 18 MG/3ML Subcutaneous Solution Pen-injector; Therapy: (Recorded:18May2017) to Recorded   18  Vitamin D3 1000 UNIT Oral Capsule; Therapy: (Recorded:17Nov2017) to Recorded  Medication List Reviewed: The medication list was reviewed and updated today  Allergies   1  Atorvastatin Calcium TABS    Vitals   Vital Signs    Recorded: 61QZM3318 10:10AM   Temperature 98 2 F   Heart Rate 82   Respiration 18   Systolic 187   Diastolic 60   Height 5 ft 2 5 in   Weight 221 lb    BMI Calculated 39 78   BSA Calculated 2 01     Physical Exam        Constitutional      General appearance: No acute distress, well appearing and well nourished  well developed,-- appears healthy,-- morbidly obese,-- well hydrated-- and-- appearance reflects stated age        Psychiatric      Orientation to person, place, and time: Normal        Mood and affect: Normal           Future Appointments      Date/Time Provider Specialty Site   01/30/2018 10:40 AM Libertad Fuentes MD Gastroenterology Adult  [facility] 9   02/19/2018 10:00 AM Ilene Velazco [facility] Bariatric Medicine [place] [address] [facility]     Signatures Electronically signed by : The JAMSHID Meyers ; Jan 18 2018 10:35AM EST                       (Author)

## 2018-01-20 ENCOUNTER — HOSPITAL ENCOUNTER (EMERGENCY)
Facility: HOSPITAL | Age: 38
Discharge: HOME | End: 2018-01-20
Admitting: EMERGENCY MEDICINE
Payer: COMMERCIAL

## 2018-01-20 VITALS
TEMPERATURE: 98.2 F | OXYGEN SATURATION: 98 % | RESPIRATION RATE: 16 BRPM | HEART RATE: 86 BPM | SYSTOLIC BLOOD PRESSURE: 102 MMHG | DIASTOLIC BLOOD PRESSURE: 57 MMHG

## 2018-01-20 DIAGNOSIS — M54.50 LOW BACK PAIN: Primary | ICD-10-CM

## 2018-01-20 LAB
BACTERIA UR QL AUTO: ABNORMAL /HPF
BILIRUB UR QL STRIP: ABNORMAL
CLARITY UR: CLEAR
COLOR UR: YELLOW
EXT PREG TEST URINE: NORMAL
GLUCOSE UR STRIP-MCNC: ABNORMAL MG/DL
HGB UR QL STRIP.AUTO: NEGATIVE
KETONES UR STRIP-MCNC: ABNORMAL MG/DL
LEUKOCYTE ESTERASE UR QL STRIP: ABNORMAL
NITRITE UR QL STRIP: NEGATIVE
NON-SQ EPI CELLS URNS QL MICRO: ABNORMAL /HPF
PH UR STRIP.AUTO: 5.5 [PH] (ref 4.5–8)
PROT UR STRIP-MCNC: ABNORMAL MG/DL
RBC #/AREA URNS AUTO: ABNORMAL /HPF
SP GR UR STRIP.AUTO: 1.01 (ref 1–1.03)
UROBILINOGEN UR QL STRIP.AUTO: 0.2 E.U./DL
WBC #/AREA URNS AUTO: ABNORMAL /HPF

## 2018-01-20 PROCEDURE — 81002 URINALYSIS NONAUTO W/O SCOPE: CPT | Performed by: PHYSICIAN ASSISTANT

## 2018-01-20 PROCEDURE — 99283 EMERGENCY DEPT VISIT LOW MDM: CPT

## 2018-01-20 PROCEDURE — 81001 URINALYSIS AUTO W/SCOPE: CPT

## 2018-01-20 PROCEDURE — 81025 URINE PREGNANCY TEST: CPT | Performed by: PHYSICIAN ASSISTANT

## 2018-01-20 RX ORDER — DIAZEPAM 5 MG/1
5 TABLET ORAL EVERY 8 HOURS PRN
Qty: 15 TABLET | Refills: 0 | Status: ON HOLD | OUTPATIENT
Start: 2018-01-20 | End: 2018-02-07 | Stop reason: ALTCHOICE

## 2018-01-20 NOTE — DISCHARGE INSTRUCTIONS
Dolor tim de espalda inferior   LO QUE NECESITA SABER:   ¿Qué es el dolor tim de la región inferior de la espalda? El dolor tim de la región lumbar de la espalda es omari molestia repentina en la parte inferior de solano espalda que dura hasta por 6 semanas  La molestia hace que sea dificil que usted tolere la [place]  ¿Qué causa o aumenta mi riesgo de tener dolor tim de la parte inferior de la espalda? Las condiciones que afectan la columna, las articulaciones, o los músculos pueden causar el dolor de espalda  Estos pueden incluir la artritis, estenosis de la toño dorsal (estrechamiento de la columna vertebral), tensión muscular o descomposición de los discos de la columna vertebral  Los siguientes aumentan solano riesgo de presentar dolor de espalda:  · Inclinarse o levantar de omari forma repetitiva o girar o levantar artículos pesados    · Lesión debido a omari caída o accidente    · Falta de actividad física regular     · Obesidad, embarazo     · Fumar    · Envejecimiento    · Manejar, estar sentado o de pie por mucho tiempo    · Amberly postura mientras está sentado o de pie  ¿Cómo se diagnostica el dolor tim de la parte inferior de la espalda? Solano médico le preguntará acerca de solano historial médico y lo examinará  Puede que le pregunte cuándo fue solano último dolor de lumbago de la espalda cómo fue que comenzó  Muéstrele dónde siente el dolor y qué lo mejora o lo GROVER  Dígale acerca del tipo de dolor que tiene, qué tan tita es, y cuánto tiempo dura  Dígale si el dolor empeora por las noches o cuando se [place]  ¿Cómo se trata el dolor lumbar tim? La meta del tratamiento es aliviar solano dolor y ayudarlo a tolerar la [place]  La mayoría de las personas que tienen dolor tim de la parte inferior de la espalda se mejoran entre unas 4 a 6 semanas  Es posible que usted necesite alguno de los siguientes:  · Medicamentos:      ¨ El acetaminofén  lorenzo el dolor   Está disponible sin receta médica  Pregunte la cantidad y la frecuencia con que debe tomarlos  [address]  El acetaminofén puede causar daño en el hígado cuando no se cynthia de forma correcta  ¨ AINEs (Analgésicos antiinflamatorios no esteroides)  ayudan a disminuir la inflamación y el dolor  Lucinda medicamento esta disponible con o sin omari receta médica  Los AINEs pueden causar sangrado estomacal o problemas renales en ciertas personas  Si usted cynthia un medicamento anticoagulante, siempre pregúntele a solano médico si los CORNELL son seguros para usted  Siempre alejo la etiqueta de lucinda medicamento y [place] instrucciones  ¨ Un medicamento con receta para el dolor  podrían ser [place]  Pregunte al médico cómo debe jerri lucinda medicamento de forma charles  ¨ Relajantes musculares  disminuyen el dolor y [employer] músculos de la parte inferior de la columna  ¿Qué puedo hacer para prevenir el dolor tim de la parte inferior de la espalda? · Use la mecánica corporal adecuada  ¨ Flexione la cadera y las rodillas cuando Delsie Omer a levantar un objeto  No doble la cintura  Utilice los [employer] de las piernas mientras levanta solano carga  No use solano espalda  Mantenga el objeto cerca de solano pecho mientras lo levanta  No se tuerza, ni levante cualquier cosa por encima de solano cintura  ¨ Cambie solano posición frecuentemente cuando pase mucho tiempo de pie  Descanse un pie sobre omari Scott Ruder o un reposapiés e intercambie con el otro pie frecuentemente  ¨ No permanezca sentado por lapsos de tiempo prolongados  Cuando sea necesario hacerlo, siéntese en omari silla de respaldo recto con los pies apoyados en el suelo  Nunca alcance, jale ni empuje mientras se encuentra sentando  · Christina ejercicios que fortalezcan emily músculos de la espalda  Entre en calor antes de hacer ejercicio  Consulte con solano médico sobre [employer] plan de ejercicios para usted  · Mantenga un peso saludable  Consulte con solano médico cuánto debería pesar   Pida que le ayude a crear un plan para bajar de peso si usted tiene sobrepeso  ¿Cómo puedo cuidarme si tengo dolor en la parte inferior de la espalda? · Manténgase activo  lo más que pueda sin causar [employer]  El reposo en cama puede empeorar solano dolor de espalda  Comience con ejercicios ligeros antoine caminar  Evite levantar objetos hasta que ya no tenga dolor  Solicite más información acerca de las actividades físicas o plan de ejercicios que son los adecuados para usted  · El hielo  ayuda a disminuir la inflamación, el dolor y los espasmos musculares  Ponga hielo marcia en omari bolsa plástica  Cúbrala con omari toalla  Aplíquela en solano kian lumbar por 20 a 30 minutos cada 2 horas  Christina esto por 2 a 3 días después que el dolor empiece, o según lo indicado  · El calor  ayuda a disminuir dolor y espasmos musculares  Empiece a utilizar calor después de nancy terminado el tratamiento con el hielo  Utilice omari toalla pequeña empapada con [place], omari almohada térmica o tome un baño de lainey con agua tibia  Aplíquese calor en el área lesionada viv 20 a 30 minutos cada 2 horas viv la cantidad de [employer] indiquen  [place] entre el calor y el hielo  ¿Cuándo todd comunicarme con mi médico?   · Usted tiene fiebre  · Usted tiene un dolor por la noche o cuando descansa  · Solano dolor no mejora con el tratamiento  · Usted tiene dolor que empeora cuando tose o estornuda  · Usted siente un estallido o chasquido repentino en solano espalda  · Usted tiene preguntas o inquietudes acerca de solano condición o cuidado  ¿Cuándo todd buscar atención inmediata o llamar al 911? · Usted tiene dolor intenso  · Usted repentinamente tiene rigidez o siente pesadez en ambos glúteos hacia abajo de ambas piernas  · Usted tiene entumecimiento o debilidad en omari pierna o dolor en ambas piernas  · Usted tiene entumecimiento en el área genital o en la región lumbar      · Usted no puede controlar solano orina ni emily deposiciones intestinales  ACUERDOS SOBRE SOLANO CUIDADO:   Usted tiene el derecho de ayudar a planear solano cuidado  Aprenda todo lo que pueda sobre solano condición y antoine darle tratamiento  Discuta emily opciones de tratamiento con emily médicos para decidir el cuidado que usted desea recibir  Usted siempre tiene el derecho de rechazar el tratamiento  Esta información es sólo para uso en educación  Solano intención no es darle un consejo médico sobre enfermedades o tratamientos  Colsulte con solano Lesleigh Deana farmacéutico antes de seguir cualquier régimen médico para saber si es seguro y efectivo para usted  © 2017 [facility] Information is for End User's use only and may not be sold, redistributed or otherwise used for commercial purposes  All illustrations and images included in CareNotes® are the copyrighted property of A D A M , Inc  or [facility]

## 2018-01-20 NOTE — ED PROVIDER NOTES
History  Chief Complaint   Patient presents with    Back Pain     Pt reports that she is experiencing her chronic back pain, but worse  Told by her pain management doctor that if she has break through pain, to go to the ED when their office is closed  History provided by:  Patient  Back Pain   Location:  Lumbar spine  Pain severity:  Moderate  Duration: chronic  Timing:  Constant  Progression:  Waxing and waning  Context: physical stress    Context: not emotional stress, not falling, not jumping from heights and not lifting heavy objects    Relieved by:  Nothing  Worsened by:  Nothing  Ineffective treatments:  None tried  Associated symptoms: no abdominal pain, no abdominal swelling, no bladder incontinence, no bowel incontinence, no chest pain, no dysuria, no fever, no headaches, no leg pain, no numbness, no paresthesias, no pelvic pain, no perianal numbness, no tingling, no weakness and no weight loss        Prior to Admission Medications   Prescriptions Last Dose Informant Patient Reported? Taking? Dexbromphen-Pseudoephed-APAP (SINADRIN PLUS PO)   Yes No   Sig: Take by mouth   Insulin Glargine (TOUJEO SOLOSTAR SC)   Yes No   Sig: Inject 40 Units under the skin daily at bedtime     Liraglutide (VICTOZA SC)   Yes No   Sig: Inject 1 8 Units under the skin daily   SIMVASTATIN PO   Yes No   Sig: Take by mouth daily   busPIRone (BUSPAR) 10 mg tablet   Yes No   Sig: Take 10 mg by mouth 3 (three) times a day     dicyclomine (BENTYL) 20 mg tablet   No No   Sig: Take 1 tablet by mouth every 6 (six) hours as needed (abd cramping) for up to 5 days   dicyclomine (BENTYL) 20 mg tablet   No No   Sig: Take 1 tablet by mouth 2 (two) times a day   dicyclomine (BENTYL) 20 mg tablet   No No   Sig: Take 1 tablet by mouth every 6 (six) hours as needed (abd cramping) for up to 5 days   gabapentin (NEURONTIN) 400 mg capsule   Yes No   Sig: Take 400 mg by mouth 2 (two) times a day     ibuprofen (MOTRIN) 600 mg tablet   No No Sig: Take 1 tablet by mouth every 6 (six) hours as needed for mild pain for up to 10 days   ibuprofen (MOTRIN) 600 mg tablet   No No   Sig: Take 1 tablet by mouth every 8 (eight) hours as needed for mild pain or fever   lidocaine (LIDODERM) 5 %   No No   Sig: Place 1 patch on the skin daily Remove & Discard patch within 12 hours or as directed by MD   methocarbamol (ROBAXIN) 500 mg tablet   No No   Sig: Take 1 tablet by mouth 4 (four) times a day for 10 days   naproxen (NAPROSYN) 500 mg tablet   No No   Sig: Take 1 tablet by mouth 2 (two) times a day with meals for 10 days   omeprazole (PriLOSEC) 20 mg delayed release capsule   No No   Sig: Take 1 capsule by mouth daily   Patient taking differently: Take 20 mg by mouth as needed     ondansetron (ZOFRAN-ODT) 4 mg disintegrating tablet   No No   Sig: Take 1 tablet by mouth every 6 (six) hours as needed for nausea or vomiting   ondansetron (ZOFRAN-ODT) 4 mg disintegrating tablet   No No   Sig: Take 1 tablet by mouth every 8 (eight) hours as needed for nausea or vomiting for up to 5 days   ondansetron (ZOFRAN-ODT) 4 mg disintegrating tablet   No No   Sig: Take 1 tablet by mouth every 8 (eight) hours as needed for nausea or vomiting for up to 7 days   pantoprazole (PROTONIX) 40 mg tablet   No No   Sig: Take 1 tablet by mouth daily in the early morning for 30 days   traZODone (DESYREL) 100 mg tablet   No No   Sig: Take 1 tablet by mouth daily at bedtime      Facility-Administered Medications: None       Past Medical History:   Diagnosis Date    Anxiety     Back pain     Depression     Diabetes mellitus (HCC)     Hyperlipidemia     Liver function study, abnormal        Past Surgical History:   Procedure Laterality Date    ABDOMINAL SURGERY       SECTION      TONSILLECTOMY      TUBAL LIGATION         History reviewed  No pertinent family history  I have reviewed and agree with the history as documented      Social History   Substance Use Topics    Smoking status: Never Smoker    Smokeless tobacco: Never Used    Alcohol use No        Review of Systems   Constitutional: Negative for activity change, appetite change, fatigue, fever and weight loss  HENT: Negative for nosebleeds, sneezing, sore throat, trouble swallowing and voice change  Eyes: Negative for photophobia, pain and visual disturbance  Respiratory: Negative for apnea, choking and stridor  Cardiovascular: Negative for chest pain, palpitations and leg swelling  Gastrointestinal: Negative for abdominal pain, anal bleeding, bowel incontinence and constipation  Endocrine: Negative for cold intolerance, heat intolerance, polydipsia and polyphagia  Genitourinary: Negative for bladder incontinence, decreased urine volume, dysuria, enuresis, frequency, genital sores, pelvic pain and urgency  Musculoskeletal: Positive for back pain  Negative for joint swelling and myalgias  Allergic/Immunologic: Negative for environmental allergies and food allergies  Neurological: Negative for tingling, tremors, seizures, speech difficulty, weakness, numbness, headaches and paresthesias  Hematological: Negative for adenopathy  Psychiatric/Behavioral: Negative for behavioral problems, decreased concentration, dysphoric mood and hallucinations  Physical Exam  ED Triage Vitals [01/20/18 1359]   Temperature Pulse Respirations Blood Pressure SpO2   98 2 °F (36 8 °C) 86 16 102/57 98 %      Temp Source Heart Rate Source Patient Position - Orthostatic VS BP Location FiO2 (%)   Oral Monitor Sitting Right arm --      Pain Score       9           Orthostatic Vital Signs  Vitals:    01/20/18 1359   BP: 102/57   Pulse: 86   Patient Position - Orthostatic VS: Sitting       Physical Exam   Constitutional: She is oriented to person, place, and time  She appears well-developed and well-nourished  No distress  HENT:   Head: Normocephalic and atraumatic     Eyes: Conjunctivae and EOM are normal  Pupils are equal, round, and reactive to light  Neck: Normal range of motion  Neck supple  No tracheal deviation present  No thyromegaly present  Cardiovascular: Normal rate, regular rhythm, normal heart sounds and intact distal pulses  No murmur heard  Pulmonary/Chest: Effort normal and breath sounds normal  No respiratory distress  She has no wheezes  She has no rales  Abdominal: Soft  Bowel sounds are normal  She exhibits no distension  There is no tenderness  Musculoskeletal:        Lumbar back: She exhibits decreased range of motion, tenderness, pain and spasm  She exhibits no swelling and no deformity  Neurological: She is alert and oriented to person, place, and time  She has normal reflexes  No cranial nerve deficit  Skin: She is not diaphoretic  Psychiatric: She has a normal mood and affect  Her behavior is normal        ED Medications  Medications - No data to display    Diagnostic Studies  Results Reviewed     Procedure Component Value Units Date/Time    Urine Microscopic [[id number]]  (Abnormal) Collected:  01/20/18 1444    Lab Status:  Final result Specimen:  Urine from Urine, Clean Catch Updated:  01/20/18 1503     RBC, UA 0-1 (A) /hpf      WBC, UA 0-1 (A) /hpf      Epithelial Cells Occasional /hpf      Bacteria, UA None Seen /hpf     POCT pregnancy, urine [[id number]]  (Normal) Resulted:  01/20/18 1448    Lab Status:  Final result Updated:  01/20/18 1448     EXT PREG TEST UR (Ref: Negative) HCG = neg (-)    POCT urinalysis dipstick [[id number]]  (Abnormal) Resulted:  01/20/18 1447    Lab Status:  Final result Specimen:  Urine Updated:  01/20/18 1447    ED Urine Macroscopic [[id number]]  (Abnormal) Collected:  01/20/18 1444    Lab Status:  Final result Specimen:  Urine Updated:  01/20/18 1445     Color, UA Yellow     Clarity, UA Clear     pH, UA 5 5     Leukocytes, UA Elevated glucose may cause decreased leukocyte values   See urine microscopic for [facility] result/ (A)     Nitrite, UA Negative     Protein, UA Trace (A) mg/dl      Glucose, UA >=1000 (1%) (A) mg/dl      Ketones, UA Trace (A) mg/dl      Urobilinogen, UA 0 2 E U /dl      Bilirubin, UA Interference- unable to analyze (A)     Blood, UA Negative     Specific Gravity, UA 1 015    Narrative:       CLINITEK RESULT                 No orders to display              Procedures  Procedures       Phone Contacts  ED Phone Contact    ED Course  ED Course                                MDM  CritCare Time    Disposition  Final diagnoses:   Low back pain     Time reflects when diagnosis was documented in both MDM as applicable and the Disposition within this note     Time User Action Codes Description Comment    1/20/2018  4:02 PM Marileehenok Armaslesli Add [M54 5] Low back pain       ED Disposition     ED Disposition Condition Comment    Discharge  [place] Bowl discharge to home/self care  Condition at discharge: Stable        Follow-up Information     Follow up With Specialties Details Why Contact Info    Telma Ortega PA-C Family Medicine Schedule an appointment as soon as possible for a visit  [address]          Patient's Medications   Discharge Prescriptions    DIAZEPAM (VALIUM) 5 MG TABLET    Take 1 tablet by mouth every 8 (eight) hours as needed for anxiety for up to 5 days       Start Date: 1/20/2018 End Date: 1/25/2018       Order Dose: 5 mg       Quantity: 15 tablet    Refills: 0     No discharge procedures on file      ED Provider  Electronically Signed by           Herberth Murphy PA-C  01/20/18 8724

## 2018-01-20 NOTE — ED NOTES
Pt states that she always has low back pain but it has been worse for the last week  States that she sees pain management and gets "injections, but not again until the 31st", states that her gabapentin dose was increased by pain management but it has not alleviated her symptoms        General Caicedo, GREGORY  01/20/18 5150

## 2018-01-22 VITALS
DIASTOLIC BLOOD PRESSURE: 68 MMHG | WEIGHT: 226.9 LBS | SYSTOLIC BLOOD PRESSURE: 116 MMHG | TEMPERATURE: 97.1 F | HEIGHT: 63 IN | BODY MASS INDEX: 40.2 KG/M2 | HEART RATE: 100 BPM | RESPIRATION RATE: 16 BRPM

## 2018-01-22 VITALS
RESPIRATION RATE: 20 BRPM | TEMPERATURE: 97.7 F | BODY MASS INDEX: 40.93 KG/M2 | HEART RATE: 88 BPM | SYSTOLIC BLOOD PRESSURE: 120 MMHG | OXYGEN SATURATION: 97 % | HEIGHT: 63 IN | WEIGHT: 231 LBS | DIASTOLIC BLOOD PRESSURE: 80 MMHG

## 2018-01-22 VITALS
DIASTOLIC BLOOD PRESSURE: 81 MMHG | BODY MASS INDEX: 39.51 KG/M2 | WEIGHT: 223 LBS | HEART RATE: 101 BPM | HEIGHT: 63 IN | SYSTOLIC BLOOD PRESSURE: 123 MMHG

## 2018-01-22 VITALS
DIASTOLIC BLOOD PRESSURE: 60 MMHG | BODY MASS INDEX: 40.79 KG/M2 | HEART RATE: 88 BPM | TEMPERATURE: 98.5 F | RESPIRATION RATE: 17 BRPM | HEIGHT: 63 IN | SYSTOLIC BLOOD PRESSURE: 110 MMHG | WEIGHT: 230.2 LBS

## 2018-01-22 VITALS
HEIGHT: 63 IN | WEIGHT: 223 LBS | DIASTOLIC BLOOD PRESSURE: 83 MMHG | BODY MASS INDEX: 39.51 KG/M2 | SYSTOLIC BLOOD PRESSURE: 124 MMHG

## 2018-01-23 VITALS
TEMPERATURE: 98.2 F | HEART RATE: 82 BPM | SYSTOLIC BLOOD PRESSURE: 102 MMHG | RESPIRATION RATE: 18 BRPM | DIASTOLIC BLOOD PRESSURE: 60 MMHG | HEIGHT: 63 IN | WEIGHT: 221 LBS | BODY MASS INDEX: 39.16 KG/M2

## 2018-01-23 VITALS — WEIGHT: 223 LBS | HEIGHT: 68 IN | BODY MASS INDEX: 33.8 KG/M2

## 2018-01-23 VITALS — WEIGHT: 223 LBS | BODY MASS INDEX: 33.8 KG/M2 | HEIGHT: 68 IN

## 2018-01-23 NOTE — RESULT NOTES
Verified Results  * US ABDOMEN COMPLETE [id number] 10:03AM Shweta Yip Order Number: [id number]    - Patient Instructions: To schedule this appointment, please contact Central Scheduling at [phone]  Test Name Result Flag Reference   US ABDOMEN COMPLETE (Report)     ABDOMEN ULTRASOUND, COMPLETE      INDICATION: Upper abdominal pain  COMPARISON: CT December 4, 2017     TECHNIQUE:  Real-time ultrasound of the abdomen was performed with a curvilinear transducer with both volumetric sweeps and still imaging techniques  FINDINGS:     PANCREAS: Visualized portions of the pancreas are within normal limits  AORTA AND IVC: Visualized portions are normal for patient age  LIVER:   Size: Within normal range  The liver measures 15 7 cm in the midclavicular line  Contour: Surface contour is smooth  Parenchyma: There is mild diffuse increased echogenicity with smooth echotexture, without significant beam attenuation or loss of periportal echogenicity  Most consistent with mild hepatic steatosis  No evidence of suspicious mass  Limited imaging of the main portal vein shows it to be patent and hepatopetal      BILIARY:   The gallbladder is normal in caliber  No wall thickening or pericholecystic fluid  No stones or sludge identified  Sonographic Katherin Goldmann sign is negative  No intrahepatic biliary dilatation  CBD measures 5 mm  No choledocholithiasis  KIDNEY:    Right kidney measures 11 6 cm  Within normal limits  Left kidney measures 12 4 cm  Within normal limits  SPLEEN:    Measures 11 cm  Within normal limits  ASCITES: None  IMPRESSION:     Mild hepatic steatosis         Workstation performed: [id number]     Signed by:   Sienna Arthur MD   12/24/17       Plan  Abdominal pain    · 1 - Luigi Dakin MD, Malvin Chaudhry  (Gastroenterology) Co-Management  *  Status: Active  Requested for:  77LAN8754  Care Summary provided  : Yes

## 2018-01-23 NOTE — PROGRESS NOTES
Active Problems    1  Abdominal pain (789 00) (R10 9)   2  Abnormal cells of cervix (622 10) (N87 9)   3  Abnormal EKG (794 31) (R94 31)   4  Amenorrhea (626 0) (N91 2)   5  Anxiety (300 00) (F41 9)   6  Chronic low back pain (724 2,338 29) (M54 5,G89 29)   7  Chronic pain syndrome (338 4) (G89 4)   8  Constipation (564 00) (K59 00)   9  Depression (311) (F32 9)   10  Dermoid cyst of right lower extremity (216 7) (D23 71)   11  Diabetes mellitus (250 00) (E11 9)   12  Drug-induced hepatic toxicity (573 3,E947 9) (K71 6,T50 905A)   13  Elevated liver function tests (790 6) (R79 89)   14  Encounter to discuss test results (V65 49) (Z71 89)   15  Hypercholesterolemia (272 0) (E78 00)   16  Hyperlipidemia (272 4) (E78 5)   17  Lumbar degenerative disc disease (722 52) (M51 36)   18  Lumbar radiculopathy (724 4) (M54 16)   19  Morbidly obese (278 01) (E66 01)   20  Tendinitis of left ankle (727 06) (M77 52)   21  Uncontrolled type 2 diabetes with neuropathy (250 62,357 2) (E11 40,E11 65)   22  Urinary tract infection (599 0) (N39 0)   23  Vitamin D deficiency (268 9) (E55 9)    Past Medical History    1  History of Depression screening (V79 0) (Z13 89)   2  History of Encounter for diabetic foot exam (250 00) (E11 9)   3  History of Encounter for routine gynecological examination with Papanicolaou smear of   cervix (V72 31,V76 2) (Z01 419)   4  History of Encounter for routine gynecological examination with Papanicolaou smear of   cervix (V72 31,V76 2) (Z01 419)   5  History of Encounter to discuss test results (V65 49) (Z71 89)   6  History of contact dermatitis (V13 3) (Z87 2)   7  History of depression (V11 8) (Z86 59)   8  History of diabetes mellitus (V12 29) (Z86 39)   9  History of influenza vaccination (V49 89) (Z92 29)   10  History of Normal vaginal delivery (650) (O80)    Surgical History    1  History of  Section   2  History of Tonsillectomy   3   History of Tubal Ligation    Family History  Mother 1  Family history of diabetes mellitus (V18 0) (Z83 3)   2  Family history of hyperlipidemia (V18 19) (Z83 49)  Father    3  Family history of pancreatitis (V18 59) (Z83 79)  Maternal Grandfather    4  Family history of lung cancer (V16 1) (Z80 1)    Social History    · Never a smoker   · No drug use   · Social alcohol use (Z78 9)    Current Meds   1  Alcohol Pads 70 % Pad; use twice daily; Therapy: 44BSW6726 to (Evaluate:16Ied7214)  Requested for: 79NYS2232; Last   Rx:31Mar2017 Ordered   2  BD Pen Needle Mini U/F 31G X 5 MM Miscellaneous; USE AS DIRECTED; Therapy: 10AKT0688 to (Evaluate:11Mar2018)  Requested for: 89IWQ7903; Last   Rx:16Mar2017 Ordered   3  BuSpar 10 MG TABS (BusPIRone HCl); TAKE 1 TABLET 3 TIMES DAILY; Therapy: (Recorded:16Mar2017) to Recorded   4  Cyclobenzaprine HCl - 10 MG Oral Tablet; TAKE ONE-HALF TO 1 TABLET 3 TIMES  DAILY   AS NEEDED; Therapy: 77BSX0076 to (Last Rx:12Oct2017)  Requested for: 12Oct2017 Ordered   5  FreeStyle Lancets Miscellaneous; Check blood sugar twice daily as directed; Therapy: 30VKP9471 to [phone])  Requested for: 81WHC9492; Last   Rx:31Mar2017 Ordered   6  FreeStyle Lite Device; TEST BLOOD SUGAR TWICE DAILY; Therapy: 58EPO1051 to (Last Rx:31Mar2017)  Requested for: 68ZRV7168 Ordered   7  FreeStyle Lite Test In [employer]; test twice daily; Therapy: 32TDX1874 to (Last Rx:31Mar2017)  Requested for: 15SGX0802 Ordered   8  Gabapentin 400 MG Oral Capsule; TAKE 1 CAPSULE 3 times daily; Therapy: 35VCW6495 to (Evaluate:14Apr2018)  Requested for: 78WSD3693; Last   Rx:16Oct2017 Ordered   9  Omeprazole 20 MG Oral Capsule Delayed Release; TAKE 1 CAPSULE Bedtime; Therapy: 24SYY4355 to (Evaluate:05Jun2018)  Requested for: 93OBT3843; Last   Rx:50Kkg5271 Ordered   10  PROzac 40 MG Oral Capsule (FLUoxetine HCl); Therapy: (Recorded:60Dso0098) to Recorded   11  Simvastatin 40 MG Oral Tablet; Therapy: (Recorded:78Gez5218) to Recorded   12   Lylia Jessica 5-1000 MG Oral Tablet; Therapy: (Recorded:18May2017) to Recorded   13  Toujeo SoloStar 300 UNIT/ML Subcutaneous Solution Pen-injector; Therapy: (Recorded:17Nov2017) to Recorded   14  TraZODone HCl - 100 MG Oral Tablet; TAKE 2 TABLETS AT BEDTIME; Therapy: (Recorded:16Mar2017) to Recorded   15  Victoza 18 MG/3ML Subcutaneous Solution Pen-injector; Therapy: (Recorded:18May2017) to Recorded   16  Vitamin D3 1000 UNIT Oral Capsule; Therapy: (Recorded:17Nov2017) to Recorded    Allergies    1  Atorvastatin Calcium TABS    Vitals  Signs   Recorded: 08MVR9013 09:46AM   Height: 5 ft 8 in  Weight: 223 lb   BMI Calculated: 33 91  BSA Calculated: 2 14     Note   Note:   Met with patient and discussed the following; weight loss and workflow  Patient lost 3lbs  Patient reported the following; she had a normal menstrual cycle, and biopsy was negative  I instructed patient to bring updated medication list to next appointment since she said there have been some changes to medication  She is scheduled for a stomach ultra sound on 12/19/17( ordered by PCP) and I suggested she mention to Dr Gregorio Hernandez when she meets him in 1/2018  In addition she has appointment with pain specialist on 1/9/18( previous doctor left state)  Patient scheduled with Gregorio Hernandez( January) and with INDER Woody in February  [place]      Future Appointments    Date/Time Provider Specialty Site   01/18/2018 10:15 AM JAMSHID Akers   General Surgery [facility]   02/19/2018 10:00 AM Elaine Hernandez [facility] Bariatric Medicine [place] [address] [facility]   01/09/2018 10:15 AM Anabela Iqbal,  Pain Management [address]     Signatures   Electronically signed by : [facility]; Dec 15 2017  9:47AM EST                       (Author)    Electronically signed by : JAMSHID Kaur ; Dec 15 2017 11:32AM EST                       (Validation)

## 2018-01-23 NOTE — PROGRESS NOTES
Discussion/Summary  Discussion Summary:    present and provided Ecuadorean interpretation   Assess: Pt here for monthly weigh in  Pt lost 4 pounds  Patient recently had adjustment in her psych meds and plans to bring her updated med list to her next appointment  Her PCP has also ordered some tests for abdominal pain  Patient is doing the following activity/exercise: she is walking 10-20 minutes 3 days per week  Patient is eating 3 meals per day FBS this morning 194 mg/dl    Nutrition Diagnosis: Obesity related to sedentary lifestyle and excess energy intake as evidenced by BMI     Intervention: Reviewed work flow  Patient is scheduled to see surgeon next month  On task with workflow  Reviewed portion sizes   Reviewed 30/60 rule Encouraged completion of lesson plan number 1-3 in bariatric booklet  Pt was receptive and verbalized understanding  Pt will work on the following goals: Activity /exercise goal: continue to walk 3 days per week  3 meals per day- keep food log-forms provided  Decrease portions of starches to 1/2 cup each      Monitoring/evaluation:  Pt will lose 2 to 4 pounds by next appointment   Follow up scheduled for : one month with surgeon and 2 months with medical PA  Active Problems    1  Abdominal pain (789 00) (R10 9)   2  Abnormal cells of cervix (622 10) (N87 9)   3  Abnormal EKG (794 31) (R94 31)   4  Amenorrhea (626 0) (N91 2)   5  Anxiety (300 00) (F41 9)   6  Chronic low back pain (724 2,338 29) (M54 5,G89 29)   7  Chronic pain syndrome (338 4) (G89 4)   8  Constipation (564 00) (K59 00)   9  Depression (311) (F32 9)   10  Dermoid cyst of right lower extremity (216 7) (D23 71)   11  Diabetes mellitus (250 00) (E11 9)   12  Drug-induced hepatic toxicity (573 3,E947 9) (K71 6,T50 905A)   13  Elevated liver function tests (790 6) (R79 89)   14  Encounter to discuss test results (V65 49) (Z71 89)   15  Hypercholesterolemia (272 0) (E78 00)   16   Hyperlipidemia (272 4) (E78 5) 17  Lumbar degenerative disc disease (722 52) (M51 36)   18  Lumbar radiculopathy (724 4) (M54 16)   19  Morbidly obese (278 01) (E66 01)   20  Tendinitis of left ankle (727 06) (M77 52)   21  Uncontrolled type 2 diabetes with neuropathy (250 62,357 2) (E11 40,E11 65)   22  Urinary tract infection (599 0) (N39 0)   23  Vitamin D deficiency (268 9) (E55 9)    Past Medical History    1  History of Depression screening (V79 0) (Z13 89)   2  History of Encounter for diabetic foot exam (250 00) (E11 9)   3  History of Encounter for routine gynecological examination with Papanicolaou smear of   cervix (V72 31,V76 2) (Z01 419)   4  History of Encounter for routine gynecological examination with Papanicolaou smear of   cervix (V72 31,V76 2) (Z01 419)   5  History of Encounter to discuss test results (V65 49) (Z71 89)   6  History of contact dermatitis (V13 3) (Z87 2)   7  History of depression (V11 8) (Z86 59)   8  History of diabetes mellitus (V12 29) (Z86 39)   9  History of influenza vaccination (V49 89) (Z92 29)   10  History of Normal vaginal delivery (650) (O80)    Surgical History    1  History of  Section   2  History of Tonsillectomy   3  History of Tubal Ligation    Family History  Mother    1  Family history of diabetes mellitus (V18 0) (Z83 3)   2  Family history of hyperlipidemia (V18 19) (Z83 49)  Father    3  Family history of pancreatitis (V18 59) (Z83 79)  Maternal Grandfather    4  Family history of lung cancer (V16 1) (Z80 1)    Social History    · Never a smoker   · No drug use   · Social alcohol use (Z78 9)    Current Meds   1  Alcohol Pads 70 % Pad; use twice daily; Therapy: 74KWU6744 to (Evaluate:56Wtb5112)  Requested for: 81YAP4391; Last   Rx:2017 Ordered   2  BD Pen Needle Mini U/F 31G X 5 MM Miscellaneous; USE AS DIRECTED; Therapy: 06ONX0330 to (Evaluate:2018)  Requested for: 69IJU5244; Last   Rx:2017 Ordered   3   BuSpar 10 MG TABS (BusPIRone HCl); TAKE 1 TABLET 3 TIMES DAILY; Therapy: (Recorded:16Mar2017) to Recorded   4  Cyclobenzaprine HCl - 10 MG Oral Tablet; TAKE ONE-HALF TO 1 TABLET 3 TIMES  DAILY   AS NEEDED; Therapy: 95IYT7402 to (Last Rx:12Oct2017)  Requested for: 12Oct2017 Ordered   5  FreeStyle Lancets Miscellaneous; Check blood sugar twice daily as directed; Therapy: 32QFZ8639 to 783 2304)  Requested for: 85TUK1886; Last   Rx:31Mar2017 Ordered   6  FreeStyle Lite Device; TEST BLOOD SUGAR TWICE DAILY; Therapy: 00OLJ9922 to (Last Rx:31Mar2017)  Requested for: 76ZYZ8300 Ordered   7  FreeStyle Lite Test In [employer]; test twice daily; Therapy: 89WTY9917 to (Last Rx:31Mar2017)  Requested for: 60FCE1944 Ordered   8  Gabapentin 400 MG Oral Capsule; TAKE 1 CAPSULE 3 times daily; Therapy: 68XEQ3459 to (Evaluate:14Apr2018)  Requested for: 29OYR5755; Last   Rx:16Oct2017 Ordered   9  Omeprazole 20 MG Oral Capsule Delayed Release; TAKE 1 CAPSULE Bedtime; Therapy: 03YRL1886 to (Evaluate:05Jun2018)  Requested for: 66TJQ7033; Last   Rx:45Lff1031 Ordered   10  PROzac 40 MG Oral Capsule (FLUoxetine HCl); Therapy: (Recorded:17Nov2017) to Recorded   11  Simvastatin 40 MG Oral Tablet; Therapy: (Recorded:18May2017) to Recorded   12  Synjardy 5-1000 MG Oral Tablet; Therapy: (Recorded:18May2017) to Recorded   13  Toujeo SoloStar 300 UNIT/ML Subcutaneous Solution Pen-injector; Therapy: (Recorded:17Nov2017) to Recorded   14  TraZODone HCl - 100 MG Oral Tablet; TAKE 2 TABLETS AT BEDTIME; Therapy: (Recorded:16Mar2017) to Recorded   15  Victoza 18 MG/3ML Subcutaneous Solution Pen-injector; Therapy: (Recorded:18May2017) to Recorded   16  Vitamin D3 1000 UNIT Oral Capsule; Therapy: (Recorded:17Nov2017) to Recorded    Allergies    1   Atorvastatin Calcium TABS    Vitals  Signs   Recorded: 15Dec2017 10:01AM   Height: 5 ft 8 in  Weight: 223 lb   BMI Calculated: 33 91  BSA Calculated: 2 14    Future Appointments    Date/Time Provider Specialty Site 01/18/2018 10:15 AM JAMSHID Wiseman   General Surgery [facility]   02/19/2018 10:00 AM Elma Graves [facility] Bariatric Medicine [facility]   01/09/2018 10:15 AM An Gonzales,  Pain Management [address]     Signatures   Electronically signed by : SIERRA Whitfield; Dec 15 2017 10:01AM EST                       (Author)    Electronically signed by : JAMSHID Sims ; Dec 15 2017 11:32AM EST                       (Validation)

## 2018-01-24 VITALS
SYSTOLIC BLOOD PRESSURE: 124 MMHG | DIASTOLIC BLOOD PRESSURE: 76 MMHG | TEMPERATURE: 96.4 F | HEIGHT: 63 IN | BODY MASS INDEX: 4.12 KG/M2 | HEART RATE: 86 BPM | OXYGEN SATURATION: 98 % | WEIGHT: 23.25 LBS | RESPIRATION RATE: 18 BRPM

## 2018-01-24 VITALS
SYSTOLIC BLOOD PRESSURE: 125 MMHG | HEIGHT: 68 IN | WEIGHT: 223 LBS | DIASTOLIC BLOOD PRESSURE: 78 MMHG | BODY MASS INDEX: 33.8 KG/M2

## 2018-01-24 VITALS
DIASTOLIC BLOOD PRESSURE: 70 MMHG | SYSTOLIC BLOOD PRESSURE: 112 MMHG | HEIGHT: 63 IN | OXYGEN SATURATION: 99 % | WEIGHT: 225.13 LBS | BODY MASS INDEX: 39.89 KG/M2 | RESPIRATION RATE: 16 BRPM | HEART RATE: 103 BPM | TEMPERATURE: 97.1 F

## 2018-01-24 VITALS
BODY MASS INDEX: 38.98 KG/M2 | HEART RATE: 89 BPM | TEMPERATURE: 98.7 F | WEIGHT: 220 LBS | DIASTOLIC BLOOD PRESSURE: 77 MMHG | HEIGHT: 63 IN | SYSTOLIC BLOOD PRESSURE: 123 MMHG

## 2018-01-30 ENCOUNTER — OFFICE VISIT (OUTPATIENT)
Dept: GASTROENTEROLOGY | Facility: MEDICAL CENTER | Age: 38
End: 2018-01-30
Payer: COMMERCIAL

## 2018-01-30 VITALS
TEMPERATURE: 96.1 F | BODY MASS INDEX: 41.22 KG/M2 | HEIGHT: 62 IN | HEART RATE: 67 BPM | SYSTOLIC BLOOD PRESSURE: 120 MMHG | WEIGHT: 224 LBS | DIASTOLIC BLOOD PRESSURE: 60 MMHG

## 2018-01-30 DIAGNOSIS — K59.04 CHRONIC IDIOPATHIC CONSTIPATION: ICD-10-CM

## 2018-01-30 DIAGNOSIS — K76.0 NAFLD (NONALCOHOLIC FATTY LIVER DISEASE): Primary | ICD-10-CM

## 2018-01-30 DIAGNOSIS — R10.13 EPIGASTRIC PAIN: ICD-10-CM

## 2018-01-30 PROCEDURE — 99214 OFFICE O/P EST MOD 30 MIN: CPT | Performed by: INTERNAL MEDICINE

## 2018-01-30 NOTE — PROGRESS NOTES
Consultation - [address] [facility]  Zach Grant 30 y o  female MRN: [id number]  Unit/Bed#:  Encounter: [id number]        Consults      HPI: Zach Grant is a 30y o  year old female who presented for follow up  Patient was found to have high AST and ALT around a year ago while she was hospitalized  At that time she was taking Lipitor and Herbalife  She was found to have slightly elevated RUSSELL and smooth muscle antibody  She subsequently underwent liver biopsy showing she has drug-induced liver injury which is resolved  The biopsy did not show signs of autoimmune hepatitis  Patient's liver function test was closely monitored  She was found to have fatty liver on ultrasound  Her most recent labs showed slightly elevated ALT (39) and normal AST (20)and bilirubin  She also has slightly low albumin 3 2  Patient complained of postprandial epigastric discomfort in the past 1 month  She still has intermittent constipation and she has not been compliant with MiraLax  Review of Systems: as per HPI  Review of Systems   Constitutional: Negative  HENT: Negative  Eyes: Negative  Respiratory: Negative  Cardiovascular: Negative  Gastrointestinal: Positive for abdominal pain and constipation  Endocrine: Negative  Genitourinary: Negative  Musculoskeletal: Negative  Skin: Negative  Allergic/Immunologic: Negative  Neurological: Negative  Hematological: Negative  Psychiatric/Behavioral: Negative          Historical Information   Past Medical History:   Diagnosis Date    Anxiety     Back pain     Depression     Diabetes mellitus ([address] )     Hyperlipidemia     Liver function study, abnormal      Past Surgical History:   Procedure Laterality Date    ABDOMINAL SURGERY       SECTION      TONSILLECTOMY      TUBAL LIGATION       Social History   History   Alcohol Use No     History   Drug Use No     History   Smoking Status    Never Smoker   Smokeless Tobacco    Never Used     No family history on file  Meds/Allergies       (Not in a hospital admission)  No current facility-administered medications for this visit  Allergies   Allergen Reactions    Atorvastatin        Objective     Blood pressure 120/60, pulse 67, temperature (!) 96 1 °F (35 6 °C), temperature source Tympanic, height 5' 2" (1 575 m), weight 102 kg (224 lb), last menstrual period 01/16/2018  [unfilled]    PHYSICAL EXAM     Physical Exam   Constitutional: She is oriented to person, place, and time  She appears well-developed  Morbid obese   Eyes: Pupils are equal, round, and reactive to light  Neck: Normal range of motion  Cardiovascular: Normal rate  Pulmonary/Chest: Effort normal    Abdominal: Soft  Musculoskeletal: Normal range of motion  Neurological: She is alert and oriented to person, place, and time  Skin: Skin is warm  Psychiatric: She has a normal mood and affect  Vitals reviewed  Lab Results:   No visits with results within 1 Day(s) from this visit  Latest known visit with results is:   Admission on 01/20/2018, Discharged on 01/20/2018   Component Date Value    EXT PREG TEST UR (Ref: N* 01/20/2018 HCG = neg (-)     Color, UA 01/20/2018 Yellow     Clarity, UA 01/20/2018 Clear     pH, UA 01/20/2018 5 5     Leukocytes, UA 01/20/2018 Elevated glucose may cause decreased leukocyte values   See urine microscopic for [facility] result/*    Nitrite, UA 01/20/2018 Negative     Protein, UA 01/20/2018 Trace*    Glucose, UA 01/20/2018 >=1000 (1%)*    Ketones, UA 01/20/2018 Trace*    Urobilinogen, UA 01/20/2018 0 2     Bilirubin, UA 01/20/2018 Interference- unable to analyze*    Blood, UA 01/20/2018 Negative     Specific Gravity, UA 01/20/2018 1 015     RBC, UA 01/20/2018 0-1*    WBC, UA 01/20/2018 0-1*    Epithelial Cells 01/20/2018 Occasional     Bacteria, UA 01/20/2018 None Seen          A/P  NAFLD:  -patient's slightly elevated ALT is likely secondary to non alcoholic fatty liver disease  Abdominal ultrasound confirmed the findings of fatty liver   -patient is under go evaluation of weight loss surgery as she failed diet and exercise regimen to lose weight   -patient is under care of an endocrinologist for poorly controlled diabetes  -patient was consult extensively on NAFLD importance of weight loss   -she should continue to follow up with us and I am hoping she can lose significant amount of weight and her metabolic syndromes  Along with fatty liver probably will be improving   -if her liver enzymes becomes elevated again, we will consider another liver biopsy to evaluate for autoimmune hepatitis, but right now it is ruled out by liver biopsy and patient's AST/ALT has been stable after stop taking herbal life  Epigastric pain:  -patient is scheduled to have preop EGD on February 7th with bariatric surgery service  We will follow up the result    Constipation:  Continue MiraLax  Follow-up in one year

## 2018-01-31 ENCOUNTER — HOSPITAL ENCOUNTER (OUTPATIENT)
Dept: RADIOLOGY | Facility: CLINIC | Age: 38
Discharge: HOME | End: 2018-01-31
Payer: COMMERCIAL

## 2018-01-31 VITALS
RESPIRATION RATE: 20 BRPM | SYSTOLIC BLOOD PRESSURE: 122 MMHG | OXYGEN SATURATION: 99 % | DIASTOLIC BLOOD PRESSURE: 83 MMHG | HEART RATE: 85 BPM | TEMPERATURE: 98.7 F

## 2018-01-31 DIAGNOSIS — M54.16 LUMBAR RADICULOPATHY: ICD-10-CM

## 2018-01-31 DIAGNOSIS — M51.36 DEGENERATION OF LUMBAR INTERVERTEBRAL DISC: ICD-10-CM

## 2018-01-31 DIAGNOSIS — M54.50 LOW BACK PAIN: ICD-10-CM

## 2018-01-31 PROCEDURE — 62323 NJX INTERLAMINAR LMBR/SAC: CPT | Performed by: ANESTHESIOLOGY

## 2018-01-31 RX ORDER — LIDOCAINE HYDROCHLORIDE 10 MG/ML
5 INJECTION, SOLUTION EPIDURAL; INFILTRATION; INTRACAUDAL; PERINEURAL ONCE
Status: COMPLETED | OUTPATIENT
Start: 2018-01-31 | End: 2018-01-31

## 2018-01-31 RX ORDER — METHYLPREDNISOLONE ACETATE 80 MG/ML
80 INJECTION, SUSPENSION INTRA-ARTICULAR; INTRALESIONAL; INTRAMUSCULAR; SOFT TISSUE ONCE
Status: COMPLETED | OUTPATIENT
Start: 2018-01-31 | End: 2018-01-31

## 2018-01-31 RX ADMIN — LIDOCAINE HYDROCHLORIDE 3 ML: 10 INJECTION, SOLUTION EPIDURAL; INFILTRATION; INTRACAUDAL; PERINEURAL at 09:13

## 2018-01-31 RX ADMIN — METHYLPREDNISOLONE ACETATE 80 MG: 80 INJECTION, SUSPENSION INTRA-ARTICULAR; INTRALESIONAL; INTRAMUSCULAR; SOFT TISSUE at 09:13

## 2018-01-31 RX ADMIN — IOHEXOL 1 ML: 300 INJECTION, SOLUTION INTRAVENOUS at 09:13

## 2018-01-31 NOTE — H&P
History of Present Illness: The patient is a 30 y o  female who presents with complaints of low back and leg pain      Patient Active Problem List   Diagnosis    Hyperlipidemia    [facility]    Depression    Anxiety    Transaminitis    BMI 40 0-44 9, adult ([facility] )       Past Medical History:   Diagnosis Date    Anxiety     Back pain     Depression     Diabetes mellitus ([facility] )     Hyperlipidemia     Liver function study, abnormal        Past Surgical History:   Procedure Laterality Date    ABDOMINAL SURGERY       SECTION      TONSILLECTOMY      TUBAL LIGATION           Current Outpatient Prescriptions:     busPIRone (BUSPAR) 10 mg tablet, Take 10 mg by mouth 3 (three) times a day , Disp: , Rfl:     Dexbromphen-Pseudoephed-APAP (SINADRIN PLUS PO), Take by mouth, Disp: , Rfl:     diazepam (VALIUM) 5 mg tablet, Take 1 tablet by mouth every 8 (eight) hours as needed for anxiety for up to 5 days, Disp: 15 tablet, Rfl: 0    dicyclomine (BENTYL) 20 mg tablet, Take 1 tablet by mouth every 6 (six) hours as needed (abd cramping) for up to 5 days, Disp: 20 tablet, Rfl: 0    gabapentin (NEURONTIN) 400 mg capsule, Take 400 mg by mouth 2 (two) times a day  , Disp: , Rfl:     ibuprofen (MOTRIN) 600 mg tablet, Take 1 tablet by mouth every 6 (six) hours as needed for mild pain for up to 10 days, Disp: 30 tablet, Rfl: 0    ibuprofen (MOTRIN) 600 mg tablet, Take 1 tablet by mouth every 8 (eight) hours as needed for mild pain or fever, Disp: 30 tablet, Rfl: 0    Insulin Glargine (TOUJEO SOLOSTAR SC), Inject 40 Units under the skin daily at bedtime  , Disp: , Rfl:     lidocaine (LIDODERM) 5 %, Place 1 patch on the skin daily Remove & Discard patch within 12 hours or as directed by MD, Disp: 30 patch, Rfl: 0    Liraglutide (VICTOZA SC), Inject 1 8 Units under the skin daily, Disp: , Rfl:     methocarbamol (ROBAXIN) 500 mg tablet, Take 1 tablet by mouth 4 (four) times a day for 10 days, Disp: 40 tablet, Rfl: 0    naproxen (NAPROSYN) 500 mg tablet, Take 1 tablet by mouth 2 (two) times a day with meals for 10 days, Disp: 20 tablet, Rfl: 0    omeprazole (PriLOSEC) 20 mg delayed release capsule, Take 1 capsule by mouth daily (Patient taking differently: Take 20 mg by mouth as needed  ), Disp: 30 capsule, Rfl: 0    ondansetron (ZOFRAN-ODT) 4 mg disintegrating tablet, Take 1 tablet by mouth every 8 (eight) hours as needed for nausea or vomiting for up to 7 days, Disp: 20 tablet, Rfl: 0    pantoprazole (PROTONIX) 40 mg tablet, Take 1 tablet by mouth daily in the early morning for 30 days, Disp: 30 tablet, Rfl: 0    SIMVASTATIN PO, Take by mouth daily, Disp: , Rfl:     traZODone (DESYREL) 100 mg tablet, Take 1 tablet by mouth daily at bedtime, Disp: 30 tablet, Rfl: 0    Allergies   Allergen Reactions    Atorvastatin        Physical Exam:   Vitals:    01/31/18 0836   BP: 111/74   Pulse: 88   Resp: 20   Temp: 98 7 °F (37 1 °C)   SpO2: 98%     General: Awake, Alert, Oriented x 3, Mood and affect appropriate  Respiratory: Respirations even and unlabored  Cardiovascular: Peripheral pulses intact; no edema  Musculoskeletal Exam:  Bilateral lumbar paraspinals tender to palpation    ASA Score: 2    Assessment:   1  Degeneration of lumbar intervertebral disc    2  Lumbar radiculopathy    3  Low back pain        Plan: LESI      Assessment   1  Chronic pain syndrome (338 4) (G89 4)   2  Lumbar radiculopathy (724 4) (M54 16)   3  Lumbar degenerative disc disease (722 52) (M51 36)     Plan   Lumbar radiculopathy    · Start: Diclofenac Sodium 50 MG Oral Tablet Delayed Release; TAKE 1 TABLET PO BID    prn   Rx By: Bhavik Mcginnis; Dispense: 30 Days ; #:60 Tablet Delayed Release;  Refill: 2;For: Lumbar radiculopathy; SEAN = N; Verified Transmission to [facility]; Last Updated By: System, [employer]; 1/9/2018 11:03:53 AM   · Start: Gabapentin 800 MG Oral Tablet; TAKE 1 TABLET 3 TIMES DAILY   Rx By: Bhavik Mcginnis; Dispense: 30 Days ; #:90 Tablet; Refill: 2;For: Lumbar radiculopathy; SEAN = N; Verified Transmission to [facility]; Last Updated By: System, [employer]; 1/9/2018 11:03:53 AM   · Lumbar Interlaminar Epidural Steroid Injection; Status:Active; Requested UAQ:[id number]; Perform:[facility]; BSX:[id number];[id number]; For:Lumbar radiculopathy; Ordered By:Giacomo Babcock;  Uncontrolled type 2 diabetes with neuropathy    · Stop: Gabapentin 400 MG Oral Capsule   Rx By: Nanci Dawkins; Dispense: 90 Days ; #:270 Capsule; Refill: 1;For: Uncontrolled type 2 diabetes with neuropathy; SEAN = N; Sent To: [facility]; Last Updated By: Aminata Oropeza; 1/9/2018 10:56:38 AM        22-year-old female returning for follow-up of lumbosacral back pain with radiculopathy in the S1 distribution of bilateral lower extremities  The patient does not have any compressive pathology of her lumbar spine, but does have partial sacralization of the L5 vertebrae  There is mild degenerative changes at L4-5  The patient has had a favorable response to LESI in the past, however this only provided about a month of relief  The patient does have a positive straight leg raise which indicates likely radiculopathy  She is currently taking gabapentin 800 mg t i d  and was taking meloxicam without much relief  She is currently under workup for bariatric surgery  1  I will schedule the patient for repeat LESI to see if we can get longer lasting relief for the patient  The procedure was discussed in detail using diagrams and models  Risks associated with the procedure were discussed with the patient including, but not limited to bleeding, infection, bruising, and nerve damage  2  The patient may continue with gabapentin 800 mg t i d      3  Meloxicam was discontinued and we will trial diclofenac 50 mg b i d  p r n     The patient is to take the medication with food and avoid any other NSAIDs while on this medication     4  the patient will continue with her home exercise program     5  if the patient fails epidural steroid injection will consider an EMG of her lower extremities     6  I will follow up the patient in 4-6 weeks after injection           Discussion/Summary   The patient has the current Goals: Reduced pain and improved function  The patent has the current Barriers: Morbid obesity and chronic pain syndrome  Patient is able to Self-Care  Possible side effects of new medications were reviewed with the patient/guardian today  The treatment plan was reviewed with the patient/guardian  The patient/guardian understands and agrees with the treatment plan    The patient was counseled regarding diagnostic results,-- instructions for management,-- risk factor reductions,-- prognosis,-- patient and family education,-- impressions,-- risks and benefits of treatment options-- and-- importance of compliance with treatment  total time of encounter was 15 minutes  Self Referrals: No      Chief Complaint   1  Pain  Low back and leg pain      History of Present Illness   79-year-old female returning for follow-up of lumbosacral back pain that radiates into the posterior aspects of her lower extremities with associated paresthesias and subjective weakness  MRI of the patient's lumbar spine reveals some mild degenerative disc disease at L4-5 and facet arthrosis  There is also partial sacralization of the L5 vertebrae  There is no significant central or foraminal stenosis  The patient did have a favorable response to LESI, however the relief only lasted for about a month  patient has had favorable results to LESI, however the relief only lasted for about a month  She is currently taking gabapentin 800 mg t i d  She was taking meloxicam as cyclobenzaprine, however these were ineffective  She denies any side effects to the medication  She is currently under workup for bariatric surgery   She denies any bladder or bowel incontinence or saddle anesthesia  patient rates her pain an 8 to 9/10 and the pain is worse in the morning and at night  The pain is constant and described as dull, aching, cramping, pressure like, shooting, and pins and needles  The pain is worse with standing and walking  The pain is alleviated with sitting and lying down   have personally reviewed and/or updated the patient's past medical history, past surgical history, family history, social history, allergies, and vital signs today  Other than as stated above, the patient denies any interval changes in medications, medical condition, mental condition, symptoms, or allergies since the last office visit  Marquita Last presents with complaints of constant episodes of bilateral neck and bilateral lower back pain, described as dull, aching and cramping, radiating to the right upper extremity and right lower extremity  On a scale of 1 to 10, the patient rates the pain as 9  Symptoms are unchanged  Review of Systems        Constitutional: no fever,-- no recent weight gain-- and-- no recent weight loss  Eyes: no double vision-- and-- no blurry vision  Cardiovascular: no chest pain,-- no palpitations-- and-- no lower extremity edema  Respiratory: no complaints of shortness of breath-- and-- no wheezing  Musculoskeletal: difficulty walking,-- joint stiffness-- and-- decreased range of motion, but-- no muscle weakness,-- no joint swelling,-- no limb swelling-- and-- no pain in extremity  Neurological: memory loss, but-- no dizziness,-- no difficulty swallowing,-- no loss of consciousness-- and-- no seizures  Gastrointestinal: no nausea,-- no vomiting,-- no constipation-- and-- no diarrhea  Genitourinary: no difficulty initiating urine stream,-- no genital pain-- and-- no frequent urination        Integumentary: no complaints of skin rash  Psychiatric: no depression  Endocrine: no excessive thirst,-- no adrenal disease,-- no hypothyroidism-- and-- no hyperthyroidism  Hematologic/Lymphatic: no tendency for easy bruising-- and-- no tendency for easy bleeding  ROS reviewed  Active Problems   1  Abdominal pain (789 00) (R10 9)   2  Abnormal cells of cervix (622 10) (N87 9)   3  Abnormal EKG (794 31) (R94 31)   4  Acute headache (784 0) (R51)   5  Amenorrhea (626 0) (N91 2)   6  Anxiety (300 00) (F41 9)   7  Chronic low back pain (724 2,338 29) (M54 5,G89 29)   8  Chronic pain syndrome (338 4) (G89 4)   9  Constipation (564 00) (K59 00)   10  Depression (311) (F32 9)   11  Dermoid cyst of right lower extremity (216 7) (D23 71)   12  Diabetes mellitus (250 00) (E11 9)   13  Drug-induced hepatic toxicity (573 3,E947 9) (K71 6,T50 905A)   14  Elevated liver function tests (790 6) (R79 89)   15  Encounter to discuss test results (V65 49) (Z71 89)   16  Hypercholesterolemia (272 0) (E78 00)   17  Hyperlipidemia (272 4) (E78 5)   18  Lumbar degenerative disc disease (722 52) (M51 36)   19  Lumbar radiculopathy (724 4) (M54 16)   20  Morbidly obese (278 01) (E66 01)   21  Tendinitis of left ankle (727 06) (M77 52)   22  Uncontrolled type 2 diabetes with neuropathy (250 62,357 2) (E11 40,E11 65)   23  Urinary tract infection (599 0) (N39 0)   24  Vitamin D deficiency (268 9) (E55 9)     Past Medical History   1  History of Depression screening (V79 0) (Z13 89)   2  History of Encounter for diabetic foot exam (250 00) (E11 9)   3  History of Encounter for routine gynecological examination with Papanicolaou smear of     cervix (V72 31,V76 2) (Z01 419)   4  History of Encounter for routine gynecological examination with Papanicolaou smear of     cervix (V72 31,V76 2) (Z01 419)   5  History of Encounter to discuss test results (V65 49) (Z71 89)   6  History of contact dermatitis (V13 3) (Z87 2)   7   History of depression (V11 8) (Z86 59)   8  History of diabetes mellitus (V12 29) (Z86 39)   9  History of influenza vaccination (V49 89) (Z92 29)   10  History of Normal vaginal delivery (650) (O80)     Surgical History   1  History of  Section   2  History of Tonsillectomy   3  History of Tubal Ligation     Family History   Mother    1  Family history of diabetes mellitus (V18 0) (Z83 3)   2  Family history of hyperlipidemia (V18 19) (Z83 49)  Father    3  Family history of pancreatitis (V18 59) (Z83 79)  Maternal Grandfather    4  Family history of lung cancer (V16 1) (Z80 1)     Social History    · Never a smoker   · No drug use   · Social alcohol use (Z78 9)     Current Meds    1  Alcohol Pads 70 % Pad; use twice daily; Therapy: 19XFE3879 to (Evaluate:88Jok6994)  Requested for: 25GCP3908; Last     Rx:2017 Ordered   2  BD Pen Needle Mini U/F 31G X 5 MM Miscellaneous; USE AS DIRECTED; Therapy: 81HEC8518 to (Evaluate:2018)  Requested for: 69HKL6690; Last     Rx:2017 Ordered   3  BuSpar 10 MG TABS; TAKE 1 TABLET 3 TIMES DAILY; Therapy: (Recorded:2017) to Recorded   4  Butalbital-APAP-Caffeine -40 MG Oral Tablet; TAKE 1 TABLET EVERY 4 TO 6     HOURS AS NEEDED FOR PAIN;     Therapy: 78QHD9913 to (Evaluate:15Xgl9273)  Requested for: 45Ivr8592; Last     Rx:2017 Ordered   5  Cyclobenzaprine HCl - 10 MG Oral Tablet; TAKE ONE-HALF TO 1 TABLET 3 TIMES  DAILY     AS NEEDED; Therapy: 16KGN3752 to (Last Rx:2017)  Requested for: 2017 Ordered   6  FreeStyle Lancets Miscellaneous; Check blood sugar twice daily as directed; Therapy: 01OVF8304 to [phone])  Requested for: 67RUG3580; Last     Rx:2017 Ordered   7  FreeStyle Lite Device; TEST BLOOD SUGAR TWICE DAILY; Therapy: 88XGS7510 to (Last Rx:2017)  Requested for: 74DSW7468 Ordered   8  FreeStyle Lite Test In [employer]; test twice daily;      Therapy: 88CJN7775 to (Last Rx:2017)  Requested for: 27NXD4458 Ordered   9  Gabapentin 400 MG Oral Capsule; TAKE 1 CAPSULE 3 times daily; Therapy: 99RGZ9616 to (Evaluate:14Apr2018)  Requested for: 22GLS9113; Last     Rx:16Oct2017 Ordered   10  Omeprazole 20 MG Oral Capsule Delayed Release; TAKE 1 CAPSULE Bedtime; Therapy: 78HPP3163 to (Evaluate:05Jun2018)  Requested for: 68DJW4121; Last      Rx:62Dhw3857 Ordered   11  PROzac 40 MG Oral Capsule; Therapy: (Recorded:17Nov2017) to Recorded   12  Simvastatin 40 MG Oral Tablet; Therapy: (Recorded:18May2017) to Recorded   13  Synjardy 5-1000 MG Oral Tablet; Therapy: (Recorded:18May2017) to Recorded   14  Toujeo SoloStar 300 UNIT/ML Subcutaneous Solution Pen-injector; Therapy: (Recorded:17Nov2017) to Recorded   15  TraZODone HCl - 100 MG Oral Tablet; TAKE 2 TABLETS AT BEDTIME; Therapy: (Recorded:16Mar2017) to Recorded   16  Victoza 18 MG/3ML Subcutaneous Solution Pen-injector; Therapy: (Recorded:18May2017) to Recorded   17  Vitamin D3 1000 UNIT Oral Capsule; Therapy: (Recorded:17Nov2017) to Recorded     Allergies   1  Atorvastatin Calcium TABS     Vitals   Vital Signs     Recorded: 93QXC9176 10:17AM   Temperature 98 7 F   Heart Rate 89   Systolic 132   Diastolic 77   Height 5 ft 3 in   Weight 220 lb    BMI Calculated 38 97   BSA Calculated 2 01   Pain Scale 9      Physical Exam        Constitutional      General appearance: Abnormal  -- obese  Eyes      Sclera: anicteric      HEENT      Hearing grossly intact  Neck      Neck: Supple, symmetric, trachea midline, no masses  Pulmonary      Respiratory effort: Even and unlabored  Abdomen      Abdomen: Soft, non-tender, non-distended  Skin      Skin and subcutaneous tissue: Normal without rashes or lesions, well hydrated  Psychiatric      Mood and affect: Mood and affect appropriate  Neurologic      the muscle tone was normal      Musculoskeletal      Gait and station: Abnormal  -- antalgic gait  Lumbar/Sacral Spine examination demonstrates  Bilateral lumbar paraspinals tender to palpation from L3-L5  Positive seated straight leg raise bilaterally  Bilateral lower extremity strength 5/5 in all muscle groups

## 2018-01-31 NOTE — DISCHARGE INSTRUCTIONS
Epidural Steroid Injection   WHAT YOU NEED TO KNOW:   An epidural steroid injection (MACARIO) is a procedure to inject steroid medicine into the epidural space  The epidural space is between your spinal cord and vertebrae  Steroids reduce inflammation and fluid buildup in your spine that may be causing pain  You may be given pain medicine along with the steroids  ACTIVITY  · Do not drive or operate machinery today  · No strenuous activity today - bending, lifting, etc   · You may resume normal activites starting tomorrow - start slowly and as tolerated  · You may shower today, but no tub baths or hot tubs  · You may have numbness for several hours from the local anesthetic  Please use caution and common sense, especially with weight-bearing activities  CARE OF THE INJECTION SITE  · If you have soreness or pain, apply ice to the area today (20 minutes on/20 minutes off)  · Starting tomorrow, you may use warm, moist heat or ice if needed  · You may have an increase or change in your discomfort for 36-48 hours after your treatment  · Apply ice and continue with any pain medication you have been prescribed  · Notify [facility] if you have any of the following: redness, drainage, swelling, headache, stiff neck or fever above 100°F     SPECIAL INSTRUCTIONS  · Our office will contact you in approximately 7 days for a progress report  MEDICATIONS  · Continue to take all routine medications  · Our office may have instructed you to hold some medications  If you have a problem specifically related to your procedure, please call our office at [phone]  Problems not related to your procedure should be directed to your primary care physician

## 2018-02-01 RX ORDER — FLUOXETINE HYDROCHLORIDE 40 MG/1
40 CAPSULE ORAL DAILY
COMMUNITY
End: 2018-04-19 | Stop reason: CLARIF

## 2018-02-01 RX ORDER — CYCLOBENZAPRINE HCL 10 MG
10 TABLET ORAL 3 TIMES DAILY PRN
COMMUNITY
End: 2018-03-23

## 2018-02-01 RX ORDER — DICLOFENAC POTASSIUM 50 MG/1
50 TABLET, FILM COATED ORAL 2 TIMES DAILY
COMMUNITY
End: 2018-04-06 | Stop reason: SDUPTHER

## 2018-02-01 RX ORDER — BIOTIN 1 MG
1 TABLET ORAL DAILY
COMMUNITY
End: 2018-07-01

## 2018-02-06 ENCOUNTER — ANESTHESIA EVENT (OUTPATIENT)
Dept: GASTROENTEROLOGY | Facility: HOSPITAL | Age: 38
End: 2018-02-06
Payer: COMMERCIAL

## 2018-02-07 ENCOUNTER — TELEPHONE (OUTPATIENT)
Dept: PAIN MEDICINE | Facility: CLINIC | Age: 38
End: 2018-02-07

## 2018-02-07 ENCOUNTER — ANESTHESIA (OUTPATIENT)
Dept: GASTROENTEROLOGY | Facility: HOSPITAL | Age: 38
End: 2018-02-07
Payer: COMMERCIAL

## 2018-02-07 ENCOUNTER — HOSPITAL ENCOUNTER (OUTPATIENT)
Facility: HOSPITAL | Age: 38
Setting detail: OUTPATIENT SURGERY
Discharge: HOME | End: 2018-02-07
Attending: SURGERY | Admitting: SURGERY
Payer: COMMERCIAL

## 2018-02-07 VITALS
HEART RATE: 77 BPM | BODY MASS INDEX: 41.22 KG/M2 | DIASTOLIC BLOOD PRESSURE: 74 MMHG | HEIGHT: 62 IN | WEIGHT: 224 LBS | RESPIRATION RATE: 20 BRPM | OXYGEN SATURATION: 99 % | TEMPERATURE: 98.4 F | SYSTOLIC BLOOD PRESSURE: 121 MMHG

## 2018-02-07 DIAGNOSIS — E66.01 MORBID (SEVERE) OBESITY DUE TO EXCESS CALORIES (HCC): ICD-10-CM

## 2018-02-07 LAB
EXT PREGNANCY TEST URINE: NEGATIVE
GLUCOSE SERPL-MCNC: 117 MG/DL (ref 65–140)
GLUCOSE SERPL-MCNC: 127 MG/DL (ref 65–140)

## 2018-02-07 PROCEDURE — 88342 IMHCHEM/IMCYTCHM 1ST ANTB: CPT | Performed by: PATHOLOGY

## 2018-02-07 PROCEDURE — 82948 REAGENT STRIP/BLOOD GLUCOSE: CPT

## 2018-02-07 PROCEDURE — 88342 IMHCHEM/IMCYTCHM 1ST ANTB: CPT | Performed by: SURGERY

## 2018-02-07 PROCEDURE — 43239 EGD BIOPSY SINGLE/MULTIPLE: CPT | Performed by: SURGERY

## 2018-02-07 PROCEDURE — 88305 TISSUE EXAM BY PATHOLOGIST: CPT | Performed by: PATHOLOGY

## 2018-02-07 PROCEDURE — 88305 TISSUE EXAM BY PATHOLOGIST: CPT | Performed by: SURGERY

## 2018-02-07 PROCEDURE — 81025 URINE PREGNANCY TEST: CPT | Performed by: ANESTHESIOLOGY

## 2018-02-07 RX ORDER — PROPOFOL 10 MG/ML
INJECTION, EMULSION INTRAVENOUS AS NEEDED
Status: DISCONTINUED | OUTPATIENT
Start: 2018-02-07 | End: 2018-02-07 | Stop reason: SURG

## 2018-02-07 RX ORDER — SODIUM CHLORIDE 9 MG/ML
125 INJECTION, SOLUTION INTRAVENOUS CONTINUOUS
Status: DISCONTINUED | OUTPATIENT
Start: 2018-02-07 | End: 2018-02-07 | Stop reason: HOSPADM

## 2018-02-07 RX ADMIN — SODIUM CHLORIDE: 0.9 INJECTION, SOLUTION INTRAVENOUS at 09:13

## 2018-02-07 RX ADMIN — PROPOFOL 120 MG: 10 INJECTION, EMULSION INTRAVENOUS at 09:19

## 2018-02-07 NOTE — H&P (VIEW-ONLY)
Assessment   1  Morbidly obese (278 01) (E66 01)   2  Uncontrolled type 2 diabetes with neuropathy (250 62,357 2) (E11 40,E11 65)   3  Hypercholesterolemia (272 0) (E78 00)   4  Chronic low back pain (724 2,338 29) (M54 5,G89 29)    Review of Systems   Focused-Female:      Constitutional: No fever, no chills, feels well, no tiredness, no recent weight gain or loss  ENT: no ear ache, no loss of hearing, no nosebleeds or nasal discharge, no sore throat or hoarseness  Cardiovascular: no complaints of slow or fast heart rate, no chest pain, no palpitations, no leg claudication or lower extremity edema  Respiratory: no complaints of shortness of breath, no wheezing, no dyspnea on exertion, no orthopnea or PND  Breasts: no complaints of breast pain, breast lump or nipple discharge    The patient presents with complaints of moderate epigastric heartburn  Symptoms are improved by proton pump inhibitors  Symptoms are worsening  Genitourinary: no complaints of dysuria, no incontinence, no pelvic pain, no dysmenorrhea, no vaginal discharge or abnormal vaginal bleeding  Musculoskeletal: no complaints of arthralgia, no myalgia, no joint swelling or stiffness, no limb pain or swelling  Integumentary: no complaints of skin rash or lesion, no itching or dry skin, no skin wounds  Neurological: no complaints of headache, no confusion, no numbness or tingling, no dizziness or fainting  ROS Reviewed:    ROS reviewed        Active Problems    · Abdominal pain (789 00) (R10 9)   · Abnormal cells of cervix (622 10) (N87 9)   · Abnormal EKG (794 31) (R94 31)   · Acute headache (784 0) (R51)   · Amenorrhea (626 0) (N91 2)   · Anxiety (300 00) (F41 9)   · Chronic low back pain (724 2,338 29) (M54 5,G89 29)   · Chronic pain syndrome (338 4) (G89 4)   · Constipation (564 00) (K59 00)   · Depression (311) (F32 9)   · Dermoid cyst of right lower extremity (216 7) (D23 71)   · Diabetes mellitus (250 00) (E11 9)   · Drug-induced hepatic toxicity (573 3,E947 9) (K71 6,T50 905A)   · Elevated liver function tests (790 6) (R79 89)   · Encounter to discuss test results (V65 49) (Z71 89)   · Hypercholesterolemia (272 0) (E78 00)   · Hyperlipidemia (272 4) (E78 5)   · Lumbar degenerative disc disease (722 52) (M51 36)   · Lumbar radiculopathy (724 4) (M54 16)   · Morbidly obese (278 01) (E66 01)   · Tendinitis of left ankle (727 06) (M77 52)   · Uncontrolled type 2 diabetes with neuropathy (250 62,357 2) (E11 40,E11 65)   · Urinary tract infection (599 0) (N39 0)   · Vitamin D deficiency (268 9) (E55 9)    Discussion/Summary   Discussion Summary:    31 yo female with a long standing h/o of obesity and inability to sustain any meaningful weight loss on her own despite several attempts  is interested in the Laparoscopic Gastric Bypass or Sleeve gastrectomy  a part of her pre op evaluation, she will be referred to a cardiologist and for a sleep evaluation and consult  needs an EGD to evaluate the anatomy of her GI tract prior to the operation  have spent over 45 minutes with her face to face in the office today discussing her options and details of the surgery  We have seen an animation of the surgery on the computer that illustrates how the operation is done and how the anatomy will be altered with the procedure  Over 50% of this was coordinating care  was given the opportunity to ask questions and I have answered all of them  have discussed and educated the patient with regards to the components of our multidisciplinary program and the importance of compliance and follow up in the post operative period  The patient was also instructed with regards to the importance of behavior modification, nutritional counseling, support meeting attendance and lifestyle changes that are important to ensure success    there is a great statistical chance of improvement or even resolution of most of her associated comorbidities, the results vary from patient to patient and they largely depend on her commitment and compliance  needs to lose 5 lbs prior to the operation  Chief Complaint   Chief Complaint Free Text Note Form: Patient in office today for consult  4/6 weight checks  History of Present Illness   HPI: 53-year-old female with a longstanding history of morbid obesity  Here today to discuss bariatric options  Bariatric Surgery Evaluation: The patient is being seen for an initial visit for bariatric surgery evaluation  Symptoms:  inability to lose weight  Initial presentation included inability to lose weight  Reported interest in weight loss is high  Diets tried in the past include low calorie, low carbohydrate and low fat  The patient exercises infrequently  Past Medical History   1  History of Depression screening (V79 0) (Z13 89)   2  History of Encounter for diabetic foot exam (250 00) (E11 9)   3  History of Encounter for routine gynecological examination with Papanicolaou smear of     cervix (V72 31,V76 2) (Z01 419)   4  History of Encounter for routine gynecological examination with Papanicolaou smear of     cervix (V72 31,V76 2) (Z01 419)   5  History of Encounter to discuss test results (V65 49) (Z71 89)   6  History of contact dermatitis (V13 3) (Z87 2)   7  History of depression (V11 8) (Z86 59)   8  History of diabetes mellitus (V12 29) (Z86 39)   9  History of influenza vaccination (V49 89) (Z92 29)   10  History of Normal vaginal delivery (650) (O80)  Active Problems And Past Medical History Reviewed: The active problems and past medical history were reviewed and updated today  Surgical History   1  History of  Section   2  History of Tonsillectomy   3  History of Tubal Ligation  Surgical History Reviewed: The surgical history was reviewed and updated today  Family History   Mother    1  Family history of diabetes mellitus (V18 0) (Z83 3)   2   Family history of hyperlipidemia (V18 19) (Z83 49)  Father    3  Family history of pancreatitis (V18 59) (Z83 79)  Maternal Grandfather    4  Family history of lung cancer (V16 1) (Z80 1)  Family History Reviewed: The family history was reviewed and updated today  Social History    · Never a smoker   · No drug use   · Social alcohol use (Z78 9)  Social History Reviewed: The social history was reviewed and updated today  Current Meds    1  Alcohol Pads 70 % Pad; use twice daily; Therapy: 40YZJ4851 to (Evaluate:80Ruw8874)  Requested for: 14KKT7198; Last     Rx:31Mar2017 Ordered   2  BD Pen Needle Mini U/F 31G X 5 MM Miscellaneous; USE AS DIRECTED; Therapy: 21LRN8280 to (Evaluate:11Mar2018)  Requested for: 23HHN0530; Last     Rx:16Mar2017 Ordered   3  BuSpar 10 MG TABS; TAKE 1 TABLET 3 TIMES DAILY; Therapy: (Recorded:16Mar2017) to Recorded   4  Butalbital-APAP-Caffeine -40 MG Oral Tablet; TAKE 1 TABLET EVERY 4 TO 6     HOURS AS NEEDED FOR PAIN;     Therapy: 15MKU9582 to (Evaluate:55Jvk5250)  Requested for: 77Dsa7910; Last     Rx:63Ngp0820 Ordered   5  Cyclobenzaprine HCl - 10 MG Oral Tablet; TAKE ONE-HALF TO 1 TABLET 3 TIMES  DAILY     AS NEEDED; Therapy: 45IIP3434 to (Last Rx:12Oct2017)  Requested for: 12Oct2017 Ordered   6  Diclofenac Sodium 50 MG Oral Tablet Delayed Release; TAKE 1 TABLET PO BID prn; Therapy: 68PYT6848 to Jacob Aguilar)  Requested for: 27PWZ9388; Last     Rx:09Jan2018 Ordered   7  FreeStyle Lancets Miscellaneous; Check blood sugar twice daily as directed; Therapy: 50MZF4880 to ([phone])  Requested for: 80RVA5527; Last     Rx:31Mar2017 Ordered   8  FreeStyle Lite Device; TEST BLOOD SUGAR TWICE DAILY; Therapy: 99CZQ2938 to (Last Rx:31Mar2017)  Requested for: 11ZDO7831 Ordered   9  FreeStyle Lite Test In [employer]; test twice daily; Therapy: 48QMK8911 to (Last Rx:31Mar2017)  Requested for: 73GDN5284 Ordered   10   Gabapentin 800 MG Oral Tablet; TAKE 1 TABLET 3 TIMES DAILY; Therapy: 22ZPO1652 to [phone])  Requested for: 90UHA0049; Last      Rx:09Jan2018 Ordered   11  Omeprazole 20 MG Oral Capsule Delayed Release; TAKE 1 CAPSULE Bedtime; Therapy: 79XFF2839 to (Evaluate:05Jun2018)  Requested for: 64PRT5355; Last      Rx:94Rsk6542 Ordered   12  PROzac 40 MG Oral Capsule; Therapy: (Recorded:17Nov2017) to Recorded   13  Simvastatin 40 MG Oral Tablet; Therapy: (Recorded:18May2017) to Recorded   14  Synjardy 5-1000 MG Oral Tablet; Therapy: (Recorded:18May2017) to Recorded   15  Toujeo SoloStar 300 UNIT/ML Subcutaneous Solution Pen-injector; Therapy: (Recorded:17Nov2017) to Recorded   16  TraZODone HCl - 100 MG Oral Tablet; TAKE 2 TABLETS AT BEDTIME; Therapy: (Recorded:16Mar2017) to Recorded   17  Victoza 18 MG/3ML Subcutaneous Solution Pen-injector; Therapy: (Recorded:18May2017) to Recorded   18  Vitamin D3 1000 UNIT Oral Capsule; Therapy: (Recorded:17Nov2017) to Recorded  Medication List Reviewed: The medication list was reviewed and updated today  Allergies   1  Atorvastatin Calcium TABS    Vitals   Vital Signs    Recorded: 32VKV5544 10:10AM   Temperature 98 2 F   Heart Rate 82   Respiration 18   Systolic 991   Diastolic 60   Height 5 ft 2 5 in   Weight 221 lb    BMI Calculated 39 78   BSA Calculated 2 01     Physical Exam        Constitutional      General appearance: No acute distress, well appearing and well nourished  well developed,-- appears healthy,-- morbidly obese,-- well hydrated-- and-- appearance reflects stated age        Psychiatric      Orientation to person, place, and time: Normal        Mood and affect: Normal           Future Appointments      Date/Time Provider Specialty Site   01/30/2018 10:40 AM Byron Rose MD Gastroenterology Adult [place] [address]   02/19/2018 10:00 AM [place] [facility] [place] [facility]     Signatures Electronically signed by : JAMSHID Toth ; Jan 18 2018 10:35AM EST                       (Author)

## 2018-02-07 NOTE — ANESTHESIA PREPROCEDURE EVALUATION
Review of Systems/Medical History  Patient summary reviewed  Chart reviewed  No history of anesthetic complications     Cardiovascular  Hyperlipidemia,    Pulmonary       GI/Hepatic    GERD well controlled, Liver disease, ,   Comment: H/o drug induced hepatitis      Negative  ROS        Endo/Other  Diabetes type 1 Insulin,   Obesity  morbid obesity   GYN       Hematology  Negative hematology ROS      Musculoskeletal  Back pain , lumbar pain,        Neurology   Psychology   Anxiety, Depression , being treated for depression,              Physical Exam    Airway    Mallampati score: II  TM Distance: >3 FB  Neck ROM: full     Dental   No notable dental hx     Cardiovascular  Rhythm: regular, Rate: normal, Cardiovascular exam normal    Pulmonary  Pulmonary exam normal Breath sounds clear to auscultation,     Other Findings        Anesthesia Plan  ASA Score- 3     Anesthesia Type- general and IV sedation with anesthesia with ASA Monitors  Additional Monitors:   Airway Plan:         Plan Factors-    Induction- intravenous  Postoperative Plan-     Informed Consent- Anesthetic plan and risks discussed with patient  I personally reviewed this patient with the CRNA  Discussed and agreed on the Anesthesia Plan with the CRNA  Ric Batista

## 2018-02-07 NOTE — PROGRESS NOTES
D/C instructions given and pt verbalizes understanding  And   OOB to ambulate, gait steady denies dizziness  Denies need to use BR

## 2018-02-07 NOTE — OP NOTE
OPERATIVE REPORT  PATIENT NAME: Mert Nayak    :  1980  MRN: [id number]  Pt Location: [facility] ROOM 01    SURGERY DATE: 2018    Surgeon(s) and Role:     * Idania Edwards MD - Primary    Preop Diagnosis:  Morbid (severe) obesity due to excess calories ([facility] [address] ) [E66 01]    Post-Op Diagnosis Codes:     * Morbid (severe) obesity due to excess calories ([facility] [address] ) [E66 01]    Procedure(s) (LRB):  ESOPHAGOGASTRODUODENOSCOPY (EGD) POSSIBLE BIOPSY (N/A)    Specimen(s):  * No specimens in log *    Estimated Blood Loss:   Minimal    Drains:       Anesthesia Type:   IV Sedation with Anesthesia    Operative Indications: Morbid (severe) obesity due to excess calories ([facility] [address] ) [E66 01]      Operative Findings:  Gastritis    Complications:   None    Procedure and Technique:  Upper endoscopy and biopsy   I was present for the entire procedure    Patient Disposition:  hemodynamically stable             Indication:   Patient suffers from morbid obesity and associated co-morbidities  and failed to achieve any meaningful or sustainable weight loss  Patient presented for a bariatric procedure and was consented for a preoperative upper endoscopy and biopsy to rule out H  Pylori  Description of the procedure: The patient was brought to the endoscopy suite and was placed in  left lateral decubitus position  A time-out was called and the patient was identified by name and by armband  The patient received IV  Propofol under closed monitoring  by the anesthesiologist and nurse anesthesist     Upper endoscopy was performed under direct visualization  Esophagus was visualized and showed normal findings   The GE junction was normal   The stomach was then inspected and showed normal findings    First and second portion of duodenum were inspected and appeared to be normal  Biopsy was taken with a punch biopsy forceps from the antrum and sent to pathology to rule out H  Pylori   Retroflex view of the GE junction was obtained and was normal      The patient tolerated the procedure well and was transferred to the recovery unit in stable condition           I    SIGNATURE: Corie Vargas MD  DATE: February 7, 2018  TIME: 9:16 AM

## 2018-02-07 NOTE — TELEPHONE ENCOUNTER
Patient reports that she has not noticed any relief so far  Patient was instructed to give more time

## 2018-02-07 NOTE — DISCHARGE INSTRUCTIONS
Gastritis   LO QUE NECESITA SABER:   La gastritis es omari inflamación o irritación del revestimiento del estómago  INSTRUCCIONES SOBRE EL [facility]ITAL[facility]:   Llame al 911 en laura de presentar lo siguiente:   · Tiene dolor de pecho o le falta el aire  Busque atención médica de inmediato si:   · Usted vomita saad  · Usted tiene evacuaciones intestinales negras o con saad  · Usted tiene un tita dolor de estómago o de espalda  Pregúntele a solano Dempsey Parkers vitaminas y minerales son adecuados para usted  · Usted tiene fiebre  · Usted tiene síntomas nuevos o estos empeoran, aun después del [place]  · Usted tiene preguntas o inquietudes acerca de solano condición o cuidado  Medicamentos:   · Medicamentos,  para ayudar a tratar la infección bacteriana o para disminuir el ácido estomacal      · [place] emily medicamentos antoine se le haya indicado  Consulte con solano médico si usted vinny que solano medicamento no le está ayudando o si presenta efectos secundarios  Infórmele si es alérgico a cualquier medicamento  Mantenga omari lista actualizada de los [place], las vitaminas y los productos herbales que cynthia  Incluya los siguientes datos de los medicamentos: cantidad, frecuencia y motivo de administración  Traiga con usted la lista o los envases de la píldoras a emily citas de seguimiento  Lleve la lista de los medicamentos con usted en laura de omari emergencia  Maneje o evite la gastritis:   · No fume  La nicotina y otras sustancias químicas de los cigarrillos y los cigarros pueden empeorar emily síntomas y causar daño pulmonar  Pida información a solano médico si usted actualmente fuma y necesita ayuda para dejar de fumar  Los cigarrillos electrónicos o tabaco sin humo todavía contienen nicotina  Consulte con solano médico antes de [employer]  · No consuma alcohol  El alcohol puede evitar la cicatrización y empeorar la gastritis   Consulte con solano médico si usted necesita ayuda para dejar de jerri alcohol  · No tome medicamentos CORNELL o aspirina a menos que así se lo indiquen  Estos analgésicos y medicamentos similares pueden causar irritación  Si stephen médico lo autoriza a jerri [place], tómelos con la comida  · No coma alimentos que le provocan irritación:  Los alimentos antoine las naranjas y la salsa pueden causar ardor o dolor  Consuma alimentos saludables y variados  Unos [address] frutas (no las cítricas), verduras, productos lácteos descremados, legumbres, pan integral al [employer] las valerie [place] y pescado  Trate de comer porciones más pequeñas y jerri agua con emily comidas  No coma nada al menos por 3 horas antes de acostarse  · Encuentre maneras de relajarse y reducir el estrés  El estrés puede aumentar el ácido estomacal y empeorar la gastritis  Las [employer] yoga, la meditación o el escuchar música pueden ayudarlo a [place]  Pase tiempo con amigos, o wu cosas que disfruta  Acuda a emily consultas de control con stephen médico según le indicaron  Puede que necesite exámenes o tratamiento continuos o derivación a un gastroenterólogo  Anote emily preguntas para que se acuerde de hacerlas viv emily visitas  © 2017 [address] Information is for End User's use only and may not be sold, redistributed or otherwise used for commercial purposes  All illustrations and images included in CareNotes® are the copyrighted property of A D A M , Inc  or Benny Patton  Esta información es sólo para uso en educación  Stephen intención no es darle un consejo médico sobre enfermedades o tratamientos  Colsulte con stephen Viraj Keas farmacéutico antes de seguir cualquier régimen médico para saber si es seguro y efectivo para usted  Endoscopia superior   LO QUE NECESITA SABER:   Bessy endoscopia superior también se conoce antoine endoscopia gastrointestinal (GI) superior o esofagogastroduodenoscopia (EGD)   Usted podría sentirse inflamado, con gases o sentir molestia abdominal después de stephen procedimiento  Stephen garganta podría estar adolorida por 24 a 36 horas  Usted podría eructar o expulsar gas debido al aire que todavía está en stephen cuerpo  INSTRUCCIONES SOBRE EL [facility]DRA [facility]OSPITALARIA:   Llame al 911 en laura de presentar lo siguiente:   · Tiene dolor en el pecho o dificultad para respirar de forma repentina  Busque atención médica de inmediato si:   · Está mareado o siente que se va a desmayar  · Usted tiene dificultad para tragar  · Nerissa evacuaciones intestinales son [place] o negras  · Stephen abdomen está cedric y firme y usted siente dolor intenso  · Usted vomita saad  Pregúntele a stephen Radha Landa vitaminas y minerales son adecuados para usted  · Usted se siente lleno o hinchado y no puede eructar o expulsar gas  · Usted no ha tenido omari evacuación intestinal después de 3 días de stephen procedimiento  · Usted tiene dolor de darlene  · Usted tiene fiebre o escalofríos  · Usted tiene náuseas o está vomitando  · Usted tiene salpullido o urticaria  · Usted tiene preguntas o inquietudes acerca de stephen endoscopia  Alivie el dolor de garganta:  Chupe pastillas para la garganta o hielo triturado  Christina gárgaras con omari pequeña cantidad de agua tibia con sal  Mezcle 1 cucharadita de sal y 1 taza de agua tibia para hacer agua salada  Alivie el gas y la molestia de la inflamación:  Acuéstese sobre stephen costado derecho con omari almohada térmica sobre stephen abdomen  Camine un poco para ayudar a expulsar el gas  Coma comidas pequeñas hasta que se alivie de la inflamación  Descanse después de stephen procedimiento:  A usted le kelly administrado medicamento para relajarse  No  maneje o tome decisiones importantes hasta el día siguiente de stephen procedimiento  Regrese a nerissa actividades normales según le indiquen  Usted generalmente puede regresar al Priti Grimes al día siguiente de stephen procedimiento  Acuda a nerissa consultas de control con stephen médico según le indicaron    Anote emily preguntas para que se acuerde de hacerlas viv emily visitas  © 2017 christiano Wilkins  Information is for End User's use only and may not be sold, redistributed or otherwise used for commercial purposes  All illustrations and images included in CareNotes® are the copyrighted property of A ALM[employer] A M , Inc  or Benny Patton  Esta información es sólo para uso en educación  Stephen intención no es darle un consejo médico sobre enfermedades o tratamientos  Colsulte con stephen Karol Antis farmacéutico antes de seguir cualquier régimen médico para saber si es seguro y efectivo para usted

## 2018-02-08 ENCOUNTER — OFFICE VISIT (OUTPATIENT)
Dept: FAMILY MEDICINE CLINIC | Facility: CLINIC | Age: 38
End: 2018-02-08
Payer: COMMERCIAL

## 2018-02-08 VITALS
BODY MASS INDEX: 40.85 KG/M2 | DIASTOLIC BLOOD PRESSURE: 72 MMHG | RESPIRATION RATE: 20 BRPM | OXYGEN SATURATION: 98 % | SYSTOLIC BLOOD PRESSURE: 124 MMHG | WEIGHT: 222 LBS | TEMPERATURE: 97.1 F | HEIGHT: 62 IN | HEART RATE: 101 BPM

## 2018-02-08 DIAGNOSIS — M54.2 NECK PAIN ON RIGHT SIDE: Primary | ICD-10-CM

## 2018-02-08 DIAGNOSIS — R10.11 RIGHT UPPER QUADRANT ABDOMINAL PAIN: ICD-10-CM

## 2018-02-08 PROBLEM — R74.01 TRANSAMINITIS: Status: RESOLVED | Noted: 2017-01-30 | Resolved: 2018-02-08

## 2018-02-08 PROBLEM — N91.2 AMENORRHEA: Status: ACTIVE | Noted: 2017-11-17

## 2018-02-08 PROBLEM — T50.905A DRUG-INDUCED HEPATIC TOXICITY: Status: RESOLVED | Noted: 2017-03-09 | Resolved: 2018-02-08

## 2018-02-08 PROBLEM — E55.9 VITAMIN D DEFICIENCY: Status: ACTIVE | Noted: 2017-11-17

## 2018-02-08 PROBLEM — K71.6 DRUG-INDUCED HEPATIC TOXICITY: Status: RESOLVED | Noted: 2017-03-09 | Resolved: 2018-02-08

## 2018-02-08 PROBLEM — R94.31 ABNORMAL EKG: Status: ACTIVE | Noted: 2017-10-25

## 2018-02-08 PROBLEM — R79.89 ELEVATED LIVER FUNCTION TESTS: Status: ACTIVE | Noted: 2017-02-09

## 2018-02-08 PROBLEM — D36.7 DERMOID CYST OF RIGHT LOWER EXTREMITY: Status: ACTIVE | Noted: 2017-05-02

## 2018-02-08 PROBLEM — M77.52 TENDINITIS OF LEFT ANKLE: Status: ACTIVE | Noted: 2017-10-03

## 2018-02-08 PROBLEM — R10.9 ABDOMINAL PAIN: Status: ACTIVE | Noted: 2017-12-07

## 2018-02-08 PROBLEM — T50.905A DRUG-INDUCED HEPATIC TOXICITY: Status: ACTIVE | Noted: 2017-03-09

## 2018-02-08 PROBLEM — M25.552 LEFT HIP PAIN: Status: ACTIVE | Noted: 2018-01-23

## 2018-02-08 PROBLEM — K71.6 DRUG-INDUCED HEPATIC TOXICITY: Status: ACTIVE | Noted: 2017-03-09

## 2018-02-08 PROBLEM — R87.619 ABNORMAL CELLS OF CERVIX: Status: ACTIVE | Noted: 2017-11-30

## 2018-02-08 PROBLEM — M54.16 LUMBAR RADICULOPATHY: Status: ACTIVE | Noted: 2017-10-17

## 2018-02-08 PROBLEM — G89.4 CHRONIC PAIN DISORDER: Status: ACTIVE | Noted: 2017-10-17

## 2018-02-08 PROCEDURE — 99214 OFFICE O/P EST MOD 30 MIN: CPT | Performed by: PHYSICIAN ASSISTANT

## 2018-02-08 PROCEDURE — 99051 MED SERV EVE/WKEND/HOLIDAY: CPT | Performed by: PHYSICIAN ASSISTANT

## 2018-02-08 RX ORDER — BACLOFEN 10 MG/1
10 TABLET ORAL 3 TIMES DAILY
Qty: 90 TABLET | Refills: 1 | Status: SHIPPED | OUTPATIENT
Start: 2018-02-08 | End: 2018-04-09

## 2018-02-08 NOTE — PROGRESS NOTES
Assessment/Plan:    1) I did recommend starting physical therapy for the neck pain since it has been persistent over the past 2 weeks with only mild relief of her symptoms with diclofenac  2) start baclofen 10 milligrams 3 times daily as needed  Patient does not drive  Advised the drowsy side effects  3) continue follow-up with GI specialist as advised for the abdominal pain  Advised that the endoscopy results are not currently in the system  This was recently performed by Dr Jim Kong  4) follow-up if there is no improvement with the neck pain with physical therapy in 4-6 weeks her any symptoms increase  Apply heat 3 times daily for 10 minutes  M*Modal software was used to dictate this note  It may contain errors with dictating incorrect words/spelling  Please contact provider directly for any questions  Diagnoses and all orders for this visit:    Neck pain on right side  -     Ambulatory referral to Physical Therapy; Future          Vitals:    02/08/18 1752   BP: 124/72   Pulse: 101   Resp: 20   Temp: (!) 97 1 °F (36 2 °C)   SpO2: 98%       Subjective:      Patient ID: Ninfa Parham is a 30 y o  female  Patient presents today for evaluation of right-sided neck pain that started about 2 weeks ago  She denies any specific injury  She states on occasion her entire left hand falls asleep  She denies any radiation of pain or tingling  She is currently on diclofenac for back pain  She is not taking any muscle relaxers at this time  She also recently had an endoscopy done by Dr Jim Kong  She was wondering if the results were available  She states she was told that she has gastritis  She does get pain intermittently of the right upper quadrant  She does take omeprazole 20 milligrams daily  She did see the GI specialist but continues with pain  She did make a follow-up appointment          The following portions of the patient's history were reviewed and updated as appropriate:   She has a past medical history of Anxiety; Back pain; Depression; Diabetes mellitus ([facility] [address] ); GERD (gastroesophageal reflux disease); Hyperlipidemia; Liver disease; Liver function study, abnormal; Morbid obesity ([facility] [address] ); and Vitamin D deficiency  She  does not have any pertinent problems on file  She  has a past surgical history that includes Abdominal surgery; Tonsillectomy;  section; Tubal ligation; and Esophagogastroduodenoscopy (N/A, 2018)  Her family history is not on file  She  reports that she has never smoked  She has never used smokeless tobacco  She reports that she does not drink alcohol or use drugs  Current Outpatient Prescriptions   Medication Sig Dispense Refill    busPIRone (BUSPAR) 10 mg tablet Take 10 mg by mouth 3 (three) times a day        Cholecalciferol (VITAMIN D3) 1000 units CAPS Take 1 capsule by mouth daily      cyclobenzaprine (FLEXERIL) 10 mg tablet Take 10 mg by mouth 3 (three) times a day as needed for muscle spasms      Dexbromphen-Pseudoephed-APAP (SINADRIN PLUS PO) Take by mouth      diclofenac potassium (CATAFLAM) 50 mg tablet Take 50 mg by mouth 2 (two) times a day      Empagliflozin-Metformin HCl (SYNJARDY) 5-1000 MG TABS Take by mouth      FLUoxetine (PROzac) 40 MG capsule Take 40 mg by mouth daily      gabapentin (NEURONTIN) 400 mg capsule Take 800 mg by mouth 3 (three) times a day        ibuprofen (MOTRIN) 600 mg tablet Take 1 tablet by mouth every 6 (six) hours as needed for mild pain for up to 10 days 30 tablet 0    ibuprofen (MOTRIN) 600 mg tablet Take 1 tablet by mouth every 8 (eight) hours as needed for mild pain or fever 30 tablet 0    Insulin Glargine (TOUJEO SOLOSTAR SC) Inject 40 Units under the skin daily at bedtime        lidocaine (LIDODERM) 5 % Place 1 patch on the skin daily Remove & Discard patch within 12 hours or as directed by MD 30 patch 0    Liraglutide (VICTOZA SC) Inject 1 8 Units under the skin daily      omeprazole (PriLOSEC) 20 mg delayed release capsule Take 1 capsule by mouth daily (Patient taking differently: Take 20 mg by mouth as needed  ) 30 capsule 0    ondansetron (ZOFRAN-ODT) 4 mg disintegrating tablet Take 1 tablet by mouth every 8 (eight) hours as needed for nausea or vomiting for up to 7 days 20 tablet 0    SIMVASTATIN PO Take 40 mg by mouth daily        traZODone (DESYREL) 100 mg tablet Take 1 tablet by mouth daily at bedtime 30 tablet 0     No current facility-administered medications for this visit  Current Outpatient Prescriptions on File Prior to Visit   Medication Sig    busPIRone (BUSPAR) 10 mg tablet Take 10 mg by mouth 3 (three) times a day      Cholecalciferol (VITAMIN D3) 1000 units CAPS Take 1 capsule by mouth daily    cyclobenzaprine (FLEXERIL) 10 mg tablet Take 10 mg by mouth 3 (three) times a day as needed for muscle spasms    Dexbromphen-Pseudoephed-APAP (SINADRIN PLUS PO) Take by mouth    diclofenac potassium (CATAFLAM) 50 mg tablet Take 50 mg by mouth 2 (two) times a day    Empagliflozin-Metformin HCl (SYNJARDY) 5-1000 MG TABS Take by mouth    FLUoxetine (PROzac) 40 MG capsule Take 40 mg by mouth daily    gabapentin (NEURONTIN) 400 mg capsule Take 800 mg by mouth 3 (three) times a day      ibuprofen (MOTRIN) 600 mg tablet Take 1 tablet by mouth every 6 (six) hours as needed for mild pain for up to 10 days    ibuprofen (MOTRIN) 600 mg tablet Take 1 tablet by mouth every 8 (eight) hours as needed for mild pain or fever    Insulin Glargine (TOUJEO SOLOSTAR SC) Inject 40 Units under the skin daily at bedtime      lidocaine (LIDODERM) 5 % Place 1 patch on the skin daily Remove & Discard patch within 12 hours or as directed by MD    Liraglutide (VICTOZA SC) Inject 1 8 Units under the skin daily    omeprazole (PriLOSEC) 20 mg delayed release capsule Take 1 capsule by mouth daily (Patient taking differently: Take 20 mg by mouth as needed  )    ondansetron (ZOFRAN-ODT) 4 mg disintegrating tablet Take 1 tablet by mouth every 8 (eight) hours as needed for nausea or vomiting for up to 7 days    SIMVASTATIN PO Take 40 mg by mouth daily      traZODone (DESYREL) 100 mg tablet Take 1 tablet by mouth daily at bedtime     No current facility-administered medications on file prior to visit  She is allergic to atorvastatin       Review of Systems   Constitutional: Negative  Gastrointestinal: Positive for abdominal pain  Negative for blood in stool, nausea and vomiting  Musculoskeletal:        As stated in HPI   Neurological:        As stated in HPI         Objective:     Physical Exam   Constitutional: She appears well-developed and well-nourished  Musculoskeletal:   Cervical spine:  She does have some right paraspinal tenderness  Very mild loss of range of motion  Good range of motion of her upper extremities with good strength at 5+/5 and equal bilaterally  Distal neurovascular status is intact to light touch

## 2018-02-14 ENCOUNTER — EVALUATION (OUTPATIENT)
Dept: PHYSICAL THERAPY | Facility: CLINIC | Age: 38
End: 2018-02-14
Payer: COMMERCIAL

## 2018-02-14 DIAGNOSIS — M54.2 CERVICALGIA: ICD-10-CM

## 2018-02-14 DIAGNOSIS — M54.2 NECK PAIN ON RIGHT SIDE: Primary | ICD-10-CM

## 2018-02-14 PROCEDURE — 97161 PT EVAL LOW COMPLEX 20 MIN: CPT | Performed by: PHYSICAL MEDICINE & REHABILITATION

## 2018-02-14 PROCEDURE — G8982 BODY POS GOAL STATUS: HCPCS | Performed by: PHYSICAL MEDICINE & REHABILITATION

## 2018-02-14 PROCEDURE — G8981 BODY POS CURRENT STATUS: HCPCS | Performed by: PHYSICAL MEDICINE & REHABILITATION

## 2018-02-14 NOTE — PROGRESS NOTES
PT Evaluation     Today's date: 2018  Patient name: Sarika Cotto  : 1980  MRN: [id number]  Referring provider: Rusty Carrasco PA-C  Dx:   Encounter Diagnoses   Name Primary?  Neck pain on right side Yes    Cervicalgia                   Assessment  Impairments: abnormal or restricted ROM, activity intolerance and pain with function    Assessment details: Patient is a 30 y o  female presenting to therapy with pain, soft tissue restrictions, and ROM deficits  Current deficits limit functional activity tolerance and ADL performance  Postural dysfunction likely contributes to sxs  Patient may benefit from f/u with optometrist to address visual changes, will continue to monitor symptoms as POC progresses  Patient would benefit from skilled intervention to address current deficits and maximize function  Thank you for the referral     Understanding of Dx/Px/POC: good   Prognosis: good    Goals  1  Patient will report pain decrease by at least 2 levels within 4 weeks  2  Patient will improve AROM by at least 5-10 degrees within 4 weeks  3  Patient will improve ADL performance to maximal level of function by d/c  4  Patient will achieve symmetrical cervical ROM without pain increase by d/c    Plan  Patient would benefit from: skilled PT  Referral necessary: No  Planned modality interventions: TENS and thermotherapy: hydrocollator packs  Planned therapy interventions: manual therapy, joint mobilization, neuromuscular re-education, postural training, strengthening, stretching, therapeutic exercise and home exercise program  Frequency: 2x week  Duration in visits: 8  Duration in weeks: 4  Treatment plan discussed with: patient        Subjective Evaluation    History of Present Illness  Date of onset: 2018  Mechanism of injury: Patient presents with right-sided neck pain of insidious onset  Patient notes daily HA with neck pain   Patient denies previous episode of neck pain, denies change in activity, pillow use, sleeping position  Patient notes blurry vision, has not seen eye Dr  Within past year- does not wear glasses as prescribed  Patient denies dizziness, presence of radicular symptoms     Not a recurrent problem Quality of life: fair    Pain  Current pain ratin  At best pain ratin  At worst pain ratin          Objective     Postural Observations  Seated posture: poor    Additional Postural Observation Details  Significant forward head posture, increased thoracic kyphosis    Active Range of Motion   Cervical/Thoracic Spine   Cervical    Flexion: 20 degrees   Extension: 20 degrees   Left lateral flexion: 20 degrees with pain  Right lateral flexion: 30 degrees   Left rotation: 40 degrees with pain  Right rotation: [facility] and with pain    Additional Active Range of Motion Details  Increased TTP over right upper trap and rhomboid areas, cervical and upper thoracic PA glide positive for pain and decreased mobility  Increased sub-occipital tension, TTP over right mastoid process  Passive cervical ROM limited secondary to pain      Precautions: Dm, LBP, depression, BMI >30    Daily Treatment Diary     Manual  18            Gentle PA mobs: upper thoracic and cervical NP            SOR, R UT, rhomboid, and levator STM NP                                                       Exercise Diary  18            Pulleys/UBE             Rhomboid stretch 3x10"            Levator stretch 3x10"            Upper trap stretch 3x10"            Chin tuck             Scap squeeze             Foam roller series                                                                                                                                                                                          Modalities              MHP/CP with TENS to conclude prn

## 2018-02-16 PROBLEM — E66.01 OBESITY, CLASS III, BMI 40-49.9 (MORBID OBESITY) (HCC): Status: ACTIVE | Noted: 2018-02-16

## 2018-02-16 NOTE — PROGRESS NOTES
Assessment/Plan:    Obesity, Class III, BMI 40-49 9 (morbid obesity) ([facility] )  Interested in Vertical Sleeve Gastrectomy with Dr Jaxon Rizvi  10% Weight loss- Not applicable  Screening labs-will repeat CBC with bmp in April  F/U with endocrinology for vitamin d  Support Group-Completed  Cardiac Risk Assessment-Completed  Upper Endoscopy-Completed; H  Pylori biopsy-Negative  Sleep Medicine Assessment-Not applicable neg STOP BANG on 7/27/17  Alcohol and nicotine use is not recommended following bariatric surgery  NSAIDs should be avoided following bariatric surgery  Advised that pregnancy should be avoided following bariatric surgery for 12-18 months and should not solely rely on OCP and consider additional/alternative sources for contraception due to potential absorption issues-Completed (s/p tubal ligation in [place])     Hypercholesterolemia  -continue with simvastatin    Vitamin D deficiency  -taking 5000 IU daily  -has f/u with endocrinology in April    Depressive disorder  -continue management with psychiatry and counseling   -She will see the  next month    Follow up in approximately 1 month with Surgical Dietician and Surgical   Diagnoses and all orders for this visit:    Obesity, Class III, BMI 40-49 9 (morbid obesity) ([facility])  -     CBC and differential; Future  -     Basic metabolic panel; Future    Diabetes mellitus type 2 in obese ([place] [address] )  Comments:  -A1c 7 0 %  -On Victoza, synjardy, toujeo  Orders:  -     CBC and differential; Future  -     Basic metabolic panel; Future    Depressive disorder  -     CBC and differential; Future  -     Basic metabolic panel; Future    Hypercholesterolemia  -     CBC and differential; Future  -     Basic metabolic panel; Future    Vitamin D deficiency    Other elevated white blood cell (WBC) count  -     CBC and differential; Future  -     Basic metabolic panel;  Future    Other orders  -     Discontinue: D-3-5 5000 units capsule;     Subjective: Chief Complaint   Patient presents with    Weight Check     Patient here for 4/6 Francisco J-Weight Check  Patient ID: Mason Ni  is a 30 y o  female with diabetes, hyperlipdemia and depression and obesity here to pursue weight managment  Patient is pursuing Vertical Sleeve Gastrectomy  HPI  The patient presents for 4/6 weight check  The patient has been:  Following the lessons in the manual- yes  Practicing the 30/60 minute rule- yes  Food logging- no   Exercise- no    The following portions of the patient's history were reviewed and updated as appropriate: allergies, current medications, past family history, past medical history, past social history, past surgical history and problem list     Review of Systems   Respiratory: Negative for cough and shortness of breath  Cardiovascular: Positive for palpitations (Increased heart rate  Recommend f/u with PCP)  Negative for chest pain  Gastrointestinal: Positive for abdominal pain (epigastric after eating; Encouraged f/u with PCP)  Negative for constipation, diarrhea, nausea and vomiting  Psychiatric/Behavioral: Negative for suicidal ideas  Sometimes feels down  Seeing therapy and psychiatry     Objective:   Physical Exam   Nursing note and vitals reviewed  Constitutional   General appearance: Abnormal   well developed and obese  Eyes No conjunctival pallor  Ears, Nose, Mouth, and Throat Oral mucosa moist    Pulmonary   Respiratory effort: No increased work of breathing or signs of respiratory distress  Auscultation of lungs: Clear to auscultation, equal breath sounds bilaterally, no wheezes, no rales, no rhonci  Cardiovascular   Auscultation of heart: Normal rate and rhythm, normal S1 and S2, without murmurs  Examination of extremities for edema and/or varicosities: Normal   no edema  Abdomen   Abdomen: Abnormal   The abdomen was obese  Bowel sounds were normal  The abdomen was soft and nontender     Musculoskeletal   Gait and station: Normal     Psychiatric   Orientation to person, place and time: Normal     Affect: appropriate

## 2018-02-16 NOTE — ASSESSMENT & PLAN NOTE
Interested in Vertical Sleeve Gastrectomy with Dr Elba Guardado  10% Weight loss- Not applicable  Screening labs-will repeat CBC with bmp in April   F/U with endocrinology for vitamin d  Support Group-Completed  Cardiac Risk Assessment-Completed  Upper Endoscopy-Completed; H  Pylori biopsy-Negative  Sleep Medicine Assessment-Not applicable neg CHANTAL PAZ on 7/27/17  Alcohol and nicotine use is not recommended following bariatric surgery  NSAIDs should be avoided following bariatric surgery  Advised that pregnancy should be avoided following bariatric surgery for 12-18 months and should not solely rely on OCP and consider additional/alternative sources for contraception due to potential absorption issues-Completed (s/p tubal ligation in [facility])

## 2018-02-19 ENCOUNTER — OFFICE VISIT (OUTPATIENT)
Dept: PHYSICAL THERAPY | Facility: CLINIC | Age: 38
End: 2018-02-19
Payer: COMMERCIAL

## 2018-02-19 ENCOUNTER — OFFICE VISIT (OUTPATIENT)
Dept: BARIATRICS | Facility: CLINIC | Age: 38
End: 2018-02-19
Payer: COMMERCIAL

## 2018-02-19 VITALS
BODY MASS INDEX: 38.52 KG/M2 | HEIGHT: 63 IN | DIASTOLIC BLOOD PRESSURE: 64 MMHG | WEIGHT: 217.4 LBS | TEMPERATURE: 97.6 F | HEART RATE: 96 BPM | SYSTOLIC BLOOD PRESSURE: 110 MMHG

## 2018-02-19 DIAGNOSIS — E78.00 HYPERCHOLESTEROLEMIA: ICD-10-CM

## 2018-02-19 DIAGNOSIS — M54.2 NECK PAIN ON RIGHT SIDE: Primary | ICD-10-CM

## 2018-02-19 DIAGNOSIS — M54.2 CERVICALGIA: ICD-10-CM

## 2018-02-19 DIAGNOSIS — F32.A DEPRESSIVE DISORDER: ICD-10-CM

## 2018-02-19 DIAGNOSIS — E11.69 DIABETES MELLITUS TYPE 2 IN OBESE (HCC): ICD-10-CM

## 2018-02-19 DIAGNOSIS — E55.9 VITAMIN D DEFICIENCY: ICD-10-CM

## 2018-02-19 DIAGNOSIS — E66.9 DIABETES MELLITUS TYPE 2 IN OBESE (HCC): ICD-10-CM

## 2018-02-19 DIAGNOSIS — D72.828 OTHER ELEVATED WHITE BLOOD CELL (WBC) COUNT: ICD-10-CM

## 2018-02-19 DIAGNOSIS — E66.01 OBESITY, CLASS III, BMI 40-49.9 (MORBID OBESITY) (HCC): Primary | ICD-10-CM

## 2018-02-19 PROCEDURE — 97014 ELECTRIC STIMULATION THERAPY: CPT | Performed by: PHYSICAL MEDICINE & REHABILITATION

## 2018-02-19 PROCEDURE — 97140 MANUAL THERAPY 1/> REGIONS: CPT | Performed by: PHYSICAL MEDICINE & REHABILITATION

## 2018-02-19 PROCEDURE — 99214 OFFICE O/P EST MOD 30 MIN: CPT | Performed by: PHYSICIAN ASSISTANT

## 2018-02-19 PROCEDURE — 97110 THERAPEUTIC EXERCISES: CPT | Performed by: PHYSICAL MEDICINE & REHABILITATION

## 2018-02-19 RX ORDER — CHOLECALCIFEROL (VITAMIN D3) 125 MCG
CAPSULE ORAL
COMMUNITY
Start: 2018-02-01 | End: 2018-02-19 | Stop reason: SDUPTHER

## 2018-02-19 NOTE — PATIENT INSTRUCTIONS
Goals: Food log (ie ) www myfitnesspal com,sparkpeople  com,loseit com,calorieking  com,etc    No sugary beverages  At least 64oz of water daily    Increase physical activity by 10 minutes daily  Practice lesson plans 1-6 in bariatric manual   Practice 30/60 rule

## 2018-02-19 NOTE — PROGRESS NOTES
Daily Note     Today's date: 2018  Patient name: Josi Montoya  : 1980  MRN: [id number]  Referring provider: Pedro Chowdary PA-C  Dx:   Encounter Diagnosis     ICD-10-CM    1  Neck pain on right side M54 2    2  Cervicalgia M54 2        Start Time: 3875  Stop Time: 0696  Total time in clinic (min): 65 minutes    Subjective: Pt reports 7/10 pain, she states that she has been performing the stretches in her HEP without a reduction in Sx        Objective: See treatment diary below  Daily Treatment Diary      Manual  18                   Gentle PA mobs: upper thoracic and cervical NP                     SOR, R UT, rhomboid, and levator STM NP  10 min                   Cervical upslide   3 min                   Sub occipital inhibition   2 min                                                 Exercise Diary  18                   Pulleys/UBE    5'                   Rhomboid stretch 3x10"  3 x 30"                   Levator stretch 3x10"  3 x 30"                   Upper trap stretch 3x10"  3 x 30"                   Chin tuck    NV                   Scap squeeze    5" hold x 10                   Foam roller series    NV                                           scalene stretch   3 x 30" ea                                                                                                                                                                                                                                                                                                 Modalities                        MHP/CP with TENS to conclude prn   x 10 min, seated                                                                              Assessment: Tolerated treatment well  Patient would benefit from continued PT      Plan: Continue per plan of care  Progress treatment as tolerated

## 2018-02-21 ENCOUNTER — TELEPHONE (OUTPATIENT)
Dept: BARIATRICS | Facility: CLINIC | Age: 38
End: 2018-02-21

## 2018-02-22 ENCOUNTER — APPOINTMENT (OUTPATIENT)
Dept: PHYSICAL THERAPY | Facility: CLINIC | Age: 38
End: 2018-02-22
Payer: COMMERCIAL

## 2018-02-26 ENCOUNTER — OFFICE VISIT (OUTPATIENT)
Dept: PHYSICAL THERAPY | Facility: CLINIC | Age: 38
End: 2018-02-26
Payer: COMMERCIAL

## 2018-02-26 DIAGNOSIS — M54.2 CERVICALGIA: ICD-10-CM

## 2018-02-26 DIAGNOSIS — M54.2 NECK PAIN ON RIGHT SIDE: Primary | ICD-10-CM

## 2018-02-26 PROCEDURE — 97110 THERAPEUTIC EXERCISES: CPT

## 2018-02-26 PROCEDURE — 97140 MANUAL THERAPY 1/> REGIONS: CPT

## 2018-02-26 NOTE — PROGRESS NOTES
Daily Note     Today's date: 2018  Patient name: Ninfa Parham  : 1980  MRN: [id number]  Referring provider: Smita Bishop PA-C  Dx:   Encounter Diagnosis     ICD-10-CM    1  Neck pain on right side M54 2    2  Cervicalgia M54 2                   Subjective: Patient indicates a 5/10 VAS this PM and reports increased joint stiffness post initial exam       Objective: See treatment diary below      Assessment:   Patient reported pain and selective tissue tension with all AROM and manual interventions  She noted mild sx relief post treatment but continued to note muscle soreness overall  Plan: Continue PT as per plan of care and patient tolerance      Daily Treatment Diary      Manual  18                 Gentle PA mobs: upper thoracic and cervical NP                     SOR, R UT, rhomboid, and levator STM NP  10 min  AS                 Cervical upslide   3 min  AS                 Sub occipital inhibition   2 min  AS                                               Exercise Diary  18                 Pulleys/UBE    5'  5'                 Rhomboid stretch 3x10"  3 x 30"  3x30''                 Levator stretch 3x10"  3 x 30"  3x30''                 Upper trap stretch 3x10"  3 x 30"  3x30''                 Chin tuck    NV  10x5''                 Scap squeeze    5" hold x 10  10x5''                 Foam roller series    NV                   hand clasp scap protraction w/ neck flex      10x2''                  scalene stretch   3 x 30" ea  3x30''                                                                                                                                                                                                                                                                                               Modalities                      MHP/CP with TENS to conclude prn   x 10 min, seated  x10

## 2018-03-01 ENCOUNTER — OFFICE VISIT (OUTPATIENT)
Dept: PHYSICAL THERAPY | Facility: CLINIC | Age: 38
End: 2018-03-01
Payer: COMMERCIAL

## 2018-03-01 DIAGNOSIS — M54.2 CERVICALGIA: ICD-10-CM

## 2018-03-01 DIAGNOSIS — M54.2 NECK PAIN ON RIGHT SIDE: Primary | ICD-10-CM

## 2018-03-01 PROCEDURE — 97010 HOT OR COLD PACKS THERAPY: CPT | Performed by: PHYSICAL THERAPIST

## 2018-03-01 PROCEDURE — 97110 THERAPEUTIC EXERCISES: CPT | Performed by: PHYSICAL THERAPIST

## 2018-03-01 PROCEDURE — 97140 MANUAL THERAPY 1/> REGIONS: CPT | Performed by: PHYSICAL THERAPIST

## 2018-03-01 NOTE — PROGRESS NOTES
Daily Note     Today's date: 3/1/2018  Patient name: Hebert Youssef  : 1980  MRN: [id number]  Referring provider: Shanice Martinez PA-C  Dx:   Encounter Diagnosis     ICD-10-CM    1  Neck pain on right side M54 2    2  Cervicalgia M54 2                   Subjective: Pt rates pain about 7/10 at start of session  Pt reports that yesterday she started having pain in L side of neck as well over L levator scap  Pt does not recall specific incident that started the pain        Objective: See treatment diary below    Daily Treatment Diary      Manual  2/14/18  2/19  2/26  3/1               Gentle PA mobs: upper thoracic and cervical NP      np               SOR, B UT, rhomboid, and levator STM NP  10 min  AS  8 min               Cervical upslide   3 min  AS  np               Sub occipital inhibition   2 min  AS  2 min                B C7 side glides       6 min                     Exercise Diary  2/14/18  2/19  2/26  3/1               Pulleys/UBE    5'  5'  2 min ea               Rhomboid stretch 3x10"  3 x 30"  3x30''  np               Levator stretch 3x10"  3 x 30"  3x30''  30"x3 ea               Upper trap stretch 3x10"  3 x 30"  3x30''  30"x 3 ea               Chin tuck    NV  10x5''  3"x20               Scap squeeze    5" hold x 10  10x5''  5"x15               Foam roller series    NV                   hand clasp scap protraction w/ neck flex      10x2''  3" x15                scalene stretch   3 x 30" ea  3x30''  30" x3 ea                                                                                                                                                                                                                                                                                             Modalities   2/19  2/26  3/1                 MHP/CP with TENS to conclude prn   x 10 min, seated  x10  CP in supine                                                                         Assessment: Tolerated treatment well  Patient would benefit from continued PT Pt required cuing for posture and correct performance of exercises  Pt had good tolerance to low grade mobs in supine and demonstrates improved passive rotation afterward  Pt rates pain 5/10 following exercises and manual therapy  Concluded with CP in supine  Plan: Continue per plan of care  Progress treatment as tolerated

## 2018-03-05 ENCOUNTER — APPOINTMENT (OUTPATIENT)
Dept: PHYSICAL THERAPY | Facility: CLINIC | Age: 38
End: 2018-03-05
Payer: COMMERCIAL

## 2018-03-06 ENCOUNTER — OFFICE VISIT (OUTPATIENT)
Dept: FAMILY MEDICINE CLINIC | Facility: CLINIC | Age: 38
End: 2018-03-06
Payer: COMMERCIAL

## 2018-03-06 VITALS
SYSTOLIC BLOOD PRESSURE: 118 MMHG | TEMPERATURE: 97.2 F | BODY MASS INDEX: 38.39 KG/M2 | OXYGEN SATURATION: 97 % | HEART RATE: 100 BPM | WEIGHT: 216.7 LBS | RESPIRATION RATE: 18 BRPM | HEIGHT: 63 IN | DIASTOLIC BLOOD PRESSURE: 60 MMHG

## 2018-03-06 DIAGNOSIS — R10.11 RIGHT UPPER QUADRANT ABDOMINAL PAIN: Primary | ICD-10-CM

## 2018-03-06 PROCEDURE — 99213 OFFICE O/P EST LOW 20 MIN: CPT | Performed by: PHYSICIAN ASSISTANT

## 2018-03-06 NOTE — PROGRESS NOTES
Assessment/Plan:  Patient Instructions   [facility] was notified by Arlyce Simmonds for ultrasound results but would not be released until patient consents with a release form  Patient advised that this may take several days to get these results  Continue omeprazole 20 mg daily  Go to the ER if any symptoms increase  Recommend re-evaluation with GI specialist if symptoms continue  M*Modal software was used to dictate this note  It may contain errors with dictating incorrect words/spelling  Please contact provider directly for any questions  Diagnoses and all orders for this visit:    Right upper quadrant abdominal pain          Subjective:      Patient ID: Leslye Hewitt is a 30 y o  female  Patient presents today for for follow-up from a recent ER visit at [facility] for abdominal pain  She points to the right upper quadrant  She states she has intermittent nausea but denies vomiting or known fevers or chills  She states she did have an ultrasound, a urine sample and some blood work and she states as far she knows there is no specific findings  She still has her gallbladder  She was prescribed Famotidine 20 mg twice daily and Bentyl 20 mg as needed but she is not taking either medication  She continues with omeprazole 20 mg daily  She did see a GI specialist with [facility] in January but was not having this abdominal pain at that time  She only has her discharge instruction sheet but no other records for review  The following portions of the patient's history were reviewed and updated as appropriate:   She  has a past medical history of Abdominal pain; Abnormal cells of cervix; Abnormal EKG; Acute headache; Amenorrhea; Anxiety; Back pain; Chronic low back pain; Chronic pain syndrome; Constipation; Contact dermatitis; Depression; Dermoid cyst of right lower extremity; Diabetes mellitus ([address] ); Drug-induced hepatic toxicity;  Elevated liver function tests; GERD (gastroesophageal reflux disease); Hypercholesterolemia; Hyperlipidemia; Left hip pain; Liver disease; Liver function study, abnormal; Lumbar degenerative disc disease; Lumbar radiculopathy; Morbid obesity ([facility] ); Normal vaginal delivery; Tendinitis of left ankle; UTI (urinary tract infection); and Vitamin D deficiency  She   Patient Active Problem List    Diagnosis Date Noted    Obesity, Class III, BMI 40-49 9 (morbid obesity) ([facility] ) 2018    Neck pain on right side 2018    Left hip pain 2018    Abdominal pain 2017    Abnormal cells of cervix 2017    Amenorrhea 2017    Vitamin D deficiency 2017    Abnormal EKG 10/25/2017    Chronic pain disorder 10/17/2017    Lumbar radiculopathy 10/17/2017    Tendinitis of left ankle 10/03/2017    Dermoid cyst of right lower extremity 2017    Elevated liver function tests 2017    BMI 40 0-44 9, adult ([facility] ) 2017    Hypercholesterolemia     Uncontrolled type 2 diabetes with neuropathy (HCC)     Depressive disorder     Anxiety     Chronic right-sided low back pain with sciatica 2014     She  has a past surgical history that includes Abdominal surgery; Tonsillectomy;  section; Tubal ligation; and Esophagogastroduodenoscopy (N/A, 2018)  Her family history includes Diabetes in her mother; Hyperlipidemia in her mother; Lung cancer in her maternal grandfather; Pancreatitis in her father; Thyroid disease in her paternal aunt  She  reports that she has never smoked  She has never used smokeless tobacco  She reports that she drinks alcohol  She reports that she does not use drugs  Current Outpatient Prescriptions   Medication Sig Dispense Refill    baclofen 10 mg tablet Take 1 tablet (10 mg total) by mouth 3 (three) times a day 90 tablet 1    busPIRone (BUSPAR) 10 mg tablet Take 10 mg by mouth 3 (three) times a day        Cholecalciferol (VITAMIN D3) 1000 units CAPS Take 1 capsule by mouth daily      cyclobenzaprine (FLEXERIL) 10 mg tablet Take 10 mg by mouth 3 (three) times a day as needed for muscle spasms      diclofenac potassium (CATAFLAM) 50 mg tablet Take 50 mg by mouth 2 (two) times a day      Empagliflozin-Metformin HCl (SYNJARDY) 5-1000 MG TABS Take by mouth      FLUoxetine (PROzac) 40 MG capsule Take 40 mg by mouth daily      gabapentin (NEURONTIN) 400 mg capsule Take 800 mg by mouth 3 (three) times a day        Insulin Glargine (TOUJEO SOLOSTAR SC) Inject 40 Units under the skin daily at bedtime        Liraglutide (VICTOZA SC) Inject 1 8 Units under the skin daily      omeprazole (PriLOSEC) 20 mg delayed release capsule Take 1 capsule by mouth daily (Patient taking differently: Take 20 mg by mouth as needed  ) 30 capsule 0    ondansetron (ZOFRAN-ODT) 4 mg disintegrating tablet Take 1 tablet by mouth every 8 (eight) hours as needed for nausea or vomiting for up to 7 days 20 tablet 0    SIMVASTATIN PO Take 40 mg by mouth daily        traZODone (DESYREL) 100 mg tablet Take 1 tablet by mouth daily at bedtime 30 tablet 0     No current facility-administered medications for this visit  Current Outpatient Prescriptions on File Prior to Visit   Medication Sig    baclofen 10 mg tablet Take 1 tablet (10 mg total) by mouth 3 (three) times a day    busPIRone (BUSPAR) 10 mg tablet Take 10 mg by mouth 3 (three) times a day      Cholecalciferol (VITAMIN D3) 1000 units CAPS Take 1 capsule by mouth daily    cyclobenzaprine (FLEXERIL) 10 mg tablet Take 10 mg by mouth 3 (three) times a day as needed for muscle spasms    diclofenac potassium (CATAFLAM) 50 mg tablet Take 50 mg by mouth 2 (two) times a day    Empagliflozin-Metformin HCl (SYNJARDY) 5-1000 MG TABS Take by mouth    FLUoxetine (PROzac) 40 MG capsule Take 40 mg by mouth daily    gabapentin (NEURONTIN) 400 mg capsule Take 800 mg by mouth 3 (three) times a day      Insulin Glargine (TOUJEO SOLOSTAR SC) Inject 40 Units under the skin daily at bedtime      Liraglutide (VICTOZA SC) Inject 1 8 Units under the skin daily    omeprazole (PriLOSEC) 20 mg delayed release capsule Take 1 capsule by mouth daily (Patient taking differently: Take 20 mg by mouth as needed  )    ondansetron (ZOFRAN-ODT) 4 mg disintegrating tablet Take 1 tablet by mouth every 8 (eight) hours as needed for nausea or vomiting for up to 7 days    SIMVASTATIN PO Take 40 mg by mouth daily      traZODone (DESYREL) 100 mg tablet Take 1 tablet by mouth daily at bedtime     No current facility-administered medications on file prior to visit  She is allergic to atorvastatin       Review of Systems   Constitutional: Negative for chills and fever  Gastrointestinal: Positive for abdominal pain and nausea  Negative for diarrhea and vomiting  Genitourinary:        She states she does have some pain down in the bladder region but was told that she does not have a urinary tract infection  Objective:      /60 (BP Location: Left arm, Patient Position: Sitting, Cuff Size: Large)   Pulse 100   Temp (!) 97 2 °F (36 2 °C) (Tympanic)   Resp 18   Ht 5' 2 5" (1 588 m)   Wt 98 3 kg (216 lb 11 2 oz)   LMP  (LMP Unknown)   SpO2 97%   Breastfeeding? No   BMI 39 00 kg/m²          Physical Exam   Constitutional: She appears well-developed and well-nourished  HENT:   Head: Normocephalic and atraumatic  Right Ear: External ear normal    Left Ear: External ear normal    Mouth/Throat: Oropharynx is clear and moist    Eyes:   No scleral icterus   Cardiovascular: Normal rate, regular rhythm and normal heart sounds  No murmur heard  Pulmonary/Chest: Effort normal    Abdominal: Soft  There is tenderness (She does have tenderness of the right upper quadrant  Negative Yee sign  )

## 2018-03-06 NOTE — PATIENT INSTRUCTIONS
[facility] was notified by Madeleine Sahni for ultrasound results but would not be released until patient consents with a release form  Patient advised that this may take several days to get these results  Continue omeprazole 20 mg daily  Go to the ER if any symptoms increase  Recommend re-evaluation with GI specialist if symptoms continue

## 2018-03-07 NOTE — PROCEDURES
Procedure    ATTENDING PHYSICIAN: Puja Bonilla MD     PROCEDURE: Lumbar interlaminar midline epidural steroid injection with steroid and local anesthetic under fluoroscopy at the L5-S1 level  PREPROCEDURE DIAGNOSIS: Low back pain and lower extremity radicular symptoms  POSTPROCEDURE DIAGNOSIS: Low back pain and lower extremity radicular symptoms  ANESTHESIA: Local    ESTIMATED BLOOD LOSS: Minimal    COMPLICATIONS: None    LOCATION: [facility]  CONSENT: Today's procedure, its potential benefits as well as its risks and potential side effects were reviewed  Discussed risks of the procedure including bleeding, infection, nerve irritation or damage, reactions to the medications, headache, failure of the pain to improve, and potential worsening of the pain were explained to the patient who verbalized understanding and who wished to proceed  Written informed consent was thereby obtained  DESCRIPTION OF THE PROCEDURE: After written informed consent was obtained, the patient was taken to the fluoroscopy suite and placed in the prone position  Anatomical landmarks were identified by way of fluoroscopy in multiple views  The skin of the lumbar region was prepped and draped in the usual sterile fashion  Strict aseptic technique was utilized  The skin and subcutaneous tissues at the needle entry site were infiltrated with 1% preservative-free lidocaine mixed with sodium bicarbonate using a 25-gauge 1-1/2-inch needle  A 20-gauge Tuohy needle was then incrementally advanced under fluoroscopy using a loss of resistance technique  Upon entering into the epidural space, a positive loss of resistance to air was noted and a characteristic "pop" was felt   Proper placement into the epidural space was confirmed with fluoroscopy in multiple views and by continued loss of resistance after injection of sterile preservative-free normal saline as well as the administration of contrast to delineate the epidural space  There were no paresthesias reported  After negative aspiration for CSF or heme, a 6 mL injectate consisting of 1 mL of Depo-Medrol 80 mg/mL and 1 mL of Depo-Medrol 40 mg/mL mixed with 4 mL of preservative-free normal saline was slowly injected  The patient tolerated the procedure well and all needles were removed with the tips intact  Hemostasis was maintained  There were no apparent paresthesias or complications  The skin was wiped clean and a Band-Aid was placed as appropriate  The patient was monitored for an appropriate period of time following the procedure and remained hemodynamically stable and neurovascularly intact following the procedure  The patient was ultimately discharged to home with supervision in good condition and instructed to call the office in a few days for an update or sooner as warranted  I was present and participated in all key and critical portions of this procedure        Signatures   Electronically signed by : JAMSHID Bloom ; Oct 24 2017 11:03AM EST                       (Author)

## 2018-03-08 ENCOUNTER — OFFICE VISIT (OUTPATIENT)
Dept: PHYSICAL THERAPY | Facility: CLINIC | Age: 38
End: 2018-03-08
Payer: COMMERCIAL

## 2018-03-08 DIAGNOSIS — M54.2 NECK PAIN ON RIGHT SIDE: Primary | ICD-10-CM

## 2018-03-08 DIAGNOSIS — M54.2 CERVICALGIA: ICD-10-CM

## 2018-03-08 PROCEDURE — 97010 HOT OR COLD PACKS THERAPY: CPT | Performed by: PHYSICAL THERAPIST

## 2018-03-08 PROCEDURE — 97140 MANUAL THERAPY 1/> REGIONS: CPT | Performed by: PHYSICAL THERAPIST

## 2018-03-08 PROCEDURE — 97110 THERAPEUTIC EXERCISES: CPT | Performed by: PHYSICAL THERAPIST

## 2018-03-08 NOTE — PROGRESS NOTES
Daily Note     Today's date: 3/8/2018  Patient name: Gudelia Zimmerman  : 1980  MRN: [id number]  Referring provider: Stevie Rodgers PA-C  Dx:   Encounter Diagnosis     ICD-10-CM    1  Neck pain on right side M54 2    2  Cervicalgia M54 2                   Subjective: Pt rates pain in neck about 5/10 at start of session        Objective: See treatment diary below    Daily Treatment Diary      Manual  2/14/18  2/19  2/26  3/1  3/8/18             Gentle PA mobs: upper thoracic and cervical NP      np  supine 4 min             SOR, B UT, rhomboid, and levator STM NP  10 min  AS  8 min  8 min             Cervical upslide   3 min  AS  np 2 min             Sub occipital inhibition   2 min  AS  2 min  2 min              B C7 side glides       6 min  np                   Exercise Diary  2/14/18  2/19  2/26  3/1  3/8/18             Pulleys/UBE    5'  5'  2 min ea  4 min ea             Rhomboid stretch 3x10"  3 x 30"  3x30''  np  np             Levator stretch 3x10"  3 x 30"  3x30''  30"x3 ea  30"x3 ea             Upper trap stretch 3x10"  3 x 30"  3x30''  30"x 3 ea  30"x 3 ea             Chin tuck    NV  10x5''  3"x20  3"x20             Scap squeeze    5" hold x 10  10x5''  5"x15  pn!             Foam roller series    NV                   hand clasp scap protraction w/ neck flex      10x2''  3" x15  10"x10              scalene stretch   3 x 30" ea  3x30''  30" x3 ea  30"x3 ea              tb shoulder ext         nv              tb low rows        nv                                                                                                                                                                                                                                           Modalities   2/19  2/26  3/1  3/8/18               MHP/CP with TENS to conclude prn   x 10 min, seated  x10  CP in supine  10 min CP                                                                           Assessment: Tolerated treatment fair  Patient demonstrated fatigue post treatment and would benefit from continued PT Pt declined scap retraction today secondary to too much pain in periscapular muscles  Pt had good tolerance to manuals  Concluded with CP in supine with pt rating pain reduced to 4/10  Plan: Continue per plan of care  Progress treatment as tolerated

## 2018-03-12 ENCOUNTER — OFFICE VISIT (OUTPATIENT)
Dept: PHYSICAL THERAPY | Facility: CLINIC | Age: 38
End: 2018-03-12
Payer: COMMERCIAL

## 2018-03-12 DIAGNOSIS — M54.2 CERVICALGIA: ICD-10-CM

## 2018-03-12 DIAGNOSIS — M54.2 NECK PAIN ON RIGHT SIDE: Primary | ICD-10-CM

## 2018-03-12 PROCEDURE — 97140 MANUAL THERAPY 1/> REGIONS: CPT | Performed by: PHYSICAL THERAPIST

## 2018-03-12 PROCEDURE — 97150 GROUP THERAPEUTIC PROCEDURES: CPT | Performed by: PHYSICAL THERAPIST

## 2018-03-12 NOTE — PROGRESS NOTES
Daily Note     Today's date: 3/12/2018  Patient name: Madelin Duran  : 1980  MRN: [id number]  Referring provider: Daron Leggett PA-C  Dx:   Encounter Diagnosis     ICD-10-CM    1  Neck pain on right side M54 2    2  Cervicalgia M54 2                   Subjective: Pt reports 5/10 pain in cervical spine at start of session, significant pain in low back        Objective: See treatment diary below    Daily Treatment Diary      Manual  2/14/18  2/19  2/26  3/1  3/8/18  3/12/18           Gentle PA mobs: upper thoracic and cervical NP      np  supine 4 min  np           SOR, B UT, rhomboid, and levator STM NP  10 min  AS  8 min  8 min  np           Cervical upslide   3 min  AS  np 2 min  np           Sub occipital inhibition   2 min  AS  2 min  2 min  np            B C7 side glides       6 min  np  np           B scap mobs in sidelying      12 min             Exercise Diary  2/14/18  2/19  2/26  3/1  3/8/18  3/12/18           Pulleys/UBE    5'  5'  2 min ea  4 min ea  2 min ea UBE; 5 min pulleys           Rhomboid stretch 3x10"  3 x 30"  3x30''  np  np  np           Levator stretch 3x10"  3 x 30"  3x30''  30"x3 ea  30"x3 ea  30"x3 ea           Upper trap stretch 3x10"  3 x 30"  3x30''  30"x 3 ea  30"x 3 ea  30"x 3 ea           Chin tuck    NV  10x5''  3"x20  3"x20  3"x20           Scap squeeze    5" hold x 10  10x5''  5"x15  pn!  np           Foam roller series    NV        np           hand clasp scap protraction w/ neck flex      10x2''  3" x15  10"x10  np            scalene stretch   3 x 30" ea  3x30''  30" x3 ea  30"x3 ea  np            tb shoulder ext         nv  nv            tb low rows        nv  nv                                                                                                                                                                                                                                         Modalities   2/19  2/26  3/1  3/8/18  3/12/18             MHP/CP with TENS to conclude prn   x 10 min, seated  x10  CP in supine  10 min CP  10 min                                                                    Assessment: Tolerated treatment fair  Patient would benefit from continued PT Pt was limited in exercise secondary to difficulty with finding a comfortable position due to low back pain  Pt had minimal relief with heat  Performed scapular mobility in sidelying to attempt to mobilize structures connecting cervical to lumbar spine  Pt reports increased pain in lumbar spine at end of session but rates pain in cervical spine 1-2/10 at end of session  Discussed with pt seeing MD for evaluation of low back to be able to treat this body part as well  Pt expressed understanding  Plan: Continue per plan of care  Progress treatment as tolerated

## 2018-03-14 ENCOUNTER — HOSPITAL ENCOUNTER (EMERGENCY)
Facility: HOSPITAL | Age: 38
Discharge: HOME | End: 2018-03-14
Attending: EMERGENCY MEDICINE | Admitting: EMERGENCY MEDICINE
Payer: COMMERCIAL

## 2018-03-14 VITALS
SYSTOLIC BLOOD PRESSURE: 130 MMHG | OXYGEN SATURATION: 100 % | TEMPERATURE: 98.2 F | HEART RATE: 86 BPM | BODY MASS INDEX: 39.6 KG/M2 | DIASTOLIC BLOOD PRESSURE: 66 MMHG | RESPIRATION RATE: 16 BRPM | WEIGHT: 220 LBS

## 2018-03-14 DIAGNOSIS — M54.9 BACK PAIN: ICD-10-CM

## 2018-03-14 DIAGNOSIS — R10.11 CHRONIC RUQ PAIN: Primary | ICD-10-CM

## 2018-03-14 DIAGNOSIS — G89.29 CHRONIC RUQ PAIN: Primary | ICD-10-CM

## 2018-03-14 LAB
BACTERIA UR QL AUTO: ABNORMAL /HPF
BILIRUB UR QL STRIP: NEGATIVE
CLARITY UR: CLEAR
COLOR UR: YELLOW
EXT PREG TEST URINE: NEGATIVE
GLUCOSE UR STRIP-MCNC: ABNORMAL MG/DL
HGB UR QL STRIP.AUTO: ABNORMAL
KETONES UR STRIP-MCNC: NEGATIVE MG/DL
LEUKOCYTE ESTERASE UR QL STRIP: ABNORMAL
NITRITE UR QL STRIP: NEGATIVE
NON-SQ EPI CELLS URNS QL MICRO: ABNORMAL /HPF
PH UR STRIP.AUTO: 6 [PH] (ref 4.5–8)
PROT UR STRIP-MCNC: NEGATIVE MG/DL
RBC #/AREA URNS AUTO: ABNORMAL /HPF
SP GR UR STRIP.AUTO: 1.01 (ref 1–1.03)
UROBILINOGEN UR QL STRIP.AUTO: 1 E.U./DL
WBC #/AREA URNS AUTO: ABNORMAL /HPF

## 2018-03-14 PROCEDURE — 81002 URINALYSIS NONAUTO W/O SCOPE: CPT | Performed by: EMERGENCY MEDICINE

## 2018-03-14 PROCEDURE — 81001 URINALYSIS AUTO W/SCOPE: CPT

## 2018-03-14 PROCEDURE — 96372 THER/PROPH/DIAG INJ SC/IM: CPT

## 2018-03-14 PROCEDURE — 99284 EMERGENCY DEPT VISIT MOD MDM: CPT

## 2018-03-14 PROCEDURE — 81025 URINE PREGNANCY TEST: CPT | Performed by: EMERGENCY MEDICINE

## 2018-03-14 RX ORDER — METHOCARBAMOL 500 MG/1
500 TABLET, FILM COATED ORAL 2 TIMES DAILY
Qty: 20 TABLET | Refills: 0 | Status: SHIPPED | OUTPATIENT
Start: 2018-03-14 | End: 2018-03-23

## 2018-03-14 RX ORDER — KETOROLAC TROMETHAMINE 30 MG/ML
30 INJECTION, SOLUTION INTRAMUSCULAR; INTRAVENOUS ONCE
Status: COMPLETED | OUTPATIENT
Start: 2018-03-14 | End: 2018-03-14

## 2018-03-14 RX ORDER — LIDOCAINE 50 MG/G
1 PATCH TOPICAL ONCE
Status: DISCONTINUED | OUTPATIENT
Start: 2018-03-14 | End: 2018-03-14 | Stop reason: HOSPADM

## 2018-03-14 RX ADMIN — LIDOCAINE 1 PATCH: 50 PATCH CUTANEOUS at 21:10

## 2018-03-14 RX ADMIN — KETOROLAC TROMETHAMINE 30 MG: 30 INJECTION, SOLUTION INTRAMUSCULAR at 21:11

## 2018-03-15 NOTE — DISCHARGE INSTRUCTIONS
Dolor abdominal   LO QUE NECESITA SABER:   El dolor abdominal puede ser sordo, [place], o tim  Usted puede sentir dolor localizado en omari nick área del abdomen o en todo el abdomen  El dolor puede ser causado por omari afección antoine estreñimiento, sensibilidad o intoxicación alimentaria, infección o omari obstrucción  Asimismo, el dolor abdominal puede deberse a omari hernia, apendicitis o Prabhakar Martha  Las enfermedades del hígado, la vesícula o el riñón también pueden causar dolor abdominal  La causa del dolor abdominal puede ser desconocida  INSTRUCCIONES SOBRE EL [facility] HOSPITAL[facility]IA:   Regrese a la patrice de emergencias si:   · Usted comienza a sentir un dolor en el pecho o dificultad para respirar que antes no sentía  · Usted siente un dolor con pulsaciones en la parte superior del abdomen o en la parte inferior de la espalda que de repente se vuelve guera  · El dolor se localiza en la parte inferior derecha del abdomen y [place] cuando se [place]  · Usted tiene fiebre por encima de los 100 4 °F (38 °C) o escalofríos  · Usted tiene vómitos y no puede retener líquidos ni alimentos en el estómago  · El dolor no mejora o empeora en las próximas 8 a 12 horas  · Usted nota saad en solano vómito o heces, o éstas tienen un aspecto negruzco y alquitranado  · Solano piel o las partes elizabeth de emily ojos se vuelven amarillentas  · Si usted es omari mounika y presenta abundante sangrado vaginal que no es solano menstruación  Pregúntele a solano Radha Landa vitaminas y minerales son adecuados para usted  · Usted siente dolor en la parte inferior de la espalda  · Usted es [employer] y tiene dolor en los testículos  · Siente dolor al Rebeca [place]  · Usted tiene preguntas o inquietudes acerca de solano condición o cuidado  Acuda en 24 horas a omari rosa de seguimiento con solano médico o antoine se le indique:  Anote emily preguntas para que se acuerde de hacerlas viv emily visitas     Medicamentos:   · Cosimo Fortune ser administrados para calmar stephen estómago y prevenir los vómitos o para disminuir el dolor  Pregunte cómo se debe jerri los analgésicos de forma charles  · [place] emily medicamentos antoine se le haya indicado  Consulte con stephen médico si usted vinny que stephen medicamento no le está ayudando o si presenta efectos secundarios  Infórmele si es alérgico a algún medicamento  Mantenga omari lista actualizada de los [employer], las vitaminas y los productos herbales que cynthia  Incluya los siguientes datos de los medicamentos: cantidad, frecuencia y motivo de administración  Traiga con usted la lista o los envases de la píldoras a emily citas de seguimiento  Lleve la lista de los medicamentos con usted en laura de omari emergencia  © 2017 [address]  Information is for End User's use only and may not be sold, redistributed or otherwise used for commercial purposes  All illustrations and images included in CareNotes® are the copyrighted property of A D A M , Inc  or Benny Patton  Esta información es sólo para uso en educación  Stephen intención no es darle un consejo médico sobre enfermedades o tratamientos  Colsulte con stephen Lesleigh Deana farmacéutico antes de seguir cualquier régimen médico para saber si es seguro y efectivo para usted  Dolor de espalda   LO QUE NECESITA SABER:   ¿Qué todd saber sobre el dolor de espalda? El dolor de espalda es común  Usted puede estar adolorido o sentir rigidez en amita o ambos lados de la espalda  El dolor se puede propagar a emily glúteos o muslos  ¿Qué causa o aumenta mi riesgo de tener dolor de espalda? Las condiciones que afectan la columna, las articulaciones, o los músculos pueden causar el dolor de espalda   Estos pueden incluir la artritis, estenosis de la toño dorsal (estrechamiento de la columna vertebral), tensión muscular o descomposición de los discos de la columna vertebral  Los siguientes aumentan stephen riesgo de presentar dolor de espalda:  · Inclinarse o levantar de omari forma repetitiva o girar o levantar artículos pesados    · Lesión debido a omari caída o accidente    · Falta de actividad física regular     · Obesidad, embarazo     · Fumar    · Envejecimiento    · Manejar, estar sentado o de pie por mucho tiempo    · Amberly postura mientras está sentado o de pie  ¿Cómo se diagnostica solano dolor de espalda? Solano médico le preguntará si tiene [employer]  El proveedor le puede preguntar cuál es solano historial del dolor de espalda y cómo comenzó  Le revisará la forma antoine se pone de pie y camina, y el rango de [place]  Muéstrele donde siente el dolor y que empeora o mejora el dolor  Explique el dolor, que tan tita es y el tiempo que le dura  Coméntele si el dolor empeora en la noche o cuando se [place]  ¿Cuál es el tratamiento para el dolor de espalda? · AINEs (Analgésicos antiinflamatorios no esteroides)  ayudan a disminuir la inflamación y el dolor  Deidre medicamento esta disponible con o sin omari receta médica  Los AINEs pueden causar sangrado estomacal o problemas renales en ciertas personas  Si usted cynthia un medicamento anticoagulante, siempre pregúntele a solano médico si los CORNELL son seguros para usted  Siempre alejo la etiqueta de deidre medicamento y [place] instrucciones  · El acetaminofén  [employer]  Está disponible sin receta médica  Pregunte la cantidad y la frecuencia con que debe tomarlos  [phone]  El acetaminofén puede causar daño en el hígado cuando no se cynthia de forma correcta  · Un medicamento con receta para el dolor  podrían ser Smiley Dust  Pregunte al médico cómo debe jerri deidre medicamento de forma charles  ¿Cómo puedo manejar mi dolor de espalda? · Aplique hielo  en solano espalda o en el área afectada de 15 a 20 minutos cada hora o según le indicaron  Use un paquete de hielo o ponga hielo molido dentro de [employer]  Cúbrala con omari toalla   El hielo disminuye el dolor y ayuda a evitar el daño en los tejidos  · La aplicación de calor  en solano espalda o en el área afectada de 20 a 30 minutos cada 2 horas viv los días que le indicaron  El calor ayuda a disminuir el dolor y los espasmos musculares  · Manténgase activo  lo más que pueda sin causar [employer]  El reposo en cama puede empeorar solano dolor de espalda  Evite levantar objetos hasta que ya no tenga dolor  ¿Cuándo todd comunicarme con mi médico?   · Usted tiene dolor de espalda que no mejora con el reposo, ni con el medicamento para el dolor  · Usted tiene fiebre  · Usted tiene un dolor que empeora cuando está acostado boca Robet Montane o al descansar  · Usted tiene dolor que empeora cuando tose o estornuda  · Usted pierde peso sin proponérselo  · Usted tiene preguntas o inquietudes acerca de solano condición o cuidado  ¿Cuándo todd buscar atención inmediata o llamar al 911? · Usted tiene dolor, entumecimiento o debilidad en omari o en ambas piernas  · El dolor se vuelve tan intenso que lo incapacita para caminar  · Usted no puede controlar solano orina ni emily deposiciones intestinales  · Usted tiene dolor de espalda intenso con dolor en el pecho  · Usted tiene dolor de espalda intenso, náuseas y vómito  · Usted tiene un dolor de espalda intenso que se propaga a un costado o al área genital   ACUERDOS SOBRE SOLANO CUIDADO:   Usted tiene el derecho de ayudar a planear solano cuidado  Aprenda todo lo que pueda sobre solano condición y antoine darle tratamiento  Discuta emily opciones de tratamiento con emily médicos para decidir el cuidado que usted desea recibir  Usted siempre tiene el derecho de rechazar el tratamiento  Esta información es sólo para uso en educación  Solano intención no es darle un consejo médico sobre enfermedades o tratamientos  Colsulte con solano Myron Patch farmacéutico antes de seguir cualquier régimen médico para saber si es seguro y efectivo para usted    © 2017 [address] Information is for End User's use only and may not be sold, redistributed or otherwise used for commercial purposes  All illustrations and images included in CareNotes® are the copyrighted property of A D A M , Inc  or Benny Patton

## 2018-03-15 NOTE — ED PROVIDER NOTES
History  Chief Complaint   Patient presents with    Back Pain     3-4 days upper and lower back pain after heavy lifting  Pain radiating to legs   Abdominal Pain     Adds that she is having RUQ pain states, "from the liver down "       History provided by:  Patient, spouse and relative   used: No (Family present in ER Translating, also  on Phone translating)    Back Pain   Location:  Lumbar spine  Quality:  Aching  Radiates to:  L posterior upper leg and R posterior upper leg  Pain severity:  Moderate  Pain is:  Same all the time  Onset quality:  Gradual  Timing:  Intermittent  Progression:  Waxing and waning  Chronicity:  Chronic  Context: not recent illness and not recent injury    Relieved by:  Being still  Worsened by:  Nothing  Ineffective treatments:  None tried  Associated symptoms: abdominal pain    Associated symptoms: no bladder incontinence, no bowel incontinence, no chest pain, no dysuria, no fever, no headaches, no numbness, no paresthesias and no weakness    Abdominal pain:     Location:  RUQ    Quality: cramping      Severity:  Moderate    Onset quality:  Gradual    Timing:  Intermittent    Progression:  Waxing and waning    Chronicity:  Chronic  Risk factors: no recent surgery    Abdominal Pain   Associated symptoms: no chest pain, no chills, no cough, no diarrhea, no dysuria, no fever, no nausea, no shortness of breath, no sore throat and no vomiting        Prior to Admission Medications   Prescriptions Last Dose Informant Patient Reported? Taking?    Cholecalciferol (VITAMIN D3) 1000 units CAPS   Yes No   Sig: Take 1 capsule by mouth daily   Empagliflozin-Metformin HCl (SYNJARDY) 5-1000 MG TABS   Yes No   Sig: Take by mouth   FLUoxetine (PROzac) 40 MG capsule   Yes No   Sig: Take 40 mg by mouth daily   Insulin Glargine (TOUJEO SOLOSTAR SC)   Yes No   Sig: Inject 40 Units under the skin daily at bedtime     Liraglutide (VICTOZA SC)   Yes No   Sig: Inject 1 8 Units under the skin daily   SIMVASTATIN PO   Yes No   Sig: Take 40 mg by mouth daily     baclofen 10 mg tablet   No No   Sig: Take 1 tablet (10 mg total) by mouth 3 (three) times a day   busPIRone (BUSPAR) 10 mg tablet   Yes No   Sig: Take 10 mg by mouth 3 (three) times a day     cyclobenzaprine (FLEXERIL) 10 mg tablet   Yes No   Sig: Take 10 mg by mouth 3 (three) times a day as needed for muscle spasms   diclofenac potassium (CATAFLAM) 50 mg tablet   Yes No   Sig: Take 50 mg by mouth 2 (two) times a day   gabapentin (NEURONTIN) 400 mg capsule   Yes No   Sig: Take 800 mg by mouth 3 (three) times a day     omeprazole (PriLOSEC) 20 mg delayed release capsule   No No   Sig: Take 1 capsule by mouth daily   Patient taking differently: Take 20 mg by mouth as needed     ondansetron (ZOFRAN-ODT) 4 mg disintegrating tablet   No No   Sig: Take 1 tablet by mouth every 8 (eight) hours as needed for nausea or vomiting for up to 7 days   traZODone (DESYREL) 100 mg tablet   No No   Sig: Take 1 tablet by mouth daily at bedtime      Facility-Administered Medications: None       Past Medical History:   Diagnosis Date    Abdominal pain     Abnormal cells of cervix     Abnormal EKG     Acute headache     Amenorrhea     Anxiety     Back pain     Chronic low back pain     Chronic pain syndrome     Constipation     Contact dermatitis     Depression     Dermoid cyst of right lower extremity     Diabetes mellitus (HCC)     Drug-induced hepatic toxicity     Elevated liver function tests     GERD (gastroesophageal reflux disease)     Hypercholesterolemia     Hyperlipidemia     Left hip pain     Liver disease     drug induced hepatic toxicity    Liver function study, abnormal     Lumbar degenerative disc disease     Lumbar radiculopathy     Morbid obesity (HCC)     Normal vaginal delivery     Tendinitis of left ankle     UTI (urinary tract infection)     Vitamin D deficiency        Past Surgical History:   Procedure Laterality Date    ABDOMINAL SURGERY       SECTION      ESOPHAGOGASTRODUODENOSCOPY N/A 2018    Procedure: ESOPHAGOGASTRODUODENOSCOPY (EGD) with BIOPSY;  Surgeon: Melisa Villalobos MD;  Location: [facility]; Service: Bariatrics    TONSILLECTOMY      TUBAL LIGATION         Family History   Problem Relation Age of Onset    Diabetes Mother     Hyperlipidemia Mother     Pancreatitis Father     Thyroid disease Paternal Aunt     Lung cancer Maternal Grandfather     Heart disease Neg Hx     Stroke Neg Hx      I have reviewed and agree with the history as documented  Social History   Substance Use Topics    Smoking status: Never Smoker    Smokeless tobacco: Never Used    Alcohol use No      Comment: Social        Review of Systems   Constitutional: Negative for activity change, chills and fever  HENT: Negative for facial swelling, sore throat and trouble swallowing  Eyes: Negative for pain and visual disturbance  Respiratory: Negative for cough, chest tightness and shortness of breath  Cardiovascular: Negative for chest pain and leg swelling  Gastrointestinal: Positive for abdominal pain  Negative for blood in stool, bowel incontinence, diarrhea, nausea and vomiting  Genitourinary: Negative for bladder incontinence, dysuria and flank pain  Musculoskeletal: Positive for back pain  Negative for neck pain and neck stiffness  Skin: Negative for pallor and rash  Allergic/Immunologic: Negative for environmental allergies and immunocompromised state  Neurological: Negative for dizziness, weakness, numbness, headaches and paresthesias  Hematological: Negative for adenopathy  Does not bruise/bleed easily  Psychiatric/Behavioral: Negative for agitation and behavioral problems  All other systems reviewed and are negative        Physical Exam  ED Triage Vitals [18]   Temperature Pulse Respirations Blood Pressure SpO2   98 2 °F (36 8 °C) 86 16 130/66 100 %      Temp Source Heart Rate Source Patient Position - Orthostatic VS BP Location FiO2 (%)   Oral Monitor Sitting Right arm --      Pain Score       9           Orthostatic Vital Signs  Vitals:    03/14/18 2010   BP: 130/66   Pulse: 86   Patient Position - Orthostatic VS: Sitting       Physical Exam   Constitutional: She is oriented to person, place, and time  She appears well-developed and well-nourished  No distress  HENT:   Head: Normocephalic and atraumatic  Eyes: EOM are normal    Neck: Normal range of motion  Neck supple  Cardiovascular: Normal rate, regular rhythm, normal heart sounds and intact distal pulses  Pulmonary/Chest: Effort normal and breath sounds normal    Abdominal: Soft  Bowel sounds are normal  There is no tenderness  There is no rebound and no guarding  Musculoskeletal: Normal range of motion  Neurological: She is alert and oriented to person, place, and time  Skin: Skin is warm and dry  Psychiatric: She has a normal mood and affect  Nursing note and vitals reviewed        ED Medications  Medications   lidocaine (LIDODERM) 5 % patch 1 patch (1 patch Transdermal Medication Applied 3/14/18 2110)   ketorolac (TORADOL) injection 30 mg (30 mg Intramuscular Given 3/14/18 2111)       Diagnostic Studies  Results Reviewed     Procedure Component Value Units Date/Time    Urine Microscopic [[id number]]  (Abnormal) Collected:  03/14/18 2019    Lab Status:  Final result Specimen:  Urine from Urine, Clean Catch Updated:  03/14/18 2038     RBC, UA 0-1 (A) /hpf      WBC, UA None Seen /hpf      Epithelial Cells None Seen /hpf      Bacteria, UA None Seen /hpf     POCT pregnancy, urine [[id number]]  (Normal) Resulted:  03/14/18 2020    Lab Status:  Final result Updated:  03/14/18 2022     EXT PREG TEST UR (Ref: Negative) negative    POCT urinalysis dipstick [[id number]]  (Abnormal) Resulted:  03/14/18 2020    Lab Status:  Final result Specimen:  Urine Updated:  03/14/18 2022    ED Urine Macroscopic [[id number]] (Abnormal) Collected:  03/14/18 2019    Lab Status:  Final result Specimen:  Urine Updated:  03/14/18 2020     Color, UA Yellow     Clarity, UA Clear     pH, UA 6 0     Leukocytes, UA Elevated glucose may cause decreased leukocyte values  See urine microscopic for [facility] result/ (A)     Nitrite, UA Negative     Protein, UA Negative mg/dl      Glucose, UA >=1000 (1%) (A) mg/dl      Ketones, UA Negative mg/dl      Urobilinogen, UA 1 0 E U /dl      Bilirubin, UA Negative     Blood, UA Trace (A)     Specific [place], UA 1 010    Narrative:       CLINITEK RESULT                 No orders to display              Procedures  Procedures       Phone Contacts  ED Phone Contact    ED Course  ED Course                                MDM  Number of Diagnoses or Management Options  Back pain: new and does not require workup  Chronic RUQ pain: new and does not require workup  Diagnosis management comments: Patient is a 29 female, comes in with complaints of acute on chronic lower back pain radiating to legs, has known disc disease has steroid injections in the spine last 1 January 2018; also complains chronic right quadrant pain for which she has been seen has had imaging including ultrasound done December that did not show any gallstones acute biliary pathology; no further is known to have been done  Denies bowel bladder incontinence  Patient is alert, oriented, vital signs stable, no acute distress, abdomen is soft, mild tenderness right upper quadrant, negative Yee sign; spine no deformity, no swelling neurovascular intact distally    Impression:  Chronic back pain and right upper quadrant pain, spoke with  on the phone, who translated to the patient, who understands that symptoms have been chronic, has had workup done, possibly the next step would be to get a HIDA scan as decided by the family doctor/GI; patient has appointment with gastroenterologist   Will give Toradol IM in the ER, discharge lidocaine patch, Robaxin, follow up family doctor/GI  Amount and/or Complexity of Data Reviewed  Review and summarize past medical records: yes (Labs, US RUQ results _12/2017, Steroid injection to Spine _1/2018)      CritCare Time    Disposition  Final diagnoses:   Chronic RUQ pain   Back pain     Time reflects when diagnosis was documented in both MDM as applicable and the Disposition within this note     Time User Action Codes Description Comment    3/14/2018  9:01 PM Yudi Dice Add [R10 11,  G89 29] Chronic RUQ pain     3/14/2018  9:02 PM Yudi Dice Add [M54 9] Back pain       ED Disposition     ED Disposition Condition Comment    Discharge  Neha Slice discharge to home/self care  Condition at discharge: Stable        Follow-up Information     Follow up With Specialties Details Why [address], [place] [facility]   [address] [facility]  [address] [place] [address]  [address] [employer]        Schedule an appointment as soon as possible for a visit  [address] [employer] "HIDA SCAN" FOR CHRONIC RUQ PAIN          Discharge Medication List as of 3/14/2018  9:04 PM      START taking these medications    Details   methocarbamol (ROBAXIN) 500 mg tablet Take 1 tablet (500 mg total) by mouth 2 (two) times a day, Starting Wed 3/14/2018, Print         CONTINUE these medications which have NOT CHANGED    Details   baclofen 10 mg tablet Take 1 tablet (10 mg total) by mouth 3 (three) times a day, Starting Thu 2/8/2018, Normal      busPIRone (BUSPAR) 10 mg tablet Take 10 mg by mouth 3 (three) times a day , Until Discontinued, Historical Med      Cholecalciferol (VITAMIN D3) 1000 units CAPS Take 1 capsule by mouth daily, Historical Med      cyclobenzaprine (FLEXERIL) 10 mg tablet Take 10 mg by mouth 3 (three) times a day as needed for muscle spasms, Historical Med      diclofenac potassium (CATAFLAM) 50 mg tablet Take 50 mg by mouth 2 (two) times a day, Historical Med Empagliflozin-Metformin HCl (SYNJARDY) 5-1000 MG TABS Take by mouth, Historical Med      FLUoxetine (PROzac) 40 MG capsule Take 40 mg by mouth daily, Historical Med      gabapentin (NEURONTIN) 400 mg capsule Take 800 mg by mouth 3 (three) times a day  , Historical Med      Insulin Glargine (TOUJEO SOLOSTAR SC) Inject 40 Units under the skin daily at bedtime  , Historical Med      Liraglutide (VICTOZA SC) Inject 1 8 Units under the skin daily, Historical Med      omeprazole (PriLOSEC) 20 mg delayed release capsule Take 1 capsule by mouth daily, Starting u 9/14/2017, Print      ondansetron (ZOFRAN-ODT) 4 mg disintegrating tablet Take 1 tablet by mouth every 8 (eight) hours as needed for nausea or vomiting for up to 7 days, Starting Tue 1/16/2018, Until Tue 1/23/2018, Print      SIMVASTATIN PO Take 40 mg by mouth daily  , Historical Med      traZODone (DESYREL) 100 mg tablet Take 1 tablet by mouth daily at bedtime, Starting 2/4/2017, Until Discontinued, No Print           No discharge procedures on file      ED Provider  Electronically Signed by           Elizabeth Johnson MD  03/14/18 6312

## 2018-03-19 ENCOUNTER — OFFICE VISIT (OUTPATIENT)
Dept: PHYSICAL THERAPY | Facility: CLINIC | Age: 38
End: 2018-03-19
Payer: COMMERCIAL

## 2018-03-19 DIAGNOSIS — M54.2 CERVICALGIA: ICD-10-CM

## 2018-03-19 DIAGNOSIS — M54.2 NECK PAIN ON RIGHT SIDE: Primary | ICD-10-CM

## 2018-03-19 PROCEDURE — 97010 HOT OR COLD PACKS THERAPY: CPT

## 2018-03-19 PROCEDURE — 97110 THERAPEUTIC EXERCISES: CPT

## 2018-03-19 NOTE — PROGRESS NOTES
Daily Note     Today's date: 3/19/2018  Patient name: Shaheen Bermudez  : 1980  MRN: [id number]  Referring provider: Napoleon Alonzo PA-C  Dx:   Encounter Diagnosis     ICD-10-CM    1  Neck pain on right side M54 2    2  Cervicalgia M54 2                   Subjective: Pt reports 3-4/10 pain in cervical spine at start of session, continues to c/o significant pain in low back  Pt denies pain in CS post TE session but reports no change in low back pain  She reports an appointment with "Spine and Pain "  Finished with [facility]  Objective: See treatment diary below    Daily Treatment Diary      Manual  2/14/18  2/19  2/26  3/1  3/8/18  3/12/18  3/19/18         Gentle PA mobs: upper thoracic and cervical NP      np  supine 4 min  np  np         SOR, B UT, rhomboid, and levator STM NP  10 min  AS  8 min  8 min  np  np         Cervical upslide   3 min  AS  np 2 min  np  np         Sub occipital inhibition   2 min  AS  2 min  2 min  np  np          B C7 side glides       6 min  np  np  np         B scap mobs in sidelying      12 min np            Exercise Diary  2/14/18  2/19  2/26  3/1  3/8/18  3/12/18  3/19/18         Pulleys/UBE    5'  5'  2 min ea  4 min ea  2 min ea UBE; 5 min pulleys  5 min pulleys    Resume  UBE nv         Rhomboid stretch 3x10"  3 x 30"  3x30''  np  np  np  3x30''         Levator stretch 3x10"  3 x 30"  3x30''  30"x3 ea  30"x3 ea  30"x3 ea   30"x3 ea         Upper trap stretch 3x10"  3 x 30"  3x30''  30"x 3 ea  30"x 3 ea  30"x 3 ea  30"x 3 ea         Chin tuck    NV  10x5''  3"x20  3"x20  3"x20  3" x 20         Scap squeeze    5" hold x 10  10x5''  5"x15  pn!  np  np         Foam roller series    NV        np  np         hand clasp scap protraction w/ neck flex      10x2''  3" x15  10"x10  np  10"x10          scalene stretch   3 x 30" ea  3x30''  30" x3 ea  30"x3 ea  np  30"x3 ea          tb shoulder ext         nv  nv  org 3" x 15 w/ ab brace          tb low rows        nv  nv  org 3" x 15 w/ ab brace                                                                                                                                                                                                                                       Modalities   2/19  2/26  3/1  3/8/18  3/12/18  3/19/18           MHP/CP with TENS to conclude prn   x 10 min, seated  x10  CP in supine  10 min CP  10 min MH  10 min Mhp only                                                                 Assessment: Pt demonstrates good to fair tolerance to TE despite low back pain  Able to add TB TE today with no complaints  Patient would benefit from continued PT  Plan: Continue per plan of care  Progress treatment as tolerated

## 2018-03-22 ENCOUNTER — OFFICE VISIT (OUTPATIENT)
Dept: PHYSICAL THERAPY | Facility: CLINIC | Age: 38
End: 2018-03-22
Payer: COMMERCIAL

## 2018-03-22 ENCOUNTER — OFFICE VISIT (OUTPATIENT)
Dept: BARIATRICS | Facility: CLINIC | Age: 38
End: 2018-03-22

## 2018-03-22 VITALS — BODY MASS INDEX: 38.2 KG/M2 | HEIGHT: 63 IN | WEIGHT: 215.6 LBS

## 2018-03-22 VITALS — HEIGHT: 63 IN | WEIGHT: 215.6 LBS | BODY MASS INDEX: 38.2 KG/M2

## 2018-03-22 DIAGNOSIS — Z98.84 BARIATRIC SURGERY STATUS: Primary | ICD-10-CM

## 2018-03-22 DIAGNOSIS — E66.9 OBESITY, CLASS II, BMI 35-39.9: ICD-10-CM

## 2018-03-22 DIAGNOSIS — M54.2 NECK PAIN ON RIGHT SIDE: Primary | ICD-10-CM

## 2018-03-22 DIAGNOSIS — M54.2 CERVICALGIA: ICD-10-CM

## 2018-03-22 DIAGNOSIS — E78.00 HYPERCHOLESTEROLEMIA: ICD-10-CM

## 2018-03-22 DIAGNOSIS — IMO0002 UNCONTROLLED TYPE 2 DIABETES WITH NEUROPATHY: Primary | ICD-10-CM

## 2018-03-22 PROCEDURE — 97530 THERAPEUTIC ACTIVITIES: CPT

## 2018-03-22 PROCEDURE — 97140 MANUAL THERAPY 1/> REGIONS: CPT

## 2018-03-22 PROCEDURE — RECHECK

## 2018-03-22 PROCEDURE — 97110 THERAPEUTIC EXERCISES: CPT

## 2018-03-22 PROCEDURE — RECHECK: Performed by: DIETITIAN, REGISTERED

## 2018-03-22 NOTE — PROGRESS NOTES
Bariatric Follow Up Nutrition Note    Preop  6 Month Program    Type of surgery  Preop  Surgery Date: TBD  Surgeon: Dr Garcia Madsen  30 y o   female  Height 5' 2 5" (1 588 m), weight 97 8 kg (215 lb 9 6 oz), last menstrual period 03/10/2018, not currently breastfeeding  Body mass index is 38 81 kg/m²  Weight in (lb) to have BMI = 25: 139 3 lb   Pre-Op Excess Wt: 76 3lb    Review of History and Medications   Past Medical History:   Diagnosis Date    Abdominal pain     Abnormal cells of cervix     Abnormal EKG     Acute headache     Amenorrhea     Anxiety     Back pain     Chronic low back pain     Chronic pain syndrome     Constipation     Contact dermatitis     Depression     Dermoid cyst of right lower extremity     Diabetes mellitus (HCC)     Drug-induced hepatic toxicity     Elevated liver function tests     GERD (gastroesophageal reflux disease)     Hypercholesterolemia     Hyperlipidemia     Left hip pain     Liver disease     drug induced hepatic toxicity    Liver function study, abnormal     Lumbar degenerative disc disease     Lumbar radiculopathy     Morbid obesity (HCC)     Normal vaginal delivery     Tendinitis of left ankle     UTI (urinary tract infection)     Vitamin D deficiency      Past Surgical History:   Procedure Laterality Date    ABDOMINAL SURGERY       SECTION      ESOPHAGOGASTRODUODENOSCOPY N/A 2018    Procedure: ESOPHAGOGASTRODUODENOSCOPY (EGD) with BIOPSY;  Surgeon: Jose Carlos Ortega MD;  Location: [facility];   Service: Bariatrics    TONSILLECTOMY      TUBAL LIGATION       Social History     Social History    Marital status: /Civil Union     Spouse name: N/A    Number of children: N/A    Years of education: N/A     Social History Main Topics    Smoking status: Never Smoker    Smokeless tobacco: Never Used    Alcohol use No      Comment: Social    Drug use: No    Sexual activity: Not on file     Other Topics Concern    Not on file     Social History Narrative    No narrative on file       Current Outpatient Prescriptions:     baclofen 10 mg tablet, Take 1 tablet (10 mg total) by mouth 3 (three) times a day, Disp: 90 tablet, Rfl: 1    busPIRone (BUSPAR) 10 mg tablet, Take 10 mg by mouth 3 (three) times a day , Disp: , Rfl:     Cholecalciferol (VITAMIN D3) 1000 units CAPS, Take 1 capsule by mouth daily, Disp: , Rfl:     cyclobenzaprine (FLEXERIL) 10 mg tablet, Take 10 mg by mouth 3 (three) times a day as needed for muscle spasms, Disp: , Rfl:     diclofenac potassium (CATAFLAM) 50 mg tablet, Take 50 mg by mouth 2 (two) times a day, Disp: , Rfl:     Empagliflozin-Metformin HCl (SYNJARDY) 5-1000 MG TABS, Take by mouth, Disp: , Rfl:     FLUoxetine (PROzac) 40 MG capsule, Take 40 mg by mouth daily, Disp: , Rfl:     gabapentin (NEURONTIN) 400 mg capsule, Take 800 mg by mouth 3 (three) times a day  , Disp: , Rfl:     Insulin Glargine (TOUJEO SOLOSTAR SC), Inject 40 Units under the skin daily at bedtime  , Disp: , Rfl:     Liraglutide (VICTOZA SC), Inject 1 8 Units under the skin daily, Disp: , Rfl:     methocarbamol (ROBAXIN) 500 mg tablet, Take 1 tablet (500 mg total) by mouth 2 (two) times a day, Disp: 20 tablet, Rfl: 0    omeprazole (PriLOSEC) 20 mg delayed release capsule, Take 1 capsule by mouth daily (Patient taking differently: Take 20 mg by mouth as needed  ), Disp: 30 capsule, Rfl: 0    ondansetron (ZOFRAN-ODT) 4 mg disintegrating tablet, Take 1 tablet by mouth every 8 (eight) hours as needed for nausea or vomiting for up to 7 days, Disp: 20 tablet, Rfl: 0    SIMVASTATIN PO, Take 40 mg by mouth daily  , Disp: , Rfl:     traZODone (DESYREL) 100 mg tablet, Take 1 tablet by mouth daily at bedtime, Disp: 30 tablet, Rfl: 0    Food Intake and Lifestyle Assessment   Food Intake Assessment completed via usual diet recall  Patient has been reducing portions, eating more fruits and vegetables, eating 3 meals per day, practicing the 30/60 rule, chewing her food well, she is taking a vitamin D supplement   Beverage intake: water and coffee/tea  Diet texture/stage: regular  Protein supplement: none- provided with samples of Premier protein   Estimated protein intake per day: 40-60 grams   Estimated fluid intake per day: 48 to 64 ounces   Meals eaten away from home: 0-1  Typical meal pattern: 3 meals per day and 0-1 snacks per day  Eating Behaviors: Large portion sizes    Food allergies or intolerances: none noted   Cultural or Denominational considerations: n/a    Physical Assessment  Physical Activity  Types of exercise: Walking  Current physical limitations: none noted     Psychosocial Assessment   Support systems: relative(s)  Socioeconomic factors: patient Ukrainian speaking, low socio economic,   LCSW Ruth Avalos provided interpretation in Ukrainian     Nutrition Diagnosis  Diagnosis: Overweight / Obesity (NC-3 3)  Related to: Food and nutrition-related knowledge deficit  As Evidenced by: BMI>35     Interventions and Teaching   Patient educated on post-op nutrition guidelines  Patient educated and handouts provided  Diet progression  Adequate hydration  Exercise  Suggestions for pre-op diet  Protein supplements  Appropriate carbohydrate, protein, and fat intake, and food/fluid choices to maximize safe weight loss, nutrient intake, and tolerance   Vitamin / Mineral supplementation of Vitamin D- recommended that patient start taking a multivitamin and mineral supplement    Education provided to: patient and with Ukrainian interpretation by Ruth Avalos    Barriers to learning: Language    Readiness to change: action    Comprehension: demonstrated understanding     Expected Compliance: good    Goals  Exercise 30 minutes 5 times per week, Complete lession plans 1-6, Eat 3 meals per day and provided with lab slip to get pre op labs completed     Patient has f/u in one month with medical PA Time Spent:   30 Minutes

## 2018-03-22 NOTE — PROGRESS NOTES
Met with patient to review workflow and weight loss( yes) and also to provide translation when patient met with RD   NV

## 2018-03-22 NOTE — PROGRESS NOTES
Daily Note     Today's date: 3/22/2018  Patient name: Nadia Borrero  : 1980  MRN: [id number]  Referring provider: Tye Lemon PA-C  Dx:   Encounter Diagnosis     ICD-10-CM    1  Neck pain on right side M54 2    2  Cervicalgia M54 2                   Subjective: Pt reports 4-5/10 pain in cervical spine at start of session, continues to c/o significant pain in low back  Pt denies pain in CS post TE session  Finished with MHP to low back  Objective: See treatment diary below    Daily Treatment Diary      Manual  2/14/18  2/19  2/26  3/1  3/8/18  3/12/18  3/19/18  3/22/18       Gentle PA mobs: upper thoracic and cervical NP      np  supine 4 min  np  np  np       SOR, B UT, rhomboid, and levator STM NP  10 min  AS  8 min  8 min  np  np  10 mins STM only       Cervical upslide   3 min  AS  np 2 min  np  np  np       Sub occipital inhibition   2 min  AS  2 min  2 min  np  np  np        B C7 side glides       6 min  np  np  np  np       B scap mobs in sidelying      12 min np 8  mins           Exercise Diary  2/14/18  2/19  2/26  3/1  3/8/18  3/12/18  3/19/18  3/22/18       Pulleys/UBE    5'  5'  2 min ea  4 min ea  2 min ea UBE; 5 min pulleys  5 min pulleys    Resume  UBE nv  UBE 2 mins ea       Rhomboid stretch 3x10"  3 x 30"  3x30''  np  np  np  3x30''  np       Levator stretch 3x10"  3 x 30"  3x30''  30"x3 ea  30"x3 ea  30"x3 ea   30"x3 ea  30"x3 ea       Upper trap stretch 3x10"  3 x 30"  3x30''  30"x 3 ea  30"x 3 ea  30"x 3 ea  30"x 3 ea  30"x 3 ea       Chin tuck    NV  10x5''  3"x20  3"x20  3"x20  3" x 20  3" x 20       Scap squeeze    5" hold x 10  10x5''  5"x15  pn!  np  np  np       Foam roller series    NV        np  np  1' ea       hand clasp scap protraction w/ neck flex      10x2''  3" x15  10"x10  np  10"x10  nv        scalene stretch   3 x 30" ea  3x30''  30" x3 ea  30"x3 ea  np  30"x3 ea  30"x3 ea        tb shoulder ext         nv  nv  org 3" x 15 w/ ab brace  org 3" x 15 w/ ab brace        tb low rows        nv  nv  org 3" x 15 w/ ab brace  org 3" x 15 w/ ab brace                                                                                                                                                                                                                                     Modalities     2/19  2/26  3/1  3/8/18 3/12/18  3/19/18 3/22/18       MHP/CP with TENS to conclude prn     x 10 min, seated  x10  CP in supine  10 min CP 10 min MH  10 mins  MHP only  10 mins  MHP only                                                             Assessment: Pt demonstrates good tolerance to TE despite low back pain reporting most relief with manual therapy  Patient would benefit from continued PT  Plan: Continue per plan of care  Progress treatment as tolerated

## 2018-03-23 ENCOUNTER — OFFICE VISIT (OUTPATIENT)
Dept: FAMILY MEDICINE CLINIC | Facility: CLINIC | Age: 38
End: 2018-03-23
Payer: COMMERCIAL

## 2018-03-23 VITALS
OXYGEN SATURATION: 99 % | SYSTOLIC BLOOD PRESSURE: 110 MMHG | DIASTOLIC BLOOD PRESSURE: 68 MMHG | BODY MASS INDEX: 39.93 KG/M2 | TEMPERATURE: 97 F | HEART RATE: 88 BPM | HEIGHT: 62 IN | WEIGHT: 217 LBS | RESPIRATION RATE: 18 BRPM

## 2018-03-23 DIAGNOSIS — G43.011 INTRACTABLE MIGRAINE WITHOUT AURA AND WITH STATUS MIGRAINOSUS: Primary | ICD-10-CM

## 2018-03-23 DIAGNOSIS — S86.811A STRAIN OF CALF MUSCLE, RIGHT, INITIAL ENCOUNTER: ICD-10-CM

## 2018-03-23 PROBLEM — S86.819A STRAIN OF CALF MUSCLE: Status: ACTIVE | Noted: 2018-03-23

## 2018-03-23 PROCEDURE — 99214 OFFICE O/P EST MOD 30 MIN: CPT | Performed by: PHYSICIAN ASSISTANT

## 2018-03-23 RX ORDER — BUTALBITAL, ACETAMINOPHEN AND CAFFEINE 50; 325; 40 MG/1; MG/1; MG/1
1 TABLET ORAL EVERY 4 HOURS PRN
Qty: 30 TABLET | Refills: 0 | Status: SHIPPED | OUTPATIENT
Start: 2018-03-23

## 2018-03-23 NOTE — PROGRESS NOTES
Assessment/Plan:    Patient Instructions   Apply heat to the right calf 3 times daily for 10 minutes  Extra-strength Tylenol as needed as package direct  Utilize baclofen 10 mg 3 times daily as needed which she states she has at home  Given instruction for home exercise stretches for the right calf strain  Fioricet as needed for headache  Advised that it may not be as effective since mid headache  Follow-up if there is no improvement over the next week  M*Modal software was used to dictate this note  It may contain errors with dictating incorrect words/spelling  Please contact provider directly for any questions  Diagnoses and all orders for this visit:    Intractable migraine without aura and with status migrainosus  -     butalbital-acetaminophen-caffeine (FIORICET,ESGIC) -40 mg per tablet; Take 1 tablet by mouth every 4 (four) hours as needed for headaches    Strain of calf muscle, right, initial encounter          Subjective:      Patient ID: Kee Mejia is a 30 y o  female  Patient presents today with her significant other who helps to translate in [address]  She complains of a headache/migraine over the past 2 days  In the past she took Fioricet but is currently out of the medication  She has not had this medication for several months  She does have associated nausea but denies any vomiting or associated visual changes  She is currently getting physical therapy for her neck pain  Denies cough, congestion, fever, chills  Complains of right calf pain  She felt a pull while she was walking today  No current treatment  She does have baclofen at home which she currently takes as needed for her chronic back pain  The following portions of the patient's history were reviewed and updated as appropriate:   She  has a past medical history of Abdominal pain; Abnormal cells of cervix; Abnormal EKG; Acute headache; Amenorrhea;  Anxiety; Back pain; Chronic low back pain; Chronic pain syndrome; Constipation; Contact dermatitis; Depression; Dermoid cyst of right lower extremity; Diabetes mellitus ([facility] [address] ); Drug-induced hepatic toxicity; Elevated liver function tests; GERD (gastroesophageal reflux disease); Hypercholesterolemia; Hyperlipidemia; Left hip pain; Liver disease; Liver function study, abnormal; Lumbar degenerative disc disease; Lumbar radiculopathy; Morbid obesity ([facility] [address] ); Normal vaginal delivery; Tendinitis of left ankle; UTI (urinary tract infection); and Vitamin D deficiency  She   Patient Active Problem List    Diagnosis Date Noted    Intractable migraine without aura and with status migrainosus 2018    Strain of calf muscle 2018    Obesity, Class III, BMI 40-49 9 (morbid obesity) ([facility] ) 2018    Neck pain on right side 2018    Left hip pain 2018    Abdominal pain 2017    Abnormal cells of cervix 2017    Amenorrhea 2017    Vitamin D deficiency 2017    Abnormal EKG 10/25/2017    Chronic pain disorder 10/17/2017    Lumbar radiculopathy 10/17/2017    Tendinitis of left ankle 10/03/2017    Dermoid cyst of right lower extremity 2017    Elevated liver function tests 2017    BMI 40 0-44 9, adult ([facility] ) 2017    Hypercholesterolemia     Uncontrolled type 2 diabetes with neuropathy (HCC)     Depressive disorder     Anxiety     Chronic right-sided low back pain with sciatica 2014     She  has a past surgical history that includes Abdominal surgery; Tonsillectomy;  section; Tubal ligation; and Esophagogastroduodenoscopy (N/A, 2018)  Her family history includes Diabetes in her mother; Hyperlipidemia in her mother; Lung cancer in her maternal grandfather; Pancreatitis in her father; Thyroid disease in her paternal aunt  She  reports that she has never smoked  She has never used smokeless tobacco  She reports that she does not drink alcohol or use drugs    Current Outpatient Prescriptions Medication Sig Dispense Refill    baclofen 10 mg tablet Take 1 tablet (10 mg total) by mouth 3 (three) times a day 90 tablet 1    busPIRone (BUSPAR) 10 mg tablet Take 10 mg by mouth 3 (three) times a day   butalbital-acetaminophen-caffeine (FIORICET,ESGIC) -40 mg per tablet Take 1 tablet by mouth every 4 (four) hours as needed for headaches 30 tablet 0    Cholecalciferol (VITAMIN D3) 1000 units CAPS Take 1 capsule by mouth daily      diclofenac potassium (CATAFLAM) 50 mg tablet Take 50 mg by mouth 2 (two) times a day      Empagliflozin-Metformin HCl (SYNJARDY) 5-1000 MG TABS Take by mouth      FLUoxetine (PROzac) 40 MG capsule Take 40 mg by mouth daily      gabapentin (NEURONTIN) 400 mg capsule Take 800 mg by mouth 3 (three) times a day        Insulin Glargine (TOUJEO SOLOSTAR SC) Inject 40 Units under the skin daily at bedtime        Liraglutide (VICTOZA SC) Inject 1 8 Units under the skin daily      omeprazole (PriLOSEC) 20 mg delayed release capsule Take 1 capsule by mouth daily (Patient taking differently: Take 20 mg by mouth as needed  ) 30 capsule 0    ondansetron (ZOFRAN-ODT) 4 mg disintegrating tablet Take 1 tablet by mouth every 8 (eight) hours as needed for nausea or vomiting for up to 7 days 20 tablet 0    SIMVASTATIN PO Take 40 mg by mouth daily        traZODone (DESYREL) 100 mg tablet Take 1 tablet by mouth daily at bedtime 30 tablet 0     No current facility-administered medications for this visit  Current Outpatient Prescriptions on File Prior to Visit   Medication Sig    baclofen 10 mg tablet Take 1 tablet (10 mg total) by mouth 3 (three) times a day    busPIRone (BUSPAR) 10 mg tablet Take 10 mg by mouth 3 (three) times a day      Cholecalciferol (VITAMIN D3) 1000 units CAPS Take 1 capsule by mouth daily    diclofenac potassium (CATAFLAM) 50 mg tablet Take 50 mg by mouth 2 (two) times a day    Empagliflozin-Metformin HCl (SYNJARDY) 5-1000 MG TABS Take by mouth    FLUoxetine (PROzac) 40 MG capsule Take 40 mg by mouth daily    gabapentin (NEURONTIN) 400 mg capsule Take 800 mg by mouth 3 (three) times a day      Insulin Glargine (TOUJEO SOLOSTAR SC) Inject 40 Units under the skin daily at bedtime      Liraglutide (VICTOZA SC) Inject 1 8 Units under the skin daily    omeprazole (PriLOSEC) 20 mg delayed release capsule Take 1 capsule by mouth daily (Patient taking differently: Take 20 mg by mouth as needed  )    ondansetron (ZOFRAN-ODT) 4 mg disintegrating tablet Take 1 tablet by mouth every 8 (eight) hours as needed for nausea or vomiting for up to 7 days    SIMVASTATIN PO Take 40 mg by mouth daily      traZODone (DESYREL) 100 mg tablet Take 1 tablet by mouth daily at bedtime    [DISCONTINUED] cyclobenzaprine (FLEXERIL) 10 mg tablet Take 10 mg by mouth 3 (three) times a day as needed for muscle spasms    [DISCONTINUED] methocarbamol (ROBAXIN) 500 mg tablet Take 1 tablet (500 mg total) by mouth 2 (two) times a day     No current facility-administered medications on file prior to visit  She is allergic to atorvastatin       Review of Systems   Constitutional:        As stated in HPI   HENT:        As stated in HPI   Respiratory: Negative for cough  Gastrointestinal:        As stated in HPI   Musculoskeletal:        As stated in HPI   Neurological: Positive for headaches  Objective:      /68   Pulse 88   Temp (!) 97 °F (36 1 °C) (Tympanic)   Resp 18   Ht 5' 2" (1 575 m)   Wt 98 4 kg (217 lb)   LMP 03/10/2018   SpO2 99%   BMI 39 69 kg/m²          Physical Exam   Constitutional: She appears well-developed and well-nourished  HENT:   Head: Normocephalic and atraumatic  Right Ear: External ear normal    Left Ear: External ear normal    Mouth/Throat: Oropharynx is clear and moist    Eyes: Conjunctivae and EOM are normal  Pupils are equal, round, and reactive to light  Neck: Neck supple     Cardiovascular: Normal rate, regular rhythm and normal heart sounds  No murmur heard  Pulmonary/Chest: Effort normal and breath sounds normal  No respiratory distress  She has no wheezes  She has no rales  Musculoskeletal:   Right lower leg:  She does have tenderness over the lateral gastrocnemius  No swelling  Lymphadenopathy:     She has no cervical adenopathy       f

## 2018-03-23 NOTE — PATIENT INSTRUCTIONS
Apply heat to the right calf 3 times daily for 10 minutes  Extra-strength Tylenol as needed as package direct  Utilize baclofen 10 mg 3 times daily as needed which she states she has at home  Given instruction for home exercise stretches for the right calf strain  Fioricet as needed for headache  Advised that it may not be as effective since mid headache  Follow-up if there is no improvement over the next week

## 2018-03-26 ENCOUNTER — APPOINTMENT (OUTPATIENT)
Dept: PHYSICAL THERAPY | Facility: CLINIC | Age: 38
End: 2018-03-26
Payer: COMMERCIAL

## 2018-03-29 ENCOUNTER — TELEPHONE (OUTPATIENT)
Dept: FAMILY MEDICINE CLINIC | Facility: CLINIC | Age: 38
End: 2018-03-29

## 2018-03-29 ENCOUNTER — OFFICE VISIT (OUTPATIENT)
Dept: PHYSICAL THERAPY | Facility: CLINIC | Age: 38
End: 2018-03-29
Payer: COMMERCIAL

## 2018-03-29 DIAGNOSIS — M54.2 NECK PAIN ON RIGHT SIDE: Primary | ICD-10-CM

## 2018-03-29 DIAGNOSIS — M54.2 CERVICALGIA: ICD-10-CM

## 2018-03-29 PROCEDURE — 97140 MANUAL THERAPY 1/> REGIONS: CPT

## 2018-03-29 PROCEDURE — 97010 HOT OR COLD PACKS THERAPY: CPT

## 2018-03-29 PROCEDURE — 97110 THERAPEUTIC EXERCISES: CPT

## 2018-03-29 NOTE — TELEPHONE ENCOUNTER
PT CALLED STATING BUTALBITAL 50 MG FOR HEADACHES WAS NEVER SENT TO [facility] SHE SEEN [facility] ON 3/26/18

## 2018-03-29 NOTE — TELEPHONE ENCOUNTER
It was accidentally sent to [facility], I called in to [facility] discount drugs today   Thank you

## 2018-03-29 NOTE — PROGRESS NOTES
Daily Note     Today's date: 3/29/2018  Patient name: Jose Barcenas  : 1980  MRN: [id number]  Referring provider: Dennice Saint, PA-C  Dx:   Encounter Diagnosis     ICD-10-CM    1  Neck pain on right side M54 2    2  Cervicalgia M54 2                   Subjective: Pt reports 5/10 pain in cervical spine at start of session, continues to c/o significant pain in low back  Pt reports mild decrease in pain in CS post TE session grading the pain a 3-4/10  Finished with MHP to low back  Objective: See treatment diary below    Daily Treatment Diary      Manual  18  3/1  3/8/18  3/12/18  3/19/18  3/22/18  3/29/18     Gentle PA mobs: upper thoracic and cervical NP      np  supine 4 min  np  np  np  np     SOR, B UT, rhomboid, and levator STM NP  10 min  AS  8 min  8 min  np  np  10 mins STM only  15 mins     Cervical upslide   3 min  AS  np 2 min  np  np  np  np     Sub occipital inhibition   2 min  AS  2 min  2 min  np  np  np  np      B C7 side glides       6 min  np  np  np  np  np     B scap mobs in sidelying      12 min np 8  mins np          Exercise Diary  18  3/1  3/8/18  3/12/18  3/19/18  3/22/18  3/29/18     Pulleys/UBE    5'  5'  2 min ea  4 min ea  2 min ea UBE; 5 min pulleys  5 min pulleys    Resume  UBE nv  UBE 2 mins ea   UBE 2 mins ea     Rhomboid stretch 3x10"  3 x 30"  3x30''  np  np  np  3x30''  np  3x30''     Levator stretch 3x10"  3 x 30"  3x30''  30"x3 ea  30"x3 ea  30"x3 ea   30"x3 ea  30"x3 ea  30"x3 ea     Upper trap stretch 3x10"  3 x 30"  3x30''  30"x 3 ea  30"x 3 ea  30"x 3 ea  30"x 3 ea  30"x 3 ea  30"x3 ea     Chin tuck    NV  10x5''  3"x20  3"x20  3"x20  3" x 20  3" x 20  3" x 20     Scap squeeze    5" hold x 10  10x5''  5"x15  pn!  np  np  np       Foam roller series    NV        np  np  1' ea  1' ea     hand clasp scap protraction w/ neck flex      10x2''  3" x15  10"x10  np  10"x10  nv  nv      scalene stretch   3 x 30" ea  3x30''  30" x3 ea  30"x3 ea  np  30"x3 ea  30"x3 ea  30"x3 ea      tb shoulder ext         nv  nv  org 3" x 15 w/ ab brace  org 3" x 15 w/ ab brace  org 3" x 20 w/ ab brace      tb low rows        nv  nv  org 3" x 15 w/ ab brace  org 3" x 15 w/ ab brace  org 3" x 20 w/ ab brace                                                                                                                                                                                                                                   Modalities     2/19  2/26  3/1  3/8/18 3/12/18  3/19/18 3/22/18  3/29/18     MHP/CP with TENS to conclude prn     x 10 min, seated  x10  CP in supine  10 min CP 10 min MH  10 mins  MHP only  10 mins  MHP only  10 mins  MHP only                                                           Assessment: Pt demonstrates good tolerance to TE requiring occ cues for proper posture  Patient would benefit from continued PT  Plan: Continue per plan of care  Progress treatment as tolerated

## 2018-04-05 ENCOUNTER — TELEPHONE (OUTPATIENT)
Dept: GASTROENTEROLOGY | Facility: CLINIC | Age: 38
End: 2018-04-05

## 2018-04-05 ENCOUNTER — OFFICE VISIT (OUTPATIENT)
Dept: GASTROENTEROLOGY | Facility: MEDICAL CENTER | Age: 38
End: 2018-04-05
Payer: COMMERCIAL

## 2018-04-05 ENCOUNTER — EVALUATION (OUTPATIENT)
Dept: PHYSICAL THERAPY | Facility: CLINIC | Age: 38
End: 2018-04-05
Payer: COMMERCIAL

## 2018-04-05 ENCOUNTER — LAB (OUTPATIENT)
Dept: LAB | Facility: MEDICAL CENTER | Age: 38
End: 2018-04-05
Payer: COMMERCIAL

## 2018-04-05 VITALS
HEIGHT: 62 IN | DIASTOLIC BLOOD PRESSURE: 70 MMHG | HEART RATE: 80 BPM | WEIGHT: 218 LBS | SYSTOLIC BLOOD PRESSURE: 118 MMHG | TEMPERATURE: 96.6 F | BODY MASS INDEX: 40.12 KG/M2

## 2018-04-05 DIAGNOSIS — M54.2 NECK PAIN ON RIGHT SIDE: ICD-10-CM

## 2018-04-05 DIAGNOSIS — K92.1 BLOOD IN STOOL: Primary | ICD-10-CM

## 2018-04-05 DIAGNOSIS — R10.11 RIGHT UPPER QUADRANT ABDOMINAL PAIN: ICD-10-CM

## 2018-04-05 DIAGNOSIS — E66.01 OBESITY, CLASS III, BMI 40-49.9 (MORBID OBESITY) (HCC): ICD-10-CM

## 2018-04-05 DIAGNOSIS — E78.00 HYPERCHOLESTEROLEMIA: ICD-10-CM

## 2018-04-05 DIAGNOSIS — F32.A DEPRESSIVE DISORDER: ICD-10-CM

## 2018-04-05 DIAGNOSIS — E11.69 DIABETES MELLITUS TYPE 2 IN OBESE (HCC): ICD-10-CM

## 2018-04-05 DIAGNOSIS — D72.828 OTHER ELEVATED WHITE BLOOD CELL (WBC) COUNT: ICD-10-CM

## 2018-04-05 DIAGNOSIS — E66.9 DIABETES MELLITUS TYPE 2 IN OBESE (HCC): ICD-10-CM

## 2018-04-05 DIAGNOSIS — K92.1 BLOOD IN STOOL: ICD-10-CM

## 2018-04-05 DIAGNOSIS — K59.04 CHRONIC IDIOPATHIC CONSTIPATION: ICD-10-CM

## 2018-04-05 DIAGNOSIS — K59.04 CHRONIC IDIOPATHIC CONSTIPATION: Primary | ICD-10-CM

## 2018-04-05 DIAGNOSIS — M54.2 CERVICALGIA: Primary | ICD-10-CM

## 2018-04-05 LAB
ALBUMIN SERPL BCP-MCNC: 3.5 G/DL (ref 3.5–5)
ALP SERPL-CCNC: 61 U/L (ref 46–116)
ALT SERPL W P-5'-P-CCNC: 19 U/L (ref 12–78)
ANION GAP SERPL CALCULATED.3IONS-SCNC: 6 MMOL/L (ref 4–13)
AST SERPL W P-5'-P-CCNC: 7 U/L (ref 5–45)
BASOPHILS # BLD AUTO: 0.02 THOUSANDS/ΜL (ref 0–0.1)
BASOPHILS NFR BLD AUTO: 0 % (ref 0–1)
BILIRUB DIRECT SERPL-MCNC: <0.05 MG/DL (ref 0–0.2)
BILIRUB SERPL-MCNC: 0.2 MG/DL (ref 0.2–1)
BUN SERPL-MCNC: 17 MG/DL (ref 5–25)
CALCIUM SERPL-MCNC: 9.5 MG/DL (ref 8.3–10.1)
CHLORIDE SERPL-SCNC: 104 MMOL/L (ref 100–108)
CO2 SERPL-SCNC: 27 MMOL/L (ref 21–32)
CREAT SERPL-MCNC: 0.75 MG/DL (ref 0.6–1.3)
EOSINOPHIL # BLD AUTO: 0.22 THOUSAND/ΜL (ref 0–0.61)
EOSINOPHIL NFR BLD AUTO: 3 % (ref 0–6)
ERYTHROCYTE [DISTWIDTH] IN BLOOD BY AUTOMATED COUNT: 13.5 % (ref 11.6–15.1)
GFR SERPL CREATININE-BSD FRML MDRD: 104 ML/MIN/1.73SQ M
GLUCOSE P FAST SERPL-MCNC: 192 MG/DL (ref 65–99)
HCT VFR BLD AUTO: 35.7 % (ref 34.8–46.1)
HGB BLD-MCNC: 11.9 G/DL (ref 11.5–15.4)
LYMPHOCYTES # BLD AUTO: 3.08 THOUSANDS/ΜL (ref 0.6–4.47)
LYMPHOCYTES NFR BLD AUTO: 35 % (ref 14–44)
MCH RBC QN AUTO: 29.1 PG (ref 26.8–34.3)
MCHC RBC AUTO-ENTMCNC: 33.3 G/DL (ref 31.4–37.4)
MCV RBC AUTO: 87 FL (ref 82–98)
MONOCYTES # BLD AUTO: 0.61 THOUSAND/ΜL (ref 0.17–1.22)
MONOCYTES NFR BLD AUTO: 7 % (ref 4–12)
NEUTROPHILS # BLD AUTO: 4.78 THOUSANDS/ΜL (ref 1.85–7.62)
NEUTS SEG NFR BLD AUTO: 55 % (ref 43–75)
NRBC BLD AUTO-RTO: 0 /100 WBCS
PLATELET # BLD AUTO: 278 THOUSANDS/UL (ref 149–390)
PMV BLD AUTO: 10.7 FL (ref 8.9–12.7)
POTASSIUM SERPL-SCNC: 4.5 MMOL/L (ref 3.5–5.3)
PROT SERPL-MCNC: 8.1 G/DL (ref 6.4–8.2)
RBC # BLD AUTO: 4.09 MILLION/UL (ref 3.81–5.12)
SODIUM SERPL-SCNC: 137 MMOL/L (ref 136–145)
WBC # BLD AUTO: 8.72 THOUSAND/UL (ref 4.31–10.16)

## 2018-04-05 PROCEDURE — 99214 OFFICE O/P EST MOD 30 MIN: CPT | Performed by: INTERNAL MEDICINE

## 2018-04-05 PROCEDURE — 85025 COMPLETE CBC W/AUTO DIFF WBC: CPT

## 2018-04-05 PROCEDURE — 97140 MANUAL THERAPY 1/> REGIONS: CPT | Performed by: PHYSICAL THERAPIST

## 2018-04-05 PROCEDURE — 36415 COLL VENOUS BLD VENIPUNCTURE: CPT

## 2018-04-05 PROCEDURE — 80076 HEPATIC FUNCTION PANEL: CPT

## 2018-04-05 PROCEDURE — G8982 BODY POS GOAL STATUS: HCPCS | Performed by: PHYSICAL THERAPIST

## 2018-04-05 PROCEDURE — G8983 BODY POS D/C STATUS: HCPCS | Performed by: PHYSICAL THERAPIST

## 2018-04-05 PROCEDURE — 80048 BASIC METABOLIC PNL TOTAL CA: CPT

## 2018-04-05 NOTE — PATIENT INSTRUCTIONS
Colonoscopy scheduled on 5/1/2018 with Dr China Hernandez at [place] [id number] sent to pharmacy/ instructions giving to patient  PT is diabetic and was told she will get a call a day before for exact procedure time

## 2018-04-05 NOTE — PROGRESS NOTES
[facility] - Outpatient Follow-up Note  Joel Ansari 30 y o  female MRN: [id number]  Encounter: [id number]          ASSESSMENT AND PLAN:      1  Chronic idiopathic constipation:  Patient still has constipation and right side abdominal pain  Schedule colonoscopy to rule out any obstructive lesions  Patient was counseled on the benefits and risks of endoscopic intervention including but not limited to bleeding, infection, bowel perforation     - Case request operating room: COLONOSCOPY; Standing  - Case request operating room: COLONOSCOPY    2  Right upper quadrant abdominal pain  HIDA scan to rule out gallbladder dysmotility     - Hepatic function panel; Future  - NM hepatobiliary w rx; Future    3  Blood in stool  -it is more likely secondary to hemorrhoids bleeding  plan colonoscopy to rule out other lesions   -Na Sulfate-K Sulfate-Mg Sulf (SUPREP BOWEL PREP KIT) 17 5-3 13-1 6 GM/180ML SOLN; Take 1 Bottle by mouth once for 1 dose  Dispense: 1 Bottle; Refill: 0  - Case request operating room: COLONOSCOPY; Standing  - Case request operating room: COLONOSCOPY    ______________________________________________________________________    SUBJECTIVE:  72-year-old female presented for follow-up on right side abdominal pain  Patient reported persistent right-sided abdominal pain  It is not really associated with food intake  She still has intermittent constipation  But compared to prior she has significantly improved  She stopped using MiraLax  She reported blood in the stool when she was constipated  She reported pain has been persistent in the past 6 months  She underwent an ultrasound and abdominal CT scan last year both showed fatty liver  She also has morbid obesity  She was able to lose around 7 lb since last year  She is following up with endocrinologist and reported her diabetes has been well controlled    She underwent an upper endoscopy with the bariatric surgeons in February and was told she has gastritis  Biopsy was negative for H pylori  She was scheduled to have sleeve gastrectomy for weight loss in   REVIEW OF SYSTEMS IS OTHERWISE NEGATIVE  Historical Information   Past Medical History:   Diagnosis Date    Abdominal pain     Abnormal cells of cervix     Abnormal EKG     Acute headache     Amenorrhea     Anxiety     Back pain     Chronic low back pain     Chronic pain syndrome     Constipation     Contact dermatitis     Depression     Dermoid cyst of right lower extremity     Diabetes mellitus (HCC)     Drug-induced hepatic toxicity     Elevated liver function tests     GERD (gastroesophageal reflux disease)     Hypercholesterolemia     Hyperlipidemia     Left hip pain     Liver disease     drug induced hepatic toxicity    Liver function study, abnormal     Lumbar degenerative disc disease     Lumbar radiculopathy     Morbid obesity (HCC)     Normal vaginal delivery     Tendinitis of left ankle     UTI (urinary tract infection)     Vitamin D deficiency      Past Surgical History:   Procedure Laterality Date    ABDOMINAL SURGERY       SECTION      ESOPHAGOGASTRODUODENOSCOPY N/A 2018    Procedure: ESOPHAGOGASTRODUODENOSCOPY (EGD) with BIOPSY;  Surgeon: Ronald Lamas MD;  Location: [facility];   Service: Bariatrics    TONSILLECTOMY      TUBAL LIGATION       Social History   History   Alcohol Use No     Comment: Social     History   Drug Use No     History   Smoking Status    Never Smoker   Smokeless Tobacco    Never Used     Family History   Problem Relation Age of Onset    Diabetes Mother     Hyperlipidemia Mother     Pancreatitis Father     Thyroid disease Paternal Aunt     Lung cancer Maternal Grandfather     Heart disease Neg Hx     Stroke Neg Hx        Meds/Allergies       Current Outpatient Prescriptions:     baclofen 10 mg tablet    busPIRone (BUSPAR) 10 mg tablet   butalbital-acetaminophen-caffeine (FIORICET,ESGIC) -40 mg per tablet    Cholecalciferol (VITAMIN D3) 1000 units CAPS    diclofenac potassium (CATAFLAM) 50 mg tablet    Empagliflozin-Metformin HCl (SYNJARDY) 5-1000 MG TABS    FLUoxetine (PROzac) 40 MG capsule    gabapentin (NEURONTIN) 400 mg capsule    Insulin Glargine (TOUJEO SOLOSTAR SC)    Liraglutide (VICTOZA SC)    omeprazole (PriLOSEC) 20 mg delayed release capsule    SIMVASTATIN PO    traZODone (DESYREL) 100 mg tablet    Na Sulfate-K Sulfate-Mg Sulf (SUPREP BOWEL PREP KIT) 17 5-3 13-1 6 GM/180ML SOLN    Allergies   Allergen Reactions    Atorvastatin            Objective     Blood pressure 118/70, pulse 80, temperature (!) 96 6 °F (35 9 °C), temperature source Tympanic, height 5' 2" (1 575 m), weight 98 9 kg (218 lb), last menstrual period 03/10/2018, not currently breastfeeding  PHYSICAL EXAM:      General Appearance:   Alert, cooperative, no distress   HEENT:   Normocephalic, atraumatic, anicteric      Neck:  Supple, symmetrical, trachea midline   Lungs:   Clear to auscultation bilaterally; no rales, rhonchi or wheezing; respirations unlabored    Heart[de-identified]   Regular rate and rhythm; no murmur, rub, or gallop  Abdomen:   Soft, non-tender, non-distended; normal bowel sounds; no masses, no organomegaly    Genitalia:   Deferred    Rectal:   Deferred    Extremities:  No cyanosis, clubbing or edema    Pulses:  2+ and symmetric    Skin:  No jaundice, rashes, or lesions    Lymph nodes:  No palpable cervical lymphadenopathy        Lab Results:   No visits with results within 1 Day(s) from this visit  Latest known visit with results is:   Admission on 03/14/2018, Discharged on 03/14/2018   Component Date Value    Color, UA 03/14/2018 Yellow     Clarity, UA 03/14/2018 Clear     pH, UA 03/14/2018 6 0     Leukocytes, UA 03/14/2018 Elevated glucose may cause decreased leukocyte values   See urine microscopic for [facility] result/*    Nitrite, UA 03/14/2018 Negative     Protein, UA 03/14/2018 Negative     Glucose, UA 03/14/2018 >=1000 (1%)*    Ketones, UA 03/14/2018 Negative     Urobilinogen, UA 03/14/2018 1 0     Bilirubin, UA 03/14/2018 Negative     Blood, UA 03/14/2018 Trace*    Specific Gravity, UA 03/14/2018 1 010     RBC, UA 03/14/2018 0-1*    WBC, UA 03/14/2018 None Seen     Epithelial Cells 03/14/2018 None Seen     Bacteria, UA 03/14/2018 None Seen     EXT PREG TEST UR (Ref: N* 03/14/2018 negative          Radiology Results:   No results found

## 2018-04-05 NOTE — PROGRESS NOTES
PT Re-Evaluation  and PT Discharge    Today's date: 2018  Patient name: Augusto Mcgrath  : 1980  MRN: [id number]  Referring provider: Manda Chris PA-C  Dx:   Encounter Diagnoses   Name Primary?  Cervicalgia Yes    Neck pain on right side                   Assessment  Impairments: abnormal or restricted ROM, activity intolerance and pain with function    Assessment details: Pt states that her neck no longer bothers her  Pt's back is the limiting factor in performing her activities at home  Pt has improved cervical AROM and upper extremity strength to WNL in all planes  Pt has reached maximal benefit of skilled therapy and will be discharged at this time  Thank you for this referral     Understanding of Dx/Px/POC: good   Prognosis: good    Goals  1  Patient will report pain decrease by at least 2 levels within 4 weeks- met  2  Patient will improve AROM by at least 5-10 degrees within 4 weeks- met  3  Patient will improve ADL performance to maximal level of function by d/c- met  4  Patient will achieve symmetrical cervical ROM without pain increase by d/c- met    Plan  Referral necessary: No  Planned modality interventions: TENS and thermotherapy: hydrocollator packs  Planned therapy interventions: manual therapy, joint mobilization, neuromuscular re-education, postural training, strengthening, stretching, therapeutic exercise and home exercise program  Frequency: 2x week  Treatment plan discussed with: patient  Plan details: Discharge to [facility]        Subjective Evaluation    History of Present Illness  Date of onset: 2018  Mechanism of injury: Pt's daughter assists with translation as pt is primarily Dominican-speaking  Patient states that she feels "100% better" with respect to neck pain  Pt has appointment with MD this month to address low back pain  Pt reports numbness and tingling in her legs but denies paresthesia in her arms    Not a recurrent problem   Quality of life: fair    Pain  Current pain ratin  At best pain ratin  At worst pain ratin  Location: neck  Progression: resolved    Patient Goals  Patient goals for therapy: decreased pain          Objective     Postural Observations  Seated posture: good    Additional Postural Observation Details  Significant forward head posture, increased thoracic kyphosis; at RE thoracic kyphosis persists, improved head posture    Active Range of Motion   Cervical/Thoracic Spine   Cervical    Flexion: 52 degrees   Extension: 46 degrees   Left lateral flexion: 45 degrees   Right lateral flexion: 40 degrees   Left rotation: 76 degrees   Right rotation: 74 degrees     Additional Active Range of Motion Details  Increased TTP over right upper trap and rhomboid areas, cervical and upper thoracic PA glide positive for pain and decreased mobility; resolved at RE  Increased sub-occipital tension, TTP over right mastoid process; resolved at RE  Passive cervical ROM limited secondary to pain; did not reassess    Strength/Myotome Testing   Cervical Spine     Left   Interossei strength (t1): 5    Right   Interossei strength (t1): 5etr    Left Shoulder     Planes of Motion   Flexion: 5   Abduction: 5     Right Shoulder     Planes of Motion   Flexion: 5   Abduction: 5     Left Elbow   Flexion: 5  Extension: 5    Right Elbow   Flexion: 5  Extension: 5    Left Wrist/Hand   Wrist extension: 5  Wrist flexion: 5    Right Wrist/Hand   Wrist extension: 5  Wrist flexion: 5

## 2018-04-05 NOTE — TELEPHONE ENCOUNTER
Spoke to pt she is aware golytely was sent to her pharmacy I mailed her new instructions//diabetic instructions

## 2018-04-06 ENCOUNTER — TELEPHONE (OUTPATIENT)
Dept: PAIN MEDICINE | Facility: MEDICAL CENTER | Age: 38
End: 2018-04-06

## 2018-04-06 DIAGNOSIS — M54.16 LUMBAR RADICULOPATHY: Primary | ICD-10-CM

## 2018-04-06 RX ORDER — GABAPENTIN 800 MG/1
800 TABLET ORAL 3 TIMES DAILY
Qty: 90 TABLET | Refills: 2 | Status: SHIPPED | OUTPATIENT
Start: 2018-04-06 | End: 2018-04-06 | Stop reason: SDUPTHER

## 2018-04-06 RX ORDER — DICLOFENAC POTASSIUM 50 MG/1
50 TABLET, FILM COATED ORAL 2 TIMES DAILY PRN
Qty: 60 TABLET | Refills: 2 | Status: SHIPPED | OUTPATIENT
Start: 2018-04-06 | End: 2018-04-09

## 2018-04-06 RX ORDER — GABAPENTIN 800 MG/1
800 TABLET ORAL 3 TIMES DAILY
Qty: 90 TABLET | Refills: 2 | Status: SHIPPED | OUTPATIENT
Start: 2018-04-06 | End: 2018-04-09 | Stop reason: SDUPTHER

## 2018-04-06 NOTE — TELEPHONE ENCOUNTER
Pharmacy called and left VM stating pt needs a med refill on Gabapentin as well as Diclofenac  Pharmacy # [phone]   Pt can be reached at [phone]

## 2018-04-06 NOTE — TELEPHONE ENCOUNTER
S/w pt's spouse to clarify  He stated she is totally out of the gabapentin  She takes it TID  He did not know the dose  She does have a office visit scheduled for 4/9  Aram Durán to advise  Thanks

## 2018-04-09 ENCOUNTER — CLINICAL SUPPORT (OUTPATIENT)
Dept: PAIN MEDICINE | Facility: CLINIC | Age: 38
End: 2018-04-09
Payer: COMMERCIAL

## 2018-04-09 ENCOUNTER — APPOINTMENT (OUTPATIENT)
Dept: LAB | Facility: HOSPITAL | Age: 38
End: 2018-04-09
Attending: SURGERY
Payer: COMMERCIAL

## 2018-04-09 VITALS
BODY MASS INDEX: 40.08 KG/M2 | DIASTOLIC BLOOD PRESSURE: 67 MMHG | HEART RATE: 85 BPM | HEIGHT: 62 IN | SYSTOLIC BLOOD PRESSURE: 111 MMHG | TEMPERATURE: 99.1 F | WEIGHT: 217.8 LBS

## 2018-04-09 DIAGNOSIS — G89.4 CHRONIC PAIN DISORDER: ICD-10-CM

## 2018-04-09 DIAGNOSIS — IMO0002 UNCONTROLLED TYPE 2 DIABETES WITH NEUROPATHY: ICD-10-CM

## 2018-04-09 DIAGNOSIS — M54.16 LUMBAR RADICULOPATHY: ICD-10-CM

## 2018-04-09 DIAGNOSIS — E66.9 OBESITY, CLASS II, BMI 35-39.9: ICD-10-CM

## 2018-04-09 DIAGNOSIS — E78.00 HYPERCHOLESTEROLEMIA: ICD-10-CM

## 2018-04-09 DIAGNOSIS — M79.18 MYOFASCIAL PAIN: Primary | ICD-10-CM

## 2018-04-09 LAB
ALBUMIN SERPL BCP-MCNC: 3.5 G/DL (ref 3.5–5)
ALP SERPL-CCNC: 65 U/L (ref 46–116)
ALT SERPL W P-5'-P-CCNC: 19 U/L (ref 12–78)
ANION GAP SERPL CALCULATED.3IONS-SCNC: 10 MMOL/L (ref 4–13)
AST SERPL W P-5'-P-CCNC: 12 U/L (ref 5–45)
BILIRUB SERPL-MCNC: 0.24 MG/DL (ref 0.2–1)
BUN SERPL-MCNC: 20 MG/DL (ref 5–25)
CALCIUM SERPL-MCNC: 9.3 MG/DL (ref 8.3–10.1)
CHLORIDE SERPL-SCNC: 103 MMOL/L (ref 100–108)
CHOLEST SERPL-MCNC: 213 MG/DL (ref 50–200)
CO2 SERPL-SCNC: 28 MMOL/L (ref 21–32)
CREAT SERPL-MCNC: 0.94 MG/DL (ref 0.6–1.3)
ERYTHROCYTE [DISTWIDTH] IN BLOOD BY AUTOMATED COUNT: 13.5 % (ref 11.6–15.1)
EST. AVERAGE GLUCOSE BLD GHB EST-MCNC: 174 MG/DL
GFR SERPL CREATININE-BSD FRML MDRD: 79 ML/MIN/1.73SQ M
GLUCOSE P FAST SERPL-MCNC: 226 MG/DL (ref 65–99)
HBA1C MFR BLD: 7.7 % (ref 4.2–6.3)
HCT VFR BLD AUTO: 36.6 % (ref 34.8–46.1)
HDLC SERPL-MCNC: 58 MG/DL (ref 40–60)
HGB BLD-MCNC: 12.1 G/DL (ref 11.5–15.4)
LDLC SERPL CALC-MCNC: 130 MG/DL (ref 0–100)
MCH RBC QN AUTO: 29.5 PG (ref 26.8–34.3)
MCHC RBC AUTO-ENTMCNC: 33.1 G/DL (ref 31.4–37.4)
MCV RBC AUTO: 89 FL (ref 82–98)
NONHDLC SERPL-MCNC: 155 MG/DL
PLATELET # BLD AUTO: 257 THOUSANDS/UL (ref 149–390)
PMV BLD AUTO: 10.5 FL (ref 8.9–12.7)
POTASSIUM SERPL-SCNC: 4.2 MMOL/L (ref 3.5–5.3)
PROT SERPL-MCNC: 7.9 G/DL (ref 6.4–8.2)
RBC # BLD AUTO: 4.1 MILLION/UL (ref 3.81–5.12)
SODIUM SERPL-SCNC: 141 MMOL/L (ref 136–145)
T4 FREE SERPL-MCNC: 0.93 NG/DL (ref 0.76–1.46)
TRIGL SERPL-MCNC: 127 MG/DL
TSH SERPL DL<=0.05 MIU/L-ACNC: 9.47 UIU/ML (ref 0.36–3.74)
WBC # BLD AUTO: 10.31 THOUSAND/UL (ref 4.31–10.16)

## 2018-04-09 PROCEDURE — 36415 COLL VENOUS BLD VENIPUNCTURE: CPT

## 2018-04-09 PROCEDURE — 80061 LIPID PANEL: CPT

## 2018-04-09 PROCEDURE — 84443 ASSAY THYROID STIM HORMONE: CPT

## 2018-04-09 PROCEDURE — 99214 OFFICE O/P EST MOD 30 MIN: CPT | Performed by: ANESTHESIOLOGY

## 2018-04-09 PROCEDURE — 85027 COMPLETE CBC AUTOMATED: CPT

## 2018-04-09 PROCEDURE — 83036 HEMOGLOBIN GLYCOSYLATED A1C: CPT

## 2018-04-09 PROCEDURE — 84439 ASSAY OF FREE THYROXINE: CPT

## 2018-04-09 PROCEDURE — 80053 COMPREHEN METABOLIC PANEL: CPT

## 2018-04-09 RX ORDER — DICLOFENAC SODIUM 75 MG/1
75 TABLET, DELAYED RELEASE ORAL 2 TIMES DAILY PRN
Qty: 60 TABLET | Refills: 2 | Status: SHIPPED | OUTPATIENT
Start: 2018-04-09 | End: 2018-05-01 | Stop reason: HOSPADM

## 2018-04-09 RX ORDER — TIZANIDINE 2 MG/1
TABLET ORAL
Qty: 180 TABLET | Refills: 2 | Status: SHIPPED | OUTPATIENT
Start: 2018-04-09 | End: 2018-08-30 | Stop reason: SDUPTHER

## 2018-04-09 RX ORDER — GABAPENTIN 800 MG/1
800 TABLET ORAL 3 TIMES DAILY
Qty: 90 TABLET | Refills: 2 | Status: SHIPPED | OUTPATIENT
Start: 2018-04-09 | End: 2018-07-11 | Stop reason: SDUPTHER

## 2018-04-09 NOTE — PROGRESS NOTES
Assessment:  1  Myofascial pain    2  Lumbar radiculopathy    3  Uncontrolled type 2 diabetes with neuropathy ([address] )    4  Chronic pain disorder        Plan:  28-year-old female returning for follow-up of lumbosacral back pain with radiculopathy in the S1 distribution of bilateral lower extremities  She does have some partial sacralization of the L5 vertebrae mild degenerative changes at L4-5  She has had some favorable responses to LESI in the past, however her most recent LESI's have been ineffective  The patient continues to take gabapentin 800 milligrams t i d  and diclofenac 50 milligrams b i d  p r n  with about 20 percent relief of her current symptoms  She denies any side effects to medications  Since the patient is not improving with LESI and she does not have any compressive pathology of her lumbar spine, however the patient is diabetic I feel it is reasonable to proceed with an EMG of her lower extremities  1   We will continue with gabapentin 800 milligrams t i d  for neuropathic complaints  2  I will trial tizanidine 2 milligrams 1-2 tablets q 8 hours p r n  for myofascial pain  3  I will increase the patient's diclofenac to 75 milligrams b i d  p r n   4   I will order an EMG of her lower extremities  5  I will follow up the patient in 3 months    [place] Prescription Drug Monitoring Program report was reviewed and was appropriate       My impressions and treatment recommendations were discussed in detail with the patient who verbalized understanding and had no further questions  Discharge instructions were provided  I personally saw and examined the patient and I agree with the above discussed plan of care  No orders of the defined types were placed in this encounter  No orders of the defined types were placed in this encounter        History of Present Illness:    Isidro Moreno is a 30 y o  female returning for follow-up of lumbosacral back pain that radiates into the posterior aspects of her lower extremities with associated paresthesias and subjective weakness  The patient does have some degenerative disc disease L4-5 and spondylosis  There is also partial sacralization of the L5 vertebrae  She did have a favorable response to LESI in the past, however her most recent LESI's have been ineffective  She denies any bladder or bowel incontinence or saddle anesthesia  She has been taking gabapentin 800 milligrams t i d  and diclofenac 50 milligrams b i d  p r n  with about 20 percent relief in her current symptoms and she denies any side effects to the medications  The patient rates her pain 8/10 on the pain is worse in the morning and at night  The pain is constant and described as dull, aching, sharp, pressure-like, and shooting  The pain is worse with standing and walking  The pain is alleviated with sitting and lying down  I have personally reviewed and/or updated the patient's past medical history, past surgical history, family history, social history, current medications, allergies, and vital signs today  Other than as stated above, the patient denies any interval changes in medications, medical condition, mental condition, symptoms, or allergies since the last office visit  Review of Systems:    Review of Systems   Respiratory: Negative for shortness of breath  Cardiovascular: Negative for chest pain  Gastrointestinal: Negative for constipation, diarrhea, nausea and vomiting  Musculoskeletal: Positive for gait problem  Negative for arthralgias, joint swelling and myalgias  Difficulty walking   Skin: Negative for rash  Neurological: Positive for dizziness  Negative for seizures and weakness  All other systems reviewed and are negative        Patient Active Problem List   Diagnosis    Hypercholesterolemia    Uncontrolled type 2 diabetes with neuropathy (HCC)    Depressive disorder    Anxiety    BMI 40 0-44 9, adult ([facility] [address] )    Neck pain on right side    Abdominal pain    Abnormal cells of cervix    Abnormal EKG    Amenorrhea    Chronic pain disorder    Chronic right-sided low back pain with sciatica    Dermoid cyst of right lower extremity    Elevated liver function tests    Left hip pain    Lumbar radiculopathy    Tendinitis of left ankle    Vitamin D deficiency    Obesity, Class III, BMI 40-49 9 (morbid obesity) (HCC)    Intractable migraine without aura and with status migrainosus    Strain of calf muscle    Blood in stool    Chronic idiopathic constipation       Past Medical History:   Diagnosis Date    Abdominal pain     Abnormal cells of cervix     Abnormal EKG     Acute headache     Amenorrhea     Anxiety     Back pain     Chronic low back pain     Chronic pain syndrome     Constipation     Contact dermatitis     Depression     Dermoid cyst of right lower extremity     Diabetes mellitus (HCC)     Drug-induced hepatic toxicity     Elevated liver function tests     GERD (gastroesophageal reflux disease)     Hypercholesterolemia     Hyperlipidemia     Left hip pain     Liver disease     drug induced hepatic toxicity    Liver function study, abnormal     Lumbar degenerative disc disease     Lumbar radiculopathy     Morbid obesity (HCC)     Normal vaginal delivery     Tendinitis of left ankle     UTI (urinary tract infection)     Vitamin D deficiency        Past Surgical History:   Procedure Laterality Date    ABDOMINAL SURGERY       SECTION      ESOPHAGOGASTRODUODENOSCOPY N/A 2018    Procedure: ESOPHAGOGASTRODUODENOSCOPY (EGD) with BIOPSY;  Surgeon: Allan Comer MD;  Location: [facility];   Service: Bariatrics    TONSILLECTOMY      TUBAL LIGATION         Family History   Problem Relation Age of Onset    Diabetes Mother     Hyperlipidemia Mother     Pancreatitis Father     Thyroid disease Paternal Aunt     Lung cancer Maternal Grandfather     Heart disease Neg Hx     Stroke Neg Hx 3 Social History     Occupational History    Not on file  Social History Main Topics    Smoking status: Never Smoker    Smokeless tobacco: Never Used    Alcohol use No      Comment: Social    Drug use: No    Sexual activity: Not on file       Current Outpatient Prescriptions on File Prior to Visit   Medication Sig    baclofen 10 mg tablet Take 1 tablet (10 mg total) by mouth 3 (three) times a day    busPIRone (BUSPAR) 10 mg tablet Take 10 mg by mouth 3 (three) times a day   butalbital-acetaminophen-caffeine (FIORICET,ESGIC) -40 mg per tablet Take 1 tablet by mouth every 4 (four) hours as needed for headaches    Cholecalciferol (VITAMIN D3) 1000 units CAPS Take 1 capsule by mouth daily    diclofenac potassium (CATAFLAM) 50 mg tablet Take 1 tablet (50 mg total) by mouth 2 (two) times a day as needed (pain)    Empagliflozin-Metformin HCl (SYNJARDY) 5-1000 MG TABS Take by mouth    FLUoxetine (PROzac) 40 MG capsule Take 40 mg by mouth daily    gabapentin (NEURONTIN) 800 mg tablet Take 1 tablet (800 mg total) by mouth 3 (three) times a day    Insulin Glargine (TOUJEO SOLOSTAR SC) Inject 40 Units under the skin daily at bedtime      Liraglutide (VICTOZA SC) Inject 1 8 Units under the skin daily    Na Sulfate-K Sulfate-Mg Sulf (SUPREP BOWEL PREP KIT) 17 5-3 13-1 6 GM/180ML SOLN Take 1 Bottle by mouth once for 1 dose    omeprazole (PriLOSEC) 20 mg delayed release capsule Take 1 capsule by mouth daily (Patient taking differently: Take 20 mg by mouth as needed  )    polyethylene glycol (GOLYTELY) 4000 mL solution Take 4,000 mL by mouth once for 1 dose    SIMVASTATIN PO Take 40 mg by mouth daily      traZODone (DESYREL) 100 mg tablet Take 1 tablet by mouth daily at bedtime     No current facility-administered medications on file prior to visit          Allergies   Allergen Reactions    Atorvastatin        Physical Exam:    /67   Pulse 85   Temp 99 1 °F (37 3 °C)   Ht 5' 2" (1 575 m) Wt 98 8 kg (217 lb 12 8 oz)   LMP 03/10/2018   BMI 39 84 kg/m²     Constitutional: obese  Eyes: anicteric  HEENT: grossly intact  Neck: supple, symmetric, trachea midline and no masses   Pulmonary:even and unlabored  Cardiovascular:No edema or pitting edema present  Skin:Normal without rashes or lesions and well hydrated  Psychiatric:Mood and affect appropriate  Neurologic:Cranial Nerves II-XII grossly intact  Musculoskeletal:normal gait  Bilateral lumbar paraspinals tender to palpation and ropy in texture  Bilateral SI joints tender to palpation  Bilateral lower extremity strength 5/5 in all muscle groups  Negative seated straight leg raise bilaterally      Imaging  Imaging reviewed

## 2018-04-10 ENCOUNTER — TELEPHONE (OUTPATIENT)
Dept: BARIATRICS | Facility: CLINIC | Age: 38
End: 2018-04-10

## 2018-04-10 NOTE — TELEPHONE ENCOUNTER
----- Message from Maryana Nguyen PA-C sent at 4/6/2018  8:14 AM EDT -----  Staff message: Please let the patient know that the BMP, hepatic function panel, and CBC are within acceptable limits for bariatric surgery  The lipid panel was done in August and within acceptable limits  A1c was 8 2 in December and should improve following bariatric surgery  Did she have the thyroid retested as ordered by Stephen Mello in March? This will need to be repeated as the TSH was slightly elevated that last time it was checked  If she did not, I can try and add the TSH to what was recently drawn  If not, she can be given the order at her 4/19/18 appointment with me  I believe the patient prefers Congolese  Thank you!

## 2018-04-13 ENCOUNTER — TELEPHONE (OUTPATIENT)
Dept: GASTROENTEROLOGY | Facility: MEDICAL CENTER | Age: 38
End: 2018-04-13

## 2018-04-13 NOTE — TELEPHONE ENCOUNTER
----- Message from Tom Sparks MD sent at 4/6/2018  3:31 PM EDT -----  Blood count, liver function and kidney function all normal

## 2018-04-19 ENCOUNTER — OFFICE VISIT (OUTPATIENT)
Dept: BARIATRICS | Facility: CLINIC | Age: 38
End: 2018-04-19
Payer: COMMERCIAL

## 2018-04-19 VITALS
SYSTOLIC BLOOD PRESSURE: 112 MMHG | BODY MASS INDEX: 38.54 KG/M2 | TEMPERATURE: 97.9 F | HEART RATE: 88 BPM | DIASTOLIC BLOOD PRESSURE: 70 MMHG | RESPIRATION RATE: 16 BRPM | HEIGHT: 63 IN | WEIGHT: 217.5 LBS

## 2018-04-19 DIAGNOSIS — IMO0002 UNCONTROLLED TYPE 2 DIABETES WITH NEUROPATHY: ICD-10-CM

## 2018-04-19 DIAGNOSIS — F32.A DEPRESSIVE DISORDER: ICD-10-CM

## 2018-04-19 DIAGNOSIS — E66.01 SEVERE OBESITY (BMI 35.0-39.9) WITH COMORBIDITY (HCC): Primary | ICD-10-CM

## 2018-04-19 DIAGNOSIS — R79.89 ABNORMAL TSH: ICD-10-CM

## 2018-04-19 PROCEDURE — 99214 OFFICE O/P EST MOD 30 MIN: CPT | Performed by: PHYSICIAN ASSISTANT

## 2018-04-19 RX ORDER — BUSPIRONE HYDROCHLORIDE 15 MG/1
15 TABLET ORAL 2 TIMES DAILY
COMMUNITY
Start: 2018-04-16

## 2018-04-19 RX ORDER — FLUOXETINE HYDROCHLORIDE 20 MG/1
20 CAPSULE ORAL 2 TIMES DAILY
COMMUNITY
Start: 2018-04-16

## 2018-04-19 NOTE — ASSESSMENT & PLAN NOTE
-8 2 % --> 7 7 %  -should continue to improve/resolve following bariatric surgery   -On Victoza, synjardy, toujeo

## 2018-04-19 NOTE — ASSESSMENT & PLAN NOTE
-she has follow-up with endocrinology next week  She will need to have this treated before she can proceed with surgery  If TSH is improving and her endocrinologist feels she can proceed with surgery then her endocrinologist will need to provide a letter/note giving her clearance

## 2018-04-19 NOTE — PATIENT INSTRUCTIONS
Goals: Food log (ie ) www myfitnesspal com,sparkpeople  com,[employer] com,calorieking  com,etc    No sugary beverages  At least 64oz of water daily  Increase physical activity by 10 minutes daily Gradually increase physical activity to a goal of 5 days per week for 30 minutes of MODERATE intensity PLUS 2 days per week of FULL BODY resistance training  Practice lesson plans 1-6 in bariatric manual   Practice 30/60 rule  Thyroid function elevated  This should be managed by endocrinologist to a normal level  If improving, can review with endocrinologist and clearance can be provided by them    Start daily multi-vitamin/B vitamin

## 2018-04-19 NOTE — PROGRESS NOTES
Assessment/Plan:    Severe obesity (BMI 35 0-39  9) with comorbidity ([address] )  Interested in Vertical Sleeve Gastrectomy with Dr Ventura Roberts  10% Weight loss- Not applicable  Screening labs-TSH elevated, otherwise within acceptable limits  Support Group-Completed  Cardiac Risk Assessment-Completed  Upper Endoscopy-Completed; H  Pylori biopsy-Negative  Sleep Medicine Assessment-Not applicable neg STOP BANG on 7/27/17  Alcohol and nicotine use is not recommended following bariatric surgery  NSAIDs should be avoided following bariatric surgery  Advised that pregnancy should be avoided following bariatric surgery for 12-18 months and should not solely rely on OCP and consider additional/alternative sources for contraception due to potential absorption issues-Completed (s/p tubal ligation in [place]); also recommend back up protection with barrier methods    Uncontrolled type 2 diabetes with neuropathy ([facility] )  -8 2 % --> 7 7 %  -should continue to improve/resolve following bariatric surgery   -On Victoza, synjardy, toujeo    Abnormal TSH  -she has follow-up with endocrinology next week  She will need to have this treated before she can proceed with surgery  If TSH is improving and her endocrinologist feels she can proceed with surgery then her endocrinologist will need to provide a letter/note giving her clearance  Depressive disorder  -continue management with psychiatry and counseling   -She will see the  next month    Follow up in approximately 1 month with Surgical Dietician and Surgical  I she has recently had adjustments made to her depression/anxiety medications and will need management of the abnormal TSH before she can be scheduled for surgery  Answered all of the patient's questions regarding surgery, including typical protocol with cholelithiasis  She is currently having workup with GI  Explained that rapid weight loss may be a risk factor for gallstones/inflammation    If this occurs after surgery it may require cholecystectomy after, however if workup shows that she would benefit from cholecystectomy prior this would be reviewed with the surgeon  She will notify the office of updates as she sees GI and has workup completed by them  Reviewed that often times neuropathy is not reversible, but worsening can be prevented as diabetes is controlled/resolved  Also recommend she take a daily multivitamin as well as B vitamin  Explained that with weight loss and lifestyle changes progression may be prevented  >25 minute visit, >50% time spent counseling patient on diet behavior and exercise modification for weight loss and long term success following bariatric surgery    Goals:  Food log (ie ) www myfitnesspal com,sparkpeople  com,loseit com,calorieking  com,etc    No sugary beverages  At least 64oz of water daily  Increase physical activity by 10 minutes daily Gradually increase physical activity to a goal of 5 days per week for 30 minutes of MODERATE intensity PLUS 2 days per week of FULL BODY resistance training  Practice lesson plans 1-6 in bariatric manual   Practice 30/60 rule  Thyroid function elevated  This should be managed by endocrinologist to a normal level  If improving, can review with endocrinologist and clearance can be provided by them  Start daily multi-vitamin/B vitamin     Diagnoses and all orders for this visit:    Severe obesity (BMI 35 0-39  9) with comorbidity ([address] )    Uncontrolled type 2 diabetes with neuropathy (HCC)    Abnormal TSH    Depressive disorder    Other orders  -     busPIRone (BUSPAR) 15 mg tablet;   -     FLUoxetine (PROzac) 20 mg capsule;     Subjective:   Chief Complaint   Patient presents with    Weight Check     Patient here for 6/6 Francisco J-Weight Check  Patient ID: Joel Ansari  is a 30 y o  female with excess weight/obesity here to pursue weight managment  Patient is pursuing Vertical Sleeve Gastrectomy       HPI  The patient presents for 6/6 weight check  The patient has been:  Following the lessons in the manual- yes  Practicing the 30/60 minute rule- yes  Food logging- somewhat  Exercise- yes    She is concerned for potential complications of surgery, particularly how it will affect her gallbladder, neuropathy     The following portions of the patient's history were reviewed and updated as appropriate: allergies, current medications, past family history, past medical history, past social history, past surgical history and problem list     Review of Systems   Respiratory: Negative for cough and shortness of breath  Cardiovascular: Negative for chest pain and palpitations  Gastrointestinal: Positive for abdominal pain (Gastritis on EGD) and nausea (occuring after meals; LMP: 4/10/18)  Negative for constipation, diarrhea and vomiting  Recommend f/u with PCP if sx persist or worsen   Psychiatric/Behavioral: Negative for suicidal ideas  Had depression exacerbation and the buspar was increased  Feeling sx are improving  Management with psychiatry     Objective:    /70 (BP Location: Left arm, Patient Position: Sitting, Cuff Size: Large)   Pulse 88   Temp 97 9 °F (36 6 °C)   Resp 16   Ht 5' 2 5" (1 588 m)   Wt 98 7 kg (217 lb 8 oz)   BMI 39 15 kg/m²      Physical Exam   Nursing note and vitals reviewed  Constitutional   General appearance: Abnormal   well developed and obese  Eyes No conjunctival pallor  Ears, Nose, Mouth, and Throat Oral mucosa moist    Pulmonary   Respiratory effort: No increased work of breathing or signs of respiratory distress  Auscultation of lungs: Clear to auscultation, equal breath sounds bilaterally, no wheezes, no rales, no rhonci  Cardiovascular   Auscultation of heart: Normal rate and rhythm, normal S1 and S2, without murmurs  Examination of extremities for edema and/or varicosities: Normal   no edema  Abdomen   Abdomen: Abnormal   The abdomen was obese   Bowel sounds were normal  The abdomen was soft and nontender     Musculoskeletal   Gait and station: Normal     Psychiatric   Orientation to person, place and time: Normal     Affect: appropriate    Translation provided by the patient's

## 2018-04-19 NOTE — ASSESSMENT & PLAN NOTE
Interested in Vertical Sleeve Gastrectomy with Dr Arreola Peers    10% Weight loss- Not applicable  Screening labs-TSH elevated, otherwise within acceptable limits  Support Group-Completed  Cardiac Risk Assessment-Completed  Upper Endoscopy-Completed; H  Pylori biopsy-Negative  Sleep Medicine Assessment-Not applicable neg STOP BANG on 7/27/17  Alcohol and nicotine use is not recommended following bariatric surgery  NSAIDs should be avoided following bariatric surgery  Advised that pregnancy should be avoided following bariatric surgery for 12-18 months and should not solely rely on OCP and consider additional/alternative sources for contraception due to potential absorption issues-Completed (s/p tubal ligation in [facility]); also recommend back up protection with barrier methods

## 2018-04-20 ENCOUNTER — TRANSCRIBE ORDERS (OUTPATIENT)
Dept: ADMINISTRATIVE | Facility: HOSPITAL | Age: 38
End: 2018-04-20

## 2018-04-20 ENCOUNTER — APPOINTMENT (OUTPATIENT)
Dept: LAB | Facility: HOSPITAL | Age: 38
End: 2018-04-20
Payer: COMMERCIAL

## 2018-04-20 DIAGNOSIS — IMO0002 SEVERE UNCONTROLLED DIABETES MELLITUS: ICD-10-CM

## 2018-04-20 DIAGNOSIS — I10 ESSENTIAL HYPERTENSION, MALIGNANT: ICD-10-CM

## 2018-04-20 DIAGNOSIS — E55.9 VITAMIN D DEFICIENCY: ICD-10-CM

## 2018-04-20 DIAGNOSIS — E06.3 CHRONIC LYMPHOCYTIC THYROIDITIS: ICD-10-CM

## 2018-04-20 DIAGNOSIS — E66.01 MORBID OBESITY (HCC): ICD-10-CM

## 2018-04-20 DIAGNOSIS — E78.00 PURE HYPERCHOLESTEROLEMIA: ICD-10-CM

## 2018-04-20 DIAGNOSIS — Z79.899 DRUG THERAPY: ICD-10-CM

## 2018-04-20 DIAGNOSIS — IMO0002 SEVERE UNCONTROLLED DIABETES MELLITUS: Primary | ICD-10-CM

## 2018-04-20 DIAGNOSIS — Z79.4 ENCOUNTER FOR LONG-TERM (CURRENT) USE OF INSULIN (HCC): ICD-10-CM

## 2018-04-20 LAB
25(OH)D3 SERPL-MCNC: 36 NG/ML (ref 30–100)
ALBUMIN SERPL BCP-MCNC: 3.3 G/DL (ref 3.5–5)
ALP SERPL-CCNC: 75 U/L (ref 46–116)
ALT SERPL W P-5'-P-CCNC: 24 U/L (ref 12–78)
ANION GAP SERPL CALCULATED.3IONS-SCNC: 8 MMOL/L (ref 4–13)
AST SERPL W P-5'-P-CCNC: 17 U/L (ref 5–45)
BILIRUB SERPL-MCNC: 0.2 MG/DL (ref 0.2–1)
BUN SERPL-MCNC: 24 MG/DL (ref 5–25)
CALCIUM SERPL-MCNC: 9.4 MG/DL (ref 8.3–10.1)
CHLORIDE SERPL-SCNC: 103 MMOL/L (ref 100–108)
CHOLEST SERPL-MCNC: 178 MG/DL (ref 50–200)
CO2 SERPL-SCNC: 28 MMOL/L (ref 21–32)
CREAT SERPL-MCNC: 1.02 MG/DL (ref 0.6–1.3)
EST. AVERAGE GLUCOSE BLD GHB EST-MCNC: 180 MG/DL
GFR SERPL CREATININE-BSD FRML MDRD: 71 ML/MIN/1.73SQ M
GLUCOSE P FAST SERPL-MCNC: 169 MG/DL (ref 65–99)
HBA1C MFR BLD: 7.9 % (ref 4.2–6.3)
HDLC SERPL-MCNC: 54 MG/DL (ref 40–60)
LDLC SERPL CALC-MCNC: 95 MG/DL (ref 0–100)
NONHDLC SERPL-MCNC: 124 MG/DL
POTASSIUM SERPL-SCNC: 4.5 MMOL/L (ref 3.5–5.3)
PROT SERPL-MCNC: 7.7 G/DL (ref 6.4–8.2)
SODIUM SERPL-SCNC: 139 MMOL/L (ref 136–145)
TRIGL SERPL-MCNC: 145 MG/DL
TSH SERPL DL<=0.05 MIU/L-ACNC: 2.97 UIU/ML (ref 0.36–3.74)

## 2018-04-20 PROCEDURE — 83036 HEMOGLOBIN GLYCOSYLATED A1C: CPT

## 2018-04-20 PROCEDURE — 82306 VITAMIN D 25 HYDROXY: CPT

## 2018-04-20 PROCEDURE — 80061 LIPID PANEL: CPT

## 2018-04-20 PROCEDURE — 84443 ASSAY THYROID STIM HORMONE: CPT

## 2018-04-20 PROCEDURE — 80053 COMPREHEN METABOLIC PANEL: CPT

## 2018-04-20 PROCEDURE — 36415 COLL VENOUS BLD VENIPUNCTURE: CPT

## 2018-04-24 ENCOUNTER — HOSPITAL ENCOUNTER (OUTPATIENT)
Dept: NUCLEAR MEDICINE | Facility: HOSPITAL | Age: 38
Discharge: HOME | End: 2018-04-24
Attending: INTERNAL MEDICINE
Payer: COMMERCIAL

## 2018-04-24 DIAGNOSIS — R10.11 RIGHT UPPER QUADRANT ABDOMINAL PAIN: ICD-10-CM

## 2018-04-24 PROCEDURE — A9537 TC99M MEBROFENIN: HCPCS

## 2018-04-24 PROCEDURE — 78227 HEPATOBIL SYST IMAGE W/DRUG: CPT

## 2018-04-24 RX ADMIN — SINCALIDE 2 MCG: 5 INJECTION, POWDER, LYOPHILIZED, FOR SOLUTION INTRAVENOUS at 09:27

## 2018-04-30 ENCOUNTER — ANESTHESIA EVENT (OUTPATIENT)
Dept: GASTROENTEROLOGY | Facility: MEDICAL CENTER | Age: 38
End: 2018-04-30
Payer: COMMERCIAL

## 2018-05-01 ENCOUNTER — ANESTHESIA (OUTPATIENT)
Dept: GASTROENTEROLOGY | Facility: MEDICAL CENTER | Age: 38
End: 2018-05-01
Payer: COMMERCIAL

## 2018-05-01 ENCOUNTER — HOSPITAL ENCOUNTER (OUTPATIENT)
Facility: MEDICAL CENTER | Age: 38
Setting detail: OUTPATIENT SURGERY
Discharge: HOME | End: 2018-05-01
Attending: INTERNAL MEDICINE | Admitting: INTERNAL MEDICINE
Payer: COMMERCIAL

## 2018-05-01 VITALS
HEIGHT: 63 IN | HEART RATE: 81 BPM | BODY MASS INDEX: 38.27 KG/M2 | RESPIRATION RATE: 16 BRPM | WEIGHT: 216 LBS | OXYGEN SATURATION: 100 % | TEMPERATURE: 98.4 F | SYSTOLIC BLOOD PRESSURE: 123 MMHG | DIASTOLIC BLOOD PRESSURE: 70 MMHG

## 2018-05-01 DIAGNOSIS — K92.1 BLOOD IN STOOL: ICD-10-CM

## 2018-05-01 DIAGNOSIS — K59.04 CHRONIC IDIOPATHIC CONSTIPATION: ICD-10-CM

## 2018-05-01 LAB — EXT PREGNANCY TEST URINE: NEGATIVE

## 2018-05-01 PROCEDURE — 88305 TISSUE EXAM BY PATHOLOGIST: CPT | Performed by: PATHOLOGY

## 2018-05-01 PROCEDURE — 45380 COLONOSCOPY AND BIOPSY: CPT | Performed by: INTERNAL MEDICINE

## 2018-05-01 PROCEDURE — 81025 URINE PREGNANCY TEST: CPT | Performed by: ANESTHESIOLOGY

## 2018-05-01 RX ORDER — ONDANSETRON 2 MG/ML
4 INJECTION INTRAMUSCULAR; INTRAVENOUS EVERY 6 HOURS PRN
Status: DISCONTINUED | OUTPATIENT
Start: 2018-05-01 | End: 2018-05-01 | Stop reason: HOSPADM

## 2018-05-01 RX ORDER — PROPOFOL 10 MG/ML
INJECTION, EMULSION INTRAVENOUS AS NEEDED
Status: DISCONTINUED | OUTPATIENT
Start: 2018-05-01 | End: 2018-05-01 | Stop reason: SURG

## 2018-05-01 RX ORDER — SODIUM CHLORIDE 9 MG/ML
125 INJECTION, SOLUTION INTRAVENOUS CONTINUOUS
Status: DISCONTINUED | OUTPATIENT
Start: 2018-05-01 | End: 2018-05-01 | Stop reason: HOSPADM

## 2018-05-01 RX ADMIN — PROPOFOL 120 MG: 10 INJECTION, EMULSION INTRAVENOUS at 10:11

## 2018-05-01 RX ADMIN — PROPOFOL 20 MG: 10 INJECTION, EMULSION INTRAVENOUS at 10:19

## 2018-05-01 RX ADMIN — SODIUM CHLORIDE 125 ML/HR: 0.9 INJECTION, SOLUTION INTRAVENOUS at 09:29

## 2018-05-01 RX ADMIN — PROPOFOL 30 MG: 10 INJECTION, EMULSION INTRAVENOUS at 10:14

## 2018-05-01 RX ADMIN — PROPOFOL 30 MG: 10 INJECTION, EMULSION INTRAVENOUS at 10:17

## 2018-05-01 RX ADMIN — PROPOFOL 20 MG: 10 INJECTION, EMULSION INTRAVENOUS at 10:22

## 2018-05-01 RX ADMIN — PROPOFOL 30 MG: 10 INJECTION, EMULSION INTRAVENOUS at 10:23

## 2018-05-01 NOTE — H&P
History and Physical - [facility]  Stephanie Morillo 30 y o  female MRN: [id number]                  HPI: Stephanie Morillo is a 30y o  year old female who presents for constipation  REVIEW OF SYSTEMS: Per the HPI, and otherwise unremarkable  Historical Information   Past Medical History:   Diagnosis Date    Abdominal pain     Abnormal cells of cervix     Abnormal EKG     Acute headache     Amenorrhea     Anxiety     Back pain     Chronic low back pain     Chronic pain syndrome     Constipation     Contact dermatitis     Depression     Dermoid cyst of right lower extremity     Diabetes mellitus (HCC)     Drug-induced hepatic toxicity     Elevated liver function tests     GERD (gastroesophageal reflux disease)     Hypercholesterolemia     Hyperlipidemia     Left hip pain     Liver disease     drug induced hepatic toxicity    Liver function study, abnormal     Lumbar degenerative disc disease     Lumbar radiculopathy     Morbid obesity (HCC)     Normal vaginal delivery     Tendinitis of left ankle     UTI (urinary tract infection)     Vitamin D deficiency      Past Surgical History:   Procedure Laterality Date    ABDOMINAL SURGERY       SECTION      ESOPHAGOGASTRODUODENOSCOPY N/A 2018    Procedure: ESOPHAGOGASTRODUODENOSCOPY (EGD) with BIOPSY;  Surgeon: Jose Castro MD;  Location: [facility];   Service: Bariatrics    TONSILLECTOMY      TUBAL LIGATION       Social History   History   Alcohol Use No     Comment: Social     History   Drug Use No     History   Smoking Status    Never Smoker   Smokeless Tobacco    Never Used     Family History   Problem Relation Age of Onset    Diabetes Mother     Hyperlipidemia Mother     Pancreatitis Father     Thyroid disease Paternal Aunt     Lung cancer Maternal Grandfather     Heart disease Neg Hx     Stroke Neg Hx        Meds/Allergies     Prescriptions Prior to Admission   Medication    busPIRone (BUSPAR) 15 mg tablet    butalbital-acetaminophen-caffeine (FIORICET,ESGIC) -40 mg per tablet    Cholecalciferol (VITAMIN D3) 1000 units CAPS    FLUoxetine (PROzac) 20 mg capsule    gabapentin (NEURONTIN) 800 mg tablet    Insulin Glargine (TOUJEO SOLOSTAR SC)    omeprazole (PriLOSEC) 20 mg delayed release capsule    SIMVASTATIN PO    tiZANidine (ZANAFLEX) 2 mg tablet    traZODone (DESYREL) 100 mg tablet    diclofenac (VOLTAREN) 75 mg EC tablet    Empagliflozin-Metformin HCl (SYNJARDY) 5-1000 MG TABS    Liraglutide (VICTOZA SC)    Na Sulfate-K Sulfate-Mg Sulf (SUPREP BOWEL PREP KIT) 17 5-3 13-1 6 GM/180ML SOLN    polyethylene glycol (GOLYTELY) 4000 mL solution       Allergies   Allergen Reactions    Atorvastatin        Objective     Blood pressure 116/66, pulse 96, temperature 98 4 °F (36 9 °C), temperature source Temporal, resp  rate 16, height 5' 2 5" (1 588 m), weight 98 kg (216 lb), SpO2 100 %, not currently breastfeeding  PHYSICAL EXAM    Gen: NAD  CV: RRR  CHEST: Clear  ABD: soft, NT/ND  EXT: no edema      ASSESSMENT/PLAN:  This is a 30y o  year old female here for constipation  PLAN:   Procedure: colonoscopy

## 2018-05-01 NOTE — DISCHARGE INSTRUCTIONS
Colonoscopia   LO QUE NECESITA SABER:   Omari colonoscopia es un procedimiento para examinar con un endoscopio el interior de solano colon (intestino)  Jeremy omari colonoscopia, es posible que le retiren pólipos o crecimientos de tejidos  Es normal que se sienta inflamado o que tenga molestia abdominal  Usted debería estar expulsando los gases  Si tiene hemorroides o si le removieron pólipos, usted podría presentar omari pequeña cantidad de sangrado  INSTRUCCIONES SOBRE [facility]:   Busque atención médica de inmediato si:   · Usted presenta omari cantidad abhishek de saad grace brillante en emily evacuaciones intestinales  · Solano abdomen está cedric y firme y usted siente dolor intenso  · Usted tiene dificultad repentina para respirar  Pregúntele a solano [place] vitaminas y minerales son adecuados para usted  · Usted presenta sarpullido o urticaria  · Usted tiene fiebre dentro de las 24 horas después de solano procedimiento       · Usted no ha tenido omari evacuación intestinal después de 3 días de solano procedimiento  · Usted tiene preguntas o inquietudes acerca de solano condición o cuidado  Actividad:   · No levante nada, no se esfuerce o corra  por 3 días después de solano procedimiento  · Descanse después de solano procedimiento  A usted le kelly administrado medicamento para relajarse  No  maneje o tome decisiones importantes hasta el día siguiente de solano procedimiento  Regrese a emily actividades normales según le indiquen  · Alivie los gases y la incomodidad de la inflamación  acostándose en solano costado derecho con omari almohada térmica sobre solano abdomen  Es posible que necesite caminar un poco para ayudar a eliminar los gases  Coma comidas pequeñas hasta que se alivie de la inflamación  Si a usted le removieron pólipos:  Por 7 días después de solano procedimiento:  · No  tome aspirina  · No  realice paseos largos en [place]  Acuda a emily consultas de control con solano médico según le indicaron    Anote emily preguntas para que se acuerde de hacerlas viv emily visitas  © 2017 christiano Wilkins  Information is for End User's use only and may not be sold, redistributed or otherwise used for commercial purposes  All illustrations and images included in CareNotes® are the copyrighted property of A D A M , Inc  or Benny Pattno  Esta información es sólo para uso en educación  Stephen intención no es darle un consejo médico sobre enfermedades o tratamientos  Colsulte con stephen Eric Arms farmacéutico antes de seguir cualquier régimen médico para saber si es seguro y efectivo para usted  Pólipos colorrectales   LO QUE NECESITA SABER:   Los pólipos colorrectales son pequeñas protuberancias de tejido que se encuentran en el forro del colon y recto  Angely Dawes de los pólipos son hiperplásticos y típicamente son benignos (no cancerosos)  Ciertos tipos de pólipos llamados adenomatosos, podrían convertirse en cáncer  INSTRUCCIONES SOBRE [facility]:   Wu omari rosa de seguimiento con stephen médico o gastroenterólogo antoine le indiquen:  Es probable que usted tenga que regresar para hacerse pruebas adicionales antoine otra colonoscopia  Anote emily preguntas para que se acuerde de hacerlas viv emily visitas  Reduzca stephen riesgo de pólipos colorrectales:   · Consuma alimentos saludables y variados:  Los alimentos saludables incluyen fruta, vegetales, panes integrales, productos lácteos bajo en grasa, frijoles, valerie sin grasa, y pescado  Pregunte si necesita seguir omari dieta especial     · Mantenga un peso saludable:  Pregunte al médico si necesita perder peso y cuánto  Pida ayuda con un programa para bajar de [place]  · Ejercicio:  Scharlene Roughen lentamente y wu más a medida que se sienta más tita  Consulte con stephen médico antes de empezar un régimen de ejercicios  · Limite el consumo de alcohol  Stephen riesgo de tener pólipos aumenta cuanto más se juan      · No fume:  Si usted fuma, nunca es demasiado tarde para dejar de hacerlo  Pida información para dejar de fumar  Para [de-identified] y más información:   · [address] (NDDI)  [address] [place] , [place] [address]  Phone: [phone]  Web Address: Nir Prom  [facility] gov  Comuníquese con solano médico o gastroenterólogo si:   · Usted tiene fiebre  · Usted tiene escalofríos, tos o se siente débil y adolorido  · Usted tiene dolor abdominal que no desaparece o aumenta después de jerri solano medicamento  · Solano abdomen se encuentra inflamado  · Usted pierde peso sin proponérselo  · Usted tiene preguntas o inquietudes acerca de solano condición o cuidado  Busque atención médica de inmediato o llame al 911 si:   · Usted tiene falta de aire repentina  · Tiene el ritmo cardíaco acelerado, la respiración acelerada o está demasiado mareado o débil para pararse  · Usted tiene dolor abdominal intenso  · Usted ve saad en emily deposiciones  © 2017 [facility] Information is for End User's use only and may not be sold, redistributed or otherwise used for commercial purposes  All illustrations and images included in CareNotes® are the copyrighted property of [employer]  or Benny Patton  Esta información es sólo para uso en educación  Solano intención no es darle un consejo médico sobre enfermedades o tratamientos  Colsulte con solano Violet Cables farmacéutico antes de seguir cualquier régimen médico para saber si es seguro y efectivo para usted

## 2018-05-01 NOTE — OP NOTE
**** GI/ENDOSCOPY REPORT ****     PATIENT NAME: Drew Ordoñez ------ VISIT ID:  Patient ID:   [id number] YOB: 1980     INTRODUCTION: Colonoscopy - A 30 female patient presents for an outpatient   Colonoscopy at [address] [place] [address]  PREVIOUS COLONOSCOPY:     INDICATIONS: Constipation  CONSENT:  The benefits, risks, and alternatives to the procedure were   discussed and informed consent was obtained from the patient  PREPARATION: EKG, pulse, pulse oximetry and blood pressure were monitored   throughout the procedure  The patient was identified by myself both   verbally and by visual inspection of ID band  Airway Assessment   Classification: Airway class 2 - Visualization of the soft palate, fauces   and uvula  ASA Classification: See anesthesia record  MEDICATIONS: Anesthesia-check records     PROCEDURE:  The endoscope was passed without difficulty through the anus   under direct visualization and advanced to the cecum, confirmed by   appendiceal orifice and ileocecal valve  The scope was withdrawn and the   mucosa was carefully examined  The quality of the preparation was good  Cecal Intubation Time: 3 Minute(s) Scope Withdrawal Time: 11 Minute(s)     RECTAL EXAM: Normal rectal exam      FINDINGS: TI: appears to be normal   colon:  A single diminutive   broad-based polyp was found in the rectosigmoid junction  The polyp was   removed by cold biopsy polypectomy  A single diminutive sessile polyp was   found in the rectum  The polyp was removed by cold biopsy polypectomy  Otherwise, the colon appeared to be normal      COMPLICATIONS: There were no complications  IMPRESSIONS: A single diminutive broad-based polyp found in the   rectosigmoid junction; removed by cold biopsy polypectomy  A single   diminutive sessile polyp found in the rectum; removed by cold biopsy   polypectomy       RECOMMENDATIONS: Colonoscopy recommended in as needed per clinical   indication in the future  Follow-up on the results of the biopsy specimens  ESTIMATED BLOOD LOSS: Insignificant  PATHOLOGY SPECIMENS: Removed by cold biopsy polypectomy  Removed by cold   biopsy polypectomy  PROCEDURE CODES:     ICD-9 Codes: 564 00 Constipation, unspecified 211 3 Benign neoplasm of   colon 211 4 Benign neoplasm of rectum and anal canal     ICD-10 Codes: K59 00 Constipation, unspecified K63 5 Polyp of colon K63 5   Polyp of colon     PERFORMED BY: JAMSHID Jefferson  on 05/01/2018  Version 1, electronically signed by JAMSHID Guevara  on 05/01/2018 at   10:44

## 2018-05-01 NOTE — ANESTHESIA PREPROCEDURE EVALUATION
Review of Systems/Medical History  Patient summary reviewed  Chart reviewed  No history of anesthetic complications     Cardiovascular  Hyperlipidemia,    Pulmonary       GI/Hepatic    GERD well controlled, Liver disease ,   Comment: H/o drug induced hepatitis     h/o constipation      Negative  ROS        Endo/Other  Diabetes type 1 Insulin,   Obesity  morbid obesity   GYN       Hematology  Negative hematology ROS      Musculoskeletal  Back pain , lumbar pain,        Neurology    Headaches,    Psychology   Anxiety, Depression , being treated for depression,              Physical Exam    Airway    Mallampati score: II  TM Distance: >3 FB  Neck ROM: full     Dental   No notable dental hx     Cardiovascular  Rhythm: regular, Rate: normal, Cardiovascular exam normal    Pulmonary  Pulmonary exam normal Breath sounds clear to auscultation,     Other Findings        Anesthesia Plan  ASA Score- 3     Anesthesia Type- general and IV sedation with anesthesia with ASA Monitors  Additional Monitors:   Airway Plan:         Plan Factors-    Induction- intravenous  Postoperative Plan-     Informed Consent- Anesthetic plan and risks discussed with patient  I personally reviewed this patient with the CRNA  Discussed and agreed on the Anesthesia Plan with the CRNA  Rhianna Person

## 2018-05-03 ENCOUNTER — TELEPHONE (OUTPATIENT)
Dept: GASTROENTEROLOGY | Facility: CLINIC | Age: 38
End: 2018-05-03

## 2018-05-03 DIAGNOSIS — K59.04 CHRONIC IDIOPATHIC CONSTIPATION: Primary | ICD-10-CM

## 2018-05-03 RX ORDER — LUBIPROSTONE 8 UG/1
8 CAPSULE, GELATIN COATED ORAL 2 TIMES DAILY WITH MEALS
Qty: 60 CAPSULE | Refills: 2 | Status: SHIPPED | OUTPATIENT
Start: 2018-05-03 | End: 2018-06-14

## 2018-05-03 NOTE — TELEPHONE ENCOUNTER
Spoke with patient  H/O chronic constipation, RUQ pain, blood in stool    Patient c/o mid abdominal pain 8/10 cramping since her colonoscopy  Patient has a small amount of gas, no BM  Denies fever, chills, n/v  She is eating and drinking well  Advised patient to continue meals, and walk around  Suggested Try gas-x or beano  Patient will try this  She will call back if symptoms persist       Would you like patient to have miralax or something else for her continued constipation?

## 2018-05-03 NOTE — TELEPHONE ENCOUNTER
DR Aliyah Puckett PT    Pt called because she is in pain since yesterday, no other symptoms  Pt had procedure done 05/01/18

## 2018-05-08 ENCOUNTER — TELEPHONE (OUTPATIENT)
Dept: GASTROENTEROLOGY | Facility: CLINIC | Age: 38
End: 2018-05-08

## 2018-05-08 NOTE — TELEPHONE ENCOUNTER
[address] called to notify you that the [place] will need an Alvira Gama   Or they prefer you order movantik instead

## 2018-05-10 ENCOUNTER — TELEPHONE (OUTPATIENT)
Dept: GASTROENTEROLOGY | Facility: AMBULARY SURGERY CENTER | Age: 38
End: 2018-05-10

## 2018-05-17 ENCOUNTER — TELEPHONE (OUTPATIENT)
Dept: PAIN MEDICINE | Facility: CLINIC | Age: 38
End: 2018-05-17

## 2018-05-17 DIAGNOSIS — K59.04 CHRONIC IDIOPATHIC CONSTIPATION: Primary | ICD-10-CM

## 2018-05-17 DIAGNOSIS — M54.16 LUMBAR RADICULOPATHY: Primary | ICD-10-CM

## 2018-05-17 LAB
ALBUMIN SERPL BCP-MCNC: 4.2 G/DL (ref 3–5.2)
ALP SERPL-CCNC: 121 U/L (ref 43–122)
ALT SERPL W P-5'-P-CCNC: 36 U/L (ref 9–52)
AMYLASE (HISTORICAL): 61 U/L (ref 30–110)
ANION GAP SERPL CALCULATED.3IONS-SCNC: 11 MMOL/L (ref 5–14)
AST SERPL W P-5'-P-CCNC: 20 U/L (ref 14–36)
BILIRUB SERPL-MCNC: 0.3 MG/DL
BUN SERPL-MCNC: 21 MG/DL (ref 5–25)
CALCIUM SERPL-MCNC: 10 MG/DL (ref 8.4–10.2)
CHLORIDE SERPL-SCNC: 97 MEQ/L (ref 97–108)
CO2 SERPL-SCNC: 30 MMOL/L (ref 22–30)
CREATINE, SERUM (HISTORICAL): 0.75 MG/DL (ref 0.6–1.2)
D-DIMER QUANTITATIVE (HISTORICAL): 0.57 MG/L FEU'S
DEPRECATED RDW RBC AUTO: 14.1 %
EGFR (HISTORICAL): >60 ML/MIN/1.73 M2
ERYTHROCYTE SEDIMENTATION RATE (HISTORICAL): 124 MM (ref 1–20)
GLUCOSE SERPL-MCNC: 104 MG/DL (ref 70–99)
HCT VFR BLD AUTO: 34.7 % (ref 36–46)
HGB BLD-MCNC: 11.2 G/DL (ref 12–16)
LIPASE SERPL-CCNC: 154 U/L (ref 23–300)
MCH RBC QN AUTO: 28.7 PG (ref 26–34)
MCHC RBC AUTO-ENTMCNC: 32.3 % (ref 31–36)
MCV RBC AUTO: 89 FL (ref 80–100)
PLATELET # BLD AUTO: 311 K/MCL (ref 150–450)
POTASSIUM SERPL-SCNC: 4.5 MEQ/L (ref 3.6–5)
RBC # BLD AUTO: 3.91 M/MCL (ref 4–5.2)
SODIUM SERPL-SCNC: 137 MEQ/L (ref 137–147)
TOTAL PROTEIN (HISTORICAL): 7.8 G/DL (ref 5.9–8.4)
TROPONIN I SERPL-MCNC: <0.01 NG/ML (ref 0–0.03)
WBC # BLD AUTO: 9.9 K/MCL (ref 4.5–11)

## 2018-05-17 RX ORDER — DICLOFENAC SODIUM 75 MG/1
75 TABLET, DELAYED RELEASE ORAL 2 TIMES DAILY PRN
Qty: 60 TABLET | Refills: 2 | Status: SHIPPED | OUTPATIENT
Start: 2018-05-17 | End: 2018-06-28 | Stop reason: HOSPADM

## 2018-05-17 NOTE — TELEPHONE ENCOUNTER
S/w pt's , he said they tried to get the pt's Diclofenac refilled at the pharmacy and the pharmacy informed them that the rx was cancelled, do you know why this might be? As per your OV note on 4/9 you increased her to 75 mg BID PRN  Please advise, thank you

## 2018-05-17 NOTE — TELEPHONE ENCOUNTER
Diclofenac 75 milligrams b i d  p r n  Re ordered and sent to pharmacy on file  I did not see any documentation of any other physicians stopping this medication in chart review    She will not be able to take any surgeon will discuss with her

## 2018-05-17 NOTE — TELEPHONE ENCOUNTER
Patient's spouse called and left a voicemail stated pt had 2 refills left on her medication  She went to pick one up but was told that we cancelled it  He would like a call back at [phone]  Thank you

## 2018-05-17 NOTE — TELEPHONE ENCOUNTER
I sent linzess 72mcg  If she feels this doesn't help she can take 2 & then call in for new rx if that works better    Jeronimo Barrios

## 2018-05-22 ENCOUNTER — HOSPITAL ENCOUNTER (OUTPATIENT)
Dept: RADIOLOGY | Facility: CLINIC | Age: 38
Discharge: HOME | End: 2018-05-22
Admitting: PHYSICAL MEDICINE & REHABILITATION
Payer: COMMERCIAL

## 2018-05-22 DIAGNOSIS — M54.16 LUMBAR RADICULOPATHY: ICD-10-CM

## 2018-05-22 PROCEDURE — 95886 MUSC TEST DONE W/N TEST COMP: CPT

## 2018-05-22 PROCEDURE — 95909 NRV CNDJ TST 5-6 STUDIES: CPT | Performed by: PHYSICAL MEDICINE & REHABILITATION

## 2018-05-22 PROCEDURE — 95886 MUSC TEST DONE W/N TEST COMP: CPT | Performed by: PHYSICAL MEDICINE & REHABILITATION

## 2018-05-22 NOTE — PROCEDURES
Procedures      Electromyogram and Nerve Conduction Velocity Procedure Note    HX:  29-year-old female referred by pain medicine for electrodiagnostic study of both lower extremities  She complains of numbness in both lower extremities associated with some back pain  No weakness  She does have a history of known diabetes with elevated hemoglobin A1c     PMH:   Type 2 diabetes, dyslipidemia, chronic pain  Exam:     She is a reflex sick in both lower extremities  There is diminished sensation to pin in a stocking pattern, there is mild atrophy of the intrinsic muscles of both feet, there are no root tension signs, no long tract signs, no fasciculations or tremors are noted  There is no motor weakness  Procedure:  Verbal informed consent was obtained as with all electrodiagnostic medicine patients  As with all patients this patient was informed that they may terminate the  examine at any time  Her male  Soledad Perez acted as an add hoc  to explain procedure obtained informed consent obtained history  Patient tolerated the procedure well with no adverse effects reported or observed  Findings:  Please see the [employer] data printout  The bilateral tibial H reflexes were absent, the right sural sensory response was absent, the left sural sensory response demonstrated reduced amplitude increased duration, the bilateral peroneal motor responses were normal with borderline to slightly reduced conduction velocity proximally in the limb  EMG:  Monopolar needle exploration revealed no abnormal spontaneous potentials the following muscles bilaterally:  Lumbar paraspinals gluteus medius vastus lateralis, biceps femoris-long head, tibialis anterior, gastrocnemius  Motor units in all muscles were normal for amplitude duration and configuration recruitment pattern normal as well  Conclusion:   1    The above electrophysiologic dated is most consistent with the presence of a 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

## 2018-05-24 ENCOUNTER — OFFICE VISIT (OUTPATIENT)
Dept: BARIATRICS | Facility: CLINIC | Age: 38
End: 2018-05-24

## 2018-05-24 ENCOUNTER — TELEPHONE (OUTPATIENT)
Dept: PAIN MEDICINE | Facility: MEDICAL CENTER | Age: 38
End: 2018-05-24

## 2018-05-24 VITALS — HEIGHT: 63 IN | BODY MASS INDEX: 38.54 KG/M2 | WEIGHT: 217.5 LBS

## 2018-05-24 DIAGNOSIS — E66.9 OBESITY, CLASS II, BMI 35-39.9: Primary | ICD-10-CM

## 2018-05-24 PROCEDURE — RECHECK

## 2018-05-24 NOTE — TELEPHONE ENCOUNTER
George Pruett called on behalf of pt asking if the EMG results came in and if someone can call her regarding it   Pt can be reached at [phone]

## 2018-05-24 NOTE — PROGRESS NOTES
Met with patient and reviewed the following; pre-op weight loss( none) and workflow  Patient's endocrinologist is Dr Jon Hinton; patient forgot print out he provides( TSH level )  Patient said she will  stop by  our office and drop off a copy  today or tomorrow(5/25/18)  Patient encouraged to continue to read manual and work on mindful physical activity

## 2018-05-25 NOTE — TELEPHONE ENCOUNTER
S/W patient and , advised results of EMG  Raineloretta Antione will continue to see patient for her medications and pt show follow up with her doctor that handles her diabetes  Patient and  verbalized understanding

## 2018-05-25 NOTE — TELEPHONE ENCOUNTER
Please notify pt that the EMG reveals evidence of mild peripheral neuropathy  No evidence of radiculopathy

## 2018-05-30 PROBLEM — E66.01 MORBID OBESITY (HCC): Status: ACTIVE | Noted: 2018-05-30

## 2018-05-30 PROBLEM — E11.9 DIABETES MELLITUS WITHOUT COMPLICATION (HCC): Status: ACTIVE | Noted: 2018-05-30

## 2018-05-30 PROBLEM — E78.5 HYPERLIPIDEMIA: Status: ACTIVE | Noted: 2018-05-30

## 2018-06-12 ENCOUNTER — HOSPITAL ENCOUNTER (EMERGENCY)
Facility: HOSPITAL | Age: 38
Discharge: HOME | End: 2018-06-13
Attending: EMERGENCY MEDICINE | Admitting: EMERGENCY MEDICINE
Payer: COMMERCIAL

## 2018-06-12 VITALS
RESPIRATION RATE: 16 BRPM | BODY MASS INDEX: 39.92 KG/M2 | HEART RATE: 82 BPM | SYSTOLIC BLOOD PRESSURE: 156 MMHG | TEMPERATURE: 98.1 F | DIASTOLIC BLOOD PRESSURE: 87 MMHG | WEIGHT: 221.8 LBS | OXYGEN SATURATION: 100 %

## 2018-06-12 DIAGNOSIS — R10.13 EPIGASTRIC PAIN: Primary | ICD-10-CM

## 2018-06-12 LAB
ALBUMIN SERPL BCP-MCNC: 3.5 G/DL (ref 3.5–5)
ALP SERPL-CCNC: 120 U/L (ref 46–116)
ALT SERPL W P-5'-P-CCNC: 38 U/L (ref 12–78)
ANION GAP SERPL CALCULATED.3IONS-SCNC: 7 MMOL/L (ref 4–13)
AST SERPL W P-5'-P-CCNC: 25 U/L (ref 5–45)
BACTERIA UR QL AUTO: ABNORMAL /HPF
BASOPHILS # BLD AUTO: 0.01 THOUSANDS/ΜL (ref 0–0.1)
BASOPHILS NFR BLD AUTO: 0 % (ref 0–1)
BILIRUB SERPL-MCNC: 0.36 MG/DL (ref 0.2–1)
BILIRUB UR QL STRIP: ABNORMAL
BUN SERPL-MCNC: 19 MG/DL (ref 5–25)
CALCIUM SERPL-MCNC: 9.8 MG/DL (ref 8.3–10.1)
CHLORIDE SERPL-SCNC: 101 MMOL/L (ref 100–108)
CLARITY UR: ABNORMAL
CO2 SERPL-SCNC: 30 MMOL/L (ref 21–32)
COLOR UR: YELLOW
CREAT SERPL-MCNC: 0.83 MG/DL (ref 0.6–1.3)
EOSINOPHIL # BLD AUTO: 0.09 THOUSAND/ΜL (ref 0–0.61)
EOSINOPHIL NFR BLD AUTO: 1 % (ref 0–6)
ERYTHROCYTE [DISTWIDTH] IN BLOOD BY AUTOMATED COUNT: 13.1 % (ref 11.6–15.1)
EXT PREG TEST URINE: NEGATIVE
GFR SERPL CREATININE-BSD FRML MDRD: 92 ML/MIN/1.73SQ M
GLUCOSE SERPL-MCNC: 95 MG/DL (ref 65–140)
GLUCOSE UR STRIP-MCNC: ABNORMAL MG/DL
HCT VFR BLD AUTO: 37.1 % (ref 34.8–46.1)
HGB BLD-MCNC: 12.3 G/DL (ref 11.5–15.4)
HGB UR QL STRIP.AUTO: NEGATIVE
KETONES UR STRIP-MCNC: ABNORMAL MG/DL
LEUKOCYTE ESTERASE UR QL STRIP: ABNORMAL
LIPASE SERPL-CCNC: 189 U/L (ref 73–393)
LYMPHOCYTES # BLD AUTO: 3.12 THOUSANDS/ΜL (ref 0.6–4.47)
LYMPHOCYTES NFR BLD AUTO: 30 % (ref 14–44)
MCH RBC QN AUTO: 29.8 PG (ref 26.8–34.3)
MCHC RBC AUTO-ENTMCNC: 33.2 G/DL (ref 31.4–37.4)
MCV RBC AUTO: 90 FL (ref 82–98)
MONOCYTES # BLD AUTO: 0.71 THOUSAND/ΜL (ref 0.17–1.22)
MONOCYTES NFR BLD AUTO: 7 % (ref 4–12)
MUCOUS THREADS UR QL AUTO: ABNORMAL
NEUTROPHILS # BLD AUTO: 6.58 THOUSANDS/ΜL (ref 1.85–7.62)
NEUTS SEG NFR BLD AUTO: 63 % (ref 43–75)
NITRITE UR QL STRIP: NEGATIVE
NON-SQ EPI CELLS URNS QL MICRO: ABNORMAL /HPF
NRBC BLD AUTO-RTO: 0 /100 WBCS
PH UR STRIP.AUTO: 6 [PH] (ref 4.5–8)
PLATELET # BLD AUTO: 248 THOUSANDS/UL (ref 149–390)
PMV BLD AUTO: 9.6 FL (ref 8.9–12.7)
POTASSIUM SERPL-SCNC: 4.3 MMOL/L (ref 3.5–5.3)
PROT SERPL-MCNC: 8.8 G/DL (ref 6.4–8.2)
PROT UR STRIP-MCNC: ABNORMAL MG/DL
RBC # BLD AUTO: 4.13 MILLION/UL (ref 3.81–5.12)
RBC #/AREA URNS AUTO: ABNORMAL /HPF
SODIUM SERPL-SCNC: 138 MMOL/L (ref 136–145)
SP GR UR STRIP.AUTO: 1.02 (ref 1–1.03)
UROBILINOGEN UR QL STRIP.AUTO: 0.2 E.U./DL
WBC # BLD AUTO: 10.51 THOUSAND/UL (ref 4.31–10.16)
WBC #/AREA URNS AUTO: ABNORMAL /HPF

## 2018-06-12 PROCEDURE — 81001 URINALYSIS AUTO W/SCOPE: CPT

## 2018-06-12 PROCEDURE — 83690 ASSAY OF LIPASE: CPT | Performed by: EMERGENCY MEDICINE

## 2018-06-12 PROCEDURE — 36415 COLL VENOUS BLD VENIPUNCTURE: CPT | Performed by: EMERGENCY MEDICINE

## 2018-06-12 PROCEDURE — 85025 COMPLETE CBC W/AUTO DIFF WBC: CPT | Performed by: EMERGENCY MEDICINE

## 2018-06-12 PROCEDURE — 81025 URINE PREGNANCY TEST: CPT | Performed by: EMERGENCY MEDICINE

## 2018-06-12 PROCEDURE — 96361 HYDRATE IV INFUSION ADD-ON: CPT

## 2018-06-12 PROCEDURE — 96375 TX/PRO/DX INJ NEW DRUG ADDON: CPT

## 2018-06-12 PROCEDURE — 96374 THER/PROPH/DIAG INJ IV PUSH: CPT

## 2018-06-12 PROCEDURE — 80053 COMPREHEN METABOLIC PANEL: CPT | Performed by: EMERGENCY MEDICINE

## 2018-06-12 RX ORDER — DICYCLOMINE HCL 20 MG
20 TABLET ORAL EVERY 6 HOURS PRN
Qty: 10 TABLET | Refills: 0 | Status: SHIPPED | OUTPATIENT
Start: 2018-06-12 | End: 2018-07-21

## 2018-06-12 RX ORDER — LIRAGLUTIDE 6 MG/ML
INJECTION SUBCUTANEOUS
COMMUNITY
Start: 2018-05-07 | End: 2018-06-14

## 2018-06-12 RX ORDER — EMPAGLIFLOZIN AND METFORMIN HYDROCHLORIDE 12.5; 1 MG/1; MG/1
TABLET ORAL
COMMUNITY
Start: 2018-05-07 | End: 2018-06-14

## 2018-06-12 RX ORDER — KETOROLAC TROMETHAMINE 30 MG/ML
30 INJECTION, SOLUTION INTRAMUSCULAR; INTRAVENOUS ONCE
Status: COMPLETED | OUTPATIENT
Start: 2018-06-12 | End: 2018-06-12

## 2018-06-12 RX ORDER — ONDANSETRON 2 MG/ML
4 INJECTION INTRAMUSCULAR; INTRAVENOUS ONCE
Status: COMPLETED | OUTPATIENT
Start: 2018-06-12 | End: 2018-06-12

## 2018-06-12 RX ORDER — DICYCLOMINE HCL 20 MG
20 TABLET ORAL ONCE
Status: COMPLETED | OUTPATIENT
Start: 2018-06-12 | End: 2018-06-12

## 2018-06-12 RX ORDER — SIMVASTATIN 40 MG
40 TABLET ORAL
COMMUNITY
Start: 2018-05-07

## 2018-06-12 RX ORDER — ONDANSETRON 4 MG/1
4 TABLET, ORALLY DISINTEGRATING ORAL EVERY 8 HOURS PRN
Qty: 10 TABLET | Refills: 0 | Status: ON HOLD | OUTPATIENT
Start: 2018-06-12 | End: 2018-07-21

## 2018-06-12 RX ORDER — DOCUSATE SODIUM 100 MG
100 CAPSULE ORAL 2 TIMES DAILY
COMMUNITY
Start: 2018-04-24 | End: 2018-07-14

## 2018-06-12 RX ADMIN — SODIUM CHLORIDE 1000 ML: 0.9 INJECTION, SOLUTION INTRAVENOUS at 23:14

## 2018-06-12 RX ADMIN — ONDANSETRON 4 MG: 2 INJECTION INTRAMUSCULAR; INTRAVENOUS at 23:14

## 2018-06-12 RX ADMIN — DICYCLOMINE HYDROCHLORIDE 20 MG: 20 TABLET ORAL at 23:14

## 2018-06-12 RX ADMIN — KETOROLAC TROMETHAMINE 30 MG: 30 INJECTION, SOLUTION INTRAMUSCULAR at 23:14

## 2018-06-13 ENCOUNTER — TELEPHONE (OUTPATIENT)
Dept: BARIATRICS | Facility: CLINIC | Age: 38
End: 2018-06-13

## 2018-06-13 PROCEDURE — 99284 EMERGENCY DEPT VISIT MOD MDM: CPT

## 2018-06-13 NOTE — ED PROVIDER NOTES
History  Chief Complaint   Patient presents with    Abdominal Pain     patient c/o of generalized abdominal pain and nausea that started today     51-year-old female with a history of diabetes, diabetic neuropathy, high cholesterol presents to the emergency department with nausea and upper abdominal pain since this morning  Nothing makes it better or worse  She has had no vomiting or diarrhea  No fevers or chills  She has not taken anything for the pain  History provided by:  Patient   used: No    Abdominal Pain   Pain location:  Epigastric, LUQ and RUQ  Pain quality: squeezing    Pain radiates to:  Does not radiate  Pain severity:  Unable to specify  Onset quality:  Gradual  Duration:  16 hours  Timing:  Constant  Progression:  Waxing and waning  Chronicity:  New  Context: previous surgery    Context: not alcohol use, not diet changes, not eating, not recent illness, not sick contacts, not suspicious food intake and not trauma    Relieved by:  None tried  Worsened by:  Nothing  Ineffective treatments:  None tried  Associated symptoms: nausea    Associated symptoms: no anorexia, no belching, no chest pain, no chills, no constipation, no cough, no diarrhea, no dysuria, no fatigue, no fever, no flatus, no hematemesis, no hematochezia, no hematuria, no melena, no shortness of breath, no sore throat, no vaginal bleeding, no vaginal discharge and no vomiting    Risk factors: obesity    Risk factors: no alcohol abuse, no NSAID use and not pregnant        Prior to Admission Medications   Prescriptions Last Dose Informant Patient Reported? Taking?    Cholecalciferol (VITAMIN D3) 1000 units CAPS   Yes Yes   Sig: Take 1 capsule by mouth daily   Empagliflozin-Metformin HCl (SYNJARDY) 5-1000 MG TABS   Yes Yes   Sig: Take by mouth   FLUoxetine (PROzac) 20 mg capsule   Yes Yes   Insulin Glargine (TOUJEO SOLOSTAR SC)   Yes Yes   Sig: Inject 40 Units under the skin daily at bedtime     Linaclotide (LINZESS) 72 MCG CAPS   No Yes   Sig: Take 72 mcg by mouth daily   Liraglutide (VICTOZA SC)   Yes Yes   Sig: Inject 1 8 Units under the skin daily   STOOL SOFTENER 100 MG capsule   Yes Yes   SYNJARDY 12 5-1000 MG TABS   Yes Yes   VICTOZA injection   Yes Yes   busPIRone (BUSPAR) 15 mg tablet   Yes Yes   butalbital-acetaminophen-caffeine (FIORICET,ESGIC) -40 mg per tablet   No Yes   Sig: Take 1 tablet by mouth every 4 (four) hours as needed for headaches   diclofenac (VOLTAREN) 75 mg EC tablet   No Yes   Sig: Take 1 tablet (75 mg total) by mouth 2 (two) times a day as needed (moderate pain)   gabapentin (NEURONTIN) 800 mg tablet   No Yes   Sig: Take 1 tablet (800 mg total) by mouth 3 (three) times a day   lubiprostone (AMITIZA) 8 mcg capsule   No Yes   Sig: Take 1 capsule (8 mcg total) by mouth 2 (two) times a day with meals   omeprazole (PriLOSEC) 20 mg delayed release capsule   No Yes   Sig: Take 1 capsule by mouth daily   Patient taking differently: Take 20 mg by mouth as needed     simvastatin (ZOCOR) 40 mg tablet   Yes Yes   tiZANidine (ZANAFLEX) 2 mg tablet   No Yes   Sig: Take 1-2 tablets every 8 hours as needed for muscle spasm   traZODone (DESYREL) 100 mg tablet   No Yes   Sig: Take 1 tablet by mouth daily at bedtime      Facility-Administered Medications: None       Past Medical History:   Diagnosis Date    Abdominal pain     Abnormal cells of cervix     Abnormal EKG     Acute headache     Amenorrhea     Anxiety     Back pain     Chronic low back pain     Chronic pain syndrome     Constipation     Contact dermatitis     Depression     Dermoid cyst of right lower extremity     Diabetes mellitus (HCC)     Drug-induced hepatic toxicity     Elevated liver function tests     GERD (gastroesophageal reflux disease)     Hypercholesterolemia     Hyperlipidemia     Left hip pain     Liver disease     drug induced hepatic toxicity    Liver function study, abnormal     Lumbar degenerative disc disease     Lumbar radiculopathy     Morbid obesity (HCC)     Neuropathy     Normal vaginal delivery     Tendinitis of left ankle     UTI (urinary tract infection)     Vitamin D deficiency        Past Surgical History:   Procedure Laterality Date    ABDOMINAL SURGERY       SECTION      COLONOSCOPY N/A 2018    Procedure: COLONOSCOPY;  Surgeon: Pietro Alvarado MD;  Location: [facility]; Service: Gastroenterology    ESOPHAGOGASTRODUODENOSCOPY N/A 2018    Procedure: ESOPHAGOGASTRODUODENOSCOPY (EGD) with BIOPSY;  Surgeon: Sapphire Horton MD;  Location: [facility]; Service: Bariatrics    TONSILLECTOMY      TUBAL LIGATION         Family History   Problem Relation Age of Onset    Diabetes Mother     Hyperlipidemia Mother     Pancreatitis Father     Thyroid disease Paternal Aunt     Lung cancer Maternal Grandfather     Heart disease Neg Hx     Stroke Neg Hx      I have reviewed and agree with the history as documented  Social History   Substance Use Topics    Smoking status: Never Smoker    Smokeless tobacco: Never Used    Alcohol use No      Comment: Social        Review of Systems   Constitutional: Negative  Negative for chills, diaphoresis, fatigue and fever  HENT: Negative  Negative for congestion, rhinorrhea and sore throat  Eyes: Negative  Negative for discharge, redness and itching  Respiratory: Negative  Negative for apnea, cough, chest tightness, shortness of breath and wheezing  Cardiovascular: Negative for chest pain, palpitations and leg swelling  Gastrointestinal: Positive for abdominal pain and nausea  Negative for abdominal distention, anorexia, blood in stool, constipation, diarrhea, flatus, hematemesis, hematochezia, melena and vomiting  Endocrine: Negative  Genitourinary: Negative  Negative for dysuria, flank pain, frequency, hematuria, urgency, vaginal bleeding and vaginal discharge  Musculoskeletal: Negative  Negative for back pain  Skin: Negative  Allergic/Immunologic: Negative  Neurological: Negative  Negative for dizziness, syncope, weakness, light-headedness, numbness and headaches  Hematological: Negative  All other systems reviewed and are negative  Physical Exam  Physical Exam   Constitutional: She is oriented to person, place, and time  She appears well-developed and well-nourished  Non-toxic appearance  She does not have a sickly appearance  She does not appear ill  No distress  HENT:   Head: Normocephalic and atraumatic  Right Ear: External ear normal    Left Ear: External ear normal    Mouth/Throat: Oropharynx is clear and moist    Eyes: Conjunctivae are normal  Pupils are equal, round, and reactive to light  Right eye exhibits no discharge  Left eye exhibits no discharge  No scleral icterus  Cardiovascular: Normal rate, regular rhythm and normal heart sounds  Exam reveals no gallop and no friction rub  No murmur heard  Pulmonary/Chest: Effort normal and breath sounds normal  No respiratory distress  She has no wheezes  She has no rales  She exhibits no tenderness  Abdominal: Soft  Normal appearance and bowel sounds are normal  She exhibits no distension and no mass  There is tenderness in the right upper quadrant, epigastric area and left upper quadrant  There is no rebound, no guarding, no tenderness at McBurney's point and negative Yee's sign  No hernia  Obese   Neurological: She is alert and oriented to person, place, and time  She has normal reflexes  She exhibits normal muscle tone  Skin: Skin is warm and dry  No rash noted  She is not diaphoretic  No erythema  No pallor  Psychiatric: She has a normal mood and affect  Nursing note and vitals reviewed        Vital Signs  ED Triage Vitals   Temperature Pulse Respirations Blood Pressure SpO2   06/12/18 2148 06/12/18 2148 06/12/18 2148 06/12/18 2148 06/12/18 2148   98 1 °F (36 7 °C) 83 20 150/82 100 %      Temp Source Heart Rate Source Patient Position - Orthostatic VS BP Location FiO2 (%)   06/12/18 2148 06/12/18 2148 06/12/18 2148 06/12/18 2148 --   Oral Monitor Sitting Right arm       Pain Score       06/12/18 2319       7           Vitals:    06/12/18 2148 06/12/18 2319   BP: 150/82 156/87   Pulse: 83 82   Patient Position - Orthostatic VS: Sitting Sitting       Visual Acuity      ED Medications  Medications   sodium chloride 0 9 % bolus 1,000 mL (1,000 mL Intravenous New Bag 6/12/18 2314)   ondansetron (ZOFRAN) injection 4 mg (4 mg Intravenous Given 6/12/18 2314)   ketorolac (TORADOL) injection 30 mg (30 mg Intravenous Given 6/12/18 2314)   dicyclomine (BENTYL) tablet 20 mg (20 mg Oral Given 6/12/18 2314)       Diagnostic Studies  Results Reviewed     Procedure Component Value Units Date/Time    Comprehensive metabolic panel [[id number]]  (Abnormal) Collected:  06/12/18 2313    Lab Status:  Final result Specimen:  Blood from Arm, Left Updated:  06/12/18 2342     Sodium 138 mmol/L      Potassium 4 3 mmol/L      Chloride 101 mmol/L      CO2 30 mmol/L      Anion Gap 7 mmol/L      BUN 19 mg/dL      Creatinine 0 83 mg/dL      Glucose 95 mg/dL      Calcium 9 8 mg/dL      AST 25 U/L      ALT 38 U/L      Alkaline Phosphatase 120 (H) U/L      Total Protein 8 8 (H) g/dL      Albumin 3 5 g/dL      Total Bilirubin 0 36 mg/dL      eGFR 92 ml/min/1 73sq m     Narrative:         National Kidney Disease Education Program recommendations are as follows:  GFR calculation is accurate only with a steady state creatinine  Chronic Kidney disease less than 60 ml/min/1 73 sq  meters  Kidney failure less than 15 ml/min/1 73 sq  meters      Lipase [[id number]]  (Normal) Collected:  06/12/18 2313    Lab Status:  Final result Specimen:  Blood from Arm, Left Updated:  06/12/18 2342     Lipase 189 u/L     Urine Microscopic [[id number]]  (Abnormal) Collected:  06/12/18 2259    Lab Status:  Final result Specimen:  Urine from Urine, Clean Catch Updated:  06/12/18 2340     RBC, UA 1-2 (A) /hpf      WBC, UA 1-2 (A) /hpf      Epithelial Cells Occasional /hpf      Bacteria, UA Occasional /hpf      MUCOUS THREADS Occasional (A)    CBC and differential [[id number]]  (Abnormal) Collected:  06/12/18 2313    Lab Status:  Final result Specimen:  Blood from Arm, Left Updated:  06/12/18 2323     WBC 10 51 (H) Thousand/uL      RBC 4 13 Million/uL      Hemoglobin 12 3 g/dL      Hematocrit 37 1 %      MCV 90 fL      MCH 29 8 pg      MCHC 33 2 g/dL      RDW 13 1 %      MPV 9 6 fL      Platelets 527 Thousands/uL      nRBC 0 /100 WBCs      Neutrophils Relative 63 %      Lymphocytes Relative 30 %      Monocytes Relative 7 %      Eosinophils Relative 1 %      Basophils Relative 0 %      Neutrophils Absolute 6 58 Thousands/µL      Lymphocytes Absolute 3 12 Thousands/µL      Monocytes Absolute 0 71 Thousand/µL      Eosinophils Absolute 0 09 Thousand/µL      Basophils Absolute 0 01 Thousands/µL     POCT pregnancy, urine [[id number]]  (Normal) Resulted:  06/12/18 2255    Lab Status:  Final result Updated:  06/12/18 2256     EXT PREG TEST UR (Ref: Negative) negative    POCT urinalysis dipstick [[id number]]  (Abnormal) Resulted:  06/12/18 2255    Lab Status:  Final result Specimen:  Urine Updated:  06/12/18 2256    ED Urine Macroscopic [[id number]]  (Abnormal) Collected:  06/12/18 2259    Lab Status:  Final result Specimen:  Urine Updated:  06/12/18 2255     Color, UA Yellow     Clarity, UA Slightly Cloudy     pH, UA 6 0     Leukocytes, UA Elevated glucose may cause decreased leukocyte values   See urine microscopic for [facility] result/ (A)     Nitrite, UA Negative     Protein, UA Trace (A) mg/dl      Glucose, UA >=1000 (1%) (A) mg/dl      Ketones, UA Trace (A) mg/dl      Urobilinogen, UA 0 2 E U /dl      Bilirubin, UA Interference- unable to analyze (A)     Blood, UA Negative     Specific Gravity, UA 1 020    Narrative:       CLINITEK RESULT                 No orders to display              Procedures  Procedures       Phone Contacts  ED Phone Contact    ED Course                               MDM  Number of Diagnoses or Management Options  Diagnosis management comments: 79-year-old female presents with a 16 hour history of epigastric right upper quadrant left upper quadrant abdominal pain which she describes as a squeezing pain  It has been constant through most of the day but waxes and wanes  No fevers  She has had nausea  Nothing makes it better or worse  She has not taken anything for the pain  On exam she does not appear acutely ill  She does have tenderness across the upper abdomen without peritoneal signs  Will do CBC, CMP, lipase rule out pregnancy  Differential includes but is not limited to cholecystitis, gastritis, pancreatitis  Less likely appendicitis  At this time I do not feel she has an acute surgical abdomen  Amount and/or Complexity of Data Reviewed  Clinical lab tests: ordered and reviewed  Independent visualization of images, tracings, or specimens: yes      CritCare Time    Disposition  Final diagnoses:   Epigastric pain     Time reflects when diagnosis was documented in both MDM as applicable and the Disposition within this note     Time User Action Codes Description Comment    6/12/2018 11:49 PM Jerome Countess Add [R10 13] Epigastric pain       ED Disposition     ED Disposition Condition Comment    Discharge  Augusto Mcgrath discharge to home/self care      Condition at discharge: Good        Follow-up Information     Follow up With Specialties Details Why Rangel Perez MD [facility] Medicine Schedule an appointment as soon as possible for a visit in 2 days If symptoms worsen [address] [facility] [address]            Patient's Medications   Discharge Prescriptions    DICYCLOMINE (BENTYL) 20 MG TABLET    Take 1 tablet (20 mg total) by mouth every 6 (six) hours as needed (pain)       Start Date: 6/12/2018 End Date: --       Order Dose: 20 mg Quantity: 10 tablet    Refills: 0    ONDANSETRON (ZOFRAN-ODT) 4 MG DISINTEGRATING TABLET    Take 1 tablet (4 mg total) by mouth every 8 (eight) hours as needed for nausea or vomiting       Start Date: 6/12/2018 End Date: --       Order Dose: 4 mg       Quantity: 10 tablet    Refills: 0     No discharge procedures on file      ED Provider  Electronically Signed by           Lopez Malone DO  06/12/18 9391

## 2018-06-13 NOTE — TELEPHONE ENCOUNTER
Outreach phone call; no response but left message about Team Meeting  Patient was reminded of the following; date, location, parking instructions, time, bring manual, eat breakfast, bring something to drink, plan for lunch and  it will be a long day

## 2018-06-13 NOTE — DISCHARGE INSTRUCTIONS
Dolor epigástrico   LO QUE NECESITA SABER:   El dolor epigástrico se siente en la parte media superior del abdomen entre las costillas y el ombligo  El dolor puede ser leve o intenso  El dolor se puede propagar de un lado a otro lugar del cuerpo  El dolor epigástrico puede ser omari señal de un problema grave que necesita recibir tratamiento  INSTRUCCIONES SOBRE [facility]:   Rasame al 911 en laura de presentar lo siguiente:   · Usted tiene alguno de los siguientes signos de un ataque cardíaco:      ¨ Estornudos, presión, o dolor en solano pecho que dura mas de 5 minutos o regresa  ¨ Malestar o dolor en solano espalda, darlene, mandíbula, abdomen, o brazo     ¨ Dificultad para respirar    ¨ Náuseas o vómito    ¨ Siente un desvanecimiento o tiene sudores fríos especialmente en el pecho o dificultad para respirar  · Usted tiene un tita dolor que se irradia a solano mandíbula o a solano espalda  Regrese a la patrice de emergencias si:   · Usted tiene un tita dolor que empieza de woody y GROVER rápidamente  · Usted no puede tener omari evacuación intestinal y está vomitando  · Usted vomita o expectora saad  · Usted nota saad en solano orina o en emily deposiciones  · Usted está somnoliento y solano respiración es más lenta que lo usual   Pregúntele a solano [place] vitaminas y minerales son adecuados para usted  · Usted tiene fiebre o escalofríos  · Se le ponen de color amarillo la piel o el peter de los ojos  · Usted vomita con frecuencia, o muchas veces de seguido  · Usted pierde peso sin proponérselo  · Usted tiene síntomas que wh[place] más de 2 semanas  · Usted tiene preguntas o inquietudes acerca de solano condición o cuidado  Medicamentos:   · Medicamentos,  para tratar el dolor o para detener el vómito  Es posible que también necesite medicamentos para reducir o controlar el ácido estomacal o para tratar [place]  · [place] emily medicamentos antoine se le haya indicado    Consulte con solano médico si usted vinny que stephen medicamento no le está ayudando o si presenta efectos secundarios  Infórmele si es alérgico a algún medicamento  Mantenga omari lista actualizada de los [place], las vitaminas y los productos herbales que cynthia  Incluya los siguientes datos de los medicamentos: cantidad, frecuencia y motivo de administración  Traiga con usted la lista o los envases de la píldoras a emily citas de seguimiento  Lleve la lista de los medicamentos con usted en laura de omari emergencia  Acuda a emily consultas de control con stephen médico según le indicaron  Anote emily preguntas para que se acuerde de hacerlas viv emily visitas  [place] de stephen síntomas:   · Lleve un registro de emily síntomas  Incluya cuándo [employer], cuánto dura y sí el dolor es tim o leve  También incluya todos los alimentos que consume o las actividades que estaba haciendo antes que el dolor empezara  Registre cualquier cosa que haya ayudado para aliviar el dolor  · Consuma alimentos saludables y variados  Los alimentos saludables incluyen frutas, verduras, pan integral, productos lácteos bajos en grasa, frijoles, valerie magras y pescado  Pregunte si necesita seguir omari dieta especial  Ciertos alimentos pueden causar stephen dolor, antoine el alcohol o alimentos que son [place]  Es posible que necesite comer comidas más pequeñas y con más frecuencia que lo habitual     · [place] líquidos antoine se le haya indicado  Pregunte cuánto líquido debe jerri cada día y cuáles líquidos son los más adecuados para usted  No consuma bebidas que contengan alcohol o cafeína  © 2017 [address] Information is for End User's use only and may not be sold, redistributed or otherwise used for commercial purposes  All illustrations and images included in CareNotes® are the copyrighted property of A D A M , Inc  or Benny Patton  Esta información es sólo para uso en educación   Stephen intención no es darle un consejo [employer] enfermedades o tratamientos  Colsulte con solano Tasha Cabot farmacéutico antes de seguir cualquier régimen médico para saber si es seguro y efectivo para usted

## 2018-06-14 ENCOUNTER — ANESTHESIA EVENT (OUTPATIENT)
Dept: PERIOP | Facility: HOSPITAL | Age: 38
DRG: 403 | End: 2018-06-14
Payer: COMMERCIAL

## 2018-06-14 ENCOUNTER — OFFICE VISIT (OUTPATIENT)
Dept: BARIATRICS | Facility: CLINIC | Age: 38
End: 2018-06-14
Payer: COMMERCIAL

## 2018-06-14 VITALS
SYSTOLIC BLOOD PRESSURE: 114 MMHG | DIASTOLIC BLOOD PRESSURE: 64 MMHG | HEART RATE: 82 BPM | TEMPERATURE: 98.8 F | BODY MASS INDEX: 39.16 KG/M2 | RESPIRATION RATE: 22 BRPM | HEIGHT: 63 IN | WEIGHT: 221 LBS

## 2018-06-14 DIAGNOSIS — F32.A DEPRESSIVE DISORDER: ICD-10-CM

## 2018-06-14 DIAGNOSIS — E78.5 HYPERLIPIDEMIA, UNSPECIFIED HYPERLIPIDEMIA TYPE: ICD-10-CM

## 2018-06-14 DIAGNOSIS — E66.01 SEVERE OBESITY (BMI 35.0-39.9) WITH COMORBIDITY (HCC): Primary | ICD-10-CM

## 2018-06-14 DIAGNOSIS — E78.00 HYPERCHOLESTEROLEMIA: ICD-10-CM

## 2018-06-14 DIAGNOSIS — IMO0002 UNCONTROLLED TYPE 2 DIABETES WITH NEUROPATHY: ICD-10-CM

## 2018-06-14 DIAGNOSIS — E66.01 MORBID (SEVERE) OBESITY DUE TO EXCESS CALORIES (HCC): Primary | ICD-10-CM

## 2018-06-14 PROCEDURE — 99213 OFFICE O/P EST LOW 20 MIN: CPT | Performed by: SURGERY

## 2018-06-14 RX ORDER — OMEPRAZOLE 20 MG/1
20 CAPSULE, DELAYED RELEASE ORAL DAILY
Qty: 90 CAPSULE | Refills: 3 | Status: SHIPPED | OUTPATIENT
Start: 2018-06-14 | End: 2018-09-12

## 2018-06-14 RX ORDER — OXYCODONE HYDROCHLORIDE AND ACETAMINOPHEN 5; 325 MG/1; MG/1
1 TABLET ORAL EVERY 4 HOURS PRN
Qty: 30 TABLET | Refills: 0 | Status: SHIPPED | OUTPATIENT
Start: 2018-06-14 | End: 2018-06-14

## 2018-06-14 NOTE — ANESTHESIA PREPROCEDURE EVALUATION
Review of Systems/Medical History  Patient summary reviewed  Chart reviewed      Cardiovascular  Hyperlipidemia,    Pulmonary  Negative pulmonary ROS        GI/Hepatic    GERD , Liver disease , No hepatitis ,             Endo/Other  Diabetes type 2 Oral agent,   Obesity  super morbid obesity   GYN  Negative gynecology ROS          Hematology  Negative hematology ROS      Musculoskeletal  Back pain ,   Arthritis     Neurology    Headaches,    Psychology   Anxiety, Depression ,              Physical Exam    Airway    Mallampati score: II  TM Distance: <3 FB  Neck ROM: full     Dental       Cardiovascular  Rhythm: regular, Rate: normal,     Pulmonary  Breath sounds clear to auscultation,     Other Findings        Anesthesia Plan  ASA Score- 3     Anesthesia Type- general with ASA Monitors  Additional Monitors:   Airway Plan: ETT  Plan Factors- Patient instructed to abstain from smoking on day of procedure  Patient did not smoke on day of surgery  Induction- intravenous  Postoperative Plan- Plan for postoperative opioid use  Planned trial extubation    Informed Consent- Anesthetic plan and risks discussed with patient  I personally reviewed this patient with the CRNA  Discussed and agreed on the Anesthesia Plan with the CRNA  [place]

## 2018-06-14 NOTE — H&P
BARIATRIC H&P - BARIATRIC SURGERY  Flavia Bedolla 30 y o  female MRN: [id number]  Unit/Bed#:  Encounter: [id number]      HPI:  Flavia Bedolla is a 30 y o  female who presents with a long-standing history of morbid obesity  She was found to be a good candidate to undergo a bariatric operation upon being [facility]  She is here today to discuss details of her surgery  Review of Systems   All other systems reviewed and are negative  Historical Information   Past Medical History:   Diagnosis Date    Abdominal pain     Abnormal cells of cervix     Abnormal EKG     Acute headache     Amenorrhea     Anxiety     Back pain     Chronic low back pain     Chronic pain syndrome     Constipation     Contact dermatitis     Depression     Dermoid cyst of right lower extremity     Diabetes mellitus (HCC)     Drug-induced hepatic toxicity     Elevated liver function tests     Gastritis     GERD (gastroesophageal reflux disease)     Hypercholesterolemia     Hyperlipidemia     Left hip pain     Liver disease     drug induced hepatic toxicity    Liver function study, abnormal     Lumbar degenerative disc disease     Lumbar radiculopathy     Morbid obesity (HCC)     Neuropathy     Normal vaginal delivery     Tendinitis of left ankle     Vitamin D deficiency     Wears glasses      Past Surgical History:   Procedure Laterality Date    ABDOMINAL SURGERY       SECTION      COLONOSCOPY N/A 2018    Procedure: COLONOSCOPY;  Surgeon: Clay Samson MD;  Location: [facility]; Service: Gastroenterology    ESOPHAGOGASTRODUODENOSCOPY N/A 2018    Procedure: ESOPHAGOGASTRODUODENOSCOPY (EGD) with BIOPSY;  Surgeon: Brunilda Dickinson MD;  Location: [facility];   Service: Colleen Garcia LIVER BIOPSY      TONSILLECTOMY      TUBAL LIGATION       Social History   History   Alcohol Use    Yes     Comment: "occas"     History   Drug Use No     History Smoking Status    Never Smoker   Smokeless Tobacco    Never Used     Family History: non-contributory    Meds/Allergies   all medications and allergies reviewed  Allergies   Allergen Reactions    Atorvastatin      Elevated liver levels    Other      Herbal Life,,,caused elevated liver levels       Objective     Current Vitals:   Blood Pressure: 114/64 (06/14/18 1415)  Pulse: 82 (06/14/18 1415)  Temperature: 98 8 °F (37 1 °C) (06/14/18 1415)  Respirations: 22 (06/14/18 1415)  Height: 5' 2 5" (158 8 cm) (06/14/18 1415)  Weight - Scale: 100 kg (221 lb) (06/14/18 1415)      Invasive Devices          No matching active lines, drains, or airways          Physical Exam   Constitutional: She is oriented to person, place, and time  Vital signs are normal  She appears well-developed and well-nourished  No distress  HENT:   Head: Normocephalic and atraumatic  Nose: Nose normal    Mouth/Throat: Oropharynx is clear and moist    Eyes: Conjunctivae are normal  Right eye exhibits no discharge  No scleral icterus  Neck: Normal range of motion  Neck supple  Cardiovascular: Normal rate, regular rhythm, normal heart sounds and intact distal pulses  Pulmonary/Chest: Effort normal and breath sounds normal  No stridor  No respiratory distress  She has no wheezes  She has no rales  She exhibits no tenderness  Abdominal: Soft  Normal appearance and bowel sounds are normal  There is no tenderness  There is no rebound, no guarding and no CVA tenderness  Abdomen is obese  Benign  Musculoskeletal: Normal range of motion  Lymphadenopathy:     She has no cervical adenopathy  Neurological: She is alert and oriented to person, place, and time  Skin: Skin is warm and dry  No rash noted  She is not diaphoretic  No erythema  Psychiatric: She has a normal mood and affect  Her behavior is normal  Judgment and thought content normal    Nursing note and vitals reviewed        Lab Results: I have personally reviewed pertinent lab results  Imaging: I have personally reviewed pertinent reports  EKG, Pathology, and Other Studies: I have personally reviewed pertinent reports  Assessment/PLAN:    30 y o  female morbidly obese found to be a good candidate to undergo a weight loss operation upon being enrolled here at the [facility]     Patient has a long history of morbid obesity and is presenting to discuss the surgical weight loss options  Despite the patient best efforts patient was unable to lose any meaningful or sustainable weight using nonsurgical means  We had a long discussion regarding all the surgical weight-loss options at our disposal at this point and reviewed the risks and benefits of each procedure in details as it relates to her age, BMI and medical conditions  She has been pre certified to undergo a Laparoscopic sleeve gastrectomy  Here today to review her pre op test results  Has been medically cleared for the procedure  I have discussed with her at length the risks and benefits of the operation and reiterated the components of our multidisciplinary program and the importance of compliance and follow up in the post operative period  Although there is a great statistical chance of improvement or even resolution of most of her associated comorbidities, the results vary from patient to patient and they largely depend on her commitment  The patient was also instructed with regards to the importance of behavior modification, nutritional counseling, support meeting attendance and lifestyle changes that are important to ensure success  She was given the opportunity to ask questions and I have answered all of them  I have addressed with the patient the level of CODE STATUS for this hospital stay and after explaining the different options currently she wishes to be a Level I  She understands and wishes to proceed  Still needs to lose 5 lbs prior to surgery    Will return next week for a weight check and if the weight loss is at least half the way there, the surgery date will not be re scheduled  Patient understands and agrees            Gemini Scherer MD  6/14/2018  2:32 PM

## 2018-06-14 NOTE — PRE-PROCEDURE INSTRUCTIONS
Pre-Surgery Instructions:   Medication Instructions    busPIRone (BUSPAR) 15 mg tablet Patient was instructed by Physician and understands   butalbital-acetaminophen-caffeine (FIORICET,ESGIC) -40 mg per tablet Patient was instructed by Physician and understands   Cholecalciferol (VITAMIN D3) 1000 units CAPS Patient was instructed by Physician and understands   diclofenac (VOLTAREN) 75 mg EC tablet Patient was instructed by Physician and understands   dicyclomine (BENTYL) 20 mg tablet Patient was instructed by Physician and understands   Empagliflozin-Metformin HCl (SYNJARDY) 5-1000 MG TABS Patient was instructed by Physician and understands   FLUoxetine (PROzac) 20 mg capsule Patient was instructed by Physician and understands   gabapentin (NEURONTIN) 800 mg tablet Patient was instructed by Physician and understands   Insulin Glargine (TOUJEO SOLOSTAR SC) Patient was instructed by Physician and understands   Liraglutide (VICTOZA SC) Patient was instructed by Physician and understands   omeprazole (PriLOSEC) 20 mg delayed release capsule Patient was instructed by Physician and understands   ondansetron (ZOFRAN-ODT) 4 mg disintegrating tablet Patient was instructed by Physician and understands   simvastatin (ZOCOR) 40 mg tablet Patient was instructed by Physician and understands   STOOL SOFTENER 100 MG capsule Patient was instructed by Physician and understands   tiZANidine (ZANAFLEX) 2 mg tablet Patient was instructed by Physician and understands   traZODone (DESYREL) 100 mg tablet Patient was instructed by Physician and understands  Filiberto Briceno pt's boyfriend with time/[phone],,Pt and boyfriend instructed by Dr Kinjal Rolle to take gabapentin, prozac,buspirone and prilosec*with a sip of water the morning of surgery and normal dose of toujeo the evening before surgery  Pt given/reviewed [facility] preop instructions and chlorhexadine soap   Pt to hold asa/NSAIDS/vitamins/herbal supplements one week before surgery or as per Dr Johnathan Mcgarry

## 2018-06-19 ENCOUNTER — TELEPHONE (OUTPATIENT)
Dept: BARIATRICS | Facility: CLINIC | Age: 38
End: 2018-06-19

## 2018-06-20 ENCOUNTER — TELEPHONE (OUTPATIENT)
Dept: BARIATRICS | Facility: CLINIC | Age: 38
End: 2018-06-20

## 2018-06-21 DIAGNOSIS — E66.01 MORBID (SEVERE) OBESITY DUE TO EXCESS CALORIES (HCC): Primary | ICD-10-CM

## 2018-06-26 ENCOUNTER — HOSPITAL ENCOUNTER (INPATIENT)
Facility: HOSPITAL | Age: 38
LOS: 2 days | Discharge: HOME | DRG: 403 | End: 2018-06-28
Attending: SURGERY | Admitting: SURGERY
Payer: COMMERCIAL

## 2018-06-26 ENCOUNTER — ANESTHESIA (OUTPATIENT)
Dept: PERIOP | Facility: HOSPITAL | Age: 38
DRG: 403 | End: 2018-06-26
Payer: COMMERCIAL

## 2018-06-26 DIAGNOSIS — E66.01 MORBID OBESITY (HCC): Primary | ICD-10-CM

## 2018-06-26 DIAGNOSIS — E11.9 DIABETES MELLITUS WITHOUT COMPLICATION (HCC): ICD-10-CM

## 2018-06-26 DIAGNOSIS — E78.5 HYPERLIPIDEMIA, UNSPECIFIED HYPERLIPIDEMIA TYPE: ICD-10-CM

## 2018-06-26 DIAGNOSIS — F32.A DEPRESSIVE DISORDER: ICD-10-CM

## 2018-06-26 DIAGNOSIS — IMO0002 UNCONTROLLED TYPE 2 DIABETES WITH NEUROPATHY: ICD-10-CM

## 2018-06-26 PROBLEM — R00.0 TACHYCARDIA: Status: ACTIVE | Noted: 2018-06-26

## 2018-06-26 LAB
EXT PREGNANCY TEST URINE: NEGATIVE
GLUCOSE SERPL-MCNC: 137 MG/DL (ref 65–140)
GLUCOSE SERPL-MCNC: 157 MG/DL (ref 65–140)
GLUCOSE SERPL-MCNC: 184 MG/DL (ref 65–140)
GLUCOSE SERPL-MCNC: 194 MG/DL (ref 65–140)
GLUCOSE SERPL-MCNC: 253 MG/DL (ref 65–140)

## 2018-06-26 PROCEDURE — 43775 LAP SLEEVE GASTRECTOMY: CPT | Performed by: SURGERY

## 2018-06-26 PROCEDURE — 0DJ08ZZ INSPECTION OF UPPER INTESTINAL TRACT, VIA NATURAL OR ARTIFICIAL OPENING ENDOSCOPIC: ICD-10-PCS | Performed by: SURGERY

## 2018-06-26 PROCEDURE — 88307 TISSUE EXAM BY PATHOLOGIST: CPT | Performed by: PATHOLOGY

## 2018-06-26 PROCEDURE — 81025 URINE PREGNANCY TEST: CPT | Performed by: ANESTHESIOLOGY

## 2018-06-26 PROCEDURE — 0DB64Z3 EXCISION OF STOMACH, PERCUTANEOUS ENDOSCOPIC APPROACH, VERTICAL: ICD-10-PCS | Performed by: SURGERY

## 2018-06-26 PROCEDURE — 99253 IP/OBS CNSLTJ NEW/EST LOW 45: CPT | Performed by: HOSPITALIST

## 2018-06-26 PROCEDURE — C9113 INJ PANTOPRAZOLE SODIUM, VIA: HCPCS | Performed by: SURGERY

## 2018-06-26 PROCEDURE — 82948 REAGENT STRIP/BLOOD GLUCOSE: CPT

## 2018-06-26 PROCEDURE — 43775 LAP SLEEVE GASTRECTOMY: CPT | Performed by: PHYSICIAN ASSISTANT

## 2018-06-26 RX ORDER — ONDANSETRON 2 MG/ML
INJECTION INTRAMUSCULAR; INTRAVENOUS AS NEEDED
Status: DISCONTINUED | OUTPATIENT
Start: 2018-06-26 | End: 2018-06-26 | Stop reason: SURG

## 2018-06-26 RX ORDER — HYDRALAZINE HYDROCHLORIDE 20 MG/ML
10 INJECTION INTRAMUSCULAR; INTRAVENOUS ONCE
Status: COMPLETED | OUTPATIENT
Start: 2018-06-26 | End: 2018-06-26

## 2018-06-26 RX ORDER — ROCURONIUM BROMIDE 10 MG/ML
INJECTION, SOLUTION INTRAVENOUS AS NEEDED
Status: DISCONTINUED | OUTPATIENT
Start: 2018-06-26 | End: 2018-06-26 | Stop reason: SURG

## 2018-06-26 RX ORDER — MAGNESIUM HYDROXIDE 1200 MG/15ML
LIQUID ORAL AS NEEDED
Status: DISCONTINUED | OUTPATIENT
Start: 2018-06-26 | End: 2018-06-26 | Stop reason: HOSPADM

## 2018-06-26 RX ORDER — LABETALOL HYDROCHLORIDE 5 MG/ML
5 INJECTION, SOLUTION INTRAVENOUS ONCE
Status: COMPLETED | OUTPATIENT
Start: 2018-06-26 | End: 2018-06-26

## 2018-06-26 RX ORDER — PANTOPRAZOLE SODIUM 40 MG/1
40 INJECTION, POWDER, FOR SOLUTION INTRAVENOUS
Status: DISCONTINUED | OUTPATIENT
Start: 2018-06-26 | End: 2018-06-28 | Stop reason: HOSPADM

## 2018-06-26 RX ORDER — MORPHINE SULFATE 10 MG/ML
4 INJECTION, SOLUTION INTRAMUSCULAR; INTRAVENOUS EVERY 2 HOUR PRN
Status: CANCELLED | OUTPATIENT
Start: 2018-06-26

## 2018-06-26 RX ORDER — OXYCODONE HCL 5 MG/5 ML
5 SOLUTION, ORAL ORAL EVERY 4 HOURS PRN
Status: CANCELLED | OUTPATIENT
Start: 2018-06-26

## 2018-06-26 RX ORDER — BUPIVACAINE HYDROCHLORIDE AND EPINEPHRINE 5; 5 MG/ML; UG/ML
INJECTION, SOLUTION PERINEURAL AS NEEDED
Status: DISCONTINUED | OUTPATIENT
Start: 2018-06-26 | End: 2018-06-26 | Stop reason: HOSPADM

## 2018-06-26 RX ORDER — MIDAZOLAM HYDROCHLORIDE 1 MG/ML
INJECTION INTRAMUSCULAR; INTRAVENOUS AS NEEDED
Status: DISCONTINUED | OUTPATIENT
Start: 2018-06-26 | End: 2018-06-26 | Stop reason: SURG

## 2018-06-26 RX ORDER — SODIUM CHLORIDE 9 MG/ML
125 INJECTION, SOLUTION INTRAVENOUS CONTINUOUS
Status: DISCONTINUED | OUTPATIENT
Start: 2018-06-26 | End: 2018-06-26

## 2018-06-26 RX ORDER — OXYCODONE HCL 5 MG/5 ML
10 SOLUTION, ORAL ORAL EVERY 4 HOURS PRN
Status: DISCONTINUED | OUTPATIENT
Start: 2018-06-26 | End: 2018-06-28 | Stop reason: HOSPADM

## 2018-06-26 RX ORDER — EPHEDRINE SULFATE 50 MG/ML
INJECTION, SOLUTION INTRAVENOUS AS NEEDED
Status: DISCONTINUED | OUTPATIENT
Start: 2018-06-26 | End: 2018-06-26 | Stop reason: SURG

## 2018-06-26 RX ORDER — GLYCOPYRROLATE 0.2 MG/ML
INJECTION INTRAMUSCULAR; INTRAVENOUS AS NEEDED
Status: DISCONTINUED | OUTPATIENT
Start: 2018-06-26 | End: 2018-06-26 | Stop reason: SURG

## 2018-06-26 RX ORDER — ONDANSETRON 2 MG/ML
4 INJECTION INTRAMUSCULAR; INTRAVENOUS EVERY 4 HOURS PRN
Status: DISCONTINUED | OUTPATIENT
Start: 2018-06-26 | End: 2018-06-28 | Stop reason: HOSPADM

## 2018-06-26 RX ORDER — MORPHINE SULFATE 2 MG/ML
2 INJECTION, SOLUTION INTRAMUSCULAR; INTRAVENOUS EVERY 2 HOUR PRN
Status: DISCONTINUED | OUTPATIENT
Start: 2018-06-26 | End: 2018-06-28 | Stop reason: HOSPADM

## 2018-06-26 RX ORDER — INSULIN GLARGINE 100 [IU]/ML
30 INJECTION, SOLUTION SUBCUTANEOUS
Status: DISCONTINUED | OUTPATIENT
Start: 2018-06-26 | End: 2018-06-28 | Stop reason: HOSPADM

## 2018-06-26 RX ORDER — FENTANYL CITRATE/PF 50 MCG/ML
50 SYRINGE (ML) INJECTION
Status: DISCONTINUED | OUTPATIENT
Start: 2018-06-26 | End: 2018-06-26 | Stop reason: HOSPADM

## 2018-06-26 RX ORDER — ALBUTEROL SULFATE 2.5 MG/3ML
2.5 SOLUTION RESPIRATORY (INHALATION) ONCE AS NEEDED
Status: DISCONTINUED | OUTPATIENT
Start: 2018-06-26 | End: 2018-06-26 | Stop reason: HOSPADM

## 2018-06-26 RX ORDER — METOCLOPRAMIDE HYDROCHLORIDE 5 MG/ML
10 INJECTION INTRAMUSCULAR; INTRAVENOUS EVERY 8 HOURS
Status: DISCONTINUED | OUTPATIENT
Start: 2018-06-26 | End: 2018-06-28 | Stop reason: HOSPADM

## 2018-06-26 RX ORDER — SCOLOPAMINE TRANSDERMAL SYSTEM 1 MG/1
1 PATCH, EXTENDED RELEASE TRANSDERMAL ONCE
Status: DISCONTINUED | OUTPATIENT
Start: 2018-06-26 | End: 2018-06-26

## 2018-06-26 RX ORDER — PROPOFOL 10 MG/ML
INJECTION, EMULSION INTRAVENOUS AS NEEDED
Status: DISCONTINUED | OUTPATIENT
Start: 2018-06-26 | End: 2018-06-26 | Stop reason: SURG

## 2018-06-26 RX ORDER — SODIUM CHLORIDE, SODIUM LACTATE, POTASSIUM CHLORIDE, CALCIUM CHLORIDE 600; 310; 30; 20 MG/100ML; MG/100ML; MG/100ML; MG/100ML
50 INJECTION, SOLUTION INTRAVENOUS CONTINUOUS
Status: DISCONTINUED | OUTPATIENT
Start: 2018-06-26 | End: 2018-06-28 | Stop reason: HOSPADM

## 2018-06-26 RX ORDER — ACETAMINOPHEN 160 MG/5ML
325 SUSPENSION, ORAL (FINAL DOSE FORM) ORAL EVERY 4 HOURS PRN
Status: CANCELLED | OUTPATIENT
Start: 2018-06-26

## 2018-06-26 RX ORDER — HYDRALAZINE HYDROCHLORIDE 20 MG/ML
10 INJECTION INTRAMUSCULAR; INTRAVENOUS EVERY 6 HOURS PRN
Status: DISCONTINUED | OUTPATIENT
Start: 2018-06-26 | End: 2018-06-28 | Stop reason: HOSPADM

## 2018-06-26 RX ORDER — MEPERIDINE HYDROCHLORIDE 50 MG/ML
12.5 INJECTION INTRAMUSCULAR; INTRAVENOUS; SUBCUTANEOUS AS NEEDED
Status: DISCONTINUED | OUTPATIENT
Start: 2018-06-26 | End: 2018-06-26 | Stop reason: HOSPADM

## 2018-06-26 RX ORDER — OXYCODONE HCL 5 MG/5 ML
5 SOLUTION, ORAL ORAL EVERY 4 HOURS PRN
Status: DISCONTINUED | OUTPATIENT
Start: 2018-06-26 | End: 2018-06-28 | Stop reason: HOSPADM

## 2018-06-26 RX ORDER — METOCLOPRAMIDE HYDROCHLORIDE 5 MG/ML
10 INJECTION INTRAMUSCULAR; INTRAVENOUS ONCE AS NEEDED
Status: DISCONTINUED | OUTPATIENT
Start: 2018-06-26 | End: 2018-06-28 | Stop reason: HOSPADM

## 2018-06-26 RX ORDER — ACETAMINOPHEN 160 MG/5ML
650 SUSPENSION, ORAL (FINAL DOSE FORM) ORAL EVERY 6 HOURS
Status: DISCONTINUED | OUTPATIENT
Start: 2018-06-26 | End: 2018-06-28 | Stop reason: HOSPADM

## 2018-06-26 RX ORDER — OXYCODONE HCL 5 MG/5 ML
10 SOLUTION, ORAL ORAL EVERY 4 HOURS PRN
Status: CANCELLED | OUTPATIENT
Start: 2018-06-26

## 2018-06-26 RX ORDER — TIZANIDINE 2 MG/1
2 TABLET ORAL EVERY 8 HOURS PRN
Status: DISCONTINUED | OUTPATIENT
Start: 2018-06-26 | End: 2018-06-28 | Stop reason: HOSPADM

## 2018-06-26 RX ORDER — TRAZODONE HYDROCHLORIDE 100 MG/1
100 TABLET ORAL
Status: DISCONTINUED | OUTPATIENT
Start: 2018-06-26 | End: 2018-06-28 | Stop reason: HOSPADM

## 2018-06-26 RX ORDER — FENTANYL CITRATE 50 UG/ML
INJECTION, SOLUTION INTRAMUSCULAR; INTRAVENOUS AS NEEDED
Status: DISCONTINUED | OUTPATIENT
Start: 2018-06-26 | End: 2018-06-26 | Stop reason: SURG

## 2018-06-26 RX ORDER — GABAPENTIN 400 MG/1
800 CAPSULE ORAL 3 TIMES DAILY
Status: DISCONTINUED | OUTPATIENT
Start: 2018-06-26 | End: 2018-06-28 | Stop reason: HOSPADM

## 2018-06-26 RX ORDER — FLUOXETINE HYDROCHLORIDE 20 MG/1
20 CAPSULE ORAL 2 TIMES DAILY
Status: DISCONTINUED | OUTPATIENT
Start: 2018-06-26 | End: 2018-06-28 | Stop reason: HOSPADM

## 2018-06-26 RX ADMIN — FENTANYL CITRATE 50 MCG: 50 INJECTION, SOLUTION INTRAMUSCULAR; INTRAVENOUS at 10:51

## 2018-06-26 RX ADMIN — ONDANSETRON HYDROCHLORIDE 4 MG: 2 INJECTION, SOLUTION INTRAVENOUS at 10:24

## 2018-06-26 RX ADMIN — MORPHINE SULFATE 2 MG: 2 INJECTION, SOLUTION INTRAMUSCULAR; INTRAVENOUS at 20:14

## 2018-06-26 RX ADMIN — SODIUM CHLORIDE: 0.9 INJECTION, SOLUTION INTRAVENOUS at 09:03

## 2018-06-26 RX ADMIN — INSULIN GLARGINE 30 UNITS: 100 INJECTION, SOLUTION SUBCUTANEOUS at 22:17

## 2018-06-26 RX ADMIN — INSULIN LISPRO 10 UNITS: 100 INJECTION, SOLUTION INTRAVENOUS; SUBCUTANEOUS at 17:26

## 2018-06-26 RX ADMIN — INSULIN LISPRO 6 UNITS: 100 INJECTION, SOLUTION INTRAVENOUS; SUBCUTANEOUS at 17:26

## 2018-06-26 RX ADMIN — MORPHINE SULFATE 2 MG: 2 INJECTION, SOLUTION INTRAMUSCULAR; INTRAVENOUS at 22:22

## 2018-06-26 RX ADMIN — METOCLOPRAMIDE 10 MG: 5 INJECTION, SOLUTION INTRAMUSCULAR; INTRAVENOUS at 20:14

## 2018-06-26 RX ADMIN — MIDAZOLAM 2 MG: 1 INJECTION INTRAMUSCULAR; INTRAVENOUS at 08:29

## 2018-06-26 RX ADMIN — LIDOCAINE HYDROCHLORIDE 100 MG: 20 INJECTION, SOLUTION INTRAVENOUS at 08:38

## 2018-06-26 RX ADMIN — EPHEDRINE SULFATE 10 MG: 50 INJECTION, SOLUTION INTRAMUSCULAR; INTRAVENOUS; SUBCUTANEOUS at 08:47

## 2018-06-26 RX ADMIN — EPHEDRINE SULFATE 20 MG: 50 INJECTION, SOLUTION INTRAMUSCULAR; INTRAVENOUS; SUBCUTANEOUS at 09:05

## 2018-06-26 RX ADMIN — HYDRALAZINE HYDROCHLORIDE 10 MG: 20 INJECTION INTRAMUSCULAR; INTRAVENOUS at 11:33

## 2018-06-26 RX ADMIN — ONDANSETRON 4 MG: 2 INJECTION INTRAMUSCULAR; INTRAVENOUS at 17:17

## 2018-06-26 RX ADMIN — ROCURONIUM BROMIDE 50 MG: 10 INJECTION INTRAVENOUS at 08:38

## 2018-06-26 RX ADMIN — HYDROMORPHONE HYDROCHLORIDE 0.5 MG: 1 INJECTION, SOLUTION INTRAMUSCULAR; INTRAVENOUS; SUBCUTANEOUS at 11:13

## 2018-06-26 RX ADMIN — FENTANYL CITRATE 100 MCG: 50 INJECTION, SOLUTION INTRAMUSCULAR; INTRAVENOUS at 08:35

## 2018-06-26 RX ADMIN — SCOPALAMINE 1 PATCH: 1 PATCH, EXTENDED RELEASE TRANSDERMAL at 07:18

## 2018-06-26 RX ADMIN — HYDROMORPHONE HYDROCHLORIDE 0.5 MG: 1 INJECTION, SOLUTION INTRAMUSCULAR; INTRAVENOUS; SUBCUTANEOUS at 09:20

## 2018-06-26 RX ADMIN — HYDROMORPHONE HYDROCHLORIDE 0.5 MG: 1 INJECTION, SOLUTION INTRAMUSCULAR; INTRAVENOUS; SUBCUTANEOUS at 11:26

## 2018-06-26 RX ADMIN — HYDROMORPHONE HYDROCHLORIDE 0.5 MG: 1 INJECTION, SOLUTION INTRAMUSCULAR; INTRAVENOUS; SUBCUTANEOUS at 09:41

## 2018-06-26 RX ADMIN — METOCLOPRAMIDE 10 MG: 5 INJECTION, SOLUTION INTRAMUSCULAR; INTRAVENOUS at 14:40

## 2018-06-26 RX ADMIN — MORPHINE SULFATE 2 MG: 2 INJECTION, SOLUTION INTRAMUSCULAR; INTRAVENOUS at 17:17

## 2018-06-26 RX ADMIN — FENTANYL CITRATE 50 MCG: 50 INJECTION, SOLUTION INTRAMUSCULAR; INTRAVENOUS at 10:44

## 2018-06-26 RX ADMIN — HYDROMORPHONE HYDROCHLORIDE 0.5 MG: 1 INJECTION, SOLUTION INTRAMUSCULAR; INTRAVENOUS; SUBCUTANEOUS at 10:59

## 2018-06-26 RX ADMIN — SODIUM CHLORIDE, SODIUM LACTATE, POTASSIUM CHLORIDE, AND CALCIUM CHLORIDE 125 ML/HR: .6; .31; .03; .02 INJECTION, SOLUTION INTRAVENOUS at 11:47

## 2018-06-26 RX ADMIN — SODIUM CHLORIDE: 0.9 INJECTION, SOLUTION INTRAVENOUS at 09:34

## 2018-06-26 RX ADMIN — PANTOPRAZOLE SODIUM 40 MG: 40 INJECTION, POWDER, FOR SOLUTION INTRAVENOUS at 14:40

## 2018-06-26 RX ADMIN — Medication 2000 MG: at 08:31

## 2018-06-26 RX ADMIN — HYDROMORPHONE HYDROCHLORIDE 0.5 MG: 1 INJECTION, SOLUTION INTRAMUSCULAR; INTRAVENOUS; SUBCUTANEOUS at 11:43

## 2018-06-26 RX ADMIN — GLYCOPYRROLATE 0.5 MG: 0.2 INJECTION, SOLUTION INTRAMUSCULAR; INTRAVENOUS at 10:21

## 2018-06-26 RX ADMIN — LABETALOL 20 MG/4 ML (5 MG/ML) INTRAVENOUS SYRINGE 5 MG: at 11:30

## 2018-06-26 RX ADMIN — SODIUM CHLORIDE 125 ML/HR: 0.9 INJECTION, SOLUTION INTRAVENOUS at 07:20

## 2018-06-26 RX ADMIN — FENTANYL CITRATE 100 MCG: 50 INJECTION, SOLUTION INTRAMUSCULAR; INTRAVENOUS at 09:12

## 2018-06-26 RX ADMIN — SODIUM CHLORIDE: 0.9 INJECTION, SOLUTION INTRAVENOUS at 10:30

## 2018-06-26 RX ADMIN — ENOXAPARIN SODIUM 40 MG: 100 INJECTION SUBCUTANEOUS at 08:10

## 2018-06-26 RX ADMIN — NEOSTIGMINE METHYLSULFATE 3 MG: 1 INJECTION, SOLUTION INTRAMUSCULAR; INTRAVENOUS; SUBCUTANEOUS at 10:21

## 2018-06-26 RX ADMIN — PROPOFOL 200 MG: 10 INJECTION, EMULSION INTRAVENOUS at 08:38

## 2018-06-26 RX ADMIN — ACETAMINOPHEN 650 MG: 160 SUSPENSION ORAL at 14:39

## 2018-06-26 NOTE — OP NOTE
[facility]   [address] [place] [address], [address]  [phone] (Fax)      Operative Report  GASTRECTOMY SLEEVE LAPAROSCOPIC AND INTRAOPERATIVE EGD     Patient Name: Saranya Yanez    :  1980  MRN: [id number]  Patient Location: [facility] ROOM 07  Surgery Date : 2018  Surgeons:  Surgeon(s) and Role:     * Latrice Orozco MD - Primary     * Kasi Carr PA-C - Assistant    Diagnosis:    Pre-Op Diagnosis Codes: Morbid obesity ([facility] [address] ) [E66 01]  Body mass index is 39 12 kg/m²  Diabetes mellitus without complication (HCC) [H34 3]  Hyperlipidemia, unspecified hyperlipidemia type [E78 5]    Post-Op Diagnosis Codes:     * Morbid obesity ([facility] [address] ) [E66 01]     * Body mass index is 39 12 kg/m²  * Diabetes mellitus without complication (HCC) [Z08 7]     * Hyperlipidemia, unspecified hyperlipidemia type [E78 5]    Procedure  1  Laparoscopic Sleeve Gastrectomy  2  Intraoperative Endoscopy    Specimen(s):  ID Type Source Tests Collected by Time Destination   1 : Portion of stomach Tissue Stomach TISSUE EXAM Latrice Orozco MD 2018 [id number]        Estimated Blood Loss:    30 cc    Anesthesia Type:     General    Operative Indications: Morbid obesity ([facility] [address] ) [E66 01]  Body mass index is 39 12 kg/m²  Diabetes mellitus without complication ([facility] [address] ) [D37 3]  Hyperlipidemia, unspecified hyperlipidemia type [E78 5]      Operative Findings:    Normal    Complications:     None    Procedure and Technique:    INDICATION    Saranya Yanez is a 41 y o  female with a Body mass index is 39 12 kg/m²  and a long standing history of morbid obesity and inability to lose a significant amount of weight on its own  This patient was found to be a good candidate to undergo a bariatric procedure upon being enrolled here at the [address] [place] [address]  OPERATIVE TECHNIQUE    The patient was taken to the operating room and placed in a supine position   A dose of IV antibiotic prophylaxis that consisted of Ancef 2g was given  Also 40 mg of subcutaneous Enoxaparin to prevent deep vein thrombosis were administered  Sequential compression devices were placed on both lower extremities  After satisfactory general anesthesia induction and endotracheal intubation was achieved, the extremities were placed and properly secured to prevent neuromuscular damage as best as possible  Subsequently, the abdominal wall was prepped and draped in a surgical standard sterile fashion  After a timeout was done and the patient was properly identified and the type of procedure was confirmed a supra-umbilical transverse skin incision was made and the subcutaneous tissues dissected  Access to the peritoneal cavity was gained with the Visiport trocar  With this device, we were able to visualize the layers of the abdominal wall, and enter the peritoneal cavity under direct visualization  Pneumoperitoneum was then established with CO2 insufflation  Under direct laparoscopic visualization, four additional trocars were placed: a 12 mm in the right upper quadrant subcostal position in the anterior axillary line, a 12-mm port was placed in the right flank midclavicular line, a 12-mm port was placed in the left upper quadrant subcostal position in the midclavicular line and another 12-mm port was placed in the left quadrant anterior axillary line lateral to the supraumbilical port  The [facility] liver retractor was placed in the subxiphoid position through the use of a 5-mm trocar incision  The patient was repositioned to a reverse Trendelenburg position  With the trocars in place, the dissection was begun  We divided the gastrocolic ligament with the energy device to enter the lesser sac  We continued to divide this ligament along the greater curvature of the stomach towards the angle of His  Special care was taken while dividing the short gastric vessels close to the spleen   This process was completely hemostatic  We then turned to the creation of an elongated and thin gastric pouch  A 36 [place] calibration tube was placed by the anesthesia staff into the stomach under our laparoscopic surveillance  Once the tip of the bougie was confirmed to be next to the pylorus, serial firings of the laparoscopic stapler with 60-mm cartridges were utilized  We started in a point inferior to the incisura angularis and the Crows foot nerve looking to preserve the gastric emptying  This was 5 to 6 centimeters proximal to the pylorus  The staple lines were reinforced with buttressing material  We created a pouch based on the lesser curve, and in vertical orientation  We continued the vertical serial firings of the stapler to the angle of His gently cinching the bougie with our laparoscopic stapler looking to create a thin pouch  As we approached the fundus of the stomach and the angle of His, the stapler loads were changed appropriately according to the variable thickness of the tissue  This completely  the pouch from the remnant stomach  We then turned our attention to the newly created pouch and examined it for bleeding or obvious defects on the staple lines  No bleeding was noted but a short segment of the first and the third firing had a couple of staples not optimally formed  I reinforced these two areas with interrupted stitches of 2/0 vicryl  The distal stomach pouch was occluded with a Charlene clamp, and an EGD as well as an air insufflation test was performed  Neither intraoperative bleeding nor leaks were detected  The periumbilical trocar site was dilated and the gastric remnant was externalized through it and passed off the surgical field to be sent to pathology  I then covered the staple line with a tongue of omentum in a Bi patch fashion and secured it in place with a single 2/0 Vicryl stitch  The sponge, needle and instrument count was reported complete      The previously dilated trocar site was then closed with use of a suture closure device and a figure-of-eight with absorbable suture  The liver retractor and the remainder ports were then removed under direct laparoscopic visualization and no back bleeding was noted  The skin incisions were all closed with 4-0 absorbable subcuticular suture  The patient tolerated the procedure well, was extubated uneventfully and was transferred to the recovery room in stable condition  I was present for the entire length of the procedure as the attending of record  No qualified resident was available to assist   The presence of an assistant was necessary for camera holding, traction and counter traction and for help with suturing and stapling in addition to performing the intraop-EGD        Patient Disposition:    PACU     Signature: Avel Boyce MD  Date: June 26, 2018  Time: 10:06 AM

## 2018-06-26 NOTE — ANESTHESIA POSTPROCEDURE EVALUATION
Post-Op Assessment Note      /72 (06/26/18 1113)    Temp 98 2 °F (36 8 °C) (06/26/18 1113)    Pulse (!) 106 (06/26/18 1113)   Resp 16 (06/26/18 1113)    SpO2 100 % (06/26/18 1113)

## 2018-06-26 NOTE — CONSULTS
Consulted for Medical Management by:  Dr Amy Siddiqi      Assessment/Plan     1-POD#0-sleeve gastrectomy-patient doing well postop  Ambulating, minimal nausea and saturating at 92% on room air  2-diabetes mellitus type 2 on insulin and oral hypoglycemics at home--she is on insulin glargine 40 units at night ,and victozaf 1 8 mg daily in addition to empagliflozin-metformin--will keep the patient on 30 units of Lantus at night and discontinue the oral hypoglycemics and Victoza   Depending on her blood sugar status will adjust her insulin  3-depression-continue patient's Prozac, BuSpar and trazodone    4-peripheral neuropathy secondary to diabetes-continue patient on gabapentin    5-GERD continue PPI  Thank you for the consultation  Will follow the patient with you  History of Present Illness     HPI:  Ludwig Monday is a 41 y o  female who underwent a gastrostomy sleeve laparoscopic and an  intraoperative EGD  The patient is ambulating in her complains of some abdominal cramps otherwise has no other symptoms  Slim was consulted for medical management  The patient's medical history is significant for diabetes mellitus type 2 for which she is on insulin glargine 40 units at night along with  empagliflozin-metformin -depression for which she is on BuSpar and Prozac, migraines on Fioricet, peripheral neuropathy for which she is on gabapentin GERD and dyslipidemia on simvastatin          Historical Information   Past Medical History:   Diagnosis Date    Abdominal pain     Abnormal cells of cervix     Abnormal EKG     Acute headache     Amenorrhea     Anxiety     Back pain     Chronic low back pain     Chronic pain syndrome     Constipation     Contact dermatitis     Depression     Dermoid cyst of right lower extremity     Diabetes mellitus (HCC)     Drug-induced hepatic toxicity     Elevated liver function tests     Gastritis     GERD (gastroesophageal reflux disease)     Hypercholesterolemia     Hyperlipidemia     Left hip pain     Liver disease     drug induced hepatic toxicity    Liver function study, abnormal     Lumbar degenerative disc disease     Lumbar radiculopathy     Morbid obesity (HCC)     Neuropathy     Normal vaginal delivery     Tendinitis of left ankle     Vitamin D deficiency     Wears glasses      Patient Active Problem List   Diagnosis    Hypercholesterolemia    Uncontrolled type 2 diabetes with neuropathy (HCC)    Depressive disorder    Anxiety    BMI 40 0-44 9, adult (HCC)    Neck pain on right side    Abdominal pain    Abnormal cells of cervix    Abnormal EKG    Amenorrhea    Chronic pain disorder    Chronic right-sided low back pain with sciatica    Dermoid cyst of right lower extremity    Elevated liver function tests    Left hip pain    Lumbar radiculopathy    Tendinitis of left ankle    Vitamin D deficiency    Severe obesity (BMI 35 0-39  9) with comorbidity ([address] )    Intractable migraine without aura and with status migrainosus    Strain of calf muscle    Blood in stool    Chronic idiopathic constipation    Abnormal TSH    Acquired polyneuropathy    Morbid obesity (HCC)    Diabetes mellitus without complication ([address] )    Hyperlipidemia     Past Surgical History:   Procedure Laterality Date    ABDOMINAL SURGERY       SECTION      COLONOSCOPY N/A 2018    Procedure: COLONOSCOPY;  Surgeon: Macarena Talamantes MD;  [facility]; Service: Gastroenterology    ESOPHAGOGASTRODUODENOSCOPY N/A 2018    Procedure: ESOPHAGOGASTRODUODENOSCOPY (EGD) with BIOPSY;  Surgeon: Mahesh Lewis MD;  [facility]: [facility];   Service: Bariatrics    LIVER BIOPSY      TONSILLECTOMY      TUBAL LIGATION      2017 Patient reports she had a tubal ligation 12 yrs ago in [address] but doesn't know the extent of the tubal   This is the first time she mentioned this procedure, added to surgical list   nw       Social History History   Alcohol Use    Yes     Comment: "occas", social     History   Drug Use No     History   Smoking Status    Never Smoker   Smokeless Tobacco    Never Used     Comment: Onset Date:  1/23/18       Family History:   Family History   Problem Relation Age of Onset    Diabetes Mother     Hyperlipidemia Mother     Pancreatitis Father     Thyroid disease Paternal Aunt     Lung cancer Maternal Grandfather     Heart disease Neg Hx     Stroke Neg Hx        Meds/Allergies       Current Facility-Administered Medications:     acetaminophen (TYLENOL) oral suspension 650 mg, 650 mg, Oral, Q6H, Javed Frankel MD    [START ON 6/27/2018] enoxaparin (LOVENOX) subcutaneous injection 40 mg, 40 mg, Subcutaneous, Q24H [address], Kaiser Taylor MD    FLUoxetine (PROzac) capsule 20 mg, 20 mg, Oral, BID, Javed Frankel MD    hydrALAZINE (APRESOLINE) injection 10 mg, 10 mg, Intravenous, Q6H PRN, Javed Frankel MD    insulin lispro (HumaLOG) 100 units/mL subcutaneous injection 2-12 Units, 2-12 Units, Subcutaneous, Q6H [address] **AND** Fingerstick Glucose (POCT), , , Q6H, Javed Frankel MD    lactated ringers infusion, 125 mL/hr, Intravenous, Continuous, Kaiser Taylor MD, Last Rate: 125 mL/hr at 06/26/18 1220, 125 mL/hr at 06/26/18 1220    metoclopramide (REGLAN) injection 10 mg, 10 mg, Intravenous, Once PRN, Kerrie Batista DO    metoclopramide (REGLAN) injection 10 mg, 10 mg, Intravenous, Q8H, Javed Frankel MD    morphine injection 2 mg, 2 mg, Intravenous, Q2H PRN, Kaiser Taylor MD    ondansetron [facility]) injection 4 mg, 4 mg, Intravenous, Q4H PRN, Kaiser Taylor MD    oxyCODONE (ROXICODONE) oral solution 10 mg, 10 mg, Oral, Q4H PRN, Javed Frankel MD    oxyCODONE (ROXICODONE) oral solution 5 mg, 5 mg, Oral, Q4H PRN, Javed Frankel MD    pantoprazole (PROTONIX) injection 40 mg, 40 mg, Intravenous, Q24H [address], Fred Garcia MD    phenol (CHLORASEPTIC) 1 4 % mucosal liquid 2 spray, 2 spray, Mouth/Throat, Q2H PRN, Dougie Valentine MD    tiZANidine (ZANAFLEX) tablet 2 mg, 2 mg, Oral, Q8H PRN, Flavia Chakraborty MD    traZODone (DESYREL) tablet 100 mg, 100 mg, Oral, HS, Flavia Chakraborty MD    trimethobenzamide Lawler Juba) IM injection 200 mg, 200 mg, Intramuscular, Once PRN, Javid Degree, DO    Allergies   Allergen Reactions    Atorvastatin      Elevated liver levels    Other      Herbal Life,,,caused elevated liver levels       Review of Systems  A detailed 12 point review of systems was conducted and is negative apart from those mentioned in the HPI  Objective   Vitals: Blood pressure 130/78, pulse 95, temperature 97 5 °F (36 4 °C), temperature source Temporal, resp  rate 14, height 5' 2" (1 575 m), weight 97 kg (213 lb 14 4 oz), last menstrual period 06/01/2018, SpO2 99 %, not currently breastfeeding  Physical Exam     General NAD  HEENT: PERRLA, EOMI, sclera anicteric, dry mucous membranes, tongue mucosa dry without lesions  Neck: supple, no JVD, lymphadenopathy, thyromegaly  Heart: Regular rate and rhythm, S1S2 present  No murmur, rub or gallop  Lungs; Clear to auscultation bilaterally  No wheezing, crackles or rhonchi  No accessory muscle use or respiratory distress  Abdomen: soft, non-tender, non-distended, NABS  No guarding or rebound  No peritoneal sound or mass  Extremities: no clubbing, cyanosis, or edema  2+ pedal pulses bilaterally  Full range of motion  Neurologic:  Cranial nerves II-XII intact  Strength and sensation globally intact  Speech fluent and goal directed  Awake, alert and oriented x 3  Skin: warm and dry  No petechiae, purpura or rash  Lab Results:   Sodium 138, potassium 4 3, BUN 19, creatinine 0 83, glucose 95, AST 25, ALT 38  Hemoglobin 12 3, hematocrit 37 1, WBC 10 5, platelets 597    EKG- previous EKG reviewed- ormal sinus rhythm without evidence of any ischemia or arrhythmia      Code Status: Level 1 - Full Code      Counseling / Coordination of Care  Total floor / unit time spent today 32 minutes  Greater than 50% of total time was spent with the patient and / or family counseling and / or coordination of care

## 2018-06-26 NOTE — H&P (VIEW-ONLY)
BARIATRIC H&P - BARIATRIC SURGERY  Vipul Huff 30 y o  female MRN: [id number]  Unit/Bed#:  Encounter: [id number]      HPI:  Vipul Huff is a 30 y o  female who presents with a long-standing history of morbid obesity  She was found to be a good candidate to undergo a bariatric operation upon being enrolled [facility]  She is here today to discuss details of her surgery  Review of Systems   All other systems reviewed and are negative  Historical Information   Past Medical History:   Diagnosis Date    Abdominal pain     Abnormal cells of cervix     Abnormal EKG     Acute headache     Amenorrhea     Anxiety     Back pain     Chronic low back pain     Chronic pain syndrome     Constipation     Contact dermatitis     Depression     Dermoid cyst of right lower extremity     Diabetes mellitus (HCC)     Drug-induced hepatic toxicity     Elevated liver function tests     Gastritis     GERD (gastroesophageal reflux disease)     Hypercholesterolemia     Hyperlipidemia     Left hip pain     Liver disease     drug induced hepatic toxicity    Liver function study, abnormal     Lumbar degenerative disc disease     Lumbar radiculopathy     Morbid obesity (HCC)     Neuropathy     Normal vaginal delivery     Tendinitis of left ankle     Vitamin D deficiency     Wears glasses      Past Surgical History:   Procedure Laterality Date    ABDOMINAL SURGERY       SECTION      COLONOSCOPY N/A 2018    Procedure: COLONOSCOPY;  Surgeon: Hezzie Cranker, MD;  Location: [facility]; Service: Gastroenterology    ESOPHAGOGASTRODUODENOSCOPY N/A 2018    Procedure: ESOPHAGOGASTRODUODENOSCOPY (EGD) with BIOPSY;  Surgeon: Norma Parks MD;  Location: [facility];   Service: Jose Dubose LIVER BIOPSY      TONSILLECTOMY      TUBAL LIGATION       Social History   History   Alcohol Use    Yes     Comment: "occas"     History   Drug Use No     History Smoking Status    Never Smoker   Smokeless Tobacco    Never Used     Family History: non-contributory    Meds/Allergies   all medications and allergies reviewed  Allergies   Allergen Reactions    Atorvastatin      Elevated liver levels    Other      Herbal Life,,,caused elevated liver levels       Objective     Current Vitals:   Blood Pressure: 114/64 (06/14/18 1415)  Pulse: 82 (06/14/18 1415)  Temperature: 98 8 °F (37 1 °C) (06/14/18 1415)  Respirations: 22 (06/14/18 1415)  Height: 5' 2 5" (158 8 cm) (06/14/18 1415)  Weight - Scale: 100 kg (221 lb) (06/14/18 1415)      Invasive Devices          No matching active lines, drains, or airways          Physical Exam   Constitutional: She is oriented to person, place, and time  Vital signs are normal  She appears well-developed and well-nourished  No distress  HENT:   Head: Normocephalic and atraumatic  Nose: Nose normal    Mouth/Throat: Oropharynx is clear and moist    Eyes: Conjunctivae are normal  Right eye exhibits no discharge  No scleral icterus  Neck: Normal range of motion  Neck supple  Cardiovascular: Normal rate, regular rhythm, normal heart sounds and intact distal pulses  Pulmonary/Chest: Effort normal and breath sounds normal  No stridor  No respiratory distress  She has no wheezes  She has no rales  She exhibits no tenderness  Abdominal: Soft  Normal appearance and bowel sounds are normal  There is no tenderness  There is no rebound, no guarding and no CVA tenderness  Abdomen is obese  Benign  Musculoskeletal: Normal range of motion  Lymphadenopathy:     She has no cervical adenopathy  Neurological: She is alert and oriented to person, place, and time  Skin: Skin is warm and dry  No rash noted  She is not diaphoretic  No erythema  Psychiatric: She has a normal mood and affect  Her behavior is normal  Judgment and thought content normal    Nursing note and vitals reviewed        Lab Results: I have personally reviewed pertinent lab results  Imaging: I have personally reviewed pertinent reports  EKG, Pathology, and Other Studies: I have personally reviewed pertinent reports  Assessment/PLAN:    30 y o  female morbidly obese found to be a good candidate to undergo a weight loss operation upon being enrolled here at the [facility]     Patient has a long history of morbid obesity and is presenting to discuss the surgical weight loss options  Despite the patient best efforts patient was unable to lose any meaningful or sustainable weight using nonsurgical means  We had a long discussion regarding all the surgical weight-loss options at our disposal at this point and reviewed the risks and benefits of each procedure in details as it relates to her age, BMI and medical conditions  She has been pre certified to undergo a Laparoscopic sleeve gastrectomy  Here today to review her pre op test results  Has been medically cleared for the procedure  I have discussed with her at length the risks and benefits of the operation and reiterated the components of our multidisciplinary program and the importance of compliance and follow up in the post operative period  Although there is a great statistical chance of improvement or even resolution of most of her associated comorbidities, the results vary from patient to patient and they largely depend on her commitment  The patient was also instructed with regards to the importance of behavior modification, nutritional counseling, support meeting attendance and lifestyle changes that are important to ensure success  She was given the opportunity to ask questions and I have answered all of them  I have addressed with the patient the level of CODE STATUS for this hospital stay and after explaining the different options currently she wishes to be a Level I  She understands and wishes to proceed  Still needs to lose 5 lbs prior to surgery    Will return next week for a weight check and if the weight loss is at least half the way there, the surgery date will not be re scheduled  Patient understands and agrees            Stefani Suggs MD  6/14/2018  2:32 PM

## 2018-06-27 LAB
ANION GAP SERPL CALCULATED.3IONS-SCNC: 9 MMOL/L (ref 4–13)
BUN SERPL-MCNC: 8 MG/DL (ref 5–25)
CALCIUM SERPL-MCNC: 8.5 MG/DL (ref 8.3–10.1)
CHLORIDE SERPL-SCNC: 100 MMOL/L (ref 100–108)
CO2 SERPL-SCNC: 25 MMOL/L (ref 21–32)
CREAT SERPL-MCNC: 0.56 MG/DL (ref 0.6–1.3)
ERYTHROCYTE [DISTWIDTH] IN BLOOD BY AUTOMATED COUNT: 13.3 % (ref 11.6–15.1)
GFR SERPL CREATININE-BSD FRML MDRD: 120 ML/MIN/1.73SQ M
GLUCOSE SERPL-MCNC: 131 MG/DL (ref 65–140)
GLUCOSE SERPL-MCNC: 131 MG/DL (ref 65–140)
GLUCOSE SERPL-MCNC: 138 MG/DL (ref 65–140)
GLUCOSE SERPL-MCNC: 160 MG/DL (ref 65–140)
GLUCOSE SERPL-MCNC: 162 MG/DL (ref 65–140)
GLUCOSE SERPL-MCNC: 190 MG/DL (ref 65–140)
HCT VFR BLD AUTO: 32.3 % (ref 34.8–46.1)
HCT VFR BLD AUTO: 36 % (ref 34.8–46.1)
HGB BLD-MCNC: 10.8 G/DL (ref 11.5–15.4)
HGB BLD-MCNC: 11.8 G/DL (ref 11.5–15.4)
MCH RBC QN AUTO: 29.3 PG (ref 26.8–34.3)
MCHC RBC AUTO-ENTMCNC: 33.4 G/DL (ref 31.4–37.4)
MCV RBC AUTO: 88 FL (ref 82–98)
PLATELET # BLD AUTO: 270 THOUSANDS/UL (ref 149–390)
PMV BLD AUTO: 9.9 FL (ref 8.9–12.7)
POTASSIUM SERPL-SCNC: 3.4 MMOL/L (ref 3.5–5.3)
RBC # BLD AUTO: 3.68 MILLION/UL (ref 3.81–5.12)
SODIUM SERPL-SCNC: 134 MMOL/L (ref 136–145)
WBC # BLD AUTO: 13.29 THOUSAND/UL (ref 4.31–10.16)

## 2018-06-27 PROCEDURE — C9113 INJ PANTOPRAZOLE SODIUM, VIA: HCPCS | Performed by: SURGERY

## 2018-06-27 PROCEDURE — 85018 HEMOGLOBIN: CPT | Performed by: SURGERY

## 2018-06-27 PROCEDURE — 99232 SBSQ HOSP IP/OBS MODERATE 35: CPT | Performed by: HOSPITALIST

## 2018-06-27 PROCEDURE — 80048 BASIC METABOLIC PNL TOTAL CA: CPT | Performed by: SURGERY

## 2018-06-27 PROCEDURE — 82948 REAGENT STRIP/BLOOD GLUCOSE: CPT

## 2018-06-27 PROCEDURE — 85014 HEMATOCRIT: CPT | Performed by: SURGERY

## 2018-06-27 PROCEDURE — 85027 COMPLETE CBC AUTOMATED: CPT | Performed by: SURGERY

## 2018-06-27 RX ORDER — POTASSIUM CHLORIDE 20MEQ/15ML
20 LIQUID (ML) ORAL ONCE
Status: COMPLETED | OUTPATIENT
Start: 2018-06-27 | End: 2018-06-27

## 2018-06-27 RX ORDER — DEXAMETHASONE SODIUM PHOSPHATE 4 MG/ML
4 INJECTION, SOLUTION INTRA-ARTICULAR; INTRALESIONAL; INTRAMUSCULAR; INTRAVENOUS; SOFT TISSUE ONCE
Status: COMPLETED | OUTPATIENT
Start: 2018-06-27 | End: 2018-06-27

## 2018-06-27 RX ORDER — ENALAPRILAT 2.5 MG/2ML
1.25 INJECTION INTRAVENOUS ONCE
Status: COMPLETED | OUTPATIENT
Start: 2018-06-27 | End: 2018-06-27

## 2018-06-27 RX ADMIN — ENALAPRILAT 1.25 MG: 1.25 INJECTION INTRAVENOUS at 12:21

## 2018-06-27 RX ADMIN — INSULIN LISPRO 2 UNITS: 100 INJECTION, SOLUTION INTRAVENOUS; SUBCUTANEOUS at 17:31

## 2018-06-27 RX ADMIN — HYDRALAZINE HYDROCHLORIDE 10 MG: 20 INJECTION INTRAMUSCULAR; INTRAVENOUS at 08:50

## 2018-06-27 RX ADMIN — BUSPIRONE HYDROCHLORIDE 15 MG: 10 TABLET ORAL at 08:34

## 2018-06-27 RX ADMIN — FLUOXETINE 20 MG: 20 CAPSULE ORAL at 17:26

## 2018-06-27 RX ADMIN — INSULIN GLARGINE 30 UNITS: 100 INJECTION, SOLUTION SUBCUTANEOUS at 21:42

## 2018-06-27 RX ADMIN — ENOXAPARIN SODIUM 40 MG: 40 INJECTION SUBCUTANEOUS at 08:34

## 2018-06-27 RX ADMIN — METOCLOPRAMIDE 10 MG: 5 INJECTION, SOLUTION INTRAMUSCULAR; INTRAVENOUS at 12:21

## 2018-06-27 RX ADMIN — METOCLOPRAMIDE 10 MG: 5 INJECTION, SOLUTION INTRAMUSCULAR; INTRAVENOUS at 21:25

## 2018-06-27 RX ADMIN — SODIUM CHLORIDE, SODIUM LACTATE, POTASSIUM CHLORIDE, AND CALCIUM CHLORIDE 125 ML/HR: .6; .31; .03; .02 INJECTION, SOLUTION INTRAVENOUS at 12:37

## 2018-06-27 RX ADMIN — OXYCODONE HYDROCHLORIDE 10 MG: 5 SOLUTION ORAL at 17:25

## 2018-06-27 RX ADMIN — PANTOPRAZOLE SODIUM 40 MG: 40 INJECTION, POWDER, FOR SOLUTION INTRAVENOUS at 08:34

## 2018-06-27 RX ADMIN — TRAZODONE HYDROCHLORIDE 100 MG: 100 TABLET ORAL at 21:24

## 2018-06-27 RX ADMIN — DEXAMETHASONE SODIUM PHOSPHATE 4 MG: 4 INJECTION, SOLUTION INTRAMUSCULAR; INTRAVENOUS at 12:21

## 2018-06-27 RX ADMIN — METOCLOPRAMIDE 10 MG: 5 INJECTION, SOLUTION INTRAMUSCULAR; INTRAVENOUS at 06:47

## 2018-06-27 RX ADMIN — GABAPENTIN 800 MG: 400 CAPSULE ORAL at 08:34

## 2018-06-27 RX ADMIN — GABAPENTIN 800 MG: 400 CAPSULE ORAL at 21:25

## 2018-06-27 RX ADMIN — ACETAMINOPHEN 650 MG: 160 SUSPENSION ORAL at 12:20

## 2018-06-27 RX ADMIN — MORPHINE SULFATE 2 MG: 2 INJECTION, SOLUTION INTRAMUSCULAR; INTRAVENOUS at 00:33

## 2018-06-27 RX ADMIN — ONDANSETRON 4 MG: 2 INJECTION INTRAMUSCULAR; INTRAVENOUS at 08:34

## 2018-06-27 RX ADMIN — INSULIN LISPRO 2 UNITS: 100 INJECTION, SOLUTION INTRAVENOUS; SUBCUTANEOUS at 12:22

## 2018-06-27 RX ADMIN — MORPHINE SULFATE 2 MG: 2 INJECTION, SOLUTION INTRAMUSCULAR; INTRAVENOUS at 06:47

## 2018-06-27 RX ADMIN — OXYCODONE HYDROCHLORIDE 10 MG: 5 SOLUTION ORAL at 21:14

## 2018-06-27 RX ADMIN — BUSPIRONE HYDROCHLORIDE 15 MG: 10 TABLET ORAL at 17:25

## 2018-06-27 RX ADMIN — FLUOXETINE 20 MG: 20 CAPSULE ORAL at 08:34

## 2018-06-27 RX ADMIN — GABAPENTIN 800 MG: 400 CAPSULE ORAL at 17:25

## 2018-06-27 RX ADMIN — MORPHINE SULFATE 2 MG: 2 INJECTION, SOLUTION INTRAMUSCULAR; INTRAVENOUS at 04:14

## 2018-06-27 RX ADMIN — ACETAMINOPHEN 650 MG: 160 SUSPENSION ORAL at 17:26

## 2018-06-27 RX ADMIN — ONDANSETRON 4 MG: 2 INJECTION INTRAMUSCULAR; INTRAVENOUS at 00:33

## 2018-06-27 RX ADMIN — SODIUM CHLORIDE, SODIUM LACTATE, POTASSIUM CHLORIDE, AND CALCIUM CHLORIDE 125 ML/HR: .6; .31; .03; .02 INJECTION, SOLUTION INTRAVENOUS at 04:14

## 2018-06-27 RX ADMIN — OXYCODONE HYDROCHLORIDE 10 MG: 5 SOLUTION ORAL at 08:50

## 2018-06-27 RX ADMIN — POTASSIUM CHLORIDE 20 MEQ: 20 SOLUTION ORAL at 12:20

## 2018-06-27 RX ADMIN — INSULIN LISPRO 2 UNITS: 100 INJECTION, SOLUTION INTRAVENOUS; SUBCUTANEOUS at 00:28

## 2018-06-27 NOTE — PROGRESS NOTES
Progress Note - Anabelle Mares 41 y o  female MRN: [id number]    Unit/Bed#: E5 -01 Encounter: [id number]    Principal Problem: Morbid obesity ([facility] [place] [facility] )  Active Problems:    Diabetes mellitus without complication ([facility] [place] [facility] )    Hyperlipidemia    Tachycardia  Elevated blood pressure    Assessment/Plan:  1-POD#1-sleeve gastrectomy-patient doing well postop  Ambulating, minimal nausea and saturating at 92% on room air      2-diabetes mellitus type 2 on insulin and oral hypoglycemics at home--she is on insulin glargine 40 units at night ,and victozaf 1 8 mg daily in addition to empagliflozin-metformin--will keep the patient on 30 units of Lantus at night and discontinue the oral hypoglycemics and Victoza   Blood sugars are well controlled on this days  Could discharge the patient on 30 units of Lantus  Without her Victoza and Oral hypoglycemics     3-depression-continue patient's Prozac, BuSpar and trazodone     4-peripheral neuropathy secondary to diabetes-continue patient on gabapentin     5-GERD continue PPI  6-elevated blood pressure-likely secondary to pain and stress of surgery  Her outpatient visit blood pressures have always been between 193 and 178 systolic  Continue IV hydralazine 10 mg q 6 hours p r n  for SBP more than 160  Would not start her on any antihypertensives here and have a follow-up with her primary care physician for blood pressure monitoring and starting medication if indicated  Subjective:  Patient's blood sugars well controlled on Lantus 30 units at night however patient's blood pressure is persistently elevated  Her office visits have always the revealed blood pressure between 121 and 887 systolic  Will have her follow-up with her primary care physician for blood pressure monitoring and starting medications if persistently elevated     Patient ambulated tree and states that pain is well controlled      Physical Exam:   Vitals: Blood pressure (!) 179/84, pulse 97, temperature 99 °F (37 2 °C), temperature source Temporal, resp  rate 17, height 5' 2" (1 575 m), weight 97 kg (213 lb 14 4 oz), last menstrual period 06/01/2018, SpO2 98 %, not currently breastfeeding  ,Body mass index is 39 12 kg/m²  Gen:  Pleasant, non-tachypnic, non-dyspnic  Conversant  Heart: regular rate and rhythm, S1S2 present, no murmur, rub or gallop  Lungs: clear to ausculatation bilaterally  No wheezing, crackless, or rhonchi  No accessory muscle use or respiratory distress  Abd: soft, non-tender, non-distended  NABS, no guarding, rebound or peritoneal signs  Extremities: no clubbing, cyanosis or edema  2+pedal pulses bilaterally  Full range of motion  Neuro: awake, alert and oriented  Cranial nerves 2-12 intact  Strength and sensation grossly intact  Skin: warm and dry: no petechiae, purpura and rash      LABS:     Results from last 7 days  Lab Units 06/27/18  1151 06/27/18  0437   WBC Thousand/uL  --  13 29*   HEMOGLOBIN g/dL 11 8 10 8*   HEMATOCRIT % 36 0 32 3*   PLATELETS Thousands/uL  --  270       Results from last 7 days  Lab Units 06/27/18  0437   SODIUM mmol/L 134*   POTASSIUM mmol/L 3 4*   CHLORIDE mmol/L 100   CO2 mmol/L 25   BUN mg/dL 8   CREATININE mg/dL 0 56*   GLUCOSE RANDOM mg/dL 131   CALCIUM mg/dL 8 5       Intake/Output Summary (Last 24 hours) at 06/27/18 1223  Last data filed at 06/27/18 0414   Gross per 24 hour   Intake           1987 5 ml   Output             1050 ml   Net            937 5 ml           Current Facility-Administered Medications:  acetaminophen 650 mg Oral Q6H Armstrong MD Madina    busPIRone 15 mg Oral BID Montez Tinajero MD    FLUoxetine 20 mg Oral BID Armstrong MD Madina    gabapentin 800 mg Oral TID Montez Tinajero MD    hydrALAZINE 10 mg Intravenous Q6H PRN Lukasz Painter MD    insulin glargine 30 Units Subcutaneous HS Montez Tinajero MD    insulin lispro 2-12 Units Subcutaneous Q6H 93 Bruno Harper MD    lactated ringers 125 mL/hr Intravenous Continuous Luis Alfredo Reid MD Last Rate: 125 mL/hr (06/27/18 0414)   metoclopramide 10 mg Intravenous Once PRN Margarette Rushing DO    metoclopramide 10 mg Intravenous Aida Li MD    morphine injection 2 mg Intravenous Q2H PRN Luis Alfredo Reid MD    ondansetron 4 mg Intravenous Q4H PRN Luis Alfredo Reid MD    oxyCODONE 10 mg Oral Q4H PRN Ana Donaldson MD    oxyCODONE 5 mg Oral Q4H PRN Ana Donaldson MD    pantoprazole 40 mg Intravenous Q24H Prabhjot Hines MD    phenol 2 spray Mouth/Throat Q2H PRN Luis Alfredo Reid MD    tiZANidine 2 mg Oral Q8H PRN Ana Donaldson MD    traZODone 100 mg Oral HS Ana Donaldson MD    trimethobenzamide 200 mg Intramuscular Once PRN Margarette Rushing DO        Family update- updated

## 2018-06-27 NOTE — PLAN OF CARE
Problem: CARDIOVASCULAR - ADULT  Goal: Absence of cardiac dysrhythmias or at baseline rhythm  INTERVENTIONS:  - Continuous cardiac monitoring, monitor vital signs, obtain 12 lead EKG if indicated  - Administer antiarrhythmic and heart rate control medications as ordered  - Monitor electrolytes and administer replacement therapy as ordered  Outcome: Progressing      Problem: RESPIRATORY - ADULT  Goal: Achieves optimal ventilation and oxygenation  INTERVENTIONS:  - Assess for changes in respiratory status  - Assess for changes in mentation and behavior  - Position to facilitate oxygenation and minimize respiratory effort  - Oxygen administration by appropriate delivery method based on oxygen saturation (per order) or ABGs  - Initiate smoking cessation education as indicated  - Encourage broncho-pulmonary hygiene including cough, deep breathe, Incentive Spirometry  - Assess the need for suctioning and aspirate as needed  - Assess and instruct to report SOB or any respiratory difficulty  - Respiratory Therapy support as indicated  Outcome: Progressing      Problem: GASTROINTESTINAL - ADULT  Goal: Minimal or absence of nausea and/or vomiting  INTERVENTIONS:  - Administer IV fluids as ordered to ensure adequate hydration  - Maintain NPO status until nausea and vomiting are resolved  - Nasogastric tube as ordered  - Administer ordered antiemetic medications as needed  - Provide nonpharmacologic comfort measures as appropriate  - Advance diet as tolerated, if ordered  - Nutrition services referral to assist patient with adequate nutrition and appropriate food choices  Outcome: Not Progressing    Goal: Maintains or returns to baseline bowel function  INTERVENTIONS:  - Assess bowel function  - Encourage oral fluids to ensure adequate hydration  - Administer IV fluids as ordered to ensure adequate hydration  - Administer ordered medications as needed  - Encourage mobilization and activity  - Nutrition services referral to assist patient with appropriate food choices  Outcome: Not Progressing    Goal: Maintains adequate nutritional intake  INTERVENTIONS:  - Monitor percentage of each meal consumed  - Identify factors contributing to decreased intake, treat as appropriate  - Assist with meals as needed  - Monitor I&O, WT and lab values  - Obtain nutrition services referral as needed  Outcome: Not Progressing      Problem: METABOLIC, FLUID AND ELECTROLYTES - ADULT  Goal: Glucose maintained within target range  INTERVENTIONS:  - Monitor Blood Glucose as ordered  - Assess for signs and symptoms of hyperglycemia and hypoglycemia  - Administer ordered medications to maintain glucose within target range  - Assess nutritional intake and initiate nutrition service referral as needed  Outcome: Not Progressing      Problem: SKIN/TISSUE INTEGRITY - ADULT  Goal: Incision(s), wounds(s) or drain site(s) healing without S/S of infection  INTERVENTIONS  - Assess and document risk factors for skin impairment   - Assess and document dressing, incision, wound bed, drain sites and surrounding tissue  - Initiate Nutrition services consult and/or wound management as needed  Outcome: Not Progressing

## 2018-06-27 NOTE — POST OP PROGRESS NOTES
Bariatric Surgery Progress Note    Subjective  No adverse events  Advancing ambulation, spirometry  Pain/nausea control fair  Objective  Vitals:    06/26/18 1538 06/26/18 2010 06/26/18 2301 06/27/18 0709   BP: 151/73 148/84 160/90 (!) 179/84   BP Location:    Right arm   Pulse: (!) 106 94 98 97   Resp: 18  18 17   Temp: (!) 97 °F (36 1 °C)  99 5 °F (37 5 °C) 99 °F (37 2 °C)   TempSrc:    Temporal   SpO2: 100%  96% 98%   Weight:       Height:         NAD, alert, mild discomfort  Normal inspiratory effort  Abdomen soft, non-distended, appropriately tender  Incisions c/d/i    Labs  K 3 4  H/H 10 8/32 3 (baseline hgb 12  3)    Assessment  38F POD 1 s/p lap sleeve gastrectomy, nauseous    Plan  - stop telemetry/pulse ox  - repeat H/H at noon  - hold Lovenox  - replete K  - appreciate SLIM input re: HTN, but probably due to pain/nausea  - encourage incentive spirometry, ambulation, clear liquids

## 2018-06-27 NOTE — PLAN OF CARE
Problem: CARDIOVASCULAR - ADULT  Goal: Absence of cardiac dysrhythmias or at baseline rhythm  INTERVENTIONS:  - Continuous cardiac monitoring, monitor vital signs, obtain 12 lead EKG if indicated  - Administer antiarrhythmic and heart rate control medications as ordered  - Monitor electrolytes and administer replacement therapy as ordered   Outcome: Progressing      Problem: RESPIRATORY - ADULT  Goal: Achieves optimal ventilation and oxygenation  INTERVENTIONS:  - Assess for changes in respiratory status  - Assess for changes in mentation and behavior  - Position to facilitate oxygenation and minimize respiratory effort  - Oxygen administration by appropriate delivery method based on oxygen saturation (per order) or ABGs  - Initiate smoking cessation education as indicated  - Encourage broncho-pulmonary hygiene including cough, deep breathe, Incentive Spirometry  - Assess the need for suctioning and aspirate as needed  - Assess and instruct to report SOB or any respiratory difficulty  - Respiratory Therapy support as indicated   Outcome: Progressing      Problem: GASTROINTESTINAL - ADULT  Goal: Minimal or absence of nausea and/or vomiting  INTERVENTIONS:  - Administer IV fluids as ordered to ensure adequate hydration  - Maintain NPO status until nausea and vomiting are resolved  - Nasogastric tube as ordered  - Administer ordered antiemetic medications as needed  - Provide nonpharmacologic comfort measures as appropriate  - Advance diet as tolerated, if ordered  - Nutrition services referral to assist patient with adequate nutrition and appropriate food choices   Outcome: Progressing    Goal: Maintains or returns to baseline bowel function  INTERVENTIONS:  - Assess bowel function  - Encourage oral fluids to ensure adequate hydration  - Administer IV fluids as ordered to ensure adequate hydration  - Administer ordered medications as needed  - Encourage mobilization and activity  - Nutrition services referral to assist patient with appropriate food choices   Outcome: Progressing    Goal: Maintains adequate nutritional intake  INTERVENTIONS:  - Monitor percentage of each meal consumed  - Identify factors contributing to decreased intake, treat as appropriate  - Assist with meals as needed  - Monitor I&O, WT and lab values  - Obtain nutrition services referral as needed   Outcome: Progressing      Problem: METABOLIC, FLUID AND ELECTROLYTES - ADULT  Goal: Glucose maintained within target range  INTERVENTIONS:  - Monitor Blood Glucose as ordered  - Assess for signs and symptoms of hyperglycemia and hypoglycemia  - Administer ordered medications to maintain glucose within target range  - Assess nutritional intake and initiate nutrition service referral as needed   Outcome: Progressing      Problem: SKIN/TISSUE INTEGRITY - ADULT  Goal: Incision(s), wounds(s) or drain site(s) healing without S/S of infection  INTERVENTIONS  - Assess and document risk factors for skin impairment   - Assess and document dressing, incision, wound bed, drain sites and surrounding tissue  - Initiate Nutrition services consult and/or wound management as needed   Outcome: Progressing

## 2018-06-27 NOTE — CASE MANAGEMENT
Initial Clinical Review    Age/Sex: 41 y o  female    Surgery Date:    6/26/2018    Procedure: Pre-Op Diagnosis Codes: Morbid obesity ([address] ) [E66 01]  Body mass index is 39 12 kg/m²  Diabetes mellitus without complication (HCC) [Q29 2]  Hyperlipidemia, unspecified hyperlipidemia type [E78 5]     Post-Op Diagnosis Codes:     * Morbid obesity ([address] ) [E66 01]     * Body mass index is 39 12 kg/m²  * Diabetes mellitus without complication (HCC) [B52 1]     * Hyperlipidemia, unspecified hyperlipidemia type [E78 5]     Procedure  1  Laparoscopic Sleeve Gastrectomy  Intraoperative Endoscopy    Anesthesia:    general    Admission Orders: Date/Time/Statement: 6/26/18 @ 1013     Orders Placed This Encounter   Procedures    Inpatient Admission     Standing Status:   Standing     Number of Occurrences:   1     Order Specific Question:   Admitting Physician     Answer:   Yris Castellano     Order Specific Question:   Level of Care     Answer:   Med Surg [16]     Order Specific Question:   Bed Type     Answer:   Bariatric [1]     Order Specific Question:   Estimated length of stay     Answer:   One Midnight       Vital Signs: BP (!) 179/84 (BP Location: Right arm)   Pulse 97   Temp 99 °F (37 2 °C) (Temporal)   Resp 17   Ht 5' 2" (1 575 m)   Wt 97 kg (213 lb 14 4 oz)   LMP 06/01/2018 (Exact Date)   SpO2 98%   Breastfeeding? No   BMI 39 12 kg/m²     Diet:        Diet Orders            Start     Ordered    06/26/18 1800  Diet Bariatric; Bariatric Clear Liquid  Diet effective now     Question Answer Comment   Diet Type Bariatric    Bariatric Bariatric Clear Liquid    RD to adjust diet per protocol?  No        06/26/18 1222    06/26/18 1240  Room Service  Once     Question:  Type of Service  Answer:  Room Service-Appropriate    06/26/18 1239          Mobility:   As  meche    DVT Prophylaxis:    SCD'S    Pain Control:   Pain Medications             butalbital-acetaminophen-caffeine (FIORICET,ESGIC) -40 mg per tablet Take 1 tablet by mouth every 4 (four) hours as needed for headaches    diclofenac (VOLTAREN) 75 mg EC tablet Take 1 tablet (75 mg total) by mouth 2 (two) times a day as needed (moderate pain)    FLUoxetine (PROzac) 20 mg capsule Take 20 mg by mouth 2 (two) times a day      gabapentin (NEURONTIN) 800 mg tablet Take 1 tablet (800 mg total) by mouth 3 (three) times a day    tiZANidine (ZANAFLEX) 2 mg tablet Take 1-2 tablets every 8 hours as needed for muscle spasm    traZODone (DESYREL) 100 mg tablet Take 1 tablet by mouth daily at bedtime        IV  Apresoline   PRN  (  x1  6/27  Thus far)  IV  MSo4  PRN  (  X 3  6/26  And   X  3  6/27  Thus far)     IV  zofran Prn   (  x1  6/26  And  X 2   6/27  Thus far)    POST OP PROGRESS  NOTE   6/27  Assessment  38F POD 1 s/p lap sleeve gastrectomy, nauseous     Plan  - stop telemetry/pulse ox  - repeat H/H at noon  - hold Lovenox  - replete K  - appreciate SLIM input re: HTN, but probably due to pain/nausea  - encourage incentive spirometry, ambulation, clear liquids    Thank you,  [address] [place] [address] in the [facility] by Benny Patton for 2017  Network Utilization Review Department  Phone: [phone]; Fax [phone]  ATTENTION: The Network Utilization Review Department is now centralized for our 7 Facilities  Make a note that we have a new phone and fax numbers for our Department  Please call with any questions or concerns to [phone] and carefully follow the prompts so that you are directed to the right person  All voicemails are confidential  Fax any determinations, approvals, denials, and requests for initial or continue stay review clinical to [phone]  Due to HIGH CALL volume, it would be easier if you could please send faxed requests to expedite your requests and in part, help us provide discharge notifications faster

## 2018-06-28 VITALS
WEIGHT: 213.9 LBS | RESPIRATION RATE: 18 BRPM | BODY MASS INDEX: 39.36 KG/M2 | TEMPERATURE: 97.9 F | OXYGEN SATURATION: 98 % | SYSTOLIC BLOOD PRESSURE: 140 MMHG | HEART RATE: 80 BPM | HEIGHT: 62 IN | DIASTOLIC BLOOD PRESSURE: 65 MMHG

## 2018-06-28 LAB
GLUCOSE SERPL-MCNC: 114 MG/DL (ref 65–140)
GLUCOSE SERPL-MCNC: 127 MG/DL (ref 65–140)

## 2018-06-28 PROCEDURE — 82948 REAGENT STRIP/BLOOD GLUCOSE: CPT

## 2018-06-28 PROCEDURE — 99232 SBSQ HOSP IP/OBS MODERATE 35: CPT | Performed by: HOSPITALIST

## 2018-06-28 PROCEDURE — 99024 POSTOP FOLLOW-UP VISIT: CPT | Performed by: SURGERY

## 2018-06-28 PROCEDURE — C9113 INJ PANTOPRAZOLE SODIUM, VIA: HCPCS | Performed by: SURGERY

## 2018-06-28 RX ORDER — INSULIN GLARGINE 100 [IU]/ML
30 INJECTION, SOLUTION SUBCUTANEOUS
Qty: 10 ML | Refills: 0 | Status: SHIPPED | OUTPATIENT
Start: 2018-06-28 | End: 2018-07-01

## 2018-06-28 RX ADMIN — FLUOXETINE 20 MG: 20 CAPSULE ORAL at 09:29

## 2018-06-28 RX ADMIN — OXYCODONE HYDROCHLORIDE 10 MG: 5 SOLUTION ORAL at 03:59

## 2018-06-28 RX ADMIN — ENOXAPARIN SODIUM 40 MG: 40 INJECTION SUBCUTANEOUS at 09:38

## 2018-06-28 RX ADMIN — PANTOPRAZOLE SODIUM 40 MG: 40 INJECTION, POWDER, FOR SOLUTION INTRAVENOUS at 09:32

## 2018-06-28 RX ADMIN — GABAPENTIN 800 MG: 400 CAPSULE ORAL at 09:29

## 2018-06-28 RX ADMIN — ONDANSETRON 4 MG: 2 INJECTION INTRAMUSCULAR; INTRAVENOUS at 09:35

## 2018-06-28 RX ADMIN — ACETAMINOPHEN 650 MG: 160 SUSPENSION ORAL at 00:47

## 2018-06-28 RX ADMIN — METOCLOPRAMIDE 10 MG: 5 INJECTION, SOLUTION INTRAMUSCULAR; INTRAVENOUS at 05:30

## 2018-06-28 RX ADMIN — BUSPIRONE HYDROCHLORIDE 15 MG: 10 TABLET ORAL at 09:29

## 2018-06-28 RX ADMIN — ACETAMINOPHEN 650 MG: 160 SUSPENSION ORAL at 06:28

## 2018-06-28 RX ADMIN — SODIUM CHLORIDE, SODIUM LACTATE, POTASSIUM CHLORIDE, AND CALCIUM CHLORIDE 50 ML/HR: .6; .31; .03; .02 INJECTION, SOLUTION INTRAVENOUS at 04:02

## 2018-06-28 NOTE — DISCHARGE INSTRUCTIONS
[address] [facility]  (Bariatric Surgery) [place] [facility]  Instrucciones para ser [address] [address]  ACTIVIDAD:  a  Progrese con solano actividad mientras se sienta cómodo - omari buena mana es: si está haciendo algo y le causa  dolor, deje de hacer sanjeev [place]  Cuando esté en casa, camine por lo menos flor veces al día  b  Sue Ivans escaleras si lo hace despacio  c  Puede bañarse en la regadera (NO en la lainey) 48 horas después de la [place]  d  Use solano espirómetro 10 veces por hora mientras que esté despierto, viv omari semana   e  No maneje si está tomando ciertas medicinas de prescripción para el dolor  Ejemplos de estas medicinas son:  Nicholos Nails, Oxicodona, Tylenol #3, y Tylenol con Radha Lamb  Siga las instrucciones de solano farmacólogo  2  DIETA:  a  Siga con omari dieta de fluidos (líquidos madai o líquida por completo) viv 7 días, bebiendo despacio  Refiérase a solano manual para ejemplos de líquidos aceptables  Acuérdese de consumir líquidos sin azúcar o  bajos en calorías  Puede jerri bebidas con proteína  b  Después de los 7 días empiece con omari dieta de purés o alimentos suaves  Empiece con 3 comidas al día,  desayuno, almuerzo o comida y rika - cada alimento consiste de 30 minutos sin consumir liquidos, 20  minutos comiendo alimentos y Natalio otros 60 minutos sin beber  Progrese los alimentos de nerissa comidas todos  los días de omari consistencia de puré o suaves asi antoine vaya tolerandolos y antoine se le explicó cuando se le  cate de iraida  No debe comer más de ¼ de taza de alimento por comida  Nerissa comidas deben jerri 20 minutos  Mastique bob  c  Manténgase hidratado bebiendo agua o líquidos de pocas calorías [employer] comidas (48 a 64 onzas diarias)  siguiendo la mana de 30/60 minutos  3  MEDICINAS  a  [place] nerissa medicinas para el dolor de acuerdo a la prescripción que se le cate    b  Jodean Moises medicamentos antoine se le indicaron en la forma Instrucciones del [place] para el [place]  de [place] que le dieron cuando lo dieron de iraida   c  Usted debe asegurarse que cuando esté tomando emily medicaciones no las tome de forma entera cuando son  píldoras o cápsulas  Usted deberá cortarlas a la mitad o en vineet partes o incluso molerlas en polvo  Tendrá  que abrir las cápsulas y mezclarlas en agua, puré o gelatina  [place] debe hacerse antoine mínimo por 2 a 4  semanas después de la [place]  Eventualmente usted podrá jerri emily medicinas de la manera usual   d  Antes de recibir solano iraida del hospital para irse a solano hogar, discuta emily medicaciones con solano enfermera   e  Necesita consultar con solano [place] Familiar acerca de todas emily medicinas de prescripción, particularmente los  que son para presión arterial y diabetes  Así antoine vaya perdiendo [place], las condiciones médicas pueden  cambiar requiriendo un ajuste o eliminación en el uso de emily medicamentos  Vallerie Seeds a jerri emily vitaminas cuando llegue a solano casa  4  CUIDADO DE LA INCISIÓN:  a  Puede bañarse en la regadera y mojar la incisión 2 días después de la [facility]  b  Si tiene omari línea de drenaje, vacíe el drenaje antoine le enseño la enfermera  5  NEAL DE SEGUIMIENTO debe hacerse omari semana después de que lo den de iraida  Para hacer omari neal, llame a la  oficina de solano cirujano al [phone]  6  LLAME A SOLANO DOCTOR SI TIENE: dolor que no se controla con medicinas, temperatura más iraida de 101  5? F,  cualquier aumento o cambio en drenaje o enrojecimiento de cualquier incisión, cualquier vómito o inhabilidad de  mantener líquidos, dificultad para respirar, dolor de mar, o sangrado    [address]  [address], [address]   Teléfono: [phone]  Fax: [phone]

## 2018-06-28 NOTE — NURSING NOTE
Reviewed discharge instructions with patient including follow-up appointments and diet and medications  IV was removed and patient provided with pill  and splitter, walked out with  and an RN

## 2018-06-28 NOTE — PROGRESS NOTES
Progress Note - Vipul Huff 41 y o  female MRN: [id number]    Unit/Bed#: E5 -01 Encounter: [id number]    Principal Problem: Morbid obesity ([facility] )  Active Problems:    Diabetes mellitus without complication ([facility] )    Hyperlipidemia    Tachycardia    Assessment/Plan:  1-POD#2-sleeve gastrectomy-patient doing well postop   Ambulating, minimal nausea and saturating at 96% on room air      2-diabetes mellitus type 2 on insulin and oral hypoglycemics at home blood sugars are stable on ranging between 110 and 140--she is on insulin glargine 40 units at night ,and victoza 1 8 mg daily in addition to empagliflozin-metformin-- patient on 30 units of Lantus at night and discontinue the oral hypoglycemics and Victoza   Blood sugars are well controlled on this dose  Could discharge the patient on 30 units of Lantus  follow up with her primary care physician for blood sugar medication adjustment     3-depression-continue patient's Prozac, BuSpar and trazodone     4-peripheral neuropathy secondary to diabetes-continue patient on gabapentin     5-GERD continue PPI        6-elevated blood pressure-likely secondary to pain and stress of surgery  Her outpatient visit blood pressures have always been between 254 and 988 systolic  Continue IV hydralazine 10 mg q 6 hours p r n  for SBP more than 160  Would not start her on any antihypertensives here and have a follow-up with her primary care physician for blood pressure monitoring and starting medication if indicated  Stable for discharge from a medical perspective  Subjective:  Patient doing well  No complaints  Blood pressure and blood sugars well controlled  Will discharge home on 30 units of Lantus  Will follow up with the primary care physician for blood sugar and blood pressure check in adjustment of her medications  Physical Exam:   Vitals: Blood pressure 140/65, pulse 80, temperature 97 9 °F (36 6 °C), temperature source Temporal, resp   rate 18, height 5' 2" (1 575 m), weight 97 kg (213 lb 14 4 oz), last menstrual period 06/01/2018, SpO2 98 %, not currently breastfeeding  ,Body mass index is 39 12 kg/m²  Gen:  Pleasant, non-tachypnic, non-dyspnic  Conversant  Heart: regular rate and rhythm, S1S2 present, no murmur, rub or gallop  Lungs: clear to ausculatation bilaterally  No wheezing, crackless, or rhonchi  No accessory muscle use or respiratory distress  Abd: soft, non-tender, non-distended  NABS, no guarding, rebound or peritoneal signs  Extremities: no clubbing, cyanosis or edema  2+pedal pulses bilaterally  Full range of motion  Neuro: awake, alert and oriented  Cranial nerves 2-12 intact  Strength and sensation grossly intact  Skin: warm and dry: no petechiae, purpura and rash      LABS:     Results from last 7 days  Lab Units 06/27/18  1151 06/27/18  0437   WBC Thousand/uL  --  13 29*   HEMOGLOBIN g/dL 11 8 10 8*   HEMATOCRIT % 36 0 32 3*   PLATELETS Thousands/uL  --  270       Results from last 7 days  Lab Units 06/27/18  0437   SODIUM mmol/L 134*   POTASSIUM mmol/L 3 4*   CHLORIDE mmol/L 100   CO2 mmol/L 25   BUN mg/dL 8   CREATININE mg/dL 0 56*   GLUCOSE RANDOM mg/dL 131   CALCIUM mg/dL 8 5       Intake/Output Summary (Last 24 hours) at 06/28/18 1023  Last data filed at 06/28/18 7462   Gross per 24 hour   Intake             1320 ml   Output              425 ml   Net              895 ml           Current Facility-Administered Medications:  acetaminophen 650 mg Oral Q6H Flavia Chakraborty MD    busPIRone 15 mg Oral BID Chase Mireles MD    enoxaparin 40 mg Subcutaneous Q24H [facility] Flavia Chakraborty MD    FLUoxetine 20 mg Oral BID Flavia Chakraborty MD    gabapentin 800 mg Oral TID Chase Mireles MD    hydrALAZINE 10 mg Intravenous Q6H PRN Flavia Chakraborty MD    insulin glargine 30 Units Subcutaneous HS Chase Mireles MD    insulin lispro 2-12 Units Subcutaneous Q6H 93 Bruno Harper MD    lactated ringers 50 mL/hr Intravenous Continuous America Guan MD Last Rate: 50 mL/hr (06/28/18 0402)   metoclopramide 10 mg Intravenous Once PRN Trinity Nguyen DO    metoclopramide 10 mg Intravenous Q8H America Guan, MD    morphine injection 2 mg Intravenous Q2H PRN Boone Bowen MD    ondansetron 4 mg Intravenous Q4H PRN Boone Bowen MD    oxyCODONE 10 mg Oral Q4H PRN America Peed, MD    oxyCODONE 5 mg Oral Q4H PRN America Johnsoned, MD    pantoprazole 40 mg Intravenous Q24H [place], MD    phenol 2 spray Mouth/Throat Q2H PRN Boone Bowen MD    tiZANidine 2 mg Oral Q8H PRN America Guan, MD    traZODone 100 mg Oral HS America Peed, MD    trimethobenzamide 200 mg Intramuscular Once PRN Trinity Nguyen DO

## 2018-06-28 NOTE — DISCHARGE SUMMARY
Discharge Summary - Kareem Jones 41 y o  female MRN: [id number]    Unit/Bed#: E5 -01 Encounter: [id number]    Admission Date: 6/26/2018     Discharge Date: 06/28/18    Admitting Diagnosis: Morbid obesity ([facility] ) [E66 01]  Di[facility] without complication ([facility] ) [K82 2]  Hyperlipidemia, unspecified hyperlipidemia type [E78 5]    Secondary Diagnosis:   Past Medical History:   Diagnosis Date    Abdominal pain     Abnormal cells of cervix     Abnormal EKG     Acute headache     Amenorrhea     Anxiety     Back pain     Chronic low back pain     Chronic pain syndrome     Constipation     Contact dermatitis     Depression     Dermoid cyst of right lower extremity     Diabetes mellitus ([facility] )     Drug-induced hepatic toxicity     Elevated liver function tests     Gastritis     GERD (gastroesophageal reflux disease)     Hypercholesterolemia     Hyperlipidemia     Left hip pain     Liver disease     drug induced hepatic toxicity    Liver function study, abnormal     Lumbar degenerative disc disease     Lumbar radiculopathy     Morbid obesity (HCC)     Neuropathy     Normal vaginal delivery     Tendinitis of left ankle     Vitamin D deficiency     Wears glasses        Discharge Diagnosis: Same    Procedures Performed: Procedure(s):  GASTRECTOMY SLEEVE LAPAROSCOPIC AND INTRAOPERATIVE EGD    Consults: [facility] Course: Patient with morbid obesity was admitted to the hospital on 6/26/2018 for elective Procedure(s):  GASTRECTOMY SLEEVE LAPAROSCOPIC AND INTRAOPERATIVE EGD  Postoperatively, the patient was stable and transferred to the [place] [address] floor for further observation  Postop day one, the patient was having nausea and discomfort prohibitive of fully tolerating a liquid diet and pain was controlled oral pain medications  Her PO intake had improved considerably by POD 2  The patient was ambulating without assistance, vital signs and lab work were stable   The patient was cleared for discharge on 06/28/18  Disposition: The patient should follow up with Argelia Paredes MD in 2 weeks for a post op check and with Ugo Mejia MD as needed for medical management  The patient should refer to the handout "Discharge Instructions" for further information  Call physician for temperature over 101, wound redness or discharge, vomiting, or intolerance to diet  Discharge Medications:  See after visit summary for reconciled discharge medications provided to patient and family

## 2018-06-28 NOTE — POST OP PROGRESS NOTES
Bariatric Surgery Progress Note    Subjective  No adverse events  Advancing PO hydration, ambulation, spirometry  Pain/nausea control adequate   Reports bloating/constipation    Objective  Vitals:    06/27/18 0709 06/27/18 1514 06/27/18 2240 06/28/18 0720   BP: (!) 179/84 166/94 137/86 140/65   BP Location: Right arm   Left arm   Pulse: 97 96 78 80   Resp: 17 18 18 18   Temp: 99 °F (37 2 °C) 99 °F (37 2 °C) 98 3 °F (36 8 °C) 97 9 °F (36 6 °C)   TempSrc: Temporal   Temporal   SpO2: 98% 96% 96% 98%   Weight:       Height:         NAD, alert  Normal inspiratory effort  Abdomen soft, non-distended, appropriately tender  Incisions c/d/i    Labs  None    Assessment  38F POD 2 s/p lap sleeve gastrectomy    Plan  - stop IVF  - enema  - encourage incentive spirometry, ambulation, clear liquids

## 2018-06-29 ENCOUNTER — TELEPHONE (OUTPATIENT)
Dept: BARIATRICS | Facility: CLINIC | Age: 38
End: 2018-06-29

## 2018-06-29 NOTE — TELEPHONE ENCOUNTER
Outreach phone call; patient asked a series of ? about  discharge instructions  Questions were asked in Frisian

## 2018-06-30 ENCOUNTER — HOSPITAL ENCOUNTER (INPATIENT)
Facility: HOSPITAL | Age: 38
LOS: 1 days | Discharge: HOME | DRG: 422 | End: 2018-07-03
Attending: EMERGENCY MEDICINE | Admitting: SURGERY
Payer: COMMERCIAL

## 2018-06-30 DIAGNOSIS — N76.0 BACTERIAL VAGINOSIS: ICD-10-CM

## 2018-06-30 DIAGNOSIS — E87.6 HYPOKALEMIA: ICD-10-CM

## 2018-06-30 DIAGNOSIS — R10.84 GENERALIZED ABDOMINAL PAIN: Primary | ICD-10-CM

## 2018-06-30 DIAGNOSIS — R07.9 CHEST PAIN: ICD-10-CM

## 2018-06-30 DIAGNOSIS — B96.89 BACTERIAL VAGINOSIS: ICD-10-CM

## 2018-06-30 DIAGNOSIS — R18.8 ASCITES: ICD-10-CM

## 2018-06-30 PROCEDURE — 93005 ELECTROCARDIOGRAM TRACING: CPT

## 2018-06-30 NOTE — Clinical Note
Case was discussed with Bariatric Surgery and the patient's admission status was agreed to be Admission Status: observation status to the service of Dr Mari Valverde

## 2018-07-01 ENCOUNTER — APPOINTMENT (EMERGENCY)
Dept: CT IMAGING | Facility: HOSPITAL | Age: 38
DRG: 422 | End: 2018-07-01
Payer: COMMERCIAL

## 2018-07-01 LAB
ALBUMIN SERPL BCP-MCNC: 3.4 G/DL (ref 3.5–5)
ALP SERPL-CCNC: 76 U/L (ref 46–116)
ALT SERPL W P-5'-P-CCNC: 23 U/L (ref 12–78)
ANION GAP SERPL CALCULATED.3IONS-SCNC: 14 MMOL/L (ref 4–13)
APTT PPP: 39 SECONDS (ref 24–36)
AST SERPL W P-5'-P-CCNC: 16 U/L (ref 5–45)
ATRIAL RATE: 87 BPM
BACTERIA UR QL AUTO: ABNORMAL /HPF
BASOPHILS # BLD AUTO: 0.04 THOUSANDS/ΜL (ref 0–0.1)
BASOPHILS NFR BLD AUTO: 0 % (ref 0–1)
BILIRUB SERPL-MCNC: 0.39 MG/DL (ref 0.2–1)
BILIRUB UR QL STRIP: ABNORMAL
BUN SERPL-MCNC: 14 MG/DL (ref 5–25)
CALCIUM SERPL-MCNC: 9.2 MG/DL (ref 8.3–10.1)
CHLORIDE SERPL-SCNC: 102 MMOL/L (ref 100–108)
CLARITY UR: CLEAR
CO2 SERPL-SCNC: 24 MMOL/L (ref 21–32)
COLOR UR: ABNORMAL
CREAT SERPL-MCNC: 0.71 MG/DL (ref 0.6–1.3)
EOSINOPHIL # BLD AUTO: 0.25 THOUSAND/ΜL (ref 0–0.61)
EOSINOPHIL NFR BLD AUTO: 2 % (ref 0–6)
ERYTHROCYTE [DISTWIDTH] IN BLOOD BY AUTOMATED COUNT: 13.1 % (ref 11.6–15.1)
EXT PREG TEST URINE: NEGATIVE
GFR SERPL CREATININE-BSD FRML MDRD: 110 ML/MIN/1.73SQ M
GLUCOSE SERPL-MCNC: 105 MG/DL (ref 65–140)
GLUCOSE UR STRIP-MCNC: NEGATIVE MG/DL
HCT VFR BLD AUTO: 32.5 % (ref 34.8–46.1)
HGB BLD-MCNC: 10.8 G/DL (ref 11.5–15.4)
HGB UR QL STRIP.AUTO: NEGATIVE
HYALINE CASTS #/AREA URNS LPF: ABNORMAL /LPF
INR PPP: 1.05 (ref 0.86–1.17)
KETONES UR STRIP-MCNC: ABNORMAL MG/DL
LEUKOCYTE ESTERASE UR QL STRIP: ABNORMAL
LYMPHOCYTES # BLD AUTO: 4.01 THOUSANDS/ΜL (ref 0.6–4.47)
LYMPHOCYTES NFR BLD AUTO: 32 % (ref 14–44)
MAGNESIUM SERPL-MCNC: 1.6 MG/DL (ref 1.6–2.6)
MCH RBC QN AUTO: 29.7 PG (ref 26.8–34.3)
MCHC RBC AUTO-ENTMCNC: 33.2 G/DL (ref 31.4–37.4)
MCV RBC AUTO: 89 FL (ref 82–98)
MONOCYTES # BLD AUTO: 0.81 THOUSAND/ΜL (ref 0.17–1.22)
MONOCYTES NFR BLD AUTO: 6 % (ref 4–12)
MUCOUS THREADS UR QL AUTO: ABNORMAL
NEUTROPHILS # BLD AUTO: 7.52 THOUSANDS/ΜL (ref 1.85–7.62)
NEUTS SEG NFR BLD AUTO: 60 % (ref 43–75)
NITRITE UR QL STRIP: NEGATIVE
NON-SQ EPI CELLS URNS QL MICRO: ABNORMAL /HPF
NRBC BLD AUTO-RTO: 0 /100 WBCS
P AXIS: 67 DEGREES
PH UR STRIP.AUTO: 5.5 [PH] (ref 4.5–8)
PLATELET # BLD AUTO: 312 THOUSANDS/UL (ref 149–390)
PMV BLD AUTO: 10.6 FL (ref 8.9–12.7)
POTASSIUM SERPL-SCNC: 3 MMOL/L (ref 3.5–5.3)
PR INTERVAL: 116 MS
PROT SERPL-MCNC: 7.7 G/DL (ref 6.4–8.2)
PROT UR STRIP-MCNC: ABNORMAL MG/DL
PROTHROMBIN TIME: 13.8 SECONDS (ref 11.8–14.2)
QRS AXIS: 44 DEGREES
QRSD INTERVAL: 72 MS
QT INTERVAL: 376 MS
QTC INTERVAL: 452 MS
RBC # BLD AUTO: 3.64 MILLION/UL (ref 3.81–5.12)
RBC #/AREA URNS AUTO: ABNORMAL /HPF
SODIUM SERPL-SCNC: 140 MMOL/L (ref 136–145)
SP GR UR STRIP.AUTO: 1.02 (ref 1–1.03)
T WAVE AXIS: 18 DEGREES
TROPONIN I SERPL-MCNC: <0.02 NG/ML
UROBILINOGEN UR QL STRIP.AUTO: 1 E.U./DL
VENTRICULAR RATE: 87 BPM
WBC # BLD AUTO: 12.63 THOUSAND/UL (ref 4.31–10.16)
WBC #/AREA URNS AUTO: ABNORMAL /HPF

## 2018-07-01 PROCEDURE — 74177 CT ABD & PELVIS W/CONTRAST: CPT

## 2018-07-01 PROCEDURE — 81025 URINE PREGNANCY TEST: CPT | Performed by: EMERGENCY MEDICINE

## 2018-07-01 PROCEDURE — 81001 URINALYSIS AUTO W/SCOPE: CPT

## 2018-07-01 PROCEDURE — 99285 EMERGENCY DEPT VISIT HI MDM: CPT

## 2018-07-01 PROCEDURE — 96374 THER/PROPH/DIAG INJ IV PUSH: CPT

## 2018-07-01 PROCEDURE — 96375 TX/PRO/DX INJ NEW DRUG ADDON: CPT

## 2018-07-01 PROCEDURE — 93010 ELECTROCARDIOGRAM REPORT: CPT | Performed by: INTERNAL MEDICINE

## 2018-07-01 PROCEDURE — 85730 THROMBOPLASTIN TIME PARTIAL: CPT | Performed by: EMERGENCY MEDICINE

## 2018-07-01 PROCEDURE — 85025 COMPLETE CBC W/AUTO DIFF WBC: CPT | Performed by: EMERGENCY MEDICINE

## 2018-07-01 PROCEDURE — 99024 POSTOP FOLLOW-UP VISIT: CPT | Performed by: SURGERY

## 2018-07-01 PROCEDURE — 83735 ASSAY OF MAGNESIUM: CPT | Performed by: EMERGENCY MEDICINE

## 2018-07-01 PROCEDURE — 71275 CT ANGIOGRAPHY CHEST: CPT

## 2018-07-01 PROCEDURE — 96361 HYDRATE IV INFUSION ADD-ON: CPT

## 2018-07-01 PROCEDURE — 80053 COMPREHEN METABOLIC PANEL: CPT | Performed by: EMERGENCY MEDICINE

## 2018-07-01 PROCEDURE — 85610 PROTHROMBIN TIME: CPT | Performed by: EMERGENCY MEDICINE

## 2018-07-01 PROCEDURE — 36415 COLL VENOUS BLD VENIPUNCTURE: CPT | Performed by: EMERGENCY MEDICINE

## 2018-07-01 PROCEDURE — 84484 ASSAY OF TROPONIN QUANT: CPT | Performed by: EMERGENCY MEDICINE

## 2018-07-01 RX ORDER — OXYCODONE HYDROCHLORIDE AND ACETAMINOPHEN 5; 325 MG/1; MG/1
1 TABLET ORAL EVERY 4 HOURS PRN
Qty: 10 TABLET | Refills: 0 | Status: SHIPPED | OUTPATIENT
Start: 2018-07-01 | End: 2018-07-11

## 2018-07-01 RX ORDER — ONDANSETRON 2 MG/ML
4 INJECTION INTRAMUSCULAR; INTRAVENOUS EVERY 4 HOURS PRN
Status: COMPLETED | OUTPATIENT
Start: 2018-07-01 | End: 2018-07-02

## 2018-07-01 RX ORDER — MORPHINE SULFATE 4 MG/ML
4 INJECTION, SOLUTION INTRAMUSCULAR; INTRAVENOUS ONCE
Status: COMPLETED | OUTPATIENT
Start: 2018-07-01 | End: 2018-07-01

## 2018-07-01 RX ORDER — ACETAMINOPHEN 80 MG/1
320 TABLET, CHEWABLE ORAL EVERY 4 HOURS PRN
Status: DISCONTINUED | OUTPATIENT
Start: 2018-07-01 | End: 2018-07-03 | Stop reason: HOSPADM

## 2018-07-01 RX ORDER — POTASSIUM CHLORIDE 20 MEQ/1
40 TABLET, EXTENDED RELEASE ORAL ONCE
Status: COMPLETED | OUTPATIENT
Start: 2018-07-01 | End: 2018-07-01

## 2018-07-01 RX ORDER — MORPHINE SULFATE 2 MG/ML
2 INJECTION, SOLUTION INTRAMUSCULAR; INTRAVENOUS
Status: DISCONTINUED | OUTPATIENT
Start: 2018-07-01 | End: 2018-07-03 | Stop reason: HOSPADM

## 2018-07-01 RX ORDER — OXYCODONE HYDROCHLORIDE AND ACETAMINOPHEN 5; 325 MG/1; MG/1
1 TABLET ORAL EVERY 4 HOURS PRN
Status: ON HOLD | COMMUNITY
End: 2018-07-01

## 2018-07-01 RX ORDER — ONDANSETRON 2 MG/ML
4 INJECTION INTRAMUSCULAR; INTRAVENOUS ONCE
Status: COMPLETED | OUTPATIENT
Start: 2018-07-01 | End: 2018-07-01

## 2018-07-01 RX ORDER — OXYCODONE HCL 5 MG/5 ML
5 SOLUTION, ORAL ORAL EVERY 4 HOURS PRN
Status: DISCONTINUED | OUTPATIENT
Start: 2018-07-01 | End: 2018-07-03 | Stop reason: HOSPADM

## 2018-07-01 RX ORDER — METRONIDAZOLE 500 MG/1
500 TABLET ORAL EVERY 12 HOURS SCHEDULED
Qty: 14 TABLET | Refills: 0 | Status: SHIPPED | OUTPATIENT
Start: 2018-07-01 | End: 2018-07-08

## 2018-07-01 RX ORDER — SODIUM CHLORIDE 9 MG/ML
125 INJECTION, SOLUTION INTRAVENOUS CONTINUOUS
Status: DISCONTINUED | OUTPATIENT
Start: 2018-07-01 | End: 2018-07-03

## 2018-07-01 RX ORDER — MORPHINE SULFATE 4 MG/ML
4 INJECTION, SOLUTION INTRAMUSCULAR; INTRAVENOUS EVERY 4 HOURS PRN
Status: DISCONTINUED | OUTPATIENT
Start: 2018-07-01 | End: 2018-07-01

## 2018-07-01 RX ORDER — INSULIN GLARGINE 300 U/ML
40 INJECTION, SOLUTION SUBCUTANEOUS
COMMUNITY
Start: 2018-06-18

## 2018-07-01 RX ADMIN — METRONIDAZOLE 500 MG: 500 INJECTION, SOLUTION INTRAVENOUS at 22:19

## 2018-07-01 RX ADMIN — MORPHINE SULFATE 2 MG: 2 INJECTION, SOLUTION INTRAMUSCULAR; INTRAVENOUS at 14:09

## 2018-07-01 RX ADMIN — METRONIDAZOLE 500 MG: 500 INJECTION, SOLUTION INTRAVENOUS at 11:07

## 2018-07-01 RX ADMIN — MORPHINE SULFATE 4 MG: 4 INJECTION, SOLUTION INTRAMUSCULAR; INTRAVENOUS at 00:28

## 2018-07-01 RX ADMIN — MORPHINE SULFATE 2 MG: 2 INJECTION, SOLUTION INTRAMUSCULAR; INTRAVENOUS at 22:16

## 2018-07-01 RX ADMIN — ENOXAPARIN SODIUM 40 MG: 100 INJECTION SUBCUTANEOUS at 11:07

## 2018-07-01 RX ADMIN — SODIUM CHLORIDE 1000 ML: 0.9 INJECTION, SOLUTION INTRAVENOUS at 11:07

## 2018-07-01 RX ADMIN — IOHEXOL 50 ML: 240 INJECTION, SOLUTION INTRATHECAL; INTRAVASCULAR; INTRAVENOUS; ORAL at 01:25

## 2018-07-01 RX ADMIN — POTASSIUM CHLORIDE 40 MEQ: 1500 TABLET, EXTENDED RELEASE ORAL at 01:25

## 2018-07-01 RX ADMIN — SODIUM CHLORIDE 125 ML/HR: 0.9 INJECTION, SOLUTION INTRAVENOUS at 23:37

## 2018-07-01 RX ADMIN — OXYCODONE HYDROCHLORIDE 5 MG: 5 SOLUTION ORAL at 21:06

## 2018-07-01 RX ADMIN — MORPHINE SULFATE 2 MG: 2 INJECTION, SOLUTION INTRAMUSCULAR; INTRAVENOUS at 10:18

## 2018-07-01 RX ADMIN — MORPHINE SULFATE 2 MG: 2 INJECTION, SOLUTION INTRAMUSCULAR; INTRAVENOUS at 17:50

## 2018-07-01 RX ADMIN — SODIUM CHLORIDE 125 ML/HR: 0.9 INJECTION, SOLUTION INTRAVENOUS at 03:49

## 2018-07-01 RX ADMIN — IOHEXOL 100 ML: 350 INJECTION, SOLUTION INTRAVENOUS at 01:25

## 2018-07-01 RX ADMIN — MORPHINE SULFATE 2 MG: 2 INJECTION, SOLUTION INTRAMUSCULAR; INTRAVENOUS at 03:53

## 2018-07-01 RX ADMIN — SODIUM CHLORIDE 1000 ML: 0.9 INJECTION, SOLUTION INTRAVENOUS at 00:22

## 2018-07-01 RX ADMIN — ONDANSETRON 4 MG: 2 INJECTION INTRAMUSCULAR; INTRAVENOUS at 00:28

## 2018-07-01 NOTE — DISCHARGE INSTRUCTIONS
Deshidratación   LO QUE NECESITA SABER:   La deshidratación es [employer] se produce cuando el cuerpo no tiene suficiente líquido  Usted podría deshidratarse si no juan suficiente agua o pierde demasiado líquido  La pérdida de líquido también podría provocar la pérdida de electrolitos (minerales), antoine el sodio  INSTRUCCIONES SOBRE EL [facility]ITAL[facility]:   Regrese a la patrice de emergencias si:   · Usted sufre omari convulsión  · Usted está confundido o no puede pensar con claridad  · Usted está muy soñoliento, u otra persona no puede despertarlo  · Usted se siente mareado o se desmaya al ponerse de pie  · Usted no puede orinar  · Usted tiene dificultad para respirar  · Solano ritmo cardíaco es acelerado o irregular  · Tiene las beth o los pies fríos o palidez en la juliano  Pregúntele a solano [employer] vitaminas y minerales son adecuados para usted  · Usted tiene dificultad para jerri líquidos porque tiene vómitos  · Emily síntomas empeoran  · Usted tiene fiebre  · Usted se siente muy débil o cansado  · Usted tiene preguntas o inquietudes acerca de solano condición o cuidado  Acuda a emily consultas de control con solano médico según le indicaron  Anote emily preguntas para que se acuerde de hacerlas viv emily visitas  Prevenga o controle la deshidratación:   · Altus líquidos antoine se le haya indicado  Los líquidos que contienen agua, azúcar y [address] pueden contribuir a que solano organismo retenga líquido y evitar que se deshidrate  Consuma líquido a lo monique del día y no solamente cuando tenga sed  Los hombres deben jerri aproximadamente 3 litros de líquido al día (13 vasos de ocho onzas)  Las mujeres deben jerri aproximadamente 2 litros de líquido al día (9 vasos de ocho onzas o 250 ml)  Mara incluso más líquido si realiza actividades al [employer], si se encuentra al sol viv un período prolongado de Dmitry o está practicando STEINKREUZ       · Manténgase fresco   Limite la cantidad de [place] pasa al aire nona viv las horas mas calurosas del día  Vístase con [place] Kailyn y fresca  · Mantenga un registro de la frecuencia con que orina  Si orina menos de lo usual o stephen orina es más Daniel, sear más líquidos  © 2017 [address] Information is for End User's use only and may not be sold, redistributed or otherwise used for commercial purposes  All illustrations and images included in CareNotes® are the copyrighted property of A D A M , Inc  or Benny Patton  Esta información es sólo para uso en educación  Stephen intención no es darle un consejo médico sobre enfermedades o tratamientos  Colsulte con stephen Melven Rimes farmacéutico antes de seguir cualquier régimen médico para saber si es seguro y efectivo para usted

## 2018-07-01 NOTE — H&P
Bariatric Surgery H&P    CC: abdominal pain    HPI: patient underwent elective, uncomplicated laparoscopic sleeve gastrectomy on 2018 (5 days ago)  Was discharged on POD 2  Returned to ER early this morning complaining of generalized abdominal pain and weakness  ROS  Denies fever/chills  + mild lightheadedness  Denies visual changes  Denies dyspnea, cough  + chest pain with PO intake  Denies nausea/vomiting  Denies change in urinary/bowel habits  Denies masses/hernias  Denies parasthesia  Denies peripheral edema  + foul-smelling mucus vaginal discharge    Past Medical History:   Diagnosis Date    Abdominal pain     Abnormal cells of cervix     Abnormal EKG     Acute headache     Amenorrhea     Anxiety     Back pain     Chronic low back pain     Chronic pain syndrome     Constipation     Contact dermatitis     Depression     Dermoid cyst of right lower extremity     Diabetes mellitus (HCC)     Drug-induced hepatic toxicity     Elevated liver function tests     Gastritis     GERD (gastroesophageal reflux disease)     Hypercholesterolemia     Hyperlipidemia     Left hip pain     Liver disease     drug induced hepatic toxicity    Liver function study, abnormal     Lumbar degenerative disc disease     Lumbar radiculopathy     Morbid obesity (HCC)     Neuropathy     Normal vaginal delivery     Tendinitis of left ankle     Vitamin D deficiency     Wears glasses      Past Surgical History:   Procedure Laterality Date    ABDOMINAL SURGERY       SECTION      COLONOSCOPY N/A 2018    Procedure: COLONOSCOPY;  Surgeon: Evelena Phalen, MD;  Location: [facility]; Service: Gastroenterology    ESOPHAGOGASTRODUODENOSCOPY N/A 2018    Procedure: ESOPHAGOGASTRODUODENOSCOPY (EGD) with BIOPSY;  Surgeon: Joan Thomas MD;  Location: [facility];   Service: Bariatrics    LIVER BIOPSY      OK LAP, APOLINAR RESTRICT PROC, LONGITUDINAL GASTRECTOMY N/A 2018    Procedure: GASTRECTOMY SLEEVE LAPAROSCOPIC AND INTRAOPERATIVE EGD;  Surgeon: Wood Arthur MD;  Location: [facility] OR;  Service: [address]      11/17/2017 Patient reports she had a tubal ligation 12 yrs ago in [place] but doesn't know the extent of the tubal   This is the first time she mentioned this procedure, added to surgical list   nw     No current facility-administered medications on file prior to encounter        Current Outpatient Prescriptions on File Prior to Encounter   Medication Sig Dispense Refill    busPIRone (BUSPAR) 15 mg tablet Take 15 mg by mouth 2 (two) times a day        butalbital-acetaminophen-caffeine (FIORICET,ESGIC) -40 mg per tablet Take 1 tablet by mouth every 4 (four) hours as needed for headaches 30 tablet 0    dicyclomine (BENTYL) 20 mg tablet Take 1 tablet (20 mg total) by mouth every 6 (six) hours as needed (pain) 10 tablet 0    enoxaparin (LOVENOX) 40 mg/0 4 mL Inject 0 4 mL (40 mg total) under the skin daily for 13 days 5 2 mL 0    FLUoxetine (PROzac) 20 mg capsule Take 20 mg by mouth 2 (two) times a day        gabapentin (NEURONTIN) 800 mg tablet Take 1 tablet (800 mg total) by mouth 3 (three) times a day 90 tablet 2    omeprazole (PriLOSEC) 20 mg delayed release capsule Take 1 capsule (20 mg total) by mouth daily for 90 days 90 capsule 3    ondansetron (ZOFRAN-ODT) 4 mg disintegrating tablet Take 1 tablet (4 mg total) by mouth every 8 (eight) hours as needed for nausea or vomiting 10 tablet 0    simvastatin (ZOCOR) 40 mg tablet 40 mg daily at bedtime        STOOL SOFTENER 100 MG capsule Take 100 mg by mouth 2 (two) times a day        tiZANidine (ZANAFLEX) 2 mg tablet Take 1-2 tablets every 8 hours as needed for muscle spasm 180 tablet 2    traZODone (DESYREL) 100 mg tablet Take 1 tablet by mouth daily at bedtime 30 tablet 0    [DISCONTINUED] Cholecalciferol (VITAMIN D3) 1000 units CAPS Take 1 capsule by mouth daily      [DISCONTINUED] insulin glargine (LANTUS) 100 units/mL subcutaneous injection Inject 30 Units under the skin daily at bedtime 10 mL 0     Exam  Vitals:    07/01/18 0701   BP: 129/65   Pulse: 78   Resp: 18   Temp: 98 3 °F (36 8 °C)   SpO2: 100%     NAD, alert  No pallor  MMM  No JVD  RRR  Normal inspiratory effort  Abdomen soft, non-distended, + generalized non-peritoneal tenderness to palpation  Incisions c/d/i, no masses or hernias  No peripheral edema  Normal affect, no agitation    Labs  WBC 12 sans shift  K 3 0, replete in ER  Otherwise unremarkable    Imaging  CT 7/1/18 - noted inflammation & ascites in pelvis/lesser sac, all within normal limits considering her surgery less than a week ago    Assessment  38F with petrona-incisional pain, weakness, dehydration s/p lap sleeve gastrectomy    Plan  - IVF  - clears  - pain/nausea control  - metronidazole for vaginal discharge  - lovenox  - reassess

## 2018-07-01 NOTE — CASE MANAGEMENT
Thank you,  Joe Aqq  [facility] Utilization Review Department  Phone: [phone]; Fax [phone]  ATTENTION: The Network Utilization Review Department is now centralized for our 9 Facilities  Make a note that we have a new phone and fax numbers for our Department  Please call with any questions or concerns to [phone] and carefully follow the prompts so that you are directed to the right person  All voicemails are confidential  Fax any determinations, approvals, denials, and requests for initial or continue stay review clinical to [phone]  Due to HIGH CALL volume, it would be easier if you could please send faxed requests to expedite your requests and in part, help us provide discharge notifications faster  Initial Clinical Review    Admission: Date/Time/Statement: [email or web address]    Orders Placed This Encounter   Procedures    Place in Observation (expected length of stay for this patient is less than two midnights)     Standing Status:   Standing     Number of Occurrences:   1     Order Specific Question:   Admitting Physician     Answer:   Fransico Caballero     Order Specific Question:   Level of Care     Answer:   Med Surg [16]         ED: Date/Time/Mode of Arrival:   ED Arrival Information     Expected Arrival Acuity Means of Arrival Escorted By Service Admission Type    - 6/30/2018 23:47 Emergent Walk-In Self Bariatrics Emergency    Arrival Complaint    Chest Pain          Chief Complaint:   Chief Complaint   Patient presents with    Chest Pain     Left anterior chest "pressure", SOB, and palpitations that started today while "sitting down"  Denies cardiac hx  Radiation down left arm  Gastric sleeve performed 3 days ago  History of Illness: patient underwent elective, uncomplicated laparoscopic sleeve gastrectomy on June 26, 2018 (5 days ago)  Was discharged on POD 2   Returned to ER early this morning complaining of generalized abdominal pain and weakness        ED Vital Signs:   ED Triage Vitals   Temperature Pulse Respirations Blood Pressure SpO2   06/30/18 2357 06/30/18 2355 06/30/18 2355 06/30/18 2355 06/30/18 2356   98 2 °F (36 8 °C) 96 20 (!) 173/81 100 %      Temp Source Heart Rate Source Patient Position - Orthostatic VS BP Location FiO2 (%)   06/30/18 2357 06/30/18 2355 06/30/18 2355 06/30/18 2355 --   Oral Monitor Sitting Left arm       Pain Score       06/30/18 2355       Worst Possible Pain        Wt Readings from Last 1 Encounters:   06/30/18 95 8 kg (211 lb 3 2 oz)       Vital Signs (abnormal):   06/30/18 2357  98 2 °F (36 8 °C)  --  --  --  --  --  --   06/30/18 2356  --  --  --  --  100 %  None (Room air)  --   06/30/18 2355  --  96  20   173/81  --  None (Room air)  Sitting            Abnormal Labs/Diagnostic Test Results:   K 3 0  WBC 12 63  HGB 10 8, HCT 32 5  PTT 39    CT 7/1/18 - noted inflammation & ascites in pelvis/lesser sac, all within normal limits considering her surgery less than a week ago     ED Treatment:   Medication Administration from 06/30/2018 2347 to 07/01/2018 0254       Date/Time Order Dose Route Action Action by Comments     07/01/2018 0128 sodium chloride 0 9 % bolus 1,000 mL 0 mL Intravenous Stopped Bhumika Cervantes RN      07/01/2018 0022 sodium chloride 0 9 % bolus 1,000 mL 1,000 mL Intravenous Marla 37 Bhumika Cervantes RN      07/01/2018 0028 morphine (PF) 4 mg/mL injection 4 mg 4 mg Intravenous Given Bhumika Cervantes RN      07/01/2018 0028 ondansetron (ZOFRAN) injection 4 mg 4 mg Intravenous Given Bhumika Cervantes RN      07/01/2018 0125 potassium chloride (K-DUR,KLOR-CON) CR tablet 40 mEq 40 mEq Oral Given Bhumika Cervantes RN      07/01/2018 0125 iohexol (OMNIPAQUE) 350 MG/ML injection (MULTI-DOSE) 100 mL 100 mL Intravenous Given Lorene Friday      07/01/2018 0125 iohexol (OMNIPAQUE) 240 MG/ML solution 50 mL 50 mL Oral Given Lorene Friday           Past Medical/Surgical History:    Active Ambulatory Problems Diagnosis Date Noted    Hypercholesterolemia     Uncontrolled type 2 diabetes with neuropathy (HCC)     Depressive disorder     Anxiety     [facility] 40 0-44 9, [facility] 01/31/2017    Neck pain on right side 02/08/2018    Abdominal pain 12/07/2017    Abnormal cells of cervix 11/30/2017    Abnormal EKG 10/25/2017    Amenorrhea 11/17/2017    Chronic pain disorder 10/17/2017    Chronic right-sided low back pain with sciatica 08/21/2014    Dermoid cyst of right lower extremity 05/02/2017    Elevated liver function tests 02/09/2017    Left hip pain 01/23/2018    Lumbar radiculopathy 10/17/2017    Tendinitis of left ankle 10/03/2017    Vitamin D deficiency 11/17/2017    Severe obesity (BMI 35 0-39  9) with comorbidity ([facility] ) 02/16/2018    Intractable migraine without aura and with status migrainosus 03/23/2018    Strain of calf muscle 03/23/2018    Blood in stool 04/05/2018    Chronic idiopathic constipation 04/05/2018    Abnormal TSH 04/19/2018    Acquired polyneuropathy     Morbid obesity ([facility] ) 05/30/2018    Diabetes mellitus without complication ([facility] ) 63/70/9422    Hyperlipidemia 05/30/2018    Tachycardia 06/26/2018     Resolved Ambulatory Problems     Diagnosis Date Noted    Transaminitis 01/30/2017    Drug-induced hepatic toxicity 03/09/2017    Morbid obesity, unspecified obesity type ([facility] ) 02/13/2016     Past Medical History:   Diagnosis Date    Abdominal pain     Abnormal cells of cervix     Abnormal EKG     Acute headache     Amenorrhea     Anxiety     Back pain     Chronic low back pain     Chronic pain syndrome     Constipation     Contact dermatitis     Depression     Dermoid cyst of right lower extremity     Diabetes mellitus (HCC)     Drug-induced hepatic toxicity     Elevated liver function tests     Gastritis     GERD (gastroesophageal reflux disease)     Hypercholesterolemia     Hyperlipidemia     Left hip pain     Liver disease     Liver function study, abnormal     Lumbar degenerative disc disease     Lumbar radiculopathy     Morbid obesity (HCC)     Neuropathy     Normal vaginal delivery     Tendinitis of left ankle     Vitamin D deficiency     Wears glasses        Admitting Diagnosis: Hypokalemia [E87 6]  Chest pain [R07 9]  Ascites [R18 8]    Age/Sex: 41 y o  female    Assessment/Plan: Assessment  38F with petrona-incisional pain, weakness, dehydration s/p lap sleeve gastrectomy     Plan  - IVF  - clears  - pain/nausea control  - metronidazole for vaginal discharge  - lovenox  - reassess    Admission Orders:  NPO  ACTIVITY AS KEENAN  BARIATRIC DIET    Scheduled Meds:   Current Facility-Administered Medications:  acetaminophen 320 mg Oral Q4H PRN Alin Mak MD    enoxaparin 40 mg Subcutaneous Q24H [facility] Alin Mak MD    metroNIDAZOLE 500 mg Intravenous Q12H Alin Mak MD    morphine injection 2 mg Intravenous Q3H PRN Alin Mak MD    ondansetron 4 mg Intravenous Q4H PRN Rosa Elena Tinoco DO    oxyCODONE 5 mg Oral Q4H PRN Alin Mak MD    sodium chloride 1,000 mL Intravenous Once Alin Mak MD    sodium chloride 125 mL/hr Intravenous Continuous Rosa Elena Tinoco DO Last Rate: 125 mL/hr (07/01/18 0349)     Continuous Infusions:   sodium chloride 125 mL/hr Last Rate: 125 mL/hr (07/01/18 0349)     PRN Meds:   acetaminophen    morphine injection X2    ondansetron    oxyCODONE

## 2018-07-02 ENCOUNTER — TRANSITIONAL CARE MANAGEMENT (OUTPATIENT)
Dept: FAMILY MEDICINE CLINIC | Facility: CLINIC | Age: 38
End: 2018-07-02

## 2018-07-02 ENCOUNTER — APPOINTMENT (OUTPATIENT)
Dept: RADIOLOGY | Facility: HOSPITAL | Age: 38
DRG: 422 | End: 2018-07-02
Payer: COMMERCIAL

## 2018-07-02 LAB
ANION GAP SERPL CALCULATED.3IONS-SCNC: 10 MMOL/L (ref 4–13)
BASOPHILS # BLD AUTO: 0.01 THOUSANDS/ΜL (ref 0–0.1)
BASOPHILS NFR BLD AUTO: 0 % (ref 0–1)
BUN SERPL-MCNC: 4 MG/DL (ref 5–25)
CALCIUM SERPL-MCNC: 8 MG/DL (ref 8.3–10.1)
CHLORIDE SERPL-SCNC: 105 MMOL/L (ref 100–108)
CO2 SERPL-SCNC: 24 MMOL/L (ref 21–32)
CREAT SERPL-MCNC: 0.47 MG/DL (ref 0.6–1.3)
EOSINOPHIL # BLD AUTO: 0.18 THOUSAND/ΜL (ref 0–0.61)
EOSINOPHIL NFR BLD AUTO: 3 % (ref 0–6)
ERYTHROCYTE [DISTWIDTH] IN BLOOD BY AUTOMATED COUNT: 13.3 % (ref 11.6–15.1)
GFR SERPL CREATININE-BSD FRML MDRD: 127 ML/MIN/1.73SQ M
GLUCOSE P FAST SERPL-MCNC: 95 MG/DL (ref 65–99)
GLUCOSE SERPL-MCNC: 95 MG/DL (ref 65–140)
HCT VFR BLD AUTO: 29.3 % (ref 34.8–46.1)
HGB BLD-MCNC: 9.5 G/DL (ref 11.5–15.4)
LYMPHOCYTES # BLD AUTO: 2.24 THOUSANDS/ΜL (ref 0.6–4.47)
LYMPHOCYTES NFR BLD AUTO: 34 % (ref 14–44)
MCH RBC QN AUTO: 29.1 PG (ref 26.8–34.3)
MCHC RBC AUTO-ENTMCNC: 32.4 G/DL (ref 31.4–37.4)
MCV RBC AUTO: 90 FL (ref 82–98)
MONOCYTES # BLD AUTO: 0.53 THOUSAND/ΜL (ref 0.17–1.22)
MONOCYTES NFR BLD AUTO: 8 % (ref 4–12)
NEUTROPHILS # BLD AUTO: 3.7 THOUSANDS/ΜL (ref 1.85–7.62)
NEUTS SEG NFR BLD AUTO: 56 % (ref 43–75)
NRBC BLD AUTO-RTO: 0 /100 WBCS
PLATELET # BLD AUTO: 231 THOUSANDS/UL (ref 149–390)
PMV BLD AUTO: 9.7 FL (ref 8.9–12.7)
POTASSIUM SERPL-SCNC: 3.3 MMOL/L (ref 3.5–5.3)
RBC # BLD AUTO: 3.27 MILLION/UL (ref 3.81–5.12)
SODIUM SERPL-SCNC: 139 MMOL/L (ref 136–145)
WBC # BLD AUTO: 6.66 THOUSAND/UL (ref 4.31–10.16)

## 2018-07-02 PROCEDURE — 99024 POSTOP FOLLOW-UP VISIT: CPT | Performed by: SURGERY

## 2018-07-02 PROCEDURE — 85025 COMPLETE CBC W/AUTO DIFF WBC: CPT | Performed by: SURGERY

## 2018-07-02 PROCEDURE — 80048 BASIC METABOLIC PNL TOTAL CA: CPT | Performed by: SURGERY

## 2018-07-02 PROCEDURE — 74240 X-RAY XM UPR GI TRC 1CNTRST: CPT

## 2018-07-02 RX ADMIN — IOHEXOL 30 ML: 350 INJECTION, SOLUTION INTRAVENOUS at 11:04

## 2018-07-02 RX ADMIN — MORPHINE SULFATE 2 MG: 2 INJECTION, SOLUTION INTRAMUSCULAR; INTRAVENOUS at 05:09

## 2018-07-02 RX ADMIN — OXYCODONE HYDROCHLORIDE 5 MG: 5 SOLUTION ORAL at 13:55

## 2018-07-02 RX ADMIN — OXYCODONE HYDROCHLORIDE 5 MG: 5 SOLUTION ORAL at 19:47

## 2018-07-02 RX ADMIN — ONDANSETRON 4 MG: 2 INJECTION INTRAMUSCULAR; INTRAVENOUS at 11:21

## 2018-07-02 RX ADMIN — SODIUM CHLORIDE 125 ML/HR: 0.9 INJECTION, SOLUTION INTRAVENOUS at 16:18

## 2018-07-02 RX ADMIN — MORPHINE SULFATE 2 MG: 2 INJECTION, SOLUTION INTRAMUSCULAR; INTRAVENOUS at 08:20

## 2018-07-02 RX ADMIN — ENOXAPARIN SODIUM 40 MG: 100 INJECTION SUBCUTANEOUS at 08:19

## 2018-07-02 RX ADMIN — MORPHINE SULFATE 2 MG: 2 INJECTION, SOLUTION INTRAMUSCULAR; INTRAVENOUS at 23:40

## 2018-07-02 RX ADMIN — MORPHINE SULFATE 2 MG: 2 INJECTION, SOLUTION INTRAMUSCULAR; INTRAVENOUS at 16:53

## 2018-07-02 RX ADMIN — METRONIDAZOLE 500 MG: 500 INJECTION, SOLUTION INTRAVENOUS at 21:47

## 2018-07-02 RX ADMIN — METRONIDAZOLE 500 MG: 500 INJECTION, SOLUTION INTRAVENOUS at 10:10

## 2018-07-02 RX ADMIN — MORPHINE SULFATE 2 MG: 2 INJECTION, SOLUTION INTRAMUSCULAR; INTRAVENOUS at 02:01

## 2018-07-02 NOTE — CASE MANAGEMENT
Continued Stay Review    OBS  ORDER   7/1  @  0233    IP    ORDER  ENTERED     7/2  @   1624    Date:    7/2/2018    Vital Signs: /63 (BP Location: Left arm)   Pulse 75   Temp 98 7 °F (37 1 °C) (Temporal)   Resp 18   Wt 95 8 kg (211 lb 3 2 oz)   LMP 06/04/2018   SpO2 100%   BMI 38 63 kg/m²     Medications:   Scheduled Meds:   Current Facility-Administered Medications:  acetaminophen 320 mg Oral Q4H PRN Candido Monteiro MD    metroNIDAZOLE 500 mg Intravenous Q12H Candido Monteiro MD Last Rate: 500 mg (07/02/18 1010)   morphine injection 2 mg Intravenous Q3H PRN Candido Monteiro MD    oxyCODONE 5 mg Oral Q4H PRN Candido Monteiro MD    sodium chloride 125 mL/hr Intravenous Continuous Bulmaro Ra , DO Last Rate: 125 mL/hr (07/02/18 1618)     Continuous Infusions:   sodium chloride 125 mL/hr Last Rate: 125 mL/hr (07/02/18 1618)     PRN Meds:   acetaminophen    morphine injection    oxyCODONE     IV  MSO4  PRN  (  x5   7/1 and  X 3  7/2  thus far)  IV  zofran Prn (  X 1  7/2  Thus far)    Abnormal Labs/Diagnostic Results:   H/H   9 5/29 3  K  3 3    Age/Sex: 41 y o  female     patient underwent elective, uncomplicated laparoscopic sleeve gastrectomy on June 26, 2018 (5 days ago)  Was discharged on POD 2  Returned to ER early this morning complaining of generalized abdominal pain and weakness        Assessment/Plan: 38F with pain 1 week s/p lap sleeve gastrectomy; work-up negative thus far for acute intraabdominal process     Plan  - recheck labs  - continue IVF    Discharge Plan:    Home    Thank you,  Joe Steinberg  [address] Utilization Review Department  Phone: [phone]; Fax [phone]  ATTENTION: The Network Utilization Review Department is now centralized for our 9 Facilities  Make a note that we have a new phone and fax numbers for our Department   Please call with any questions or concerns to [phone] and carefully follow the prompts so that you are directed to the right person  All voicemails are confidential  Fax any determinations, approvals, denials, and requests for initial or continue stay review clinical to [phone]  Due to HIGH CALL volume, it would be easier if you could please send faxed requests to expedite your requests and in part, help us provide discharge notifications faster

## 2018-07-02 NOTE — PROGRESS NOTES
Bariatric Surgery Progress Note    Subjective  No adverse events  Still complains of pain, worse after eating, generalized but most in epigastrium  No emesis      Objective  Vitals:    07/01/18 0326 07/01/18 0701 07/01/18 1529 07/01/18 2257   BP: 165/82 129/65 133/72 120/67   BP Location: Left arm Right arm Left arm Left arm   Pulse: 84 78 82 75   Resp: 18 18 18 20   Temp: 99 °F (37 2 °C) 98 3 °F (36 8 °C) 98 5 °F (36 9 °C) 97 6 °F (36 4 °C)   TempSrc: Temporal Temporal Temporal Temporal   SpO2: 100% 100% 98% 96%   Weight:         NAD, alert  Normal inspiratory effort  Abdomen soft, non-distended, appropriately tender 1 week s/p sleeve gastrectomy  Incisions c/d/i    Labs  pending    Assessment  38F with pain 1 week s/p lap sleeve gastrectomy; work-up negative thus far for acute intraabdominal process    Plan  - recheck labs  - continue IVF  - reassess in PM  - encourage incentive spirometry, ambulation, clear liquids

## 2018-07-03 ENCOUNTER — TELEPHONE (OUTPATIENT)
Dept: FAMILY MEDICINE CLINIC | Facility: CLINIC | Age: 38
End: 2018-07-03

## 2018-07-03 VITALS
SYSTOLIC BLOOD PRESSURE: 177 MMHG | WEIGHT: 211.2 LBS | RESPIRATION RATE: 18 BRPM | HEART RATE: 78 BPM | BODY MASS INDEX: 38.63 KG/M2 | OXYGEN SATURATION: 99 % | DIASTOLIC BLOOD PRESSURE: 82 MMHG | TEMPERATURE: 97.7 F

## 2018-07-03 LAB
BASOPHILS # BLD AUTO: 0.02 THOUSANDS/ΜL (ref 0–0.1)
BASOPHILS NFR BLD AUTO: 0 % (ref 0–1)
EOSINOPHIL # BLD AUTO: 0.25 THOUSAND/ΜL (ref 0–0.61)
EOSINOPHIL NFR BLD AUTO: 3 % (ref 0–6)
ERYTHROCYTE [DISTWIDTH] IN BLOOD BY AUTOMATED COUNT: 13.2 % (ref 11.6–15.1)
HCT VFR BLD AUTO: 31.6 % (ref 34.8–46.1)
HGB BLD-MCNC: 10.5 G/DL (ref 11.5–15.4)
LYMPHOCYTES # BLD AUTO: 2.42 THOUSANDS/ΜL (ref 0.6–4.47)
LYMPHOCYTES NFR BLD AUTO: 31 % (ref 14–44)
MCH RBC QN AUTO: 29.5 PG (ref 26.8–34.3)
MCHC RBC AUTO-ENTMCNC: 33.2 G/DL (ref 31.4–37.4)
MCV RBC AUTO: 89 FL (ref 82–98)
MONOCYTES # BLD AUTO: 0.7 THOUSAND/ΜL (ref 0.17–1.22)
MONOCYTES NFR BLD AUTO: 9 % (ref 4–12)
NEUTROPHILS # BLD AUTO: 4.32 THOUSANDS/ΜL (ref 1.85–7.62)
NEUTS SEG NFR BLD AUTO: 56 % (ref 43–75)
NRBC BLD AUTO-RTO: 0 /100 WBCS
PLATELET # BLD AUTO: 251 THOUSANDS/UL (ref 149–390)
PMV BLD AUTO: 10 FL (ref 8.9–12.7)
RBC # BLD AUTO: 3.56 MILLION/UL (ref 3.81–5.12)
WBC # BLD AUTO: 7.71 THOUSAND/UL (ref 4.31–10.16)

## 2018-07-03 PROCEDURE — 85025 COMPLETE CBC W/AUTO DIFF WBC: CPT | Performed by: SURGERY

## 2018-07-03 PROCEDURE — 99024 POSTOP FOLLOW-UP VISIT: CPT | Performed by: SURGERY

## 2018-07-03 RX ORDER — POLYETHYLENE GLYCOL 3350 17 G/17G
17 POWDER, FOR SOLUTION ORAL ONCE
Status: COMPLETED | OUTPATIENT
Start: 2018-07-03 | End: 2018-07-03

## 2018-07-03 RX ORDER — BISACODYL 10 MG
10 SUPPOSITORY, RECTAL RECTAL ONCE
Status: COMPLETED | OUTPATIENT
Start: 2018-07-03 | End: 2018-07-03

## 2018-07-03 RX ORDER — ONDANSETRON 2 MG/ML
4 INJECTION INTRAMUSCULAR; INTRAVENOUS ONCE
Status: COMPLETED | OUTPATIENT
Start: 2018-07-03 | End: 2018-07-03

## 2018-07-03 RX ADMIN — MORPHINE SULFATE 2 MG: 2 INJECTION, SOLUTION INTRAMUSCULAR; INTRAVENOUS at 11:16

## 2018-07-03 RX ADMIN — MORPHINE SULFATE 2 MG: 2 INJECTION, SOLUTION INTRAMUSCULAR; INTRAVENOUS at 05:33

## 2018-07-03 RX ADMIN — METRONIDAZOLE 500 MG: 500 INJECTION, SOLUTION INTRAVENOUS at 10:28

## 2018-07-03 RX ADMIN — POLYETHYLENE GLYCOL 3350 17 G: 17 POWDER, FOR SOLUTION ORAL at 08:28

## 2018-07-03 RX ADMIN — MORPHINE SULFATE 2 MG: 2 INJECTION, SOLUTION INTRAMUSCULAR; INTRAVENOUS at 08:27

## 2018-07-03 RX ADMIN — ONDANSETRON 4 MG: 2 INJECTION INTRAMUSCULAR; INTRAVENOUS at 11:16

## 2018-07-03 RX ADMIN — OXYCODONE HYDROCHLORIDE 5 MG: 5 SOLUTION ORAL at 10:30

## 2018-07-03 RX ADMIN — ACETAMINOPHEN 320 MG: 80 TABLET, CHEWABLE ORAL at 14:06

## 2018-07-03 RX ADMIN — BISACODYL 10 MG: 10 SUPPOSITORY RECTAL at 08:28

## 2018-07-03 RX ADMIN — MORPHINE SULFATE 2 MG: 2 INJECTION, SOLUTION INTRAMUSCULAR; INTRAVENOUS at 14:16

## 2018-07-03 RX ADMIN — SODIUM CHLORIDE 125 ML/HR: 0.9 INJECTION, SOLUTION INTRAVENOUS at 02:51

## 2018-07-03 RX ADMIN — OXYCODONE HYDROCHLORIDE 5 MG: 5 SOLUTION ORAL at 14:32

## 2018-07-03 RX ADMIN — OXYCODONE HYDROCHLORIDE 5 MG: 5 SOLUTION ORAL at 02:52

## 2018-07-03 NOTE — DISCHARGE SUMMARY
Discharge Summary - Anabelle Mares 41 y o  female MRN: [id number]    Unit/Bed#: E5 -01 Encounter: [id number]    Admission Date: 6/30/2018     Discharge Date: 07/03/18    Admitting Diagnosis: Hypokalemia [E87 6]  Chest pain [R07 9]  Ascites [R18 8]    Secondary Diagnosis:   Past Medical History:   Diagnosis Date    Abdominal pain     Abnormal cells of cervix     Abnormal EKG     Acute headache     Amenorrhea     Anxiety     Back pain     Chronic low back pain     Chronic pain syndrome     Constipation     Contact dermatitis     Depression     Dermoid cyst of right lower extremity     Diabetes mellitus ([address] )     Drug-induced hepatic toxicity     Elevated liver function tests     Gastritis     GERD (gastroesophageal reflux disease)     Hypercholesterolemia     Hyperlipidemia     Left hip pain     Liver disease     drug induced hepatic toxicity    Liver function study, abnormal     Lumbar degenerative disc disease     Lumbar radiculopathy     Morbid obesity (HCC)     Neuropathy     Normal vaginal delivery     Tendinitis of left ankle     Vitamin D deficiency     Wears glasses        Discharge Diagnosis: Same    Procedures Performed:     Consults: None    Hospital Course: patient was admitted through the ER on 6/30/18 with abdominal pain and nausea ~1 week after laparoscopic sleeve gastrectomy  Work-up revealed mild anemia and a CT scan with evidence of non-active bleeding from the sleeve staple line  The patient was treated with IV fluids and pain and nausea medication  Her H&H remained stable, as did her vital signs, and her symptoms gradually improved  On 7/3/18 she was hydrating well by mouth and was feeling well enough to be discharged home  She will resume her normal follow-up with the bariatric surgery clinic  Disposition: Call physician for temperature over 101, wound redness or discharge, vomiting, or intolerance to diet        Discharge Medications:  See after visit summary for reconciled discharge medications provided to patient and family

## 2018-07-03 NOTE — NURSING NOTE
AVS reviewed with pt and her   Written rx's given to pt  No questions or concerns  Walked off unit with GREGORY colvin and pt's

## 2018-07-03 NOTE — PROGRESS NOTES
Bariatric Surgery Progress Note    Subjective  No adverse events  Advancing PO hydration, ambulation, spirometry  Pain/nausea control adequate      Objective  Vitals:    07/02/18 1522 07/02/18 1658 07/02/18 2337 07/03/18 0727   BP: 136/63 146/84 154/68 (!) 177/82   BP Location: Left arm Left arm Left arm Left arm   Pulse: 75  77 78   Resp: 18 18 18   Temp: 98 7 °F (37 1 °C)  97 6 °F (36 4 °C) 97 7 °F (36 5 °C)   TempSrc: Temporal  Temporal Temporal   SpO2: 100%  99% 99%   Weight:         NAD, alert  Normal inspiratory effort  Abdomen soft, non-distended, appropriately tender  Incisions c/d/i    Labs  H/H 10 8 --> 9 5 --> 10 5    Assessment  38F with pain 1 week s/p lap sleeve gastrectomy; likely 2/2 small self-limited petrona-gastric bleeding    Plan  - stop IVF  - encourage incentive spirometry, ambulation, clear liquids

## 2018-07-05 ENCOUNTER — TELEPHONE (OUTPATIENT)
Dept: BARIATRICS | Facility: CLINIC | Age: 38
End: 2018-07-05

## 2018-07-05 NOTE — TELEPHONE ENCOUNTER
Outreach phone call at request of Jeffory Schlatter; patient  was asked several questions regarding care while in hospital and discharge instructions  Patient said she felt light headed and itchy/ red around the incisions  I will have nurse Messina call patient  I spoke with nurse Messina and she will have JAMEL Abdul 23 speak with patient since patient does not speak english

## 2018-07-06 ENCOUNTER — OFFICE VISIT (OUTPATIENT)
Dept: BARIATRICS | Facility: CLINIC | Age: 38
End: 2018-07-06

## 2018-07-06 VITALS
WEIGHT: 202 LBS | HEART RATE: 80 BPM | BODY MASS INDEX: 35.79 KG/M2 | HEIGHT: 63 IN | RESPIRATION RATE: 18 BRPM | DIASTOLIC BLOOD PRESSURE: 70 MMHG | SYSTOLIC BLOOD PRESSURE: 118 MMHG | TEMPERATURE: 98 F

## 2018-07-06 DIAGNOSIS — E66.9 OBESITY, CLASS II, BMI 35-39.9: Primary | ICD-10-CM

## 2018-07-06 DIAGNOSIS — E66.01 MORBID OBESITY (HCC): ICD-10-CM

## 2018-07-06 DIAGNOSIS — Z98.84 S/P LAPAROSCOPIC SLEEVE GASTRECTOMY: ICD-10-CM

## 2018-07-06 DIAGNOSIS — E66.01 SEVERE OBESITY (BMI 35.0-39.9) WITH COMORBIDITY (HCC): Primary | ICD-10-CM

## 2018-07-06 PROBLEM — K59.04 CHRONIC IDIOPATHIC CONSTIPATION: Status: RESOLVED | Noted: 2018-04-05 | Resolved: 2018-07-06

## 2018-07-06 PROBLEM — S86.819A STRAIN OF CALF MUSCLE: Status: RESOLVED | Noted: 2018-03-23 | Resolved: 2018-07-06

## 2018-07-06 PROBLEM — E55.9 VITAMIN D DEFICIENCY: Status: RESOLVED | Noted: 2017-11-17 | Resolved: 2018-07-06

## 2018-07-06 PROCEDURE — 99024 POSTOP FOLLOW-UP VISIT: CPT | Performed by: SURGERY

## 2018-07-06 PROCEDURE — RECHECK

## 2018-07-06 NOTE — PROGRESS NOTES
Assessment/Plan: patient is doing fairly well after her laparoscopic sleeve gastrectomy by Dr Veronica Bacon on 6/26/18  She required a brief readmission for pain and nausea, at which time she was diagnosed with a spontaneously resolved perigastric bleed  No transfusion was required in the patient's vital signs were not affected  She presently reports some mildly low energy but is otherwise tolerant of all medication, diet, and activity regimens  I reinforced the importance of good oral hydration  She will see the dietitian for dietary advancement today  She will return to clinic in 4 weeks for her next interval visit  Diagnoses and all orders for this visit:    Severe obesity (BMI 35 0-39  9) with comorbidity ([address] )    Morbid obesity (HCC)    S/P laparoscopic sleeve gastrectomy        Subjective: low energy, otherwise no issues     Patient ID: Ye Arreguin is a 41 y o  female      HPI per above    Review of Systems    Denies fever/chills  Denies lightheadedness  Denies visual changes  Denies dyspnea, cough  Denies chest pain  Denies nausea/vomiting  Denies change in urinary/bowel habits  Denies masses/hernias  Denies parasthesia  Denies peripheral edema    Objective:     Physical Exam    Vitals:    07/06/18 1146   BP: 118/70   Pulse: 80   Resp: 18   Temp: 98 °F (36 7 °C)     NAD, alert  No pallor  MMM  No JVD  RRR  Normal inspiratory effort  Abdomen soft, NT/ND  Incisions c/d/i, no masses or hernias  No peripheral edema  Normal affect, no agitation

## 2018-07-06 NOTE — PROGRESS NOTES
Weight Management Nutrition Class     Diagnosis: Obesity    Bariatric Surgeon: Dr Amadeo Fernandes    Surgery: Vertical Sleeve Gastrectomy    Class: first post op note    Topics discussed today include:     fluid goals post op, protein goals post op, chew food well, diet progression, protein supplems, vitamin/mineral supplements and calcium supplements    Patient was able to verbalize basic diet (protein, fluid, vitamin and mineral) recommendations and possible nutrition-related complications  Yes     Pt primarily [place]ian speaking, her significant other was there to translate  Pt is still on liquids drinking mostly clears and her protein shake (1 premier per day)  Pt will see surgeon today  Advised if surgeon says it is OK she can progress to the pureed diet for 3 days and then move onto the soft diet for 8 weeks  Reviewed appropriate foods to incorporate at each stage  She does appear to be staying hydrated, but is not meeting protein needs  Gave examples of ways to better meet needs depending on what diet she is on  She is not currently taking any vitamin supplements  Recommended to purchase Bubbleball one a day chewable and Upcal calcium powder (provided samples) 3 per day that she can purchase from the [employer]  Pt and significant other verbalize understanding

## 2018-07-11 ENCOUNTER — OFFICE VISIT (OUTPATIENT)
Dept: PAIN MEDICINE | Facility: CLINIC | Age: 38
End: 2018-07-11
Payer: COMMERCIAL

## 2018-07-11 VITALS
DIASTOLIC BLOOD PRESSURE: 68 MMHG | SYSTOLIC BLOOD PRESSURE: 110 MMHG | TEMPERATURE: 97.7 F | HEART RATE: 99 BPM | BODY MASS INDEX: 36.03 KG/M2 | WEIGHT: 200.2 LBS

## 2018-07-11 DIAGNOSIS — G89.4 CHRONIC PAIN DISORDER: ICD-10-CM

## 2018-07-11 DIAGNOSIS — M51.36 LUMBAR DEGENERATIVE DISC DISEASE: ICD-10-CM

## 2018-07-11 DIAGNOSIS — M54.50 CHRONIC BILATERAL LOW BACK PAIN WITHOUT SCIATICA: Primary | ICD-10-CM

## 2018-07-11 DIAGNOSIS — G89.29 CHRONIC BILATERAL LOW BACK PAIN WITHOUT SCIATICA: Primary | ICD-10-CM

## 2018-07-11 DIAGNOSIS — M54.16 LUMBAR RADICULOPATHY: ICD-10-CM

## 2018-07-11 DIAGNOSIS — M79.18 MYOFASCIAL PAIN SYNDROME: ICD-10-CM

## 2018-07-11 DIAGNOSIS — G62.9 ACQUIRED POLYNEUROPATHY: ICD-10-CM

## 2018-07-11 PROCEDURE — 99213 OFFICE O/P EST LOW 20 MIN: CPT | Performed by: NURSE PRACTITIONER

## 2018-07-11 RX ORDER — CHOLECALCIFEROL (VITAMIN D3) 125 MCG
CAPSULE ORAL
COMMUNITY
Start: 2018-07-10

## 2018-07-11 RX ORDER — GABAPENTIN 800 MG/1
800 TABLET ORAL 3 TIMES DAILY
Qty: 90 TABLET | Refills: 1 | Status: SHIPPED | OUTPATIENT
Start: 2018-07-11 | End: 2018-08-30 | Stop reason: SDUPTHER

## 2018-07-11 NOTE — PROGRESS NOTES
Assessment:  1  Chronic bilateral low back pain without sciatica    2  Chronic pain disorder    3  Lumbar degenerative disc disease    4  Acquired polyneuropathy    5  Myofascial pain syndrome    6  Lumbar radiculopathy        Plan:  While the patient and her  were in the office today, I did have a thorough conversation with the patient regarding her medication regimen treatment plan options  While the patient was in the office I did discuss with the patient that at this point time her EMG findings showed evidence of a mild peripheral neuropathy with no evidence of lumbar radiculopathy  At this point time I discussed with the patient and her  that since she continues with worsening low back pain and she does have lumbar spondylosis, we could consider a lumbar medial branch block  The patient's low back pain persists despite time, relative rest, activity modification and therapy  Based on the patient's symptoms examination, I suspect that the pain is being generated by the lumbar facet joints  The facet joints are only one of many possible low-back pain generators  Unfortunately, studies have demonstrated that history and examination alone are unreliable  We will schedule the patient for diagnostic lumbar medial branch blockade using the double block paradigm  If the patient receives significant relief of appropriate duration with lidocaine 2%, we will confirm with Marcaine 0 75%  If the patient demonstrates appropriate response to medial branch blockade we will schedule for radiofrequency ablation to provide long-term relief  Complete risks and benefits including bleeding, infection, tissue reaction, nerve injury and allergic reaction were discussed  The approach was demonstrated using models and literature was provided   However, at this point time the patient was not sure that she wanted to proceed with the lumbar medial branch block and want to take some time to review the literature and think about it for now  After a thorough conversation with the patient and her  we decided to continue with the gabapentin as prescribed for now and see how she does over the next 2 months as she heals from her bariatric surgery and hopefully continues to lose weight  At her next office visit we will re-evaluate her pain and discuss the possibility of the medial branch blocks and/or look at any other medication options if possible at that time  I advised the patient that at this point she could restart the tizanidine as needed as I see no reason why she could not be on that medication after her surgery  The patient was agreeable and verbalized an understanding  The patient will follow-up in 8 weeks for medication prescription refill and reevaluation  The patient was advised to contact the office should their symptoms worsen in the interim  The patient was agreeable and verbalized an understanding  History of Present Illness: The patient is a 41 y o  female last seen on *4/9/18** who presents for a follow up office visit in regards to chronic pain secondary to lumbar spondylosis  The patient currently reports that since her last office visit her low back and leg pain has remained relatively stable that in general it is her back pain that is her biggest issue  She presents today with her  to discuss the results of her most recent lower extremity EMG and her treatment plan options  However, she is status post a gastric sleeve surgery on June 26, 2018 with Dr Jadon Patterson  She reports that prior to her surgery she had to discontinue the diclofenac and tizanidine, but felt that the tizanidine was at least helpful in her pain  Please note that the patient speaks mostly [place] and her  interpreted for her the entire time  Current pain medications includes:  Gabapentin 800 mg t i d  The patient reports that this regimen is providing minimal pain relief    The patient is reporting no side effects from this pain medication regimen  I have personally reviewed and/or updated the patient's past medical history, past surgical history, family history, social history, current medications, allergies, and vital signs today  Review of Systems:    Review of Systems   Respiratory: Negative for shortness of breath  Cardiovascular: Negative for chest pain  Gastrointestinal: Negative for constipation, diarrhea, nausea and vomiting  Musculoskeletal: Negative for arthralgias, gait problem, joint swelling and myalgias  Skin: Negative for rash  Neurological: Negative for dizziness, seizures and weakness  All other systems reviewed and are negative  Past Medical History:   Diagnosis Date    Abdominal pain     Abnormal cells of cervix     Abnormal EKG     Acute headache     Amenorrhea     Anxiety     Back pain     Chronic low back pain     Chronic pain syndrome     Constipation     Contact dermatitis     Depression     Dermoid cyst of right lower extremity     Diabetes mellitus (HCC)     Drug-induced hepatic toxicity     Elevated liver function tests     Gastritis     GERD (gastroesophageal reflux disease)     Hypercholesterolemia     Hyperlipidemia     Left hip pain     Liver disease     drug induced hepatic toxicity    Liver function study, abnormal     Lumbar degenerative disc disease     Lumbar radiculopathy     Morbid obesity (HCC)     Neuropathy     Normal vaginal delivery     Tendinitis of left ankle     Vitamin D deficiency     Wears glasses        Past Surgical History:   Procedure Laterality Date    ABDOMINAL SURGERY       SECTION      COLONOSCOPY N/A 2018    Procedure: COLONOSCOPY;  Surgeon: Evelena Phalen, MD;  Location: [facility];   Service: Gastroenterology    ESOPHAGOGASTRODUODENOSCOPY N/A 2018    Procedure: ESOPHAGOGASTRODUODENOSCOPY (EGD) with BIOPSY;  Surgeon: Joan Thomas MD;  Location: [facility]; Service: Bariatrics    LIVER BIOPSY      [place] LAP, APOLINAR RESTRICT PROC, LONGITUDINAL GASTRECTOMY N/A 6/26/2018    Procedure: GASTRECTOMY SLEEVE LAPAROSCOPIC AND INTRAOPERATIVE EGD;  Surgeon: Luis Alfredo Reid MD;  Location: [facility];  Service: Jj Villaseñor TUBAL LIGATION      11/17/2017 Patient reports she had a tubal ligation 12 yrs ago in [place] but doesn't know the extent of the tubal   This is the first time she mentioned this procedure, added to surgical list   nw       Family History   Problem Relation Age of Onset    Diabetes Mother     Hyperlipidemia Mother     Pancreatitis Father     Thyroid disease Paternal Aunt     Lung cancer Maternal Grandfather     Heart disease Neg Hx     Stroke Neg Hx        Social History     Occupational History    Not on file       Social History Main Topics    Smoking status: Never Smoker    Smokeless tobacco: Never Used      Comment: Onset Date:  1/23/18    Alcohol use Yes      Comment: "occas", social    Drug use: No    Sexual activity: Not on file         Current Outpatient Prescriptions:     busPIRone (BUSPAR) 15 mg tablet, Take 15 mg by mouth 2 (two) times a day  , Disp: , Rfl:     butalbital-acetaminophen-caffeine (FIORICET,ESGIC) -40 mg per tablet, Take 1 tablet by mouth every 4 (four) hours as needed for headaches, Disp: 30 tablet, Rfl: 0    dicyclomine (BENTYL) 20 mg tablet, Take 1 tablet (20 mg total) by mouth every 6 (six) hours as needed (pain), Disp: 10 tablet, Rfl: 0    enoxaparin (LOVENOX) 40 mg/0 4 mL, Inject 0 4 mL (40 mg total) under the skin daily for 13 days, Disp: 5 2 mL, Rfl: 0    FLUoxetine (PROzac) 20 mg capsule, Take 20 mg by mouth 2 (two) times a day  , Disp: , Rfl:     gabapentin (NEURONTIN) 800 mg tablet, Take 1 tablet (800 mg total) by mouth 3 (three) times a day, Disp: 90 tablet, Rfl: 2    omeprazole (PriLOSEC) 20 mg delayed release capsule, Take 1 capsule (20 mg total) by mouth daily for 90 days, Disp: 90 capsule, Rfl: 3    ondansetron (ZOFRAN-ODT) 4 mg disintegrating tablet, Take 1 tablet (4 mg total) by mouth every 8 (eight) hours as needed for nausea or vomiting, Disp: 10 tablet, Rfl: 0    oxyCODONE-acetaminophen (PERCOCET) 5-325 mg per tablet, Take 1 tablet by mouth every 4 (four) hours as needed for moderate pain for up to 10 days Max Daily Amount: 6 tablets, Disp: 10 tablet, Rfl: 0    simvastatin (ZOCOR) 40 mg tablet, 40 mg daily at bedtime  , Disp: , Rfl:     STOOL SOFTENER 100 MG capsule, Take 100 mg by mouth 2 (two) times a day  , Disp: , Rfl:     tiZANidine (ZANAFLEX) 2 mg tablet, Take 1-2 tablets every 8 hours as needed for muscle spasm, Disp: 180 tablet, Rfl: 2    TOUJEO SOLOSTAR 300 units/mL CONCETRATED U-300 injection pen, Inject 30 Units under the skin daily at bedtime, Disp: , Rfl:     traZODone (DESYREL) 100 mg tablet, Take 1 tablet by mouth daily at bedtime, Disp: 30 tablet, Rfl: 0    Allergies   Allergen Reactions    Atorvastatin      Elevated liver levels    Other      Herbal Life,,,caused elevated liver levels       Physical Exam:    There were no vitals taken for this visit  Constitutional:normal, well developed, well nourished, alert, in no distress and non-toxic and no overt pain behavior  and overweight  Eyes:anicteric  HEENT:grossly intact  Neck:supple, symmetric, trachea midline and no masses   Pulmonary:even and unlabored  Cardiovascular:No edema or pitting edema present  Skin:Normal without rashes or lesions and well hydrated  Psychiatric:Mood and affect appropriate  Neurologic:Cranial Nerves II-XII grossly intact  Musculoskeletal:Slightly antalgic, but steady gait without the use of any assistive devices        Imaging  No orders to display     MRI lumbar spine wo contrast   Status: Final result   PACS Images     Show images for MRI lumbar spine wo contrast   Order Report      Order Details   Study Result     MRI LUMBAR SPINE WITHOUT CONTRAST     INDICATION:  Low back pain, 724 2     COMPARISON:  X-ray 1/3/2016     TECHNIQUE:  Sagittal T1, sagittal T2, sagittal inversion recovery, axial T1 and axial T2, coronal T2        IMAGE QUALITY:  Diagnostic     FINDINGS:     ALIGNMENT:  Normal alignment of the lumbar spine  No compression fracture  No spondylolysis or spondylolisthesis  No scoliosis      MARROW SIGNAL:  Normal marrow signal is identified within the visualized bony structures  No discrete marrow lesion      DISTAL CORD AND CONUS:  Normal size and signal within the distal cord and conus  The conus ends at the L1-L2 level      PARASPINAL SOFT TISSUES:  Paraspinal soft tissues are unremarkable      SACRUM:  Normal signal within the sacrum  No evidence of insufficiency or stress fracture  Partial sacralization of left greater than right L5 transverse processes  This is known to alter normal biomechanics and predispose to back pain      LOWER THORACIC DISC SPACES:  Normal disc height and signal   No disc herniation, canal stenosis or foraminal narrowing      LUMBAR DISC SPACES:          L1-L2:  Normal      L2-L3:  Normal      L3-L4:  Normal      L4-L5:  Minor bulge, slight facet arthrosis     L5-S1:  Prominent prevertebral venous plexus  No significant displacement of disc material      IMPRESSION:     No compressive cord or root disease  Partial sacralization of left greater than right L5 transverse process, a condition which has been demonstrated to predispose patients to back pain                No orders of the defined types were placed in this encounter

## 2018-07-14 ENCOUNTER — HOSPITAL ENCOUNTER (EMERGENCY)
Facility: HOSPITAL | Age: 38
Discharge: HOME | End: 2018-07-14
Attending: EMERGENCY MEDICINE
Payer: COMMERCIAL

## 2018-07-14 ENCOUNTER — APPOINTMENT (EMERGENCY)
Dept: CT IMAGING | Facility: HOSPITAL | Age: 38
End: 2018-07-14
Payer: COMMERCIAL

## 2018-07-14 VITALS
HEART RATE: 75 BPM | RESPIRATION RATE: 18 BRPM | DIASTOLIC BLOOD PRESSURE: 72 MMHG | OXYGEN SATURATION: 100 % | SYSTOLIC BLOOD PRESSURE: 131 MMHG | TEMPERATURE: 98.5 F

## 2018-07-14 DIAGNOSIS — R10.9 ABDOMINAL PAIN: Primary | ICD-10-CM

## 2018-07-14 DIAGNOSIS — E87.6 HYPOKALEMIA: ICD-10-CM

## 2018-07-14 LAB
ALBUMIN SERPL BCP-MCNC: 3.5 G/DL (ref 3.5–5)
ALP SERPL-CCNC: 57 U/L (ref 46–116)
ALT SERPL W P-5'-P-CCNC: 38 U/L (ref 12–78)
ANION GAP SERPL CALCULATED.3IONS-SCNC: 5 MMOL/L (ref 4–13)
AST SERPL W P-5'-P-CCNC: 36 U/L (ref 5–45)
BACTERIA UR QL AUTO: ABNORMAL /HPF
BASOPHILS # BLD AUTO: 0.02 THOUSANDS/ΜL (ref 0–0.1)
BASOPHILS NFR BLD AUTO: 0 % (ref 0–1)
BILIRUB DIRECT SERPL-MCNC: 0.11 MG/DL (ref 0–0.2)
BILIRUB SERPL-MCNC: 0.37 MG/DL (ref 0.2–1)
BILIRUB UR QL STRIP: ABNORMAL
BUN SERPL-MCNC: 11 MG/DL (ref 5–25)
CALCIUM SERPL-MCNC: 10.1 MG/DL (ref 8.3–10.1)
CHLORIDE SERPL-SCNC: 103 MMOL/L (ref 100–108)
CLARITY UR: ABNORMAL
CO2 SERPL-SCNC: 30 MMOL/L (ref 21–32)
COLOR UR: YELLOW
CREAT SERPL-MCNC: 0.68 MG/DL (ref 0.6–1.3)
EOSINOPHIL # BLD AUTO: 0.11 THOUSAND/ΜL (ref 0–0.61)
EOSINOPHIL NFR BLD AUTO: 2 % (ref 0–6)
ERYTHROCYTE [DISTWIDTH] IN BLOOD BY AUTOMATED COUNT: 14.8 % (ref 11.6–15.1)
EXT PREG TEST URINE: NORMAL
GFR SERPL CREATININE-BSD FRML MDRD: 113 ML/MIN/1.73SQ M
GLUCOSE SERPL-MCNC: 204 MG/DL (ref 65–140)
GLUCOSE UR STRIP-MCNC: NEGATIVE MG/DL
HCT VFR BLD AUTO: 34.6 % (ref 34.8–46.1)
HGB BLD-MCNC: 11.5 G/DL (ref 11.5–15.4)
HGB UR QL STRIP.AUTO: ABNORMAL
KETONES UR STRIP-MCNC: ABNORMAL MG/DL
LEUKOCYTE ESTERASE UR QL STRIP: NEGATIVE
LIPASE SERPL-CCNC: 379 U/L (ref 73–393)
LYMPHOCYTES # BLD AUTO: 1.89 THOUSANDS/ΜL (ref 0.6–4.47)
LYMPHOCYTES NFR BLD AUTO: 32 % (ref 14–44)
MAGNESIUM SERPL-MCNC: 1.6 MG/DL (ref 1.6–2.6)
MCH RBC QN AUTO: 29.6 PG (ref 26.8–34.3)
MCHC RBC AUTO-ENTMCNC: 33.2 G/DL (ref 31.4–37.4)
MCV RBC AUTO: 89 FL (ref 82–98)
MONOCYTES # BLD AUTO: 0.61 THOUSAND/ΜL (ref 0.17–1.22)
MONOCYTES NFR BLD AUTO: 10 % (ref 4–12)
MUCOUS THREADS UR QL AUTO: ABNORMAL
NEUTROPHILS # BLD AUTO: 3.32 THOUSANDS/ΜL (ref 1.85–7.62)
NEUTS SEG NFR BLD AUTO: 56 % (ref 43–75)
NITRITE UR QL STRIP: NEGATIVE
NON-SQ EPI CELLS URNS QL MICRO: ABNORMAL /HPF
NRBC BLD AUTO-RTO: 0 /100 WBCS
PH UR STRIP.AUTO: 6 [PH] (ref 4.5–8)
PLATELET # BLD AUTO: 241 THOUSANDS/UL (ref 149–390)
PMV BLD AUTO: 10.5 FL (ref 8.9–12.7)
POTASSIUM SERPL-SCNC: 3.1 MMOL/L (ref 3.5–5.3)
PROT SERPL-MCNC: 7.5 G/DL (ref 6.4–8.2)
PROT UR STRIP-MCNC: ABNORMAL MG/DL
RBC # BLD AUTO: 3.88 MILLION/UL (ref 3.81–5.12)
RBC #/AREA URNS AUTO: ABNORMAL /HPF
SODIUM SERPL-SCNC: 138 MMOL/L (ref 136–145)
SP GR UR STRIP.AUTO: >=1.03 (ref 1–1.03)
TROPONIN I SERPL-MCNC: <0.02 NG/ML
TSH SERPL DL<=0.05 MIU/L-ACNC: 2.4 UIU/ML (ref 0.36–3.74)
UROBILINOGEN UR QL STRIP.AUTO: 1 E.U./DL
WBC # BLD AUTO: 5.95 THOUSAND/UL (ref 4.31–10.16)
WBC #/AREA URNS AUTO: ABNORMAL /HPF

## 2018-07-14 PROCEDURE — 93005 ELECTROCARDIOGRAM TRACING: CPT

## 2018-07-14 PROCEDURE — 80076 HEPATIC FUNCTION PANEL: CPT | Performed by: EMERGENCY MEDICINE

## 2018-07-14 PROCEDURE — 84443 ASSAY THYROID STIM HORMONE: CPT | Performed by: EMERGENCY MEDICINE

## 2018-07-14 PROCEDURE — 81025 URINE PREGNANCY TEST: CPT | Performed by: EMERGENCY MEDICINE

## 2018-07-14 PROCEDURE — 80048 BASIC METABOLIC PNL TOTAL CA: CPT | Performed by: EMERGENCY MEDICINE

## 2018-07-14 PROCEDURE — 74177 CT ABD & PELVIS W/CONTRAST: CPT

## 2018-07-14 PROCEDURE — 84484 ASSAY OF TROPONIN QUANT: CPT | Performed by: EMERGENCY MEDICINE

## 2018-07-14 PROCEDURE — 83690 ASSAY OF LIPASE: CPT | Performed by: EMERGENCY MEDICINE

## 2018-07-14 PROCEDURE — 96360 HYDRATION IV INFUSION INIT: CPT

## 2018-07-14 PROCEDURE — 85025 COMPLETE CBC W/AUTO DIFF WBC: CPT | Performed by: EMERGENCY MEDICINE

## 2018-07-14 PROCEDURE — 99284 EMERGENCY DEPT VISIT MOD MDM: CPT

## 2018-07-14 PROCEDURE — 83735 ASSAY OF MAGNESIUM: CPT | Performed by: EMERGENCY MEDICINE

## 2018-07-14 PROCEDURE — 81001 URINALYSIS AUTO W/SCOPE: CPT

## 2018-07-14 PROCEDURE — 36415 COLL VENOUS BLD VENIPUNCTURE: CPT | Performed by: EMERGENCY MEDICINE

## 2018-07-14 RX ORDER — POTASSIUM CHLORIDE 20 MEQ/1
20 TABLET, EXTENDED RELEASE ORAL ONCE
Status: COMPLETED | OUTPATIENT
Start: 2018-07-14 | End: 2018-07-14

## 2018-07-14 RX ORDER — POTASSIUM CHLORIDE 20 MEQ/1
20 TABLET, EXTENDED RELEASE ORAL 2 TIMES DAILY
Qty: 10 TABLET | Refills: 0 | Status: SHIPPED | OUTPATIENT
Start: 2018-07-14 | End: 2018-08-07

## 2018-07-14 RX ORDER — ACETAMINOPHEN 325 MG/1
650 TABLET ORAL ONCE
Status: COMPLETED | OUTPATIENT
Start: 2018-07-14 | End: 2018-07-14

## 2018-07-14 RX ADMIN — SODIUM CHLORIDE 1000 ML: 0.9 INJECTION, SOLUTION INTRAVENOUS at 14:25

## 2018-07-14 RX ADMIN — IOHEXOL 50 ML: 240 INJECTION, SOLUTION INTRATHECAL; INTRAVASCULAR; INTRAVENOUS; ORAL at 15:10

## 2018-07-14 RX ADMIN — IOHEXOL 100 ML: 350 INJECTION, SOLUTION INTRAVENOUS at 15:10

## 2018-07-14 RX ADMIN — POTASSIUM CHLORIDE 20 MEQ: 1500 TABLET, EXTENDED RELEASE ORAL at 16:14

## 2018-07-14 RX ADMIN — ACETAMINOPHEN 650 MG: 325 TABLET, FILM COATED ORAL at 16:14

## 2018-07-14 NOTE — DISCHARGE INSTRUCTIONS
Llame a ross cirujano los lunes para informarles que usted estaba en la patrice de emergencias y que tiene dolor abdominal     Debe hacer un seguimiento en la oficina para mayor evaluación y administración  [employer] veces al día viv los próximos 5 días  Asegúrese de beber muchos líquidos para evitar la deshidratación  Dolor abdominal, cuidados ambulatorios   INFORMACIÓN GENERAL:   El dolor abdominal  puede ser sordo, molesto o [place]  Puede sentir dolor en omari kain del abdomen o en todo el abdomen  El dolor puede deberse a ciertos estados antoine estreñimiento, sensibilidad o intoxicación alimentaria, infección o omari obstrucción  Asimismo, el dolor abdominal puede deberse a omari hernia, apendicitis o úlcera  La causa del dolor abdominal puede ser desconocida  Busque cuidados inmediatos para los siguientes síntomas:   · [place] dolor de pecho o falta de aliento    · Dolor pulsátil en el abdomen superior o en la parte inferior de la espalda que de repente es guera    · Dolor que se localiza en el abdomen inferior derecho y empeora con el movimiento    · Fiebre por encima de 100 4°F (38°C) o escalofríos amadeo    · Vómito de todo lo que usted come y cynthia    · Dolor que no mejora o más bob empeora viv las siguientes 8 a 12 horas    · Jose E en el vómito o heces que se stephen negras y alquitranadas    · La piel o el peter de los ojos se vuelven amarillentos    · Grandes cantidades de sangrado vaginal que no es ross periodo menstrual  El tratamiento para el dolor abdominal  puede llegar a incluir medicamentos para calmar ross estómago, prevenir el vómito o disminuir el dolor  Programe omari rosa con ross proveedor de [employer] se le haya indicado: Anote emily preguntas para que se acuerde de hacerlas viv emily visitas  ACUERDOS SOBRE ROSS CUIDADO:   [place] tiene el derecho de participar en la planificación de ross cuidado  Aprenda todo lo que pueda sobre ross condición y antoine darle tratamiento   [address] con emily médicos emily opciones de tratamiento para juntos decidir el cuidado que usted quiere recibir  Usted siempre tiene el derecho a rechazar stephen tratamiento  Esta información es sólo para uso en educación  Stephen intención no es darle un consejo médico sobre enfermedades o tratamientos  Colsulte con stephen Kaylyn Sullivan farmacéutico antes de seguir cualquier régimen médico para saber si es seguro y efectivo para usted  © 2014 [address] is for End User's use only and may not be sold, redistributed or otherwise used for commercial purposes  All illustrations and images included in CareNotes® are the copyrighted property of [employer]  or Benny Patton

## 2018-07-14 NOTE — ED PROVIDER NOTES
History  Chief Complaint   Patient presents with    Surgical Problem Re-Evaluation     Reports had bariatric surgery and reports incisional pain   Syncope     Reports near syncope for the past three days  States becomes dizzy and has to lay down   Abdominal Pain     Reports RUQ pain, with intermittent nausea  Denies vomiting  40 YO female presents with abdominal pain for the last 3 days  States this has been constant, feels like a pulsing in the Right mid and upper abdomen, constant, non-radiating, no known exacerbating or alleviating factors  Pt states this has been associated with nausea, no vomiting, denies similar pain in the past  She has felt warm but has not had a fever  Pt had a gastric sleeve 6/26, she has followed up with bariatrics for this, did require one readmission 2/2 pain and vomiting but states this did improve  She has felt lightheaded for the last 3 days,  states she looked like she may pass out yesterday  This has been constant, no known exacerbating or alleviating factors  Pt denies CP/SOB/F/C/V/D/C, no dysuria, burning on urination or blood in urine  History provided by:  Patient and spouse   used:  Yes    Abdominal Pain   Pain location:  RUQ  Pain quality comment:  Pulsing  Pain radiates to:  Does not radiate  Pain severity:  Moderate  Onset quality:  Gradual  Duration:  3 days  Timing:  Constant  Progression:  Unchanged  Chronicity:  New  Relieved by:  Nothing  Worsened by:  Nothing  Ineffective treatments:  None tried  Associated symptoms: nausea    Associated symptoms: no chest pain, no chills, no constipation, no diarrhea, no dysuria, no fatigue, no fever, no shortness of breath and no vomiting    Nausea:     Severity:  Moderate    Onset quality:  Gradual    Duration:  3 days    Timing:  Intermittent    Progression:  Waxing and waning  Dizziness   Quality:  Lightheadedness  Severity:  Moderate  Onset quality:  Gradual  Duration:  3 days  Timing:  Constant  Progression:  Unchanged  Chronicity:  New  Relieved by:  Nothing  Worsened by:  Nothing  Ineffective treatments:  None tried  Associated symptoms: nausea    Associated symptoms: no chest pain, no diarrhea, no shortness of breath, no vomiting and no weakness        Prior to Admission Medications   Prescriptions Last Dose Informant Patient Reported? Taking?    D-3-5 5000 units capsule   Yes Yes   FLUoxetine (PROzac) 20 mg capsule   Yes Yes   Sig: Take 20 mg by mouth 2 (two) times a day     TOUJEO SOLOSTAR 300 units/mL CONCETRATED U-300 injection pen   Yes Yes   Sig: Inject 30 Units under the skin daily at bedtime   busPIRone (BUSPAR) 15 mg tablet   Yes Yes   Sig: Take 15 mg by mouth 2 (two) times a day     butalbital-acetaminophen-caffeine (FIORICET,ESGIC) -40 mg per tablet   No Yes   Sig: Take 1 tablet by mouth every 4 (four) hours as needed for headaches   dicyclomine (BENTYL) 20 mg tablet   No Yes   Sig: Take 1 tablet (20 mg total) by mouth every 6 (six) hours as needed (pain)   enoxaparin (LOVENOX) 40 mg/0 4 mL   No No   Sig: Inject 0 4 mL (40 mg total) under the skin daily for 13 days   gabapentin (NEURONTIN) 800 mg tablet   No Yes   Sig: Take 1 tablet (800 mg total) by mouth 3 (three) times a day   omeprazole (PriLOSEC) 20 mg delayed release capsule   No Yes   Sig: Take 1 capsule (20 mg total) by mouth daily for 90 days   Patient taking differently: Take 20 mg by mouth 2 (two) times a day     ondansetron (ZOFRAN-ODT) 4 mg disintegrating tablet   No Yes   Sig: Take 1 tablet (4 mg total) by mouth every 8 (eight) hours as needed for nausea or vomiting   simvastatin (ZOCOR) 40 mg tablet   Yes Yes   Si mg daily at bedtime     tiZANidine (ZANAFLEX) 2 mg tablet   No Yes   Sig: Take 1-2 tablets every 8 hours as needed for muscle spasm   traZODone (DESYREL) 100 mg tablet   No Yes   Sig: Take 1 tablet by mouth daily at bedtime      Facility-Administered Medications: None       Past Medical History:   Diagnosis Date    Abdominal pain     Abnormal cells of cervix     Abnormal EKG     Acute headache     Amenorrhea     Anxiety     Back pain     Chronic low back pain     Chronic pain syndrome     Constipation     Contact dermatitis     Depression     Dermoid cyst of right lower extremity     Diabetes mellitus (HCC)     Drug-induced hepatic toxicity     Elevated liver function tests     Gastritis     GERD (gastroesophageal reflux disease)     Hypercholesterolemia     Hyperlipidemia     Left hip pain     Liver disease     drug induced hepatic toxicity    Liver function study, abnormal     Lumbar degenerative disc disease     Lumbar radiculopathy     Morbid obesity (HCC)     Neuropathy     Normal vaginal delivery     Tendinitis of left ankle     Vitamin D deficiency     Wears glasses        Past Surgical History:   Procedure Laterality Date    ABDOMINAL SURGERY       SECTION      COLONOSCOPY N/A 2018    Procedure: COLONOSCOPY;  Surgeon: Jazz Feng MD;  Location: [facility]; Service: Gastroenterology    ESOPHAGOGASTRODUODENOSCOPY N/A 2018    Procedure: ESOPHAGOGASTRODUODENOSCOPY (EGD) with BIOPSY;  Surgeon: Gina Cerrato MD;  Location: [facility];   Service: Bariatrics    LIVER BIOPSY      [place] LAP, APOLINAR RESTRICT PROC, LONGITUDINAL GASTRECTOMY N/A 2018    Procedure: GASTRECTOMY SLEEVE LAPAROSCOPIC AND INTRAOPERATIVE EGD;  Surgeon: Graham Jordan MD;  Location: [facility];  Service: [place] TUBAL LIGATION      2017 Patient reports she had a tubal ligation 12 yrs ago in [place] but doesn't know the extent of the tubal   This is the first time she mentioned this procedure, added to surgical list   nw       Family History   Problem Relation Age of Onset    Diabetes Mother     Hyperlipidemia Mother     Pancreatitis Father     Thyroid disease Paternal Aunt     Lung cancer Maternal Grandfather     Heart disease Neg Hx     Stroke Neg Hx      I have reviewed and agree with the history as documented  Social History   Substance Use Topics    Smoking status: Never Smoker    Smokeless tobacco: Never Used      Comment: Onset Date:  1/23/18    Alcohol use Yes      Comment: "occas", social        Review of Systems   Constitutional: Negative for chills, fatigue and fever  HENT: Negative for dental problem  Eyes: Negative for visual disturbance  Respiratory: Negative for shortness of breath  Cardiovascular: Negative for chest pain  Gastrointestinal: Positive for abdominal pain and nausea  Negative for constipation, diarrhea and vomiting  Genitourinary: Negative for dysuria and frequency  Musculoskeletal: Negative for arthralgias  Skin: Negative for rash  Neurological: Positive for dizziness  Negative for weakness and light-headedness  Psychiatric/Behavioral: Negative for agitation, behavioral problems and confusion  All other systems reviewed and are negative  Physical Exam  Physical Exam   Constitutional: She is oriented to person, place, and time  She appears well-developed and well-nourished  HENT:   Head: Normocephalic and atraumatic  Eyes: EOM are normal    Neck: Normal range of motion  Cardiovascular: Normal rate, regular rhythm and normal heart sounds  Pulmonary/Chest: Effort normal and breath sounds normal    Abdominal: Soft  Musculoskeletal: Normal range of motion  Neurological: She is alert and oriented to person, place, and time  Skin: Skin is warm and dry  Psychiatric: She has a normal mood and affect  Her behavior is normal  Thought content normal    Nursing note and vitals reviewed        Vital Signs  ED Triage Vitals   Temperature Pulse Respirations Blood Pressure SpO2   07/14/18 1332 07/14/18 1332 07/14/18 1332 07/14/18 1332 07/14/18 1332   98 5 °F (36 9 °C) 79 16 110/64 98 %      Temp Source Heart Rate Source Patient Position - Orthostatic VS BP Location FiO2 (%)   07/14/18 1332 07/14/18 1332 07/14/18 1332 07/14/18 1332 --   Temporal Monitor Sitting Right arm       Pain Score       07/14/18 1614       8           Vitals:    07/14/18 1332 07/14/18 1531 07/14/18 1645   BP: 110/64 118/58 131/72   Pulse: 79 78 75   Patient Position - Orthostatic VS: Sitting Lying Sitting       Visual Acuity      ED Medications  Medications   sodium chloride 0 9 % bolus 1,000 mL (0 mL Intravenous Stopped 7/14/18 1531)   iohexol (OMNIPAQUE) 350 MG/ML injection (SINGLE-DOSE) 100 mL (100 mL Intravenous Given 7/14/18 1510)   iohexol (OMNIPAQUE) 240 MG/ML solution 50 mL (50 mL Oral Given 7/14/18 1510)   acetaminophen (TYLENOL) tablet 650 mg (650 mg Oral Given 7/14/18 1614)   potassium chloride (K-DUR,KLOR-CON) CR tablet 20 mEq (20 mEq Oral Given 7/14/18 1614)       Diagnostic Studies  Results Reviewed     Procedure Component Value Units Date/Time    Hepatic function panel [[id number]]  (Normal) Collected:  07/14/18 1425    Lab Status:  Final result Specimen:  Blood from Arm, Left Updated:  07/14/18 1504     Total Bilirubin 0 37 mg/dL      Bilirubin, Direct 0 11 mg/dL      Alkaline Phosphatase 57 U/L      AST 36 U/L      ALT 38 U/L      Total Protein 7 5 g/dL      Albumin 3 5 g/dL     TSH [[id number]]  (Normal) Collected:  07/14/18 1425    Lab Status:  Final result Specimen:  Blood from Arm, Left Updated:  07/14/18 1504     TSH 3RD GENERATON 2 396 uIU/mL     Narrative:         Patients undergoing fluorescein dye angiography may retain small amounts of fluorescein in the body for 48-72 hours post procedure  Samples containing fluorescein can produce falsely depressed TSH values  If the patient had this procedure,a specimen should be resubmitted post fluorescein clearance            The recommended reference ranges for TSH during pregnancy are as follows:  First trimester 0 1 to 2 5 uIU/mL  Second trimester  0 2 to 3 0 uIU/mL  Third trimester 0 3 to 3 0 uIU/m      Magnesium [[id number]]  (Normal) Collected: 07/14/18 1425    Lab Status:  Final result Specimen:  Blood from Arm, Left Updated:  07/14/18 1504     Magnesium 1 6 mg/dL     Lipase [[id number]]  (Normal) Collected:  07/14/18 1425    Lab Status:  Final result Specimen:  Blood from Arm, Left Updated:  07/14/18 1504     Lipase 379 u/L     Basic metabolic panel [[id number]]  (Abnormal) Collected:  07/14/18 1425    Lab Status:  Final result Specimen:  Blood from Arm, Left Updated:  07/14/18 1459     Sodium 138 mmol/L      Potassium 3 1 (L) mmol/L      Chloride 103 mmol/L      CO2 30 mmol/L      Anion Gap 5 mmol/L      BUN 11 mg/dL      Creatinine 0 68 mg/dL      Glucose 204 (H) mg/dL      Calcium 10 1 mg/dL      eGFR 113 ml/min/1 73sq m     Narrative:         National Kidney Disease Education Program recommendations are as follows:  GFR calculation is accurate only with a steady state creatinine  Chronic Kidney disease less than 60 ml/min/1 73 sq  meters  Kidney failure less than 15 ml/min/1 73 sq  meters      Troponin I [[id number]]  (Normal) Collected:  07/14/18 1425    Lab Status:  Final result Specimen:  Blood from Arm, Left Updated:  07/14/18 1456     Troponin I <0 02 ng/mL     CBC and differential [[id number]]  (Abnormal) Collected:  07/14/18 1425    Lab Status:  Final result Specimen:  Blood from Arm, Left Updated:  07/14/18 1435     WBC 5 95 Thousand/uL      RBC 3 88 Million/uL      Hemoglobin 11 5 g/dL      Hematocrit 34 6 (L) %      MCV 89 fL      MCH 29 6 pg      MCHC 33 2 g/dL      RDW 14 8 %      MPV 10 5 fL      Platelets 134 Thousands/uL      nRBC 0 /100 WBCs      Neutrophils Relative 56 %      Lymphocytes Relative 32 %      Monocytes Relative 10 %      Eosinophils Relative 2 %      Basophils Relative 0 %      Neutrophils Absolute 3 32 Thousands/µL      Lymphocytes Absolute 1 89 Thousands/µL      Monocytes Absolute 0 61 Thousand/µL      Eosinophils Absolute 0 11 Thousand/µL      Basophils Absolute 0 02 Thousands/µL     Urine Microscopic [[id number]]  (Abnormal) Collected:  07/14/18 1359    Lab Status:  Final result Specimen:  Urine from Urine, Clean Catch Updated:  07/14/18 1413     RBC, UA None Seen /hpf      WBC, UA None Seen /hpf      Epithelial Cells Occasional /hpf      Bacteria, UA Innumerable (A) /hpf      MUCOUS THREADS Moderate (A)    POCT urinalysis dipstick [[id number]]  (Abnormal) Resulted:  07/14/18 1356    Lab Status:  Final result Specimen:  Urine from Urine, Other Updated:  07/14/18 1356    POCT pregnancy, urine [[id number]]  (Normal) Resulted:  07/14/18 1356    Lab Status:  Final result Updated:  07/14/18 1356     EXT PREG TEST UR (Ref: Negative) NEGATIVE (-)    ED Urine Macroscopic [[id number]]  (Abnormal) Collected:  07/14/18 1359    Lab Status:  Final result Specimen:  Urine Updated:  07/14/18 1353     Color, UA Yellow     Clarity, UA Cloudy     pH, UA 6 0     Leukocytes, UA Negative     Nitrite, UA Negative     Protein,  (2+) (A) mg/dl      Glucose, UA Negative mg/dl      Ketones, UA >=160 (4+) (A) mg/dl      Urobilinogen, UA 1 0 E U /dl      Bilirubin, UA Interference- unable to analyze (A)     Blood, UA Trace (A)     Specific Gravity, UA >=1 030    Narrative:       CLINITEK RESULT                 CT abdomen pelvis with contrast   Final Result by Clovis Belcher MD (07/14 2420)      Postsurgical changes of gastric sleeve procedure    Small amount of ascites medial to the spleen superior to the left kidney possibly postsurgical decreased in size from the prior exam             Workstation performed: [id number]                    Procedures  ECG 12 Lead Documentation  Date/Time: 7/14/2018 2:30 PM  Performed by: Karmen Weeks, [address] [place] [address] by: Sera MARTINEZ     ECG reviewed by me, the ED Provider: yes    Patient location:  ED  Previous ECG:     Previous ECG:  Compared to current    Comparison ECG info:  6/30    Similarity:  No change  Interpretation:     Interpretation: normal    Rate:     ECG rate:  73    ECG rate assessment: normal    Rhythm:     Rhythm: sinus rhythm    QRS:     QRS axis:  Normal    QRS intervals:  Normal  Conduction:     Conduction: normal    ST segments:     ST segments:  Normal  T waves:     T waves: inverted      Inverted:  III           Phone Contacts  ED Phone Contact    ED Course  ED Course as of Jul 14 2326   Sat Jul 14, 2018   1441 9 5 12 days ago, the to 10 5 11 days ago  Hemoglobin: 11 5                               MDM  Number of Diagnoses or Management Options  Abdominal pain: new and requires workup  Hypokalemia: new and requires workup  Diagnosis management comments: 1  Abdominal pain - Pt S/P gastric sleeve, now with Right abdominal pain, will check electrolytes, CBC for inflammatory markers, urine for infection, LFT's, lipase  Will CT  2  Lightheaded - Urine is concentrated, possible dehydration, electrolytes to determine kidney function/hydration status, CBC for anemia, ECG and troponin for ACS  Amount and/or Complexity of Data Reviewed  Clinical lab tests: ordered and reviewed  Tests in the radiology section of CPT®: ordered and reviewed  Review and summarize past medical records: yes  Independent visualization of images, tracings, or specimens: yes    Patient Progress  Patient progress: stable    CritCare Time    Disposition  Final diagnoses:   Abdominal pain   Hypokalemia     Time reflects when diagnosis was documented in both MDM as applicable and the Disposition within this note     Time User Action Codes Description Comment    7/14/2018  4:35 PM Larri Rolling E Add [R10 9] Abdominal pain     7/14/2018  4:36 PM Larri Rolling E Add [E87 6] Hypokalemia       ED Disposition     ED Disposition Condition Comment    Discharge  [facility] discharge to home/self care      Condition at discharge: Stable        Follow-up Information     Follow up With Specialties Details Why Gina Ortega MD General Surgery, Bariatrics Schedule an appointment as soon as possible for a visit  [address] [facility] [place] [address]  [phone]            Discharge Medication List as of 7/14/2018  4:39 PM      START taking these medications    Details   potassium chloride (K-DUR,KLOR-CON) 20 mEq tablet Take 1 tablet (20 mEq total) by mouth 2 (two) times a day for 5 days, Starting Sat 7/14/2018, Until Thu 7/19/2018, Print         CONTINUE these medications which have NOT CHANGED    Details   busPIRone (BUSPAR) 15 mg tablet Take 15 mg by mouth 2 (two) times a day  , Starting Mon 4/16/2018, Historical Med      butalbital-acetaminophen-caffeine (FIORICET,ESGIC) -40 mg per tablet Take 1 tablet by mouth every 4 (four) hours as needed for headaches, Starting Fri 3/23/2018, Print      D-3-5 5000 units capsule Starting Tue 7/10/2018, Historical Med      dicyclomine (BENTYL) 20 mg tablet Take 1 tablet (20 mg total) by mouth every 6 (six) hours as needed (pain), Starting Tue 6/12/2018, Print      FLUoxetine (PROzac) 20 mg capsule Take 20 mg by mouth 2 (two) times a day  , Starting Mon 4/16/2018, Historical Med      gabapentin (NEURONTIN) 800 mg tablet Take 1 tablet (800 mg total) by mouth 3 (three) times a day, Starting Wed 7/11/2018, Normal      omeprazole (PriLOSEC) 20 mg delayed release capsule Take 1 capsule (20 mg total) by mouth daily for 90 days, Starting Thu 6/14/2018, Until Wed 9/12/2018, Normal      ondansetron (ZOFRAN-ODT) 4 mg disintegrating tablet Take 1 tablet (4 mg total) by mouth every 8 (eight) hours as needed for nausea or vomiting, Starting Tue 6/12/2018, Print      simvastatin (ZOCOR) 40 mg tablet 40 mg daily at bedtime  , Starting Mon 5/7/2018, Historical Med      tiZANidine (ZANAFLEX) 2 mg tablet Take 1-2 tablets every 8 hours as needed for muscle spasm, Normal      TOUJEO SOLOSTAR 300 units/mL CONCETRATED U-300 injection pen Inject 30 Units under the skin daily at bedtime, Starting Mon 6/18/2018, Historical Med      traZODone (DESYREL) 100 mg tablet Take 1 tablet by mouth daily at bedtime, Starting 2/4/2017, Until Discontinued, No Print      enoxaparin (LOVENOX) 40 mg/0 4 mL Inject 0 4 mL (40 mg total) under the skin daily for 13 days, Starting Thu 6/21/2018, Until Wed 7/4/2018, Normal           No discharge procedures on file      ED Provider  Electronically Signed by           Eva Garner MD  07/14/18 6729

## 2018-07-14 NOTE — ED NOTES
Pt c/o lower abdominal pain  Pt has 6 incisions that are well-approximated and open to air       Norberto Hamlin RN  07/14/18 7928

## 2018-07-17 LAB
ATRIAL RATE: 73 BPM
P AXIS: 29 DEGREES
PR INTERVAL: 128 MS
QRS AXIS: 35 DEGREES
QRSD INTERVAL: 84 MS
QT INTERVAL: 406 MS
QTC INTERVAL: 447 MS
T WAVE AXIS: 2 DEGREES
VENTRICULAR RATE: 73 BPM

## 2018-07-17 PROCEDURE — 93010 ELECTROCARDIOGRAM REPORT: CPT | Performed by: INTERNAL MEDICINE

## 2018-07-19 ENCOUNTER — APPOINTMENT (EMERGENCY)
Dept: CT IMAGING | Facility: HOSPITAL | Age: 38
End: 2018-07-19
Payer: COMMERCIAL

## 2018-07-19 ENCOUNTER — HOSPITAL ENCOUNTER (OUTPATIENT)
Facility: HOSPITAL | Age: 38
Setting detail: OBSERVATION
Discharge: HOME | End: 2018-07-21
Attending: SURGERY | Admitting: SURGERY
Payer: COMMERCIAL

## 2018-07-19 DIAGNOSIS — IMO0002 UNCONTROLLED TYPE 2 DIABETES WITH NEUROPATHY: ICD-10-CM

## 2018-07-19 DIAGNOSIS — R10.13 EPIGASTRIC PAIN: ICD-10-CM

## 2018-07-19 DIAGNOSIS — Z98.84 S/P LAPAROSCOPIC SLEEVE GASTRECTOMY: ICD-10-CM

## 2018-07-19 DIAGNOSIS — G89.4 CHRONIC PAIN DISORDER: ICD-10-CM

## 2018-07-19 DIAGNOSIS — R10.84 ABDOMINAL PAIN, DIFFUSE: ICD-10-CM

## 2018-07-19 DIAGNOSIS — R10.11 RIGHT UPPER QUADRANT PAIN: Primary | ICD-10-CM

## 2018-07-19 DIAGNOSIS — R74.8 ELEVATED LIPASE: ICD-10-CM

## 2018-07-19 PROBLEM — E11.9 DIABETES MELLITUS WITHOUT COMPLICATION (HCC): Status: RESOLVED | Noted: 2018-05-30 | Resolved: 2018-07-19

## 2018-07-19 LAB
ALBUMIN SERPL BCP-MCNC: 3.4 G/DL (ref 3.5–5)
ALP SERPL-CCNC: 57 U/L (ref 46–116)
ALT SERPL W P-5'-P-CCNC: 36 U/L (ref 12–78)
ANION GAP SERPL CALCULATED.3IONS-SCNC: 2 MMOL/L (ref 4–13)
AST SERPL W P-5'-P-CCNC: 33 U/L (ref 5–45)
BACTERIA UR QL AUTO: ABNORMAL /HPF
BASOPHILS # BLD AUTO: 0.01 THOUSANDS/ΜL (ref 0–0.1)
BASOPHILS NFR BLD AUTO: 0 % (ref 0–1)
BILIRUB SERPL-MCNC: 0.19 MG/DL (ref 0.2–1)
BILIRUB UR QL STRIP: ABNORMAL
BUN SERPL-MCNC: 13 MG/DL (ref 5–25)
CALCIUM SERPL-MCNC: 9.1 MG/DL (ref 8.3–10.1)
CHLORIDE SERPL-SCNC: 103 MMOL/L (ref 100–108)
CLARITY UR: CLEAR
CO2 SERPL-SCNC: 33 MMOL/L (ref 21–32)
COLOR UR: YELLOW
CREAT SERPL-MCNC: 0.68 MG/DL (ref 0.6–1.3)
EOSINOPHIL # BLD AUTO: 0.15 THOUSAND/ΜL (ref 0–0.61)
EOSINOPHIL NFR BLD AUTO: 2 % (ref 0–6)
ERYTHROCYTE [DISTWIDTH] IN BLOOD BY AUTOMATED COUNT: 14.2 % (ref 11.6–15.1)
EXT PREG TEST URINE: NEGATIVE
GFR SERPL CREATININE-BSD FRML MDRD: 113 ML/MIN/1.73SQ M
GLUCOSE SERPL-MCNC: 171 MG/DL (ref 65–140)
GLUCOSE UR STRIP-MCNC: ABNORMAL MG/DL
HCT VFR BLD AUTO: 33.2 % (ref 34.8–46.1)
HGB BLD-MCNC: 10.8 G/DL (ref 11.5–15.4)
HGB UR QL STRIP.AUTO: NEGATIVE
KETONES UR STRIP-MCNC: ABNORMAL MG/DL
LEUKOCYTE ESTERASE UR QL STRIP: NEGATIVE
LIPASE SERPL-CCNC: 587 U/L (ref 73–393)
LYMPHOCYTES # BLD AUTO: 2.78 THOUSANDS/ΜL (ref 0.6–4.47)
LYMPHOCYTES NFR BLD AUTO: 41 % (ref 14–44)
MCH RBC QN AUTO: 29.5 PG (ref 26.8–34.3)
MCHC RBC AUTO-ENTMCNC: 32.5 G/DL (ref 31.4–37.4)
MCV RBC AUTO: 91 FL (ref 82–98)
MONOCYTES # BLD AUTO: 0.71 THOUSAND/ΜL (ref 0.17–1.22)
MONOCYTES NFR BLD AUTO: 11 % (ref 4–12)
NEUTROPHILS # BLD AUTO: 3.13 THOUSANDS/ΜL (ref 1.85–7.62)
NEUTS SEG NFR BLD AUTO: 46 % (ref 43–75)
NITRITE UR QL STRIP: NEGATIVE
NON-SQ EPI CELLS URNS QL MICRO: ABNORMAL /HPF
NRBC BLD AUTO-RTO: 0 /100 WBCS
PH UR STRIP.AUTO: 7 [PH] (ref 4.5–8)
PLATELET # BLD AUTO: 220 THOUSANDS/UL (ref 149–390)
PMV BLD AUTO: 11.4 FL (ref 8.9–12.7)
POTASSIUM SERPL-SCNC: 3.9 MMOL/L (ref 3.5–5.3)
PROT SERPL-MCNC: 7 G/DL (ref 6.4–8.2)
PROT UR STRIP-MCNC: ABNORMAL MG/DL
RBC # BLD AUTO: 3.66 MILLION/UL (ref 3.81–5.12)
RBC #/AREA URNS AUTO: ABNORMAL /HPF
SODIUM SERPL-SCNC: 138 MMOL/L (ref 136–145)
SP GR UR STRIP.AUTO: 1.02 (ref 1–1.03)
UROBILINOGEN UR QL STRIP.AUTO: 2 E.U./DL
WBC # BLD AUTO: 6.78 THOUSAND/UL (ref 4.31–10.16)
WBC #/AREA URNS AUTO: ABNORMAL /HPF

## 2018-07-19 PROCEDURE — 85025 COMPLETE CBC W/AUTO DIFF WBC: CPT | Performed by: SURGERY

## 2018-07-19 PROCEDURE — 80053 COMPREHEN METABOLIC PANEL: CPT | Performed by: SURGERY

## 2018-07-19 PROCEDURE — 36415 COLL VENOUS BLD VENIPUNCTURE: CPT

## 2018-07-19 PROCEDURE — 81002 URINALYSIS NONAUTO W/O SCOPE: CPT | Performed by: SURGERY

## 2018-07-19 PROCEDURE — 83690 ASSAY OF LIPASE: CPT | Performed by: SURGERY

## 2018-07-19 PROCEDURE — 81001 URINALYSIS AUTO W/SCOPE: CPT

## 2018-07-19 PROCEDURE — 81025 URINE PREGNANCY TEST: CPT | Performed by: SURGERY

## 2018-07-19 PROCEDURE — 74177 CT ABD & PELVIS W/CONTRAST: CPT

## 2018-07-19 RX ORDER — ACETAMINOPHEN 325 MG/1
975 TABLET ORAL ONCE
Status: COMPLETED | OUTPATIENT
Start: 2018-07-19 | End: 2018-07-19

## 2018-07-19 RX ORDER — OXYCODONE HCL 5 MG/5 ML
5 SOLUTION, ORAL ORAL ONCE
Status: COMPLETED | OUTPATIENT
Start: 2018-07-19 | End: 2018-07-19

## 2018-07-19 RX ADMIN — OXYCODONE HYDROCHLORIDE 5 MG: 5 SOLUTION ORAL at 23:20

## 2018-07-19 RX ADMIN — SODIUM CHLORIDE 2000 ML: 0.9 INJECTION, SOLUTION INTRAVENOUS at 23:36

## 2018-07-19 RX ADMIN — IOHEXOL 100 ML: 350 INJECTION, SOLUTION INTRAVENOUS at 22:18

## 2018-07-19 RX ADMIN — IOHEXOL 25 ML: 240 INJECTION, SOLUTION INTRATHECAL; INTRAVASCULAR; INTRAVENOUS; ORAL at 22:18

## 2018-07-19 RX ADMIN — ACETAMINOPHEN 975 MG: 325 TABLET, FILM COATED ORAL at 22:03

## 2018-07-20 PROBLEM — K92.1 BLOOD IN STOOL: Status: RESOLVED | Noted: 2018-04-05 | Resolved: 2018-07-20

## 2018-07-20 PROBLEM — K59.00 CONSTIPATION: Status: ACTIVE | Noted: 2018-07-20

## 2018-07-20 PROCEDURE — 99024 POSTOP FOLLOW-UP VISIT: CPT | Performed by: SURGERY

## 2018-07-20 PROCEDURE — 99285 EMERGENCY DEPT VISIT HI MDM: CPT

## 2018-07-20 RX ORDER — ACETAMINOPHEN 160 MG/5ML
650 SUSPENSION, ORAL (FINAL DOSE FORM) ORAL ONCE
Status: COMPLETED | OUTPATIENT
Start: 2018-07-20 | End: 2018-07-20

## 2018-07-20 RX ORDER — BISACODYL 10 MG
10 SUPPOSITORY, RECTAL RECTAL ONCE
Status: COMPLETED | OUTPATIENT
Start: 2018-07-20 | End: 2018-07-20

## 2018-07-20 RX ORDER — ACETAMINOPHEN 160 MG/5ML
650 SUSPENSION, ORAL (FINAL DOSE FORM) ORAL EVERY 6 HOURS PRN
Status: DISCONTINUED | OUTPATIENT
Start: 2018-07-20 | End: 2018-07-21 | Stop reason: HOSPADM

## 2018-07-20 RX ORDER — MAGNESIUM CARB/ALUMINUM HYDROX 105-160MG
296 TABLET,CHEWABLE ORAL ONCE
Status: COMPLETED | OUTPATIENT
Start: 2018-07-20 | End: 2018-07-20

## 2018-07-20 RX ORDER — ONDANSETRON 2 MG/ML
4 INJECTION INTRAMUSCULAR; INTRAVENOUS ONCE AS NEEDED
Status: DISCONTINUED | OUTPATIENT
Start: 2018-07-20 | End: 2018-07-20

## 2018-07-20 RX ORDER — OXYCODONE HCL 5 MG/5 ML
5 SOLUTION, ORAL ORAL ONCE
Status: COMPLETED | OUTPATIENT
Start: 2018-07-20 | End: 2018-07-20

## 2018-07-20 RX ORDER — OXYCODONE HCL 5 MG/5 ML
5 SOLUTION, ORAL ORAL EVERY 6 HOURS PRN
Status: DISCONTINUED | OUTPATIENT
Start: 2018-07-20 | End: 2018-07-21 | Stop reason: HOSPADM

## 2018-07-20 RX ORDER — ONDANSETRON 2 MG/ML
4 INJECTION INTRAMUSCULAR; INTRAVENOUS EVERY 4 HOURS PRN
Status: DISCONTINUED | OUTPATIENT
Start: 2018-07-20 | End: 2018-07-21 | Stop reason: HOSPADM

## 2018-07-20 RX ORDER — SODIUM CHLORIDE, SODIUM LACTATE, POTASSIUM CHLORIDE, CALCIUM CHLORIDE 600; 310; 30; 20 MG/100ML; MG/100ML; MG/100ML; MG/100ML
125 INJECTION, SOLUTION INTRAVENOUS CONTINUOUS
Status: DISCONTINUED | OUTPATIENT
Start: 2018-07-20 | End: 2018-07-21

## 2018-07-20 RX ADMIN — MAGESIUM CITRATE 296 ML: 1.75 LIQUID ORAL at 09:29

## 2018-07-20 RX ADMIN — OXYCODONE HYDROCHLORIDE 5 MG: 5 SOLUTION ORAL at 12:25

## 2018-07-20 RX ADMIN — BISACODYL 10 MG: 10 SUPPOSITORY RECTAL at 09:25

## 2018-07-20 RX ADMIN — SODIUM CHLORIDE 2000 ML: 0.9 INJECTION, SOLUTION INTRAVENOUS at 16:04

## 2018-07-20 RX ADMIN — MAGESIUM CITRATE 296 ML: 1.75 LIQUID ORAL at 16:09

## 2018-07-20 RX ADMIN — ACETAMINOPHEN 650 MG: 160 SUSPENSION ORAL at 23:28

## 2018-07-20 RX ADMIN — ONDANSETRON 4 MG: 2 INJECTION INTRAMUSCULAR; INTRAVENOUS at 09:25

## 2018-07-20 RX ADMIN — OXYCODONE HYDROCHLORIDE 5 MG: 5 SOLUTION ORAL at 05:15

## 2018-07-20 RX ADMIN — OXYCODONE HYDROCHLORIDE 5 MG: 5 SOLUTION ORAL at 01:19

## 2018-07-20 RX ADMIN — SODIUM CHLORIDE, SODIUM LACTATE, POTASSIUM CHLORIDE, AND CALCIUM CHLORIDE 125 ML/HR: .6; .31; .03; .02 INJECTION, SOLUTION INTRAVENOUS at 05:15

## 2018-07-20 RX ADMIN — OXYCODONE HYDROCHLORIDE 5 MG: 5 SOLUTION ORAL at 19:50

## 2018-07-20 RX ADMIN — ONDANSETRON 4 MG: 2 INJECTION INTRAMUSCULAR; INTRAVENOUS at 16:06

## 2018-07-20 RX ADMIN — ACETAMINOPHEN 650 MG: 160 SUSPENSION ORAL at 05:15

## 2018-07-20 NOTE — PLAN OF CARE
DISCHARGE PLANNING     Discharge to home or other facility with appropriate resources Progressing        GASTROINTESTINAL - ADULT     Minimal or absence of nausea and/or vomiting Progressing     Maintains or returns to baseline bowel function Progressing     Maintains adequate nutritional intake Progressing        INFECTION - ADULT     Absence or prevention of progression during hospitalization Progressing     Absence of fever/infection during neutropenic period Progressing        Knowledge Deficit     Patient/family/caregiver demonstrates understanding of disease process, treatment plan, medications, and discharge instructions Progressing        PAIN - ADULT     Verbalizes/displays adequate comfort level or baseline comfort level Progressing        Potential for Falls     Patient will remain free of falls Progressing        SAFETY ADULT     Patient will remain free of falls Progressing     Maintain or return to baseline ADL function Progressing     Maintain or return mobility status to optimal level Progressing

## 2018-07-20 NOTE — H&P
Bariatric Surgery H&P    CC: abdominal pain    HPI:  Patient is status post laparoscopic sleeve gastrectomy on 2018 by Dr Carrie Goddard  Over the last week or so, she has been having recurrent and unresolving generalized crampy abdominal pain  Workup in the ER earlier this month was nondiagnostic  She re-presented to the ER yesterday evening  She was diagnosed with constipation after CT scan revealed no abnormality with her post gastrectomy anatomy  At the time of interview, the patient states that her pain is slightly improving  She is noticed a decrease in her stool frequency despite taking stool softener  She also admits to not drinking enough water  Interview conducted in  [facility] by the writer      ROS  Denies fever/chills  Denies lightheadedness  Denies visual changes  Denies dyspnea, cough  Denies chest pain  Denies nausea/vomiting  + decreased BM's  Denies masses/hernias  Denies parasthesia  Denies peripheral edema    Past Medical History:   Diagnosis Date    Abdominal pain     Abnormal cells of cervix     Abnormal EKG     Acute headache     Amenorrhea     Anxiety     Back pain     Chronic low back pain     Chronic pain syndrome     Constipation     Contact dermatitis     Depression     Dermoid cyst of right lower extremity     Diabetes mellitus (HCC)     Drug-induced hepatic toxicity     Elevated liver function tests     Gastritis     GERD (gastroesophageal reflux disease)     Hypercholesterolemia     Hyperlipidemia     Left hip pain     Liver disease     drug induced hepatic toxicity    Liver function study, abnormal     Lumbar degenerative disc disease     Lumbar radiculopathy     Morbid obesity (HCC)     Neuropathy     Normal vaginal delivery     Tendinitis of left ankle     Vitamin D deficiency     Wears glasses      Past Surgical History:   Procedure Laterality Date    ABDOMINAL SURGERY       SECTION      COLONOSCOPY N/A 2018    Procedure: COLONOSCOPY;  Surgeon: Shelby Devi MD;  Location: [facility]; Service: Gastroenterology    ESOPHAGOGASTRODUODENOSCOPY N/A 2/7/2018    Procedure: ESOPHAGOGASTRODUODENOSCOPY (EGD) with BIOPSY;  Surgeon: Tye Early MD;  Location: [facility]; Service: Bariatrics    LIVER BIOPSY      [place] LAP, APOLINAR RESTRICT PROC, LONGITUDINAL GASTRECTOMY N/A 6/26/2018    Procedure: GASTRECTOMY SLEEVE LAPAROSCOPIC AND INTRAOPERATIVE EGD;  Surgeon: Gemini Scherer MD;  Location: [facility];  Service: [address]      11/17/2017 Patient reports she had a tubal ligation 12 yrs ago in [place] but doesn't know the extent of the tubal   This is the first time she mentioned this procedure, added to surgical list   nw     No current facility-administered medications on file prior to encounter        Current Outpatient Prescriptions on File Prior to Encounter   Medication Sig Dispense Refill    busPIRone (BUSPAR) 15 mg tablet Take 15 mg by mouth 2 (two) times a day        butalbital-acetaminophen-caffeine (FIORICET,ESGIC) -40 mg per tablet Take 1 tablet by mouth every 4 (four) hours as needed for headaches 30 tablet 0    D-3-5 5000 units capsule       dicyclomine (BENTYL) 20 mg tablet Take 1 tablet (20 mg total) by mouth every 6 (six) hours as needed (pain) 10 tablet 0    FLUoxetine (PROzac) 20 mg capsule Take 20 mg by mouth 2 (two) times a day        gabapentin (NEURONTIN) 800 mg tablet Take 1 tablet (800 mg total) by mouth 3 (three) times a day 90 tablet 1    omeprazole (PriLOSEC) 20 mg delayed release capsule Take 1 capsule (20 mg total) by mouth daily for 90 days (Patient taking differently: Take 20 mg by mouth 2 (two) times a day  ) 90 capsule 3    ondansetron (ZOFRAN-ODT) 4 mg disintegrating tablet Take 1 tablet (4 mg total) by mouth every 8 (eight) hours as needed for nausea or vomiting 10 tablet 0    potassium chloride (K-DUR,KLOR-CON) 20 mEq tablet Take 1 tablet (20 mEq total) by mouth 2 (two) times a day for 5 days 10 tablet 0    simvastatin (ZOCOR) 40 mg tablet 40 mg daily at bedtime        tiZANidine (ZANAFLEX) 2 mg tablet Take 1-2 tablets every 8 hours as needed for muscle spasm 180 tablet 2    TOUJEO SOLOSTAR 300 units/mL CONCETRATED U-300 injection pen Inject 30 Units under the skin daily at bedtime      traZODone (DESYREL) 100 mg tablet Take 1 tablet by mouth daily at bedtime 30 tablet 0     Labs  Lipase 587 (unremarkable in setting of recent sleeve gastrectomy and dehydration  CMP, CBC otherwise unremarkable    Imaging  CT abdomen/pelvis without evidence for leak, bleed, or obstruction  Noted stool throughout the colon      Assessment  38F with abdominal pain secondary to dehydration after sleeve gastrectomy    Plan  - IVF  - mag citrate  - suppository/enema  - full liquid diet  - encourage ambulation

## 2018-07-20 NOTE — ED PROVIDER NOTES
History  Chief Complaint   Patient presents with    Abdominal Pain     RLQ pain increasing throughout the day  denies nausea and vomiting  diarrhea reported  tylenol in am no help  History provided by:  Patient   used: [id number]  Abdominal Pain   Pain location:  Generalized (With focus in the right upper quadrant )  Pain quality: aching, cramping and dull    Pain quality: not sharp, not shooting and not stabbing    Pain radiates to:  Does not radiate  Pain severity:  Moderate  Onset quality:  Gradual  Timing:  Constant  Progression:  Unchanged  Context: previous surgery    Context: not retching    Relieved by:  Nothing  Worsened by:  Nothing  Ineffective treatments:  None tried  Associated symptoms: diarrhea    Associated symptoms: no anorexia, no chest pain, no chills, no cough, no fever and no vaginal bleeding    Risk factors: obesity and recent hospitalization        Prior to Admission Medications   Prescriptions Last Dose Informant Patient Reported? Taking?    D-3-5 5000 units capsule   Yes Yes   FLUoxetine (PROzac) 20 mg capsule   Yes Yes   Sig: Take 20 mg by mouth 2 (two) times a day     TOUJEO SOLOSTAR 300 units/mL CONCETRATED U-300 injection pen   Yes Yes   Sig: Inject 30 Units under the skin daily at bedtime   busPIRone (BUSPAR) 15 mg tablet   Yes Yes   Sig: Take 15 mg by mouth 2 (two) times a day     butalbital-acetaminophen-caffeine (FIORICET,ESGIC) -40 mg per tablet   No Yes   Sig: Take 1 tablet by mouth every 4 (four) hours as needed for headaches   dicyclomine (BENTYL) 20 mg tablet   No Yes   Sig: Take 1 tablet (20 mg total) by mouth every 6 (six) hours as needed (pain)   gabapentin (NEURONTIN) 800 mg tablet   No Yes   Sig: Take 1 tablet (800 mg total) by mouth 3 (three) times a day   omeprazole (PriLOSEC) 20 mg delayed release capsule   No Yes   Sig: Take 1 capsule (20 mg total) by mouth daily for 90 days   Patient taking differently: Take 20 mg by mouth 2 (two) times a day     ondansetron (ZOFRAN-ODT) 4 mg disintegrating tablet   No Yes   Sig: Take 1 tablet (4 mg total) by mouth every 8 (eight) hours as needed for nausea or vomiting   potassium chloride (K-DUR,KLOR-CON) 20 mEq tablet   No Yes   Sig: Take 1 tablet (20 mEq total) by mouth 2 (two) times a day for 5 days   simvastatin (ZOCOR) 40 mg tablet   Yes Yes   Si mg daily at bedtime     tiZANidine (ZANAFLEX) 2 mg tablet   No Yes   Sig: Take 1-2 tablets every 8 hours as needed for muscle spasm   traZODone (DESYREL) 100 mg tablet   No Yes   Sig: Take 1 tablet by mouth daily at bedtime      Facility-Administered Medications: None       Past Medical History:   Diagnosis Date    Abdominal pain     Abnormal cells of cervix     Abnormal EKG     Acute headache     Amenorrhea     Anxiety     Back pain     Chronic low back pain     Chronic pain syndrome     Constipation     Contact dermatitis     Depression     Dermoid cyst of right lower extremity     Diabetes mellitus (HCC)     Drug-induced hepatic toxicity     Elevated liver function tests     Gastritis     GERD (gastroesophageal reflux disease)     Hypercholesterolemia     Hyperlipidemia     Left hip pain     Liver disease     drug induced hepatic toxicity    Liver function study, abnormal     Lumbar degenerative disc disease     Lumbar radiculopathy     Morbid obesity (HCC)     Neuropathy     Normal vaginal delivery     Tendinitis of left ankle     Vitamin D deficiency     Wears glasses        Past Surgical History:   Procedure Laterality Date    ABDOMINAL SURGERY       SECTION      COLONOSCOPY N/A 2018    Procedure: COLONOSCOPY;  Surgeon: Jonas Nickerson MD;  Location: [facility]; Service: Gastroenterology    ESOPHAGOGASTRODUODENOSCOPY N/A 2018    Procedure: ESOPHAGOGASTRODUODENOSCOPY (EGD) with BIOPSY;  Surgeon: Alec Vazquez MD;  Location: [facility];   Service: Bariatrics    LIVER BIOPSY      ND LAP, APOLINAR RESTRICT PROC, LONGITUDINAL GASTRECTOMY N/A 6/26/2018    Procedure: GASTRECTOMY SLEEVE LAPAROSCOPIC AND INTRAOPERATIVE EGD;  Surgeon: Jose Rose MD;  Location: [facility];  Service: [address] [place] [address]      11/17/2017 Patient reports she had a tubal ligation 12 yrs ago in [address] but doesn't know the extent of the tubal   This is the first time she mentioned this procedure, added to surgical list   nw       Family History   Problem Relation Age of Onset    Diabetes Mother     Hyperlipidemia Mother     Pancreatitis Father     Thyroid disease Paternal Aunt     Lung cancer Maternal Grandfather     Heart disease Neg Hx     Stroke Neg Hx      I have reviewed and agree with the history as documented  Social History   Substance Use Topics    Smoking status: Never Smoker    Smokeless tobacco: Never Used      Comment: Onset Date:  1/23/18    Alcohol use Yes      Comment: "occas", social        Review of Systems   Constitutional: Negative for chills and fever  HENT: Negative for dental problem  Eyes: Negative for pain  Respiratory: Negative for cough  Cardiovascular: Negative for chest pain and leg swelling  Gastrointestinal: Positive for abdominal pain and diarrhea  Negative for anorexia  Endocrine: Negative for polydipsia  Genitourinary: Negative for difficulty urinating, flank pain, pelvic pain and vaginal bleeding  Musculoskeletal: Negative for joint swelling and neck stiffness  Skin: Negative for rash  Allergic/Immunologic: Negative for food allergies  Neurological: Negative for headaches  Hematological: Does not bruise/bleed easily  Psychiatric/Behavioral: Negative for behavioral problems  Physical Exam  Physical Exam   Constitutional: She appears well-developed and well-nourished  No distress  HENT:   Head: Normocephalic and atraumatic  Eyes: Conjunctivae are normal    Neck: Neck supple  No JVD present     Cardiovascular: Normal rate and regular rhythm  Pulmonary/Chest: Effort normal  No respiratory distress  Abdominal: Soft  She exhibits no distension  There is tenderness  There is no CVA tenderness  Genitourinary:   Genitourinary Comments: No complaints deferred  Musculoskeletal: She exhibits no edema  Neurological: She is alert  Skin: Skin is warm  Capillary refill takes less than 2 seconds  She is not diaphoretic  Psychiatric: She has a normal mood and affect         Vital Signs  ED Triage Vitals   Temperature Pulse Respirations Blood Pressure SpO2   07/19/18 2011 07/19/18 2011 07/19/18 2011 07/19/18 2011 07/19/18 2011   98 2 °F (36 8 °C) 74 16 132/58 99 %      Temp src Heart Rate Source Patient Position - Orthostatic VS BP Location FiO2 (%)   -- 07/19/18 2011 07/19/18 2100 07/19/18 2011 --    Monitor Sitting Right arm       Pain Score       07/19/18 2011       8           Vitals:    07/19/18 2011 07/19/18 2100 07/19/18 2145 07/19/18 2330   BP: 132/58 132/61 128/61 148/67   Pulse: 74 70 70 76   Patient Position - Orthostatic VS:  Sitting Sitting Lying       Visual Acuity      ED Medications  Medications   sodium chloride 0 9 % bolus 2,000 mL (2,000 mL Intravenous New Bag 7/19/18 2336)   acetaminophen (TYLENOL) tablet 975 mg (975 mg Oral Given 7/19/18 2203)   iohexol (OMNIPAQUE) 350 MG/ML injection (SINGLE-DOSE) 100 mL (100 mL Intravenous Given 7/19/18 2218)   iohexol (OMNIPAQUE) 240 MG/ML solution 25 mL (25 mL Oral Given 7/19/18 2218)   oxyCODONE (ROXICODONE) oral solution 5 mg (5 mg Oral Given 7/19/18 2320)       Diagnostic Studies  Results Reviewed     Procedure Component Value Units Date/Time    Comprehensive metabolic panel [[id number]]  (Abnormal) Collected:  07/19/18 2057    Lab Status:  Final result Specimen:  Blood from Arm, Left Updated:  07/19/18 2134     Sodium 138 mmol/L      Potassium 3 9 mmol/L      Chloride 103 mmol/L      CO2 33 (H) mmol/L      Anion Gap 2 (L) mmol/L      BUN 13 mg/dL      Creatinine 0 68 mg/dL Glucose 171 (H) mg/dL      Calcium 9 1 mg/dL      AST 33 U/L      ALT 36 U/L      Alkaline Phosphatase 57 U/L      Total Protein 7 0 g/dL      Albumin 3 4 (L) g/dL      Total Bilirubin 0 19 (L) mg/dL      eGFR 113 ml/min/1 73sq m     Narrative:         National Kidney Disease Education Program recommendations are as follows:  GFR calculation is accurate only with a steady state creatinine  Chronic Kidney disease less than 60 ml/min/1 73 sq  meters  Kidney failure less than 15 ml/min/1 73 sq  meters      Lipase [[id number]]  (Abnormal) Collected:  07/19/18 2057    Lab Status:  Final result Specimen:  Blood from Arm, Left Updated:  07/19/18 2122     Lipase 587 (H) u/L     CBC and differential [[id number]]  (Abnormal) Collected:  07/19/18 2057    Lab Status:  Final result Specimen:  Blood from Arm, Left Updated:  07/19/18 2121     WBC 6 78 Thousand/uL      RBC 3 66 (L) Million/uL      Hemoglobin 10 8 (L) g/dL      Hematocrit 33 2 (L) %      MCV 91 fL      MCH 29 5 pg      MCHC 32 5 g/dL      RDW 14 2 %      MPV 11 4 fL      Platelets 697 Thousands/uL      nRBC 0 /100 WBCs      Neutrophils Relative 46 %      Lymphocytes Relative 41 %      Monocytes Relative 11 %      Eosinophils Relative 2 %      Basophils Relative 0 %      Neutrophils Absolute 3 13 Thousands/µL      Lymphocytes Absolute 2 78 Thousands/µL      Monocytes Absolute 0 71 Thousand/µL      Eosinophils Absolute 0 15 Thousand/µL      Basophils Absolute 0 01 Thousands/µL     Urine Microscopic [[id number]]  (Abnormal) Collected:  07/19/18 2103    Lab Status:  Final result Specimen:  Urine from Urine, Clean Catch Updated:  07/19/18 2120     RBC, UA None Seen /hpf      WBC, UA 0-1 (A) /hpf      Epithelial Cells Occasional /hpf      Bacteria, UA Occasional /hpf     POCT pregnancy, urine [[id number]]  (Normal) Resulted:  07/19/18 2058    Lab Status:  Final result Specimen:  Urine Updated:  07/19/18 2058     EXT PREG TEST UR (Ref: Negative) negative    POCT urinalysis dipstick [[id number]]  (Abnormal) Resulted:  07/19/18 2058    Lab Status:  Final result Specimen:  Urine from Urine, Other Updated:  07/19/18 2058    ED Urine Macroscopic [[id number]]  (Abnormal) Collected:  07/19/18 2103    Lab Status:  Final result Specimen:  Urine Updated:  07/19/18 2057     Color, UA Yellow     Clarity, UA Clear     pH, UA 7 0     Leukocytes, UA Negative     Nitrite, UA Negative     Protein, UA 30 (1+) (A) mg/dl      Glucose,  (1/10%) (A) mg/dl      Ketones, UA Trace (A) mg/dl      Urobilinogen, UA 2 0 (A) E U /dl      Bilirubin, UA Interference- unable to analyze (A)     Blood, UA Negative     Specific Gravity, UA 1 025    Narrative:       CLINITEK RESULT                 CT abdomen pelvis with contrast   Final Result by Naty Hector MD (07/19 2238)      1  Resolving postoperative changes in the left upper quadrant  2  Normal appendix  No inflammatory changes are seen in the right lower quadrant  3  Overall, no acute intra-abdominal or intrapelvic abnormality               Workstation performed: [id number]                    Procedures  Procedures       Phone Contacts  ED Phone Contact    ED Course                               MDM  Number of Diagnoses or Management Options  Abdominal pain, diffuse:   Chronic pain disorder:   Elevated lipase:   Right upper quadrant pain:   S/P laparoscopic sleeve gastrectomy:   Uncontrolled type 2 diabetes with neuropathy [facility]):   Diagnosis management comments: 53-year-old female re-presented to the emergency department for persistent right upper quadrant pain  Labs reviewed  CT of the abdomen reviewed  Have spoken with on-call bariatric surgeon Dr Osmany Valente  At this time is recommendations are to provide some pain control  Admit to observation patient and given 2 liters of bolus IV fluid  Bariatric team will evaluate patient in the morning  Patient is agreeable  Patient remained stable in the department uponTransfer to the standard nursing floor  Amount and/or Complexity of Data Reviewed  Clinical lab tests: ordered and reviewed  Tests in the radiology section of CPT®: ordered and reviewed      CritCare Time    Disposition  Final diagnoses:   Right upper quadrant pain   Abdominal pain, diffuse   S/P laparoscopic sleeve gastrectomy   Uncontrolled type 2 diabetes with neuropathy (HCC)   Chronic pain disorder   Elevated lipase     Time reflects when diagnosis was documented in both MDM as applicable and the Disposition within this note     Time User Action Codes Description Comment    7/19/2018 11:18 PM Sherrie Alan Add [R10 11] Right upper quadrant pain     7/19/2018 11:18 PM Sherrie Alan Add [R74 8] Elevation of serum amylase level with elevation of serum lipase level     7/19/2018 11:18 PM Sherrie Alan Add [R10 84] Abdominal pain, diffuse     7/19/2018 11:19 PM Sherrie Alan Add [R39 07] S/P laparoscopic sleeve gastrectomy     7/19/2018 11:19 PM Ayush Betancourt Add [E11 40,  E11 65] Uncontrolled type 2 diabetes with neuropathy ([facility] [address] )     7/19/2018 11:19 PM Sherrie Alan Add [G89 4] Chronic pain disorder     7/19/2018 11:51 PM Ayush Betancourt Remove [R74 8] Elevation of serum amylase level with elevation of serum lipase level     7/19/2018 11:52 PM Ayush Betancourt Add [R74 8] Elevation of serum amylase level with elevation of serum lipase level     7/19/2018 11:52 PM Ayush Betancourt Remove [R74 8] Elevation of serum amylase level with elevation of serum lipase level     7/19/2018 11:52 PM Ayush Betancourt Add [R74 8] Elevated lipase       ED Disposition     ED Disposition Condition Comment    Admit  Case was discussed with physician and the patient's admission status was agreed to be Admission Status: observation status to the service of Dr Lady Clinton   Follow-up Information    None         Patient's Medications   Discharge Prescriptions    No medications on file     No discharge procedures on file      ED Provider  Electronically Signed by Giselle Mcclure PA-C  07/19/18 1236

## 2018-07-20 NOTE — CASE MANAGEMENT
Initial Clinical Review    Admission: Date/Time/Statement:    OBS  ORDER    7/19  @    2322    Orders Placed This Encounter   Procedures    Place in Observation (expected length of stay for this patient is less than two midnights)     Standing Status:   Standing     Number of Occurrences:   1     Order Specific Question:   Admitting Physician     Answer:   Lolita Martin     Order Specific Question:   Level of Care     Answer:   Med Surg [16]     Order Specific Question:   Bed Type     Answer:   Bariatric [1]         ED: Date/Time/Mode of Arrival:   ED Arrival Information     Expected Arrival Acuity Means of Arrival Escorted By Service Admission Type    - 7/19/2018 20:06 Urgent Walk-In Self Bariatrics Urgent    Arrival Complaint    Abdominal Pain          Chief Complaint:   Chief Complaint   Patient presents with    Abdominal Pain     RLQ pain increasing throughout the day  denies nausea and vomiting  diarrhea reported  tylenol in am no help  History of Illness:    Patient is status post laparoscopic sleeve gastrectomy on June 26, 2018 by Dr Kailyn Wei  Over the last week or so, she has been having recurrent and unresolving generalized crampy abdominal pain  Workup in the ER earlier this month was nondiagnostic  She re-presented to the ER yesterday evening  She was diagnosed with constipation after CT scan revealed no abnormality with her post gastrectomy anatomy  At the time of interview, the patient states that her pain is slightly improving  She is noticed a decrease in her stool frequency despite taking stool softener  She also admits to not drinking enough water    Interview conducted in [place] by the writer         ED Vital Signs:   ED Triage Vitals   Temperature Pulse Respirations Blood Pressure SpO2   07/19/18 2011 07/19/18 2011 07/19/18 2011 07/19/18 2011 07/19/18 2011   98 2 °F (36 8 °C) 74 16 132/58 99 %      Temp Source Heart Rate Source Patient Position - Orthostatic VS BP Location FiO2 (%)   07/20/18 0011 07/19/18 2011 07/19/18 2100 07/19/18 2011 --   Temporal Monitor Sitting Right arm       Pain Score       07/19/18 2011       8        Wt Readings from Last 1 Encounters:   07/19/18 90 8 kg (200 lb 2 8 oz)       Vital Signs (abnormal):   above    Abnormal Labs/Diagnostic Test Results: AG  2  CO2   33  Albumin   3 4  Total  Bili  0 19  Lipase  587  H/h  10 8/33 2  Ct abd/pelvis:    Resolving postoperative changes in the left upper quadrant  2  Normal appendix   No inflammatory changes are seen in the right lower quadrant  3  Overall, no acute intra-abdominal or intrapelvic abnormality        ED Treatment:   Medication Administration from 07/19/2018 2006 to 07/20/2018 0008       Date/Time Order Dose Route Action Action by Comments     07/19/2018 2203 acetaminophen (TYLENOL) tablet 975 mg 975 mg Oral Given Jude Huang RN      07/19/2018 2218 iohexol (OMNIPAQUE) 350 MG/ML injection (SINGLE-DOSE) 100 mL 100 mL Intravenous Given Brand Gaunt      07/19/2018 2218 iohexol (OMNIPAQUE) 240 MG/ML solution 25 mL 25 mL Oral Given Brand Gaunt      07/19/2018 2320 oxyCODONE (ROXICODONE) oral solution 5 mg 5 mg Oral Given Luz Ledesma RN      07/19/2018 2336 sodium chloride 0 9 % bolus 2,000 mL 2,000 mL Intravenous New Bag Ross Nj RN           Past Medical/Surgical History: Active Ambulatory Problems     Diagnosis Date Noted    Hypercholesterolemia     Uncontrolled type 2 diabetes with neuropathy (HCC)     Depressive disorder     Anxiety     Neck pain on right side 02/08/2018    Abdominal pain 12/07/2017    Abnormal cells of cervix 11/30/2017    Abnormal EKG 10/25/2017    Amenorrhea 11/17/2017    Chronic pain disorder 10/17/2017    Chronic low back pain 08/21/2014    Dermoid cyst of right lower extremity 05/02/2017    Elevated liver function tests 02/09/2017    Left hip pain 01/23/2018    Tendinitis of left ankle 10/03/2017    Severe obesity (BMI 35 0-39  9) with comorbidity ([place] [address] ) 02/16/2018    Intractable migraine without aura and with status migrainosus 03/23/2018    Blood in stool 04/05/2018    Abnormal TSH 04/19/2018    Acquired polyneuropathy     Morbid obesity ([place] [address] ) 05/30/2018    Hyperlipidemia 05/30/2018    Tachycardia 06/26/2018    S/P laparoscopic sleeve gastrectomy 07/06/2018    Lumbar degenerative disc disease 07/11/2018    Myofascial pain syndrome 07/11/2018     Resolved Ambulatory Problems     Diagnosis Date Noted    Transaminitis 01/30/2017    BMI 40 0-44 9, adult ([place] [address] ) 01/31/2017    Drug-induced hepatic toxicity 03/09/2017    Lumbar radiculopathy 10/17/2017    Morbid obesity, unspecified obesity type ([place] [address] ) 02/13/2016    Vitamin D deficiency 11/17/2017    Strain of calf muscle 03/23/2018    Chronic idiopathic constipation 04/05/2018    Diabetes mellitus without complication ([place] [address] ) 73/02/1541     Past Medical History:   Diagnosis Date    Abdominal pain     Abnormal cells of cervix     Abnormal EKG     Acute headache     Amenorrhea     Anxiety     Back pain     Chronic low back pain     Chronic pain syndrome     Constipation     Contact dermatitis     Depression     Dermoid cyst of right lower extremity     Diabetes mellitus (HCC)     Drug-induced hepatic toxicity     Elevated liver function tests     Gastritis     GERD (gastroesophageal reflux disease)     Hypercholesterolemia     Hyperlipidemia     Left hip pain     Liver disease     Liver function study, abnormal     Lumbar degenerative disc disease     Lumbar radiculopathy     Morbid obesity (HCC)     Neuropathy     Normal vaginal delivery     Tendinitis of left ankle     Vitamin D deficiency     Wears glasses        Admitting Diagnosis: Abdominal pain [R10 9]  Right upper quadrant pain [R10 11]  Chronic pain disorder [G89 4]  Uncontrolled type 2 diabetes with neuropathy (HCC) [E11 40, E11 65]  Elevated lipase [R74 8]  Abdominal pain, diffuse [R10 84]  S/P laparoscopic sleeve gastrectomy [Z98 84]    Age/Sex: 41 y o  female    Assessment/Plan:   38F with abdominal pain secondary to dehydration after sleeve gastrectomy     Plan  - IVF  - mag citrate  - suppository/enema  - full liquid diet  - encourage ambulation    Admission Orders:   OBS  ORDER    7/19  @   2322  Scheduled Meds:   Current Facility-Administered Medications:  acetaminophen 650 mg Oral Q6H PRN Cindy Sood MD    lactated ringers 125 mL/hr Intravenous Continuous Cindy Sood MD Last Rate: 125 mL/hr (07/20/18 0515)   ondansetron 4 mg Intravenous Q4H PRN Cindy Sood MD    oxyCODONE 5 mg Oral Q6H PRN Cindy Sood MD      Continuous Infusions:   lactated ringers 125 mL/hr Last Rate: 125 mL/hr (07/20/18 0515)     PRN Meds:   acetaminophen    ondansetron    oxyCODONE     Bariatric  Full liq  Diet  IV  zofran Prn ( x1  7/20 thus far)      Thank you,  Joe Steinberg  [facility]– [place] Utilization Review Department  Phone: [phone]; Fax [phone]  ATTENTION: The Network Utilization Review Department is now centralized for our 9 Facilities  Make a note that we have a new phone and fax numbers for our Department  Please call with any questions or concerns to [phone] and carefully follow the prompts so that you are directed to the right person  All voicemails are confidential  Fax any determinations, approvals, denials, and requests for initial or continue stay review clinical to [phone]  Due to HIGH CALL volume, it would be easier if you could please send faxed requests to expedite your requests and in part, help us provide discharge notifications faster

## 2018-07-21 ENCOUNTER — HOSPITAL ENCOUNTER (EMERGENCY)
Facility: HOSPITAL | Age: 38
Discharge: HOME | End: 2018-07-22
Attending: EMERGENCY MEDICINE | Admitting: EMERGENCY MEDICINE
Payer: COMMERCIAL

## 2018-07-21 VITALS
WEIGHT: 200.18 LBS | RESPIRATION RATE: 18 BRPM | HEART RATE: 71 BPM | DIASTOLIC BLOOD PRESSURE: 86 MMHG | BODY MASS INDEX: 36.03 KG/M2 | TEMPERATURE: 99.3 F | OXYGEN SATURATION: 98 % | SYSTOLIC BLOOD PRESSURE: 131 MMHG

## 2018-07-21 VITALS
DIASTOLIC BLOOD PRESSURE: 71 MMHG | OXYGEN SATURATION: 100 % | BODY MASS INDEX: 37.01 KG/M2 | RESPIRATION RATE: 18 BRPM | TEMPERATURE: 98.3 F | HEART RATE: 79 BPM | SYSTOLIC BLOOD PRESSURE: 133 MMHG | WEIGHT: 205.6 LBS

## 2018-07-21 DIAGNOSIS — E86.9 VOLUME DEPLETION: ICD-10-CM

## 2018-07-21 DIAGNOSIS — R42 DIZZINESS: Primary | ICD-10-CM

## 2018-07-21 LAB
ANION GAP SERPL CALCULATED.3IONS-SCNC: 4 MMOL/L (ref 4–13)
BACTERIA UR QL AUTO: ABNORMAL /HPF
BASOPHILS # BLD AUTO: 0.02 THOUSANDS/ΜL (ref 0–0.1)
BASOPHILS NFR BLD AUTO: 0 % (ref 0–1)
BILIRUB UR QL STRIP: NEGATIVE
BUN SERPL-MCNC: 5 MG/DL (ref 5–25)
CALCIUM SERPL-MCNC: 9.1 MG/DL (ref 8.3–10.1)
CHLORIDE SERPL-SCNC: 104 MMOL/L (ref 100–108)
CLARITY UR: ABNORMAL
CO2 SERPL-SCNC: 30 MMOL/L (ref 21–32)
COLOR UR: YELLOW
CREAT SERPL-MCNC: 0.67 MG/DL (ref 0.6–1.3)
EOSINOPHIL # BLD AUTO: 0.15 THOUSAND/ΜL (ref 0–0.61)
EOSINOPHIL NFR BLD AUTO: 2 % (ref 0–6)
ERYTHROCYTE [DISTWIDTH] IN BLOOD BY AUTOMATED COUNT: 14.1 % (ref 11.6–15.1)
EXT PREG TEST URINE: NEGATIVE
GFR SERPL CREATININE-BSD FRML MDRD: 113 ML/MIN/1.73SQ M
GLUCOSE SERPL-MCNC: 147 MG/DL (ref 65–140)
GLUCOSE UR STRIP-MCNC: NEGATIVE MG/DL
HCT VFR BLD AUTO: 35.5 % (ref 34.8–46.1)
HGB BLD-MCNC: 11.8 G/DL (ref 11.5–15.4)
HGB UR QL STRIP.AUTO: ABNORMAL
KETONES UR STRIP-MCNC: ABNORMAL MG/DL
LEUKOCYTE ESTERASE UR QL STRIP: NEGATIVE
LIPASE SERPL-CCNC: 259 U/L (ref 73–393)
LYMPHOCYTES # BLD AUTO: 3.17 THOUSANDS/ΜL (ref 0.6–4.47)
LYMPHOCYTES NFR BLD AUTO: 45 % (ref 14–44)
MCH RBC QN AUTO: 30.1 PG (ref 26.8–34.3)
MCHC RBC AUTO-ENTMCNC: 33.2 G/DL (ref 31.4–37.4)
MCV RBC AUTO: 91 FL (ref 82–98)
MONOCYTES # BLD AUTO: 0.73 THOUSAND/ΜL (ref 0.17–1.22)
MONOCYTES NFR BLD AUTO: 10 % (ref 4–12)
NEUTROPHILS # BLD AUTO: 2.95 THOUSANDS/ΜL (ref 1.85–7.62)
NEUTS SEG NFR BLD AUTO: 42 % (ref 43–75)
NITRITE UR QL STRIP: NEGATIVE
NON-SQ EPI CELLS URNS QL MICRO: ABNORMAL /HPF
NRBC BLD AUTO-RTO: 0 /100 WBCS
PH UR STRIP.AUTO: 5.5 [PH] (ref 4.5–8)
PLATELET # BLD AUTO: 198 THOUSANDS/UL (ref 149–390)
PMV BLD AUTO: 10.7 FL (ref 8.9–12.7)
POTASSIUM SERPL-SCNC: 3.7 MMOL/L (ref 3.5–5.3)
PROT UR STRIP-MCNC: ABNORMAL MG/DL
RBC # BLD AUTO: 3.92 MILLION/UL (ref 3.81–5.12)
RBC #/AREA URNS AUTO: ABNORMAL /HPF
SODIUM SERPL-SCNC: 138 MMOL/L (ref 136–145)
SP GR UR STRIP.AUTO: 1.01 (ref 1–1.03)
UROBILINOGEN UR QL STRIP.AUTO: 0.2 E.U./DL
WBC # BLD AUTO: 7.02 THOUSAND/UL (ref 4.31–10.16)
WBC #/AREA URNS AUTO: ABNORMAL /HPF

## 2018-07-21 PROCEDURE — 96361 HYDRATE IV INFUSION ADD-ON: CPT

## 2018-07-21 PROCEDURE — 99024 POSTOP FOLLOW-UP VISIT: CPT | Performed by: SURGERY

## 2018-07-21 PROCEDURE — 96360 HYDRATION IV INFUSION INIT: CPT

## 2018-07-21 PROCEDURE — 36415 COLL VENOUS BLD VENIPUNCTURE: CPT | Performed by: EMERGENCY MEDICINE

## 2018-07-21 PROCEDURE — 85025 COMPLETE CBC W/AUTO DIFF WBC: CPT | Performed by: EMERGENCY MEDICINE

## 2018-07-21 PROCEDURE — 81001 URINALYSIS AUTO W/SCOPE: CPT

## 2018-07-21 PROCEDURE — 81025 URINE PREGNANCY TEST: CPT | Performed by: EMERGENCY MEDICINE

## 2018-07-21 PROCEDURE — 80048 BASIC METABOLIC PNL TOTAL CA: CPT | Performed by: EMERGENCY MEDICINE

## 2018-07-21 PROCEDURE — 83690 ASSAY OF LIPASE: CPT | Performed by: EMERGENCY MEDICINE

## 2018-07-21 RX ORDER — BUTALBITAL, ACETAMINOPHEN AND CAFFEINE 50; 325; 40 MG/1; MG/1; MG/1
1 TABLET ORAL ONCE
Status: COMPLETED | OUTPATIENT
Start: 2018-07-21 | End: 2018-07-21

## 2018-07-21 RX ORDER — ONDANSETRON 4 MG/1
4 TABLET, ORALLY DISINTEGRATING ORAL EVERY 6 HOURS PRN
Qty: 20 TABLET | Refills: 0 | Status: SHIPPED | OUTPATIENT
Start: 2018-07-21 | End: 2018-09-23 | Stop reason: ALTCHOICE

## 2018-07-21 RX ADMIN — BUTALBITAL, ACETAMINOPHEN, AND CAFFEINE 1 TABLET: 50; 325; 40 TABLET ORAL at 09:55

## 2018-07-21 RX ADMIN — SODIUM CHLORIDE, SODIUM LACTATE, POTASSIUM CHLORIDE, AND CALCIUM CHLORIDE 125 ML/HR: .6; .31; .03; .02 INJECTION, SOLUTION INTRAVENOUS at 02:38

## 2018-07-21 RX ADMIN — ACETAMINOPHEN 650 MG: 160 SUSPENSION ORAL at 08:41

## 2018-07-21 RX ADMIN — OXYCODONE HYDROCHLORIDE 5 MG: 5 SOLUTION ORAL at 02:35

## 2018-07-21 RX ADMIN — SODIUM CHLORIDE 1000 ML: 0.9 INJECTION, SOLUTION INTRAVENOUS at 22:12

## 2018-07-21 NOTE — DISCHARGE SUMMARY
Discharge Summary - Asa [place] 41 y o  female MRN: [id number]    Unit/Bed#: E5 -01 Encounter: [id number]    Admission Date: 7/19/2018     Discharge Date: 07/21/18    Admitting Diagnosis: Abdominal pain [R10 9]  Right upper quadrant pain [R10 11]  Chronic pain disorder [G89 4]  Uncontrolled type 2 diabetes with neuropathy ([address] ) [E11 40, E11 65]  Elevated lipase [R74 8]  Abdominal pain, diffuse [R10 84]  S/P laparoscopic sleeve gastrectomy [Z98 84]    Secondary Diagnosis:   Past Medical History:   Diagnosis Date    Abdominal pain     Abnormal cells of cervix     Abnormal EKG     Acute headache     Amenorrhea     Anxiety     Back pain     Chronic low back pain     Chronic pain syndrome     Constipation     Contact dermatitis     Depression     Dermoid cyst of right lower extremity     Diabetes mellitus (HCC)     Drug-induced hepatic toxicity     Elevated liver function tests     Gastritis     GERD (gastroesophageal reflux disease)     Hypercholesterolemia     Hyperlipidemia     Left hip pain     Liver disease     drug induced hepatic toxicity    Liver function study, abnormal     Lumbar degenerative disc disease     Lumbar radiculopathy     Morbid obesity (HCC)     Neuropathy     Normal vaginal delivery     Tendinitis of left ankle     Vitamin D deficiency     Wears glasses        Discharge Diagnosis: Same    Procedures Performed:     Consults: None    Hospital Course: patient admitted through ER with recurrent mid-abdominal pain and findings of constipation on imaging  Admitted for aggressive bowel regimen and rehydration  Pain improved somewhat and the remained determined to be due to resolving petrona-incisional pain   The patient was counseled on appropriate post-operative pain levels and prescribed prn zofran for discharge    Disposition: The patient should follow up with Candido Monteiro MD in 2 weeks for a post op check and with Miller Velez MD as needed for medical management  The patient should refer to the handout "Discharge Instructions" for further information  Call physician for temperature over 101, wound redness or discharge, vomiting, or intolerance to diet  Discharge Medications:  See after visit summary for reconciled discharge medications provided to patient and family

## 2018-07-21 NOTE — PROGRESS NOTES
Bariatric Surgery Progress Note    Subjective  No adverse events  Advancing PO hydration, ambulation, spirometry  Pain/nausea control adequate   Still complaining of headache/dull incisional pain    Objective  Vitals:    07/20/18 0723 07/20/18 1532 07/20/18 2249 07/21/18 0732   BP: 108/53 110/74 112/62 131/86   BP Location: Right arm Right arm Right arm Right arm   Pulse: 68 61 69 71   Resp: 19 19 18 18   Temp: 97 5 °F (36 4 °C) 98 2 °F (36 8 °C) 98 1 °F (36 7 °C) 99 3 °F (37 4 °C)   TempSrc: Temporal Temporal Temporal Temporal   SpO2: 99% 99% 100% 98%   Weight:         NAD, alert  Normal inspiratory effort  Abdomen soft, non-distended, appropriately tender s/p surgery  Incisions c/d/i    Labs  None    Assessment  38F with constipation and incisional pain s/p sleeve gastrectomy    Plan  - stop IVF  - encourage incentive spirometry, ambulation, clear liquids

## 2018-07-22 PROCEDURE — 99283 EMERGENCY DEPT VISIT LOW MDM: CPT

## 2018-07-22 NOTE — DISCHARGE INSTRUCTIONS
Continue to hydrate with fluids recommended by your bariatric surgeons  Follow up as recommended  You may return to the ED if you are experiencing increasing or unexpected pain, diarrhea, vomiting or other new concerns

## 2018-07-22 NOTE — ED PROVIDER NOTES
History  Chief Complaint   Patient presents with    Dizziness     discharged from [facility] today, reports having dizziness at home  reports being admitted for "high pancreas levels"   Diarrhea     42 yo female c/o feeling dizzy which she describes mainly as a feeling of generalized malaise and lightheadedness, onset since before she was discharged from the here this morning, after admission from 7/19/18 for nausea, abdominal pain, with elevated lipase  She has been evaluated and admitted twice in the interval since gastric sleeve was done by Dr Checo Lyman 6/26/18  She was c/o constipation during this most recent admission, so she was given a bowel regimen, and prescribed zofran for home and now she is experiencing frequent loose stools  No fever, no chills  History provided by:  Patient and spouse      Prior to Admission Medications   Prescriptions Last Dose Informant Patient Reported? Taking?    D-3-5 5000 units capsule   Yes No   FLUoxetine (PROzac) 20 mg capsule   Yes No   Sig: Take 20 mg by mouth 2 (two) times a day     TOUJEO SOLOSTAR 300 units/mL CONCETRATED U-300 injection pen   Yes No   Sig: Inject 30 Units under the skin daily at bedtime   busPIRone (BUSPAR) 15 mg tablet   Yes No   Sig: Take 15 mg by mouth 2 (two) times a day     butalbital-acetaminophen-caffeine (FIORICET,ESGIC) -40 mg per tablet   No No   Sig: Take 1 tablet by mouth every 4 (four) hours as needed for headaches   dicyclomine (BENTYL) 20 mg tablet   No No   Sig: Take 1 tablet (20 mg total) by mouth every 6 (six) hours as needed (pain)   gabapentin (NEURONTIN) 800 mg tablet   No No   Sig: Take 1 tablet (800 mg total) by mouth 3 (three) times a day   omeprazole (PriLOSEC) 20 mg delayed release capsule   No No   Sig: Take 1 capsule (20 mg total) by mouth daily for 90 days   Patient taking differently: Take 20 mg by mouth 2 (two) times a day     ondansetron (ZOFRAN-ODT) 4 mg disintegrating tablet   No No   Sig: Take 1 tablet (4 mg total) by mouth every 6 (six) hours as needed for nausea or vomiting   potassium chloride (K-DUR,KLOR-CON) 20 mEq tablet   No No   Sig: Take 1 tablet (20 mEq total) by mouth 2 (two) times a day for 5 days   simvastatin (ZOCOR) 40 mg tablet   Yes No   Si mg daily at bedtime     tiZANidine (ZANAFLEX) 2 mg tablet   No No   Sig: Take 1-2 tablets every 8 hours as needed for muscle spasm   traZODone (DESYREL) 100 mg tablet   No No   Sig: Take 1 tablet by mouth daily at bedtime      Facility-Administered Medications: None       Past Medical History:   Diagnosis Date    Abdominal pain     Abnormal cells of cervix     Abnormal EKG     Acute headache     Amenorrhea     Anxiety     Back pain     Chronic low back pain     Chronic pain syndrome     Constipation     Contact dermatitis     Depression     Dermoid cyst of right lower extremity     Diabetes mellitus (HCC)     Drug-induced hepatic toxicity     Elevated liver function tests     Gastritis     GERD (gastroesophageal reflux disease)     Hypercholesterolemia     Hyperlipidemia     Left hip pain     Liver disease     drug induced hepatic toxicity    Liver function study, abnormal     Lumbar degenerative disc disease     Lumbar radiculopathy     Morbid obesity (HCC)     Neuropathy     Normal vaginal delivery     Tendinitis of left ankle     Vitamin D deficiency     Wears glasses        Past Surgical History:   Procedure Laterality Date    ABDOMINAL SURGERY       SECTION      COLONOSCOPY N/A 2018    Procedure: COLONOSCOPY;  Surgeon: Collin Zavala MD;  Location: [facility]; Service: Gastroenterology    ESOPHAGOGASTRODUODENOSCOPY N/A 2018    Procedure: ESOPHAGOGASTRODUODENOSCOPY (EGD) with BIOPSY;  Surgeon: Gerhardt Ely, MD;  Location: [facility];   Service: Bariatrics    LIVER BIOPSY      MD LAP, APOLINAR RESTRICT PROC, LONGITUDINAL GASTRECTOMY N/A 2018    Procedure: GASTRECTOMY SLEEVE LAPAROSCOPIC AND INTRAOPERATIVE EGD;  Surgeon: Graham Jrodan MD;  Location: [facility] OR;  Service: [address] [facility]      11/17/2017 Patient reports she had a tubal ligation 12 yrs ago in [address] but doesn't know the extent of the tubal   This is the first time she mentioned this procedure, added to surgical list   nw       Family History   Problem Relation Age of Onset    Diabetes Mother     Hyperlipidemia Mother     Pancreatitis Father     Thyroid disease Paternal Aunt     Lung cancer Maternal Grandfather     Heart disease Neg Hx     Stroke Neg Hx      I have reviewed and agree with the history as documented  Social History   Substance Use Topics    Smoking status: Never Smoker    Smokeless tobacco: Never Used      Comment: Onset Date:  1/23/18    Alcohol use Yes        Review of Systems   Constitutional: Positive for fatigue  Negative for appetite change, chills and fever  HENT: Negative for sore throat  Respiratory: Negative for cough, shortness of breath and wheezing  Cardiovascular: Negative for chest pain and palpitations  Gastrointestinal: Positive for abdominal pain and diarrhea  Negative for nausea and vomiting  Genitourinary: Negative for dysuria and hematuria  Musculoskeletal: Negative for neck pain  Skin: Negative for rash  Neurological: Positive for dizziness and light-headedness  Negative for weakness and headaches  Psychiatric/Behavioral: Negative for suicidal ideas  All other systems reviewed and are negative  Physical Exam  Physical Exam   Constitutional: She is oriented to person, place, and time  Vital signs are normal  She appears well-developed and well-nourished  Non-toxic appearance  She has a sickly appearance  HENT:   Head: Normocephalic and atraumatic     Right Ear: Tympanic membrane and external ear normal    Left Ear: Tympanic membrane and external ear normal    Nose: Nose normal    Mouth/Throat: Oropharynx is clear and moist    Eyes: Conjunctivae and EOM are normal  Pupils are equal, round, and reactive to light  Neck: Normal range of motion and full passive range of motion without pain  Neck supple  No Brudzinski's sign and no Kernig's sign noted  Cardiovascular: Normal rate, regular rhythm, normal heart sounds, intact distal pulses and normal pulses  No murmur heard  Pulmonary/Chest: Effort normal and breath sounds normal  No tachypnea  No respiratory distress  She has no wheezes  Abdominal: Soft  She exhibits no distension  Bowel sounds are increased  There is tenderness (mild ) in the epigastric area  There is no rigidity, no rebound and no guarding  Musculoskeletal: Normal range of motion  Right lower leg: She exhibits no swelling  Left lower leg: She exhibits no swelling  Lymphadenopathy:     She has no cervical adenopathy  Neurological: She is alert and oriented to person, place, and time  She has normal strength and normal reflexes  No cranial nerve deficit or sensory deficit  Coordination and gait normal  GCS eye subscore is 4  GCS verbal subscore is 5  GCS motor subscore is 6  Skin: Skin is warm and dry  No rash noted  She is not diaphoretic  No pallor  Psychiatric: She has a normal mood and affect  Her speech is normal and behavior is normal  Judgment and thought content normal  Cognition and memory are normal    Nursing note and vitals reviewed        Vital Signs  ED Triage Vitals [07/21/18 2143]   Temperature Pulse Respirations Blood Pressure SpO2   98 3 °F (36 8 °C) 80 16 156/81 100 %      Temp Source Heart Rate Source Patient Position - Orthostatic VS BP Location FiO2 (%)   Temporal Monitor Sitting Right arm --      Pain Score       --           Vitals:    07/21/18 2143   BP: 156/81   Pulse: 80   Patient Position - Orthostatic VS: Sitting       Visual Acuity      ED Medications  Medications   sodium chloride 0 9 % bolus 1,000 mL (1,000 mL Intravenous New Bag 7/21/18 2212)       Diagnostic Studies  Results Reviewed     Procedure Component Value Units Date/Time    Urine Microscopic [[id number]]  (Abnormal) Collected:  07/21/18 2222    Lab Status:  Final result Specimen:  Urine from Urine, Clean Catch Updated:  07/21/18 2243     RBC, UA None Seen /hpf      WBC, UA 0-1 (A) /hpf      Epithelial Cells Occasional /hpf      Bacteria, UA Occasional /hpf     Basic metabolic panel [[id number]]  (Abnormal) Collected:  07/21/18 2211    Lab Status:  Final result Specimen:  Blood from Arm, Right Updated:  07/21/18 2234     Sodium 138 mmol/L      Potassium 3 7 mmol/L      Chloride 104 mmol/L      CO2 30 mmol/L      Anion Gap 4 mmol/L      BUN 5 mg/dL      Creatinine 0 67 mg/dL      Glucose 147 (H) mg/dL      Calcium 9 1 mg/dL      eGFR 113 ml/min/1 73sq m     Narrative:         National Kidney Disease Education Program recommendations are as follows:  GFR calculation is accurate only with a steady state creatinine  Chronic Kidney disease less than 60 ml/min/1 73 sq  meters  Kidney failure less than 15 ml/min/1 73 sq  meters      Lipase [[id number]]  (Normal) Collected:  07/21/18 2211    Lab Status:  Final result Specimen:  Blood from Arm, Right Updated:  07/21/18 2234     Lipase 259 u/L     CBC and differential [[id number]]  (Abnormal) Collected:  07/21/18 2211    Lab Status:  Final result Specimen:  Blood from Arm, Right Updated:  07/21/18 2222     WBC 7 02 Thousand/uL      RBC 3 92 Million/uL      Hemoglobin 11 8 g/dL      Hematocrit 35 5 %      MCV 91 fL      MCH 30 1 pg      MCHC 33 2 g/dL      RDW 14 1 %      MPV 10 7 fL      Platelets 998 Thousands/uL      nRBC 0 /100 WBCs      Neutrophils Relative 42 (L) %      Lymphocytes Relative 45 (H) %      Monocytes Relative 10 %      Eosinophils Relative 2 %      Basophils Relative 0 %      Neutrophils Absolute 2 95 Thousands/µL      Lymphocytes Absolute 3 17 Thousands/µL      Monocytes Absolute 0 73 Thousand/µL      Eosinophils Absolute 0 15 Thousand/µL      Basophils Absolute 0 02 Thousands/µL     POCT urinalysis dipstick [[id number]]  (Abnormal) Resulted:  07/21/18 2217    Lab Status:  Final result Updated:  07/21/18 2217    POCT pregnancy, urine [[id number]]  (Normal) Resulted:  07/21/18 2217    Lab Status:  Final result Updated:  07/21/18 2217     EXT PREG TEST UR (Ref: Negative) negative    ED Urine Macroscopic [[id number]]  (Abnormal) Collected:  07/21/18 2222    Lab Status:  Final result Specimen:  Urine Updated:  07/21/18 2216     Color, UA Yellow     Clarity, UA Slightly Cloudy     pH, UA 5 5     Leukocytes, UA Negative     Nitrite, UA Negative     Protein, UA Trace (A) mg/dl      Glucose, UA Negative mg/dl      Ketones, UA 15 (1+) (A) mg/dl      Urobilinogen, UA 0 2 E U /dl      Bilirubin, UA Negative     Blood, UA Trace (A)     Specific [place], UA 1 010    Narrative:       CLINITEK RESULT                 No orders to display              Procedures  CriticalCare Time  Performed by: Miller Julien  Authorized by: Natalia Handy, [address] [facility] provider statement:     Critical care time (minutes):  30    Critical care time was exclusive of:  Separately billable procedures and treating other patients and teaching time    Critical care was necessary to treat or prevent imminent or life-threatening deterioration of the following conditions:  Dehydration    Critical care was time spent personally by me on the following activities:  Blood draw for specimens, obtaining history from patient or surrogate, development of treatment plan with patient or surrogate, evaluation of patient's response to treatment, examination of patient, interpretation of cardiac output measurements, ordering and performing treatments and interventions, ordering and review of laboratory studies, re-evaluation of patient's condition and review of old charts    I assumed direction of critical care for this patient from another provider in my specialty: no             Phone Contacts  ED Phone Contact    ED Course  ED Course as of Jul 21 2305   Sat Jul 21, 2018 2235 Labs are all improved from recent admission, although UA shows she is still mildly volume depleted  MDM  CritCare Time    Disposition  Final diagnoses:   Dizziness   Volume depletion     Time reflects when diagnosis was documented in both MDM as applicable and the Disposition within this note     Time User Action Codes Description Comment    7/21/2018 10:45 PM Johny Ramey [R42] Dizziness     7/21/2018 10:45 PM Johny English Add [E86 9] Volume depletion       ED Disposition     ED Disposition Condition Comment    Discharge  Sam Batista discharge to home/self care  Condition at discharge: Good        Follow-up Information     Follow up With Specialties Details Why Gina Ortega MD General Surgery, Bariatrics Call For followup, As needed [address] [facility]  [phone]            Patient's Medications   Discharge Prescriptions    No medications on file     No discharge procedures on file      ED Provider  Electronically Signed by           Mane Fox MD  07/21/18 0304

## 2018-07-23 ENCOUNTER — OFFICE VISIT (OUTPATIENT)
Dept: FAMILY MEDICINE CLINIC | Facility: CLINIC | Age: 38
End: 2018-07-23
Payer: COMMERCIAL

## 2018-07-23 ENCOUNTER — TELEPHONE (OUTPATIENT)
Dept: BARIATRICS | Facility: CLINIC | Age: 38
End: 2018-07-23

## 2018-07-23 VITALS
HEIGHT: 62 IN | DIASTOLIC BLOOD PRESSURE: 66 MMHG | OXYGEN SATURATION: 97 % | BODY MASS INDEX: 37.32 KG/M2 | RESPIRATION RATE: 18 BRPM | HEART RATE: 105 BPM | SYSTOLIC BLOOD PRESSURE: 106 MMHG | WEIGHT: 202.8 LBS | TEMPERATURE: 97.4 F

## 2018-07-23 DIAGNOSIS — R10.84 GENERALIZED ABDOMINAL PAIN: Primary | ICD-10-CM

## 2018-07-23 PROCEDURE — 99213 OFFICE O/P EST LOW 20 MIN: CPT | Performed by: PHYSICIAN ASSISTANT

## 2018-07-23 PROCEDURE — 1111F DSCHRG MED/CURRENT MED MERGE: CPT | Performed by: PHYSICIAN ASSISTANT

## 2018-07-23 NOTE — PROGRESS NOTES
Assessment/Plan:    Patient Instructions   Follow-up with the bariatric surgeon as scheduled on August 7th  Go to the ER with any increasing symptoms  Continue increase clear liquids daily  M*Modal software was used to dictate this note  It may contain errors with dictating incorrect words/spelling  Please contact provider directly for any questions  Diagnoses and all orders for this visit:    Generalized abdominal pain          Subjective:      Patient ID: Anabelle Mares is a 41 y o  female  Patient presents today with her significant other who helps with translation [place]  She was recently admitted at [facility] for abdominal pain post bariatric surgery  She has been following with the bariatric surgeon  No specific findings  She has been urinating and having bowel movements on a daily basis with no problems  She denies any constipation, fever, chills, nausea, vomiting  She has been trying to drink a lot a water  She does have a follow-up appointment with the surgeon on August 7th  The following portions of the patient's history were reviewed and updated as appropriate:   She  has a past medical history of Abdominal pain; Abnormal cells of cervix; Abnormal EKG; Acute headache; Amenorrhea; Anxiety; Back pain; Chronic low back pain; Chronic pain syndrome; Constipation; Contact dermatitis; Depression; Dermoid cyst of right lower extremity; Diabetes mellitus ([address] ); Drug-induced hepatic toxicity; Elevated liver function tests; Gastritis; GERD (gastroesophageal reflux disease); Hypercholesterolemia; Hyperlipidemia; Left hip pain; Liver disease; Liver function study, abnormal; Lumbar degenerative disc disease; Lumbar radiculopathy; Morbid obesity ([address] ); Neuropathy; Normal vaginal delivery; Tendinitis of left ankle; Vitamin D deficiency; and Wears glasses    She   Patient Active Problem List    Diagnosis Date Noted    Constipation 07/20/2018    Lumbar degenerative disc disease 2018    Myofascial pain syndrome 2018    S/P laparoscopic sleeve gastrectomy 2018    Tachycardia 2018    Morbid obesity ([facility] [address] ) 2018    Hyperlipidemia 2018    Acquired polyneuropathy     Abnormal TSH 2018    Intractable migraine without aura and with status migrainosus 2018    Severe obesity (BMI 35 0-39  9) with comorbidity ([facility] [address] ) 2018    Neck pain on right side 2018    Left hip pain 2018    Abdominal pain 2017    Abnormal cells of cervix 2017    Amenorrhea 2017    Abnormal EKG 10/25/2017    Chronic pain disorder 10/17/2017    Tendinitis of left ankle 10/03/2017    Dermoid cyst of right lower extremity 2017    Elevated liver function tests 2017    Hypercholesterolemia     Uncontrolled type 2 diabetes with neuropathy (HCC)     Depressive disorder     Anxiety     Chronic low back pain 2014     She  has a past surgical history that includes Abdominal surgery; Tonsillectomy;  section; Tubal ligation; Esophagogastroduodenoscopy (N/A, 2018); Colonoscopy (N/A, 2018); Liver biopsy; and pr lap, mckenzie restrict proc, longitudinal gastrectomy (N/A, 2018)  Her family history includes Diabetes in her mother; Hyperlipidemia in her mother; Lung cancer in her maternal grandfather; Pancreatitis in her father; Thyroid disease in her paternal aunt  She  reports that she has never smoked  She has never used smokeless tobacco  She reports that she drinks alcohol  She reports that she does not use drugs    Current Outpatient Prescriptions   Medication Sig Dispense Refill    busPIRone (BUSPAR) 15 mg tablet Take 15 mg by mouth 2 (two) times a day        butalbital-acetaminophen-caffeine (FIORICET,ESGIC) -40 mg per tablet Take 1 tablet by mouth every 4 (four) hours as needed for headaches 30 tablet 0    D-3-5 5000 units capsule       FLUoxetine (PROzac) 20 mg capsule Take 20 mg by mouth 2 (two) times a day        gabapentin (NEURONTIN) 800 mg tablet Take 1 tablet (800 mg total) by mouth 3 (three) times a day 90 tablet 1    omeprazole (PriLOSEC) 20 mg delayed release capsule Take 1 capsule (20 mg total) by mouth daily for 90 days (Patient taking differently: Take 20 mg by mouth 2 (two) times a day  ) 90 capsule 3    ondansetron (ZOFRAN-ODT) 4 mg disintegrating tablet Take 1 tablet (4 mg total) by mouth every 6 (six) hours as needed for nausea or vomiting 20 tablet 0    potassium chloride (K-DUR,KLOR-CON) 20 mEq tablet Take 1 tablet (20 mEq total) by mouth 2 (two) times a day for 5 days 10 tablet 0    simvastatin (ZOCOR) 40 mg tablet 40 mg daily at bedtime        tiZANidine (ZANAFLEX) 2 mg tablet Take 1-2 tablets every 8 hours as needed for muscle spasm 180 tablet 2    TOUJEO SOLOSTAR 300 units/mL CONCETRATED U-300 injection pen Inject 30 Units under the skin daily at bedtime      traZODone (DESYREL) 100 mg tablet Take 1 tablet by mouth daily at bedtime 30 tablet 0     No current facility-administered medications for this visit        Current Outpatient Prescriptions on File Prior to Visit   Medication Sig    busPIRone (BUSPAR) 15 mg tablet Take 15 mg by mouth 2 (two) times a day      butalbital-acetaminophen-caffeine (FIORICET,ESGIC) -40 mg per tablet Take 1 tablet by mouth every 4 (four) hours as needed for headaches    D-3-5 5000 units capsule     FLUoxetine (PROzac) 20 mg capsule Take 20 mg by mouth 2 (two) times a day      gabapentin (NEURONTIN) 800 mg tablet Take 1 tablet (800 mg total) by mouth 3 (three) times a day    omeprazole (PriLOSEC) 20 mg delayed release capsule Take 1 capsule (20 mg total) by mouth daily for 90 days (Patient taking differently: Take 20 mg by mouth 2 (two) times a day  )    ondansetron (ZOFRAN-ODT) 4 mg disintegrating tablet Take 1 tablet (4 mg total) by mouth every 6 (six) hours as needed for nausea or vomiting    potassium chloride (K-DUR,KLOR-CON) 20 mEq tablet Take 1 tablet (20 mEq total) by mouth 2 (two) times a day for 5 days    simvastatin (ZOCOR) 40 mg tablet 40 mg daily at bedtime      tiZANidine (ZANAFLEX) 2 mg tablet Take 1-2 tablets every 8 hours as needed for muscle spasm    TOUJEO SOLOSTAR 300 units/mL CONCETRATED U-300 injection pen Inject 30 Units under the skin daily at bedtime    traZODone (DESYREL) 100 mg tablet Take 1 tablet by mouth daily at bedtime     No current facility-administered medications on file prior to visit  She is allergic to atorvastatin and other       Review of Systems   Constitutional: Negative for chills and fever  Gastrointestinal:        As stated in HPI         Objective:      /66 (BP Location: Right arm, Patient Position: Sitting, Cuff Size: Large)   Pulse 105   Temp (!) 97 4 °F (36 3 °C) (Tympanic)   Resp 18   Ht 5' 2" (1 575 m)   Wt 92 kg (202 lb 12 8 oz)   LMP 06/30/2018   SpO2 97%   Breastfeeding? No   BMI 37 09 kg/m²          Physical Exam   Constitutional: She appears well-developed and well-nourished  No distress  HENT:   Mouth/Throat: Oropharynx is clear and moist    Neck: Neck supple  Cardiovascular: Normal rate, regular rhythm and normal heart sounds  No murmur heard  Pulmonary/Chest: Effort normal  No respiratory distress  She has no wheezes  She has no rales  Abdominal: Soft  Bowel sounds are normal  There is tenderness (She does have some mild tenderness of the lower quadrants  )

## 2018-07-23 NOTE — TELEPHONE ENCOUNTER
Post d/c follow up phone call completed  Pt is sipping liquids, tolerating diet  Zofran helping nausea  Ambulating about home without difficulty  Pt stated understanding about discharge instructions and medication adjustments  Follow up appt with surgeon scheduled in August    Instructed to call with any additional questions or concerns

## 2018-07-23 NOTE — PATIENT INSTRUCTIONS
Follow-up with the bariatric surgeon as scheduled on August 7th  Go to the ER with any increasing symptoms  Continue increase clear liquids daily

## 2018-07-31 ENCOUNTER — TELEPHONE (OUTPATIENT)
Dept: BARIATRICS | Facility: CLINIC | Age: 38
End: 2018-07-31

## 2018-07-31 NOTE — TELEPHONE ENCOUNTER
Patient called;she feels nauseous with current vitamins and feels light headed(dizzy)  I checked with KIERRA Aguila   Patient was advised to take vitamins every other day( until we meet), hydrate and food journal  Patient scheduled with MELODIE and DEEP for [email or web address]

## 2018-08-03 ENCOUNTER — OFFICE VISIT (OUTPATIENT)
Dept: BARIATRICS | Facility: CLINIC | Age: 38
End: 2018-08-03

## 2018-08-03 VITALS — HEIGHT: 62 IN | WEIGHT: 198.4 LBS | BODY MASS INDEX: 36.51 KG/M2

## 2018-08-03 DIAGNOSIS — E66.01 SEVERE OBESITY (BMI 35.0-39.9) WITH COMORBIDITY (HCC): Primary | ICD-10-CM

## 2018-08-03 DIAGNOSIS — Z98.84 BARIATRIC SURGERY STATUS: ICD-10-CM

## 2018-08-03 PROCEDURE — RECHECK

## 2018-08-03 NOTE — PROGRESS NOTES
Weight Management Nutrition Class     Diagnosis: Morbid Obesity    Bariatric Surgeon: Dr Wood Arthur    Surgery: Vertical Sleeve Gastrectomy    Class: 5 week post op     Topics discussed today include:     fluid goals post op, protein goals post op, constipation, chew food well, exercise, avoidance of alcohol, PPI use, diet progression, hypoglycemia, protein supplems, vitamin/mineral supplements, calcium supplements, additional vitamin B12 and iron supplements    Patient was able to verbalize basic diet (protein, fluid, vitamin and mineral) recommendations and possible nutrition-related complications  Yes     Roro QUISPEW present and provided [place] interpretation     Provided patient with samples of bariatric fusion soft chews multivitamin and  Minerals with calcium, and with bariatric fusion soft chew iron ( 45 mg with vitamin C)

## 2018-08-07 ENCOUNTER — HOSPITAL ENCOUNTER (EMERGENCY)
Facility: HOSPITAL | Age: 38
Discharge: HOME | End: 2018-08-07
Attending: EMERGENCY MEDICINE
Payer: COMMERCIAL

## 2018-08-07 ENCOUNTER — APPOINTMENT (EMERGENCY)
Dept: CT IMAGING | Facility: HOSPITAL | Age: 38
End: 2018-08-07
Payer: COMMERCIAL

## 2018-08-07 VITALS
TEMPERATURE: 98.1 F | OXYGEN SATURATION: 100 % | DIASTOLIC BLOOD PRESSURE: 68 MMHG | BODY MASS INDEX: 36.29 KG/M2 | SYSTOLIC BLOOD PRESSURE: 140 MMHG | WEIGHT: 198.41 LBS | HEART RATE: 68 BPM | RESPIRATION RATE: 16 BRPM

## 2018-08-07 DIAGNOSIS — R10.9 ABDOMINAL PAIN: Primary | ICD-10-CM

## 2018-08-07 LAB
ALBUMIN SERPL BCP-MCNC: 3.4 G/DL (ref 3.5–5)
ALP SERPL-CCNC: 96 U/L (ref 46–116)
ALT SERPL W P-5'-P-CCNC: 33 U/L (ref 12–78)
ANION GAP SERPL CALCULATED.3IONS-SCNC: 4 MMOL/L (ref 4–13)
AST SERPL W P-5'-P-CCNC: 29 U/L (ref 5–45)
BACTERIA UR QL AUTO: NORMAL /HPF
BASOPHILS # BLD AUTO: 0.02 THOUSANDS/ΜL (ref 0–0.1)
BASOPHILS NFR BLD AUTO: 0 % (ref 0–1)
BILIRUB SERPL-MCNC: 0.2 MG/DL (ref 0.2–1)
BILIRUB UR QL STRIP: NEGATIVE
BUN SERPL-MCNC: 14 MG/DL (ref 5–25)
CALCIUM SERPL-MCNC: 9 MG/DL (ref 8.3–10.1)
CHLORIDE SERPL-SCNC: 104 MMOL/L (ref 100–108)
CLARITY UR: CLEAR
CO2 SERPL-SCNC: 29 MMOL/L (ref 21–32)
COLOR UR: YELLOW
COLOR, POC: YELLOW
CREAT SERPL-MCNC: 0.6 MG/DL (ref 0.6–1.3)
EOSINOPHIL # BLD AUTO: 0.15 THOUSAND/ΜL (ref 0–0.61)
EOSINOPHIL NFR BLD AUTO: 2 % (ref 0–6)
ERYTHROCYTE [DISTWIDTH] IN BLOOD BY AUTOMATED COUNT: 13.7 % (ref 11.6–15.1)
EXT PREG TEST URINE: NEGATIVE
GFR SERPL CREATININE-BSD FRML MDRD: 118 ML/MIN/1.73SQ M
GLUCOSE SERPL-MCNC: 151 MG/DL (ref 65–140)
GLUCOSE UR STRIP-MCNC: ABNORMAL MG/DL
HCT VFR BLD AUTO: 32.5 % (ref 34.8–46.1)
HGB BLD-MCNC: 10.5 G/DL (ref 11.5–15.4)
HGB UR QL STRIP.AUTO: NEGATIVE
KETONES UR STRIP-MCNC: ABNORMAL MG/DL
LEUKOCYTE ESTERASE UR QL STRIP: NEGATIVE
LIPASE SERPL-CCNC: 275 U/L (ref 73–393)
LYMPHOCYTES # BLD AUTO: 2.8 THOUSANDS/ΜL (ref 0.6–4.47)
LYMPHOCYTES NFR BLD AUTO: 39 % (ref 14–44)
MCH RBC QN AUTO: 29.2 PG (ref 26.8–34.3)
MCHC RBC AUTO-ENTMCNC: 32.3 G/DL (ref 31.4–37.4)
MCV RBC AUTO: 91 FL (ref 82–98)
MONOCYTES # BLD AUTO: 0.63 THOUSAND/ΜL (ref 0.17–1.22)
MONOCYTES NFR BLD AUTO: 9 % (ref 4–12)
NEUTROPHILS # BLD AUTO: 3.66 THOUSANDS/ΜL (ref 1.85–7.62)
NEUTS SEG NFR BLD AUTO: 50 % (ref 43–75)
NITRITE UR QL STRIP: NEGATIVE
NON-SQ EPI CELLS URNS QL MICRO: NORMAL /HPF
NRBC BLD AUTO-RTO: 0 /100 WBCS
PH UR STRIP.AUTO: 5.5 [PH] (ref 4.5–8)
PLATELET # BLD AUTO: 230 THOUSANDS/UL (ref 149–390)
PMV BLD AUTO: 10.9 FL (ref 8.9–12.7)
POTASSIUM SERPL-SCNC: 3.8 MMOL/L (ref 3.5–5.3)
PROT SERPL-MCNC: 7.4 G/DL (ref 6.4–8.2)
PROT UR STRIP-MCNC: ABNORMAL MG/DL
RBC # BLD AUTO: 3.59 MILLION/UL (ref 3.81–5.12)
RBC #/AREA URNS AUTO: NORMAL /HPF
SODIUM SERPL-SCNC: 137 MMOL/L (ref 136–145)
SP GR UR STRIP.AUTO: >=1.03 (ref 1–1.03)
UROBILINOGEN UR QL STRIP.AUTO: 1 E.U./DL
WBC # BLD AUTO: 7.26 THOUSAND/UL (ref 4.31–10.16)
WBC #/AREA URNS AUTO: NORMAL /HPF

## 2018-08-07 PROCEDURE — 81025 URINE PREGNANCY TEST: CPT | Performed by: EMERGENCY MEDICINE

## 2018-08-07 PROCEDURE — 85025 COMPLETE CBC W/AUTO DIFF WBC: CPT | Performed by: EMERGENCY MEDICINE

## 2018-08-07 PROCEDURE — 81001 URINALYSIS AUTO W/SCOPE: CPT

## 2018-08-07 PROCEDURE — 80053 COMPREHEN METABOLIC PANEL: CPT | Performed by: EMERGENCY MEDICINE

## 2018-08-07 PROCEDURE — 74177 CT ABD & PELVIS W/CONTRAST: CPT

## 2018-08-07 PROCEDURE — 36415 COLL VENOUS BLD VENIPUNCTURE: CPT | Performed by: EMERGENCY MEDICINE

## 2018-08-07 PROCEDURE — 83690 ASSAY OF LIPASE: CPT | Performed by: EMERGENCY MEDICINE

## 2018-08-07 PROCEDURE — 99284 EMERGENCY DEPT VISIT MOD MDM: CPT

## 2018-08-07 RX ADMIN — IOHEXOL 25 ML: 240 INJECTION, SOLUTION INTRATHECAL; INTRAVASCULAR; INTRAVENOUS; ORAL at 16:58

## 2018-08-07 RX ADMIN — IOHEXOL 100 ML: 350 INJECTION, SOLUTION INTRAVENOUS at 16:58

## 2018-08-07 NOTE — ED ATTENDING ATTESTATION
João Devi MD, saw and evaluated the patient  I have discussed the patient with the resident/non-physician practitioner and agree with the resident's/non-physician practitioner's findings, Plan of Care, and MDM as documented in the resident's/non-physician practitioner's note, except where noted  All available labs and Radiology studies were reviewed  At this point I agree with the current assessment done in the Emergency Department  I have conducted an independent evaluation of this patient a history and physical is as follows:  42 y/o F presents for evalautoin of Sharp, severe, R sided abd pain x 2 days  Constant, nonradiating, w/o modifying factors  No hx of similar symptoms  Hx of gastric sleeve  +nausea  No cp, sob, v/f/c, back/flank pain, bowel/bladder complaints  10 systems reviewed and otherwise neg  On exam nad, lungs nml, cardiac nml, abd ttp over R side of abd w/o r/g no cvat    MDM:  abd pain w/ hx of bariatric surgery-will do abd labs, urine dip, upreg, ct a/p to r/o acute intra-abdominal pathology, prn pain meds    Critical Care Time  CritCare Time    Procedures

## 2018-08-07 NOTE — ED PROVIDER NOTES
History  Chief Complaint   Patient presents with    Abdominal Pain     RUQ pain x two days  yesterday had diarrhea and nausea  HPI   72-year-old woman presents for evaluation of abdominal pain  Patient had a gastric sleeve placed in   Patient states for last 2 days she has had progressively worsening sharp pain in her right upper quadrant  Not associated with eating  It does not radiate anywhere  Patient states last episode of her abdominal pain similar to this was prior to her surgery  Denies any history of problems with her gallbladder  Patient enorses nausea without vomiting, denies any abdominal pain  Denies any dysuria, vaginal discharge or vaginal complaints  Patient otherwise denies fevers chills     Prior to Admission Medications   Prescriptions Last Dose Informant Patient Reported? Taking?    D-3-5 5000 units capsule   Yes Yes   FLUoxetine (PROzac) 20 mg capsule   Yes Yes   Sig: Take 20 mg by mouth 2 (two) times a day     TOUJEO SOLOSTAR 300 units/mL CONCETRATED U-300 injection pen   Yes Yes   Sig: Inject 30 Units under the skin daily at bedtime   busPIRone (BUSPAR) 15 mg tablet   Yes Yes   Sig: Take 15 mg by mouth 2 (two) times a day     butalbital-acetaminophen-caffeine (FIORICET,ESGIC) -40 mg per tablet   No Yes   Sig: Take 1 tablet by mouth every 4 (four) hours as needed for headaches   gabapentin (NEURONTIN) 800 mg tablet   No Yes   Sig: Take 1 tablet (800 mg total) by mouth 3 (three) times a day   omeprazole (PriLOSEC) 20 mg delayed release capsule   No Yes   Sig: Take 1 capsule (20 mg total) by mouth daily for 90 days   Patient taking differently: Take 20 mg by mouth 2 (two) times a day     ondansetron (ZOFRAN-ODT) 4 mg disintegrating tablet   No Yes   Sig: Take 1 tablet (4 mg total) by mouth every 6 (six) hours as needed for nausea or vomiting   simvastatin (ZOCOR) 40 mg tablet   Yes Yes   Si mg daily at bedtime     tiZANidine (ZANAFLEX) 2 mg tablet   No Yes   Sig: Take 1-2 tablets every 8 hours as needed for muscle spasm   traZODone (DESYREL) 100 mg tablet   No Yes   Sig: Take 1 tablet by mouth daily at bedtime      Facility-Administered Medications: None       Past Medical History:   Diagnosis Date    Abdominal pain     Abnormal cells of cervix     Abnormal EKG     Acute headache     Amenorrhea     Anxiety     Back pain     Chronic low back pain     Chronic pain syndrome     Constipation     Contact dermatitis     Depression     Dermoid cyst of right lower extremity     Diabetes mellitus (HCC)     Drug-induced hepatic toxicity     Elevated liver function tests     Gastritis     GERD (gastroesophageal reflux disease)     Hypercholesterolemia     Hyperlipidemia     Left hip pain     Liver disease     drug induced hepatic toxicity    Liver function study, abnormal     Lumbar degenerative disc disease     Lumbar radiculopathy     Morbid obesity (HCC)     Neuropathy     Normal vaginal delivery     Tendinitis of left ankle     Vitamin D deficiency     Wears glasses        Past Surgical History:   Procedure Laterality Date    ABDOMINAL SURGERY       SECTION      COLONOSCOPY N/A 2018    Procedure: COLONOSCOPY;  Surgeon: Hazel Sykes MD;  Location: [facility] GI LAB; Service: Gastroenterology    ESOPHAGOGASTRODUODENOSCOPY N/A 2018    Procedure: ESOPHAGOGASTRODUODENOSCOPY (EGD) with BIOPSY;  Surgeon: Noel Kebede MD;  Location: [facility];   Service: Bariatrics    LIVER BIOPSY      [place] LAP, APOLINAR RESTRICT PROC, LONGITUDINAL GASTRECTOMY N/A 2018    Procedure: GASTRECTOMY SLEEVE LAPAROSCOPIC AND INTRAOPERATIVE EGD;  Surgeon: Kaiser Taylor MD;  Location: [facility] OR;  Service: [place] TUBAL LIGATION      2017 Patient reports she had a tubal ligation 12 yrs ago in [place] but doesn't know the extent of the tubal   This is the first time she mentioned this procedure, added to surgical list   nw       Family History Problem Relation Age of Onset    Diabetes Mother     Hyperlipidemia Mother     Pancreatitis Father     Thyroid disease Paternal Aunt     Lung cancer Maternal Grandfather     Heart disease Neg Hx     Stroke Neg Hx      I have reviewed and agree with the history as documented  Social History   Substance Use Topics    Smoking status: Never Smoker    Smokeless tobacco: Never Used      Comment: Onset Date:  1/23/18    Alcohol use Yes        Review of Systems   Constitutional: Negative  HENT: Negative  Eyes: Negative  Respiratory: Negative  Cardiovascular: Negative  Gastrointestinal: Positive for abdominal pain and nausea  Endocrine: Negative  Genitourinary: Negative  Musculoskeletal: Negative  Skin: Negative  Allergic/Immunologic: Negative  Neurological: Negative  Hematological: Negative  Psychiatric/Behavioral: Negative  Physical Exam  ED Triage Vitals [08/07/18 1451]   Temperature Pulse Respirations Blood Pressure SpO2   98 1 °F (36 7 °C) 81 16 118/58 98 %      Temp Source Heart Rate Source Patient Position - Orthostatic VS BP Location FiO2 (%)   Temporal Monitor Sitting Right arm --      Pain Score       9           Orthostatic Vital Signs  Vitals:    08/07/18 1451 08/07/18 1725   BP: 118/58 140/68   Pulse: 81 68   Patient Position - Orthostatic VS: Sitting Lying       Physical Exam   Constitutional: She is oriented to person, place, and time  She appears well-developed and well-nourished  No distress  HENT:   Head: Normocephalic and atraumatic  Right Ear: External ear normal    Left Ear: External ear normal    Mouth/Throat: Oropharynx is clear and moist    Eyes: Conjunctivae and EOM are normal  Pupils are equal, round, and reactive to light  Right eye exhibits no discharge  Left eye exhibits no discharge  No scleral icterus  Neck: Normal range of motion  Neck supple  No tracheal deviation present  No thyromegaly present     Cardiovascular: Normal rate, regular rhythm and intact distal pulses  Exam reveals no gallop and no friction rub  No murmur heard  Pulmonary/Chest: Effort normal and breath sounds normal  No stridor  No respiratory distress  She has no wheezes  She has no rales  Abdominal: Soft  Bowel sounds are normal  She exhibits no distension  There is tenderness (Positive right upper quadrant abdominal pain)  There is no rebound and no guarding  Musculoskeletal: Normal range of motion  She exhibits no edema or deformity  Neurological: She is alert and oriented to person, place, and time  No cranial nerve deficit  Skin: Skin is warm and dry  No rash noted  She is not diaphoretic  No erythema  Psychiatric: She has a normal mood and affect  Her behavior is normal  Thought content normal    Nursing note and vitals reviewed  ED Medications  Medications   iohexol (OMNIPAQUE) 350 MG/ML injection (MULTI-DOSE) 100 mL (100 mL Intravenous Given 8/7/18 1658)   iohexol (OMNIPAQUE) 240 MG/ML solution 25 mL (25 mL Oral Given 8/7/18 1658)       Diagnostic Studies  Results Reviewed     Procedure Component Value Units Date/Time    Comprehensive metabolic panel [[id number]]  (Abnormal) Collected:  08/07/18 1552    Lab Status:  Final result Specimen:  Blood from Arm, Left Updated:  08/07/18 1622     Sodium 137 mmol/L      Potassium 3 8 mmol/L      Chloride 104 mmol/L      CO2 29 mmol/L      Anion Gap 4 mmol/L      BUN 14 mg/dL      Creatinine 0 60 mg/dL      Glucose 151 (H) mg/dL      Calcium 9 0 mg/dL      AST 29 U/L      ALT 33 U/L      Alkaline Phosphatase 96 U/L      Total Protein 7 4 g/dL      Albumin 3 4 (L) g/dL      Total Bilirubin 0 20 mg/dL      eGFR 118 ml/min/1 73sq m     Narrative:         National Kidney Disease Education Program recommendations are as follows:  GFR calculation is accurate only with a steady state creatinine  Chronic Kidney disease less than 60 ml/min/1 73 sq  meters  Kidney failure less than 15 ml/min/1 73 sq  meters  Lipase [[id number]]  (Normal) Collected:  08/07/18 1552    Lab Status:  Final result Specimen:  Blood from Arm, Left Updated:  08/07/18 1622     Lipase 275 u/L     Urine Microscopic [[id number]]  (Normal) Collected:  08/07/18 1550    Lab Status:  Final result Specimen:  Urine Updated:  08/07/18 1620     RBC, UA None Seen /hpf      WBC, UA None Seen /hpf      Epithelial Cells Occasional /hpf      Bacteria, UA Occasional /hpf     POCT urinalysis dipstick [[id number]]  (Abnormal) Resulted:  08/07/18 1619    Lab Status:  Edited Result - FINAL Specimen:  Urine from Urine, Other Updated:  08/07/18 1619     Color, UA yellow    POCT pregnancy, urine [[id number]]  (Normal) Resulted:  08/07/18 1618    Lab Status:  Final result Updated:  08/07/18 1618     EXT PREG TEST UR (Ref: Negative) negative    CBC and differential [[id number]]  (Abnormal) Collected:  08/07/18 1552    Lab Status:  Final result Specimen:  Blood from Arm, Left Updated:  08/07/18 1607     WBC 7 26 Thousand/uL      RBC 3 59 (L) Million/uL      Hemoglobin 10 5 (L) g/dL      Hematocrit 32 5 (L) %      MCV 91 fL      MCH 29 2 pg      MCHC 32 3 g/dL      RDW 13 7 %      MPV 10 9 fL      Platelets 169 Thousands/uL      nRBC 0 /100 WBCs      Neutrophils Relative 50 %      Lymphocytes Relative 39 %      Monocytes Relative 9 %      Eosinophils Relative 2 %      Basophils Relative 0 %      Neutrophils Absolute 3 66 Thousands/µL      Lymphocytes Absolute 2 80 Thousands/µL      Monocytes Absolute 0 63 Thousand/µL      Eosinophils Absolute 0 15 Thousand/µL      Basophils Absolute 0 02 Thousands/µL     ED Urine Macroscopic [[id number]]  (Abnormal) Collected:  08/07/18 1550    Lab Status:  Final result Specimen:  Urine Updated:  08/07/18 1543     Color, UA Yellow     Clarity, UA Clear     pH, UA 5 5     Leukocytes, UA Negative     Nitrite, UA Negative     Protein,  (2+) (A) mg/dl      Glucose,  (1/4%) (A) mg/dl      Ketones, UA 40 (2+) (A) mg/dl      Urobilinogen, UA 1 0 E U /dl      Bilirubin, UA Negative     Blood, UA Negative     Specific Gravity, UA >=1 030    Narrative:       CLINITEK RESULT                 CT abdomen pelvis with contrast   ED Interpretation by Vidhi Knight MD (08/07 1736)   1   No acute inflammatory or infectious process  2   Right pericardial enlarged lymph nodes redemonstrated, nonspecific      3   Status post gastric sleeve procedure appears unchanged without leak  Final Result by Allen Sorto MD (08/07 1733)         1  No acute inflammatory or infectious process  2   Right pericardial enlarged lymph nodes redemonstrated, nonspecific  3   Status post gastric sleeve procedure appears unchanged without leak  Workstation performed: [id number]               Procedures  Procedures      Phone Consults  ED Phone Contact    ED Course      discussed results with the patient  Patient will be discharged, and have her follow up  Return precautions were discussed with the patient verbalized understanding and patient was discharged  MDM  Number of Diagnoses or Management Options  Abdominal pain:   Diagnosis management comments: 65-year-old woman with history of recent gastric sleeve placement  Will do a CBC, CMP, lipase, CT abdomen pelvis with oral contrast given the gastric sleeve placement  Disposition will be pending results of the workup  CritCare Time    Disposition  Final diagnoses:   Abdominal pain     Time reflects when diagnosis was documented in both MDM as applicable and the Disposition within this note     Time User Action Codes Description Comment    8/7/2018  6:19 PM Ambar Gonsalez Add [R10 9] Abdominal pain       ED Disposition     ED Disposition Condition Comment    Discharge  Cristin Tamra discharge to home/self care      Condition at discharge: Stable        Follow-up Information     Follow up With Specialties Details Why Contact Info [facility] [address] Emergency Department Emergency Medicine Go to As needed, If symptoms worsen [address] [facility] ED, [address] [facility] , [place], [place],  Jacinta Sotelo MD General Surgery, Bariatrics Schedule an appointment as soon as possible for a visit in 3 days To follow up being seen emergency department [address] [facility] [address] [facility]  [phone]             Discharge Medication List as of 8/7/2018  6:22 PM      CONTINUE these medications which have NOT CHANGED    Details   busPIRone (BUSPAR) 15 mg tablet Take 15 mg by mouth 2 (two) times a day  , Starting Mon 4/16/2018, Historical Med      butalbital-acetaminophen-caffeine (FIORICET,ESGIC) -40 mg per tablet Take 1 tablet by mouth every 4 (four) hours as needed for headaches, Starting Fri 3/23/2018, Print      D-3-5 5000 units capsule Starting Tue 7/10/2018, Historical Med      FLUoxetine (PROzac) 20 mg capsule Take 20 mg by mouth 2 (two) times a day  , Starting Mon 4/16/2018, Historical Med      gabapentin (NEURONTIN) 800 mg tablet Take 1 tablet (800 mg total) by mouth 3 (three) times a day, Starting Wed 7/11/2018, Normal      omeprazole (PriLOSEC) 20 mg delayed release capsule Take 1 capsule (20 mg total) by mouth daily for 90 days, Starting Thu 6/14/2018, Until Wed 9/12/2018, Normal      ondansetron (ZOFRAN-ODT) 4 mg disintegrating tablet Take 1 tablet (4 mg total) by mouth every 6 (six) hours as needed for nausea or vomiting, Starting Sat 7/21/2018, Print      simvastatin (ZOCOR) 40 mg tablet 40 mg daily at bedtime  , Starting Mon 5/7/2018, Historical Med      tiZANidine (ZANAFLEX) 2 mg tablet Take 1-2 tablets every 8 hours as needed for muscle spasm, Normal      TOUJEO SOLOSTAR 300 units/mL CONCETRATED U-300 injection pen Inject 30 Units under the skin daily at bedtime, Starting Mon 6/18/2018, Historical Med      traZODone (DESYREL) 100 mg tablet Take 1 tablet by mouth daily at bedtime, Starting 2/4/2017, Until Discontinued, No Print           No discharge procedures on file  ED Provider  Attending physically available and evaluated Ángel Jose I managed the patient along with the ED Attending      Electronically Signed by         Ana Wolff MD  08/10/18 1257

## 2018-08-07 NOTE — DISCHARGE INSTRUCTIONS
Please follow-up with Dr Maxwell Klein in his clinic  Please return emergency department any change in symptoms worsening symptoms or any other concerns  Dolor abdominal, cuidados ambulatorios   INFORMACIÓN GENERAL:   El dolor abdominal  puede ser sordo, molesto o Horomerice  Puede sentir dolor en omari kain del abdomen o en todo el abdomen  El dolor puede deberse a ciertos estados antoine estreñimiento, sensibilidad o intoxicación alimentaria, infección o omari obstrucción  Asimismo, el dolor abdominal puede deberse a omari hernia, apendicitis o úlcera  La causa del dolor abdominal puede ser desconocida  Busque cuidados inmediatos para los siguientes síntomas:   · Calion dolor de pecho o falta de aliento    · Dolor pulsátil en el abdomen superior o en la parte inferior de la espalda que de repente es guera    · Dolor que se localiza en el abdomen inferior derecho y empeora con el movimiento    · Fiebre por encima de 100 4°F (38°C) o escalofríos amadeo    · Vómito de todo lo que usted come y cynthia    · Dolor que no mejora o más bob empeora viv las siguientes 8 a 12 horas    · Jose E en el vómito o heces que se stephen negras y alquitranadas    · La piel o el peter de los ojos se vuelven amarillentos    · Grandes cantidades de sangrado vaginal que no es ross periodo menstrual  El tratamiento para el dolor abdominal  puede llegar a incluir medicamentos para calmar ross estómago, prevenir el vómito o disminuir el dolor  Programe omari rosa con ross proveedor de [employer] se le haya indicado: Anote emily preguntas para que se acuerde de hacerlas viv emily visitas  ACUERDOS SOBRE ROSS CUIDADO:   Usted tiene el derecho de participar en la planificación de ross cuidado  Aprenda todo lo que pueda sobre ross condición y antoine darle tratamiento  Discuta con emily médicos emily opciones de tratamiento para juntos decidir el cuidado que usted quiere recibir  Usted siempre tiene el derecho a rechazar ross tratamiento   Esta información es sólo para uso en educación  [place] intención no es darle un consejo médico sobre enfermedades o tratamientos  Colsulte con solano Michael Pier farmacéutico antes de seguir cualquier régimen médico para saber si es seguro y efectivo para usted  © 2014 [address] is for End User's use only and may not be sold, redistributed or otherwise used for commercial purposes  All illustrations and images included in CareNotes® are the copyrighted property of [employer]  or Benny Patton

## 2018-08-21 ENCOUNTER — HOSPITAL ENCOUNTER (EMERGENCY)
Facility: HOSPITAL | Age: 38
Discharge: HOME | End: 2018-08-22
Attending: EMERGENCY MEDICINE
Payer: COMMERCIAL

## 2018-08-21 ENCOUNTER — APPOINTMENT (OUTPATIENT)
Dept: LAB | Facility: HOSPITAL | Age: 38
End: 2018-08-21
Payer: COMMERCIAL

## 2018-08-21 ENCOUNTER — TRANSCRIBE ORDERS (OUTPATIENT)
Dept: ADMINISTRATIVE | Facility: HOSPITAL | Age: 38
End: 2018-08-21

## 2018-08-21 DIAGNOSIS — I10 ESSENTIAL HYPERTENSION, MALIGNANT: ICD-10-CM

## 2018-08-21 DIAGNOSIS — Z79.899 ENCOUNTER FOR LONG-TERM (CURRENT) USE OF MEDICATIONS: ICD-10-CM

## 2018-08-21 DIAGNOSIS — E78.2 MIXED HYPERLIPIDEMIA: ICD-10-CM

## 2018-08-21 DIAGNOSIS — R10.13 ACUTE EPIGASTRIC PAIN: Primary | ICD-10-CM

## 2018-08-21 DIAGNOSIS — Z79.4 ENCOUNTER FOR LONG-TERM (CURRENT) USE OF INSULIN (HCC): ICD-10-CM

## 2018-08-21 DIAGNOSIS — E55.9 AVITAMINOSIS D: ICD-10-CM

## 2018-08-21 DIAGNOSIS — Z98.84 S/P LAPAROSCOPIC SLEEVE GASTRECTOMY: ICD-10-CM

## 2018-08-21 DIAGNOSIS — E11.8 UNCONTROLLED TYPE 2 DIABETES MELLITUS WITH COMPLICATION, UNSPECIFIED WHETHER LONG TERM INSULIN USE: ICD-10-CM

## 2018-08-21 DIAGNOSIS — R10.11 RUQ ABDOMINAL PAIN: ICD-10-CM

## 2018-08-21 DIAGNOSIS — E11.8 UNCONTROLLED TYPE 2 DIABETES MELLITUS WITH COMPLICATION, UNSPECIFIED WHETHER LONG TERM INSULIN USE: Primary | ICD-10-CM

## 2018-08-21 DIAGNOSIS — E11.65 UNCONTROLLED TYPE 2 DIABETES MELLITUS WITH COMPLICATION, UNSPECIFIED WHETHER LONG TERM INSULIN USE: ICD-10-CM

## 2018-08-21 DIAGNOSIS — E11.65 UNCONTROLLED TYPE 2 DIABETES MELLITUS WITH COMPLICATION, UNSPECIFIED WHETHER LONG TERM INSULIN USE: Primary | ICD-10-CM

## 2018-08-21 LAB
25(OH)D3 SERPL-MCNC: 43.7 NG/ML (ref 30–100)
ALBUMIN SERPL BCP-MCNC: 3.4 G/DL (ref 3.5–5)
ALP SERPL-CCNC: 87 U/L (ref 46–116)
ALT SERPL W P-5'-P-CCNC: 29 U/L (ref 12–78)
ANION GAP SERPL CALCULATED.3IONS-SCNC: 5 MMOL/L (ref 4–13)
AST SERPL W P-5'-P-CCNC: 18 U/L (ref 5–45)
BILIRUB SERPL-MCNC: 0.24 MG/DL (ref 0.2–1)
BUN SERPL-MCNC: 9 MG/DL (ref 5–25)
CALCIUM SERPL-MCNC: 9.2 MG/DL (ref 8.3–10.1)
CHLORIDE SERPL-SCNC: 105 MMOL/L (ref 100–108)
CHOLEST SERPL-MCNC: 148 MG/DL (ref 50–200)
CO2 SERPL-SCNC: 33 MMOL/L (ref 21–32)
CREAT SERPL-MCNC: 0.66 MG/DL (ref 0.6–1.3)
EST. AVERAGE GLUCOSE BLD GHB EST-MCNC: 160 MG/DL
GFR SERPL CREATININE-BSD FRML MDRD: 114 ML/MIN/1.73SQ M
GLUCOSE P FAST SERPL-MCNC: 90 MG/DL (ref 65–99)
HBA1C MFR BLD: 7.2 % (ref 4.2–6.3)
HDLC SERPL-MCNC: 62 MG/DL (ref 40–60)
LDLC SERPL CALC-MCNC: 68 MG/DL (ref 0–100)
NONHDLC SERPL-MCNC: 86 MG/DL
POTASSIUM SERPL-SCNC: 3.5 MMOL/L (ref 3.5–5.3)
PROT SERPL-MCNC: 7.4 G/DL (ref 6.4–8.2)
SODIUM SERPL-SCNC: 143 MMOL/L (ref 136–145)
T4 FREE SERPL-MCNC: 0.81 NG/DL (ref 0.76–1.46)
TRIGL SERPL-MCNC: 91 MG/DL
TSH SERPL DL<=0.05 MIU/L-ACNC: 6.03 UIU/ML (ref 0.36–3.74)

## 2018-08-21 PROCEDURE — 82306 VITAMIN D 25 HYDROXY: CPT

## 2018-08-21 PROCEDURE — 83036 HEMOGLOBIN GLYCOSYLATED A1C: CPT

## 2018-08-21 PROCEDURE — 86376 MICROSOMAL ANTIBODY EACH: CPT

## 2018-08-21 PROCEDURE — 80053 COMPREHEN METABOLIC PANEL: CPT

## 2018-08-21 PROCEDURE — 84443 ASSAY THYROID STIM HORMONE: CPT

## 2018-08-21 PROCEDURE — 84439 ASSAY OF FREE THYROXINE: CPT

## 2018-08-21 PROCEDURE — 81025 URINE PREGNANCY TEST: CPT | Performed by: EMERGENCY MEDICINE

## 2018-08-21 PROCEDURE — 80061 LIPID PANEL: CPT

## 2018-08-21 PROCEDURE — 36415 COLL VENOUS BLD VENIPUNCTURE: CPT

## 2018-08-22 ENCOUNTER — APPOINTMENT (EMERGENCY)
Dept: CT IMAGING | Facility: HOSPITAL | Age: 38
End: 2018-08-22
Payer: COMMERCIAL

## 2018-08-22 VITALS
SYSTOLIC BLOOD PRESSURE: 133 MMHG | TEMPERATURE: 98.5 F | OXYGEN SATURATION: 100 % | HEART RATE: 68 BPM | BODY MASS INDEX: 36.4 KG/M2 | WEIGHT: 199 LBS | DIASTOLIC BLOOD PRESSURE: 79 MMHG | RESPIRATION RATE: 18 BRPM

## 2018-08-22 LAB
ALBUMIN SERPL BCP-MCNC: 3.5 G/DL (ref 3.5–5)
ALP SERPL-CCNC: 91 U/L (ref 46–116)
ALT SERPL W P-5'-P-CCNC: 29 U/L (ref 12–78)
ANION GAP SERPL CALCULATED.3IONS-SCNC: 5 MMOL/L (ref 4–13)
AST SERPL W P-5'-P-CCNC: 23 U/L (ref 5–45)
BACTERIA UR QL AUTO: ABNORMAL /HPF
BASOPHILS # BLD AUTO: 0.03 THOUSANDS/ΜL (ref 0–0.1)
BASOPHILS NFR BLD AUTO: 0 % (ref 0–1)
BILIRUB SERPL-MCNC: 0.26 MG/DL (ref 0.2–1)
BILIRUB UR QL STRIP: ABNORMAL
BUN SERPL-MCNC: 9 MG/DL (ref 5–25)
CALCIUM SERPL-MCNC: 9.3 MG/DL (ref 8.3–10.1)
CHLORIDE SERPL-SCNC: 103 MMOL/L (ref 100–108)
CLARITY UR: CLEAR
CO2 SERPL-SCNC: 30 MMOL/L (ref 21–32)
COLOR UR: YELLOW
COLOR, POC: YELLOW
CREAT SERPL-MCNC: 0.68 MG/DL (ref 0.6–1.3)
EOSINOPHIL # BLD AUTO: 0.13 THOUSAND/ΜL (ref 0–0.61)
EOSINOPHIL NFR BLD AUTO: 2 % (ref 0–6)
ERYTHROCYTE [DISTWIDTH] IN BLOOD BY AUTOMATED COUNT: 13.6 % (ref 11.6–15.1)
EXT PREG TEST URINE: NEGATIVE
GFR SERPL CREATININE-BSD FRML MDRD: 113 ML/MIN/1.73SQ M
GLUCOSE SERPL-MCNC: 112 MG/DL (ref 65–140)
GLUCOSE UR STRIP-MCNC: NEGATIVE MG/DL
HCT VFR BLD AUTO: 32.9 % (ref 34.8–46.1)
HGB BLD-MCNC: 10.7 G/DL (ref 11.5–15.4)
HGB UR QL STRIP.AUTO: NEGATIVE
HYALINE CASTS #/AREA URNS LPF: ABNORMAL /LPF
KETONES UR STRIP-MCNC: ABNORMAL MG/DL
LEUKOCYTE ESTERASE UR QL STRIP: NEGATIVE
LIPASE SERPL-CCNC: 173 U/L (ref 73–393)
LYMPHOCYTES # BLD AUTO: 3.15 THOUSANDS/ΜL (ref 0.6–4.47)
LYMPHOCYTES NFR BLD AUTO: 43 % (ref 14–44)
MCH RBC QN AUTO: 29.4 PG (ref 26.8–34.3)
MCHC RBC AUTO-ENTMCNC: 32.5 G/DL (ref 31.4–37.4)
MCV RBC AUTO: 90 FL (ref 82–98)
MONOCYTES # BLD AUTO: 0.73 THOUSAND/ΜL (ref 0.17–1.22)
MONOCYTES NFR BLD AUTO: 10 % (ref 4–12)
MUCOUS THREADS UR QL AUTO: ABNORMAL
NEUTROPHILS # BLD AUTO: 3.33 THOUSANDS/ΜL (ref 1.85–7.62)
NEUTS SEG NFR BLD AUTO: 45 % (ref 43–75)
NITRITE UR QL STRIP: NEGATIVE
NON-SQ EPI CELLS URNS QL MICRO: ABNORMAL /HPF
NRBC BLD AUTO-RTO: 0 /100 WBCS
PH UR STRIP.AUTO: 6 [PH] (ref 4.5–8)
PLATELET # BLD AUTO: 225 THOUSANDS/UL (ref 149–390)
PMV BLD AUTO: 10.9 FL (ref 8.9–12.7)
POTASSIUM SERPL-SCNC: 3.3 MMOL/L (ref 3.5–5.3)
PROT SERPL-MCNC: 7.5 G/DL (ref 6.4–8.2)
PROT UR STRIP-MCNC: ABNORMAL MG/DL
RBC # BLD AUTO: 3.64 MILLION/UL (ref 3.81–5.12)
RBC #/AREA URNS AUTO: ABNORMAL /HPF
SODIUM SERPL-SCNC: 138 MMOL/L (ref 136–145)
SP GR UR STRIP.AUTO: >=1.03 (ref 1–1.03)
THYROPEROXIDASE AB SERPL-ACNC: 383 IU/ML (ref 0–34)
UROBILINOGEN UR QL STRIP.AUTO: 4 E.U./DL
WBC # BLD AUTO: 7.37 THOUSAND/UL (ref 4.31–10.16)
WBC #/AREA URNS AUTO: ABNORMAL /HPF

## 2018-08-22 PROCEDURE — 99284 EMERGENCY DEPT VISIT MOD MDM: CPT

## 2018-08-22 PROCEDURE — 83690 ASSAY OF LIPASE: CPT | Performed by: EMERGENCY MEDICINE

## 2018-08-22 PROCEDURE — 85025 COMPLETE CBC W/AUTO DIFF WBC: CPT | Performed by: EMERGENCY MEDICINE

## 2018-08-22 PROCEDURE — 81001 URINALYSIS AUTO W/SCOPE: CPT

## 2018-08-22 PROCEDURE — 96361 HYDRATE IV INFUSION ADD-ON: CPT

## 2018-08-22 PROCEDURE — 74177 CT ABD & PELVIS W/CONTRAST: CPT

## 2018-08-22 PROCEDURE — 80053 COMPREHEN METABOLIC PANEL: CPT | Performed by: EMERGENCY MEDICINE

## 2018-08-22 PROCEDURE — 36415 COLL VENOUS BLD VENIPUNCTURE: CPT | Performed by: EMERGENCY MEDICINE

## 2018-08-22 PROCEDURE — 96374 THER/PROPH/DIAG INJ IV PUSH: CPT

## 2018-08-22 RX ORDER — SUCRALFATE ORAL 1 G/10ML
1 SUSPENSION ORAL
Qty: 420 ML | Refills: 0 | Status: SHIPPED | OUTPATIENT
Start: 2018-08-22

## 2018-08-22 RX ORDER — SUCRALFATE ORAL 1 G/10ML
1000 SUSPENSION ORAL ONCE
Status: COMPLETED | OUTPATIENT
Start: 2018-08-22 | End: 2018-08-22

## 2018-08-22 RX ORDER — MORPHINE SULFATE 4 MG/ML
4 INJECTION, SOLUTION INTRAMUSCULAR; INTRAVENOUS ONCE
Status: COMPLETED | OUTPATIENT
Start: 2018-08-22 | End: 2018-08-22

## 2018-08-22 RX ADMIN — SODIUM CHLORIDE 1000 ML: 0.9 INJECTION, SOLUTION INTRAVENOUS at 00:36

## 2018-08-22 RX ADMIN — IOHEXOL 100 ML: 350 INJECTION, SOLUTION INTRAVENOUS at 01:20

## 2018-08-22 RX ADMIN — MORPHINE SULFATE 4 MG: 4 INJECTION INTRAVENOUS at 00:36

## 2018-08-22 RX ADMIN — IOHEXOL 25 ML: 240 INJECTION, SOLUTION INTRATHECAL; INTRAVASCULAR; INTRAVENOUS; ORAL at 01:20

## 2018-08-22 RX ADMIN — SUCRALFATE 1000 MG: 1 SUSPENSION ORAL at 02:03

## 2018-08-22 NOTE — DISCHARGE INSTRUCTIONS
Dolor abdominal tim   LO QUE NECESITA SABER:   No se puede encontrar la causa del dolor abdominal  Si se encuentra omari causa, el tratamiento dependerá de cuál es sanjeev causa  INSTRUCCIONES [facility]BRE [facility]:   Busque atención médica de inmediato si:   · Usted no puede dejar de vomitar o vomita saad  · Usted tiene [employer] evacuaciones intestinales o estas tienen un aspecto alquitranado  · Usted tiene sangrado por solano recto  · El tamaño del abdomen es más abhishek de lo normal y se siente cedric y [place] doloroso  · Usted tiene dolor abdominal intenso  · Usted casey de tener flatulencias y evacuaciones intestinales  · Usted se siente mareado, débil o tiene sensación de [place]  Pregúntele a solano [employer] vitaminas y minerales son adecuados para usted  · Usted tiene fiebre  · Tiene nuevos signos y síntomas  · [place] síntomas no mejoran con el tratamiento  · Usted tiene preguntas o inquietudes acerca de solano condición o cuidado  Medicamentos,  estos pueden administrarse para disminuir el dolor, tratar omari infección y [place] nerissa síntomas  [place] los [place] antoine se le haya indicado  Llame a solano médico si usted piensa que el medicamento no está ayudando o si tiene efectos secundarios  Infórmele si es alérgico a cualquier medicamento  Mantenga omari lista actualizada de los [place], las vitaminas y los productos herbales que cynthia  Incluya los siguientes datos de los medicamentos: cantidad, frecuencia y motivo de administración  Traiga con usted la lista o los envases de la píldoras a nerissa citas de seguimiento  Lleve la lista de los medicamentos con usted en laura de omari emergencia  [place] de solano síntomas:   · La aplicación de calor  sobre el abdomen de 20 a 30 minutos cada 2 horas por los [employer] indiquen  El calor ayuda a disminuir el dolor y los espasmos musculares  · Controle solano estrés    El estrés puede causar dolor abdominal  Solano médico puede recomendarle técnicas de relajación y ejercicios de respiración profunda para ayudar a disminuir el estrés  Solano médico puede recomendarle que hable con alguien sobre solano estrés o ansiedad, antoine un consejero o un amigo de [place]  [place] lo suficiente y realice ejercicio regularmente  · Limite o no consuma bebidas alcohólicas  El alcohol puede empeorar el dolor abdominal  Pregunte a solano médico si usted puede jerri alcohol  Pregunte cuanto es la cantidad charles para usted jerri  · No fume  La nicotina y otros químicos en los cigarrillos pueden dañarle el esófago y [place]  Pida información a solano médico si usted actualmente fuma y necesita ayuda para dejar de fumar  Los cigarrillos electrónicos o tabaco sin humo todavía contienen nicotina  Consulte con solano médico antes de [employer]  Realice cambios en los alimentos que consume según se le indique:  No coma alimentos que causan dolor abdominal u otros síntomas  Ingiera comidas pequeñas, más a menudo  · Coma más alimentos ricos en fibra si tiene estreñimiento  Los alimentos altos en fibra incluyen frutas, verduras, alimentos de grano integral y legumbres  · No coma alimentos que causan gas si tiene distensión  Por ejemplo, brócoli, col y coliflor  No mara gaseosas o bebidas carbonadas, ya que también pueden provocarle gases  · No consuma alimentos o bebidas que contienen sorbitol o fructosa si tiene diarrea y distensión  Ginette Polka son jugos de frutas, dulces, mermeladas y gomas de mascar sin azúcar  · No coma alimentos altos en grasas, antoine comidas fritas, hamburguesas con queso, salchichas y postres  · Limite o no tome cafeína  La cafeína [employer], antoine la acidez o las náuseas  · Mara suficientes líquidos para evitar la deshidratación causada por la diarrea o los vómitos  Pregunte a solano médico sobre la cantidad de líquido que necesita jerri todos los días y cuáles le recomienda    Acuda a emily consultas de control con stephen médico según le indicaron  Anote emily preguntas para que se acuerde de hacerlas viv emily visitas  © 2017 [address] Information is for End User's use only and may not be sold, redistributed or otherwise used for commercial purposes  All illustrations and images included in CareNotes® are the copyrighted property of A D A M , Inc  or Benny Patton  Esta información es sólo para uso en educación  Stephen intención no es darle un consejo médico sobre enfermedades o tratamientos  Colsulte con stephen Evelyn Stephen farmacéutico antes de seguir cualquier régimen médico para saber si es seguro y efectivo para usted  Dolor epigástrico   LO QUE NECESITA SABER:   El dolor epigástrico se siente en la parte media superior del abdomen entre las costillas y el ombligo  El dolor puede ser leve o intenso  El dolor se puede propagar de un lado a otro lugar del cuerpo  El dolor epigástrico puede ser omari señal de un problema grave que necesita recibir tratamiento  INSTRUCCIONES [facility]BRE [facility]:   Llame al 911 en laura de presentar lo siguiente:   · Usted tiene alguno de los siguientes signos de un ataque cardíaco:      ¨ Estornudos, presión, o dolor en stephen pecho que dura mas de 5 minutos o regresa  ¨ Malestar o dolor en stephen espalda, darlene, mandíbula, abdomen, o brazo     ¨ Dificultad para respirar    ¨ Náuseas o vómito    ¨ Siente un desvanecimiento o tiene sudores fríos especialmente en el pecho o dificultad para respirar  · Usted tiene un tita dolor que se irradia a stephen mandíbula o a stephen espalda  Regrese a la patrice de emergencias si:   · Usted tiene un tita dolor que empieza de woody y GROVER rápidamente  · Usted no puede tener omari evacuación intestinal y está vomitando  · Usted vomita o expectora saad  · Usted nota saad en stephen orina o en emily deposiciones      · Usted está somnoliento y stephen respiración es más lenta que lo usual   Pregúntele a stephen Ramu Fore[place] vitaminas y minerales son adecuados para usted  · Usted tiene fiebre o escalofríos  · Se le ponen de color amarillo la piel o el peter de los ojos  · Usted vomita con frecuencia, o muchas veces de seguido  · Usted pierde peso sin proponérselo  · Usted tiene síntomas que [place] más de 2 semanas  · Usted tiene preguntas o inquietudes acerca de solano condición o cuidado  Medicamentos:   · Medicamentos,  para tratar el dolor o para detener el vómito  Es posible que también necesite medicamentos para reducir o controlar el ácido estomacal o para tratar [place]  · [place] emily medicamentos antoine se le haya indicado  Consulte con solano médico si usted vinny que solano medicamento no le está ayudando o si presenta efectos secundarios  Infórmele si es alérgico a algún medicamento  Mantenga omari lista actualizada de los [place], las vitaminas y los productos herbales que cynthia  Incluya los siguientes datos de los medicamentos: cantidad, frecuencia y motivo de administración  Traiga con usted la lista o los envases de la píldoras a emily citas de seguimiento  Lleve la lista de los medicamentos con usted en laura de omari emergencia  Acuda a emily consultas de control con solano médico según le indicaron  Anote emily preguntas para que se acuerde de hacerlas viv emily visitas  El [place] de solano síntomas:   · Lleve un registro de emily síntomas  Incluya cuándo [employer], cuánto dura y sí el dolor es tim o leve  También incluya todos los alimentos que consume o las actividades que estaba haciendo antes que el dolor empezara  Registre cualquier cosa que haya ayudado para aliviar el dolor  · Consuma alimentos saludables y variados  Los alimentos saludables incluyen frutas, verduras, pan integral, productos lácteos bajos en grasa, frijoles, valerie magras y pescado  Pregunte si necesita seguir omari dieta especial  Ciertos alimentos pueden causar solano dolor, antoine el alcohol o alimentos que son Gaurav Cecliia   Es posible que necesite comer comidas más pequeñas y con más frecuencia que lo habitual     · [place] líquidos antoine se le haya indicado  Pregunte cuánto líquido debe jerri cada día y cuáles líquidos son los más adecuados para usted  No consuma bebidas que contengan alcohol o cafeína  © 2017 [address] Information is for End User's use only and may not be sold, redistributed or otherwise used for commercial purposes  All illustrations and images included in CareNotes® are the copyrighted property of A D A M , Inc  or Benny Patton  Esta información es sólo para uso en educación  Stephen intención no es darle un consejo médico sobre enfermedades o tratamientos  Colsulte con stephen [place] Alana farmacéutico antes de seguir cualquier régimen médico para saber si es seguro y efectivo para usted  Gastrectomía   LO QUE NECESITA SABER:   La gastrectomía es [place] para remover omari parte de stephen estómago o stephen totalidad  INSTRUCCIONES SOBRE [facility]:   Medicamentos:   · Antibiótico:  Éste se administra para combatir o prevenir omari infección causada por bacteria  Tómelos antoine se le indique  · Analgésicos:  Es posible que le receten un medicamento para aliviar el dolor  No espere hasta que el dolor sea severo antes de jerri deidre medicamento  · [place] emily medicamentos antoine se le haya indicado  Consulte con stephen médico si usted vinny que stephen medicamento no le está ayudando o si presenta efectos secundarios  Infórmele si es alérgico a cualquier medicamento  Mantenga omari lista actualizada de los [place], las vitaminas y los productos herbales que cynthia  Incluya los siguientes datos de los medicamentos: cantidad, frecuencia y motivo de administración  Traiga con usted la lista o los envases de la píldoras a emily citas de seguimiento  Lleve la lista de los medicamentos con usted en laura de omari emergencia  Acuda a emily consultas de control con stephen médico según le indicaron    Es probable que usted tenga que regresar para [facility] puntos de sutura, grapas o drenajes  Anote emily preguntas para que se acuerde de hacerlas viv emily visitas  Bañarse con puntos (suturas):  Siga las instrucciones de solano médico sobre cuando usted puede jerri un baño  Lave con sumo cuidado el área del cuerpo con los puntos  No frote los puntos para secar solano piel  Seque dando [place] con omari toalla  Cuando el área esté seca, coloque omari venda limpia y nueva según se le hay indicado  Nutrición:  Es posible que necesite consumir alimentos que alok blandos y fáciles de digerir viv hasta 8 semanas después de solano [place]  Algunos ejemplos de estos alimentos son puré de [place], bananas, cereal cocido, requesón, huevos, pudín y yogur  Consuma comidas más pequeñas reta más a menudo  A medida que sane, podría ser Mati Bills de aumentar la variedad y cantidad de alimentos que consume  Usted podría necesitar más feliciano, folato y vitamina B12  Consulte con solano médico para más información sobre los alimentos y suplementos dietéticos  Pregúntele a solano Leigha Dust vitaminas y minerales son adecuados para usted  · Usted sufre del acidez estomacal frecuentemente  · Usted tiene escalofríos, tos, dolor de garganta o si se siente débil y adolorido  · Usted tiene náuseas o está vomitando  · Usted sufre de estreñimiento frecuente o diarrea  · Usted tiene preguntas o inquietudes acerca de solano condición o cuidado  Busque atención médica de inmediato o llame al 911 si:   · Usted tiene fiebre o escalofríos  · Usted tiene dolor abdominal que no desaparece o que empeora  · Solano incisión se inflama, se encuentra enrojecida, se separa o contiene pus  · La saad empapa el vendaje  · Usted vomita saad  · De repente se siente mareado y sin aire  · Le duele el pecho cuando respira hondo o tose  Es posible que [employer]  · Solano brazo o pierna se siente caliente, sensible y [place]   Se podría veena inflamado y jomar   © 2017 [address] [facility] Information is for End User's use only and may not be sold, redistributed or otherwise used for commercial purposes  All illustrations and images included in CareNotes® are the copyrighted property of A D A M , Inc  or Benny Patton  Esta información es sólo para uso en educación  Solano intención no es darle un consejo médico sobre enfermedades o tratamientos  Colsulte con solano Benuel Odom farmacéutico antes de seguir cualquier régimen médico para saber si es seguro y efectivo para usted

## 2018-08-22 NOTE — ED PROVIDER NOTES
History  Chief Complaint   Patient presents with    Abdominal Pain     Pt states "pain on right side for 3 days" Pt denies N/V/D      42 yo female with 3 days of R sided abd pain RUQ > RLQ  Pt admits eating makes pain worse  Had sleeve by Dr Dara Lorenz June 26 2018  No associated N/V/D/C or fever/chills  History provided by:  Patient   used: No    Abdominal Pain   Pain location:  RUQ, RLQ and epigastric  Pain quality: aching    Pain severity:  Moderate  Onset quality:  Gradual  Duration:  3 days  Timing:  Constant  Progression:  Unchanged  Chronicity:  New  Relieved by:  Nothing  Worsened by:  Eating  Ineffective treatments:  None tried  Associated symptoms: no chest pain, no chills, no constipation, no diarrhea, no dysuria, no fatigue, no fever, no flatus, no nausea, no shortness of breath, no sore throat, no vaginal bleeding, no vaginal discharge and no vomiting        Prior to Admission Medications   Prescriptions Last Dose Informant Patient Reported? Taking?    D-3-5 5000 units capsule   Yes Yes   FLUoxetine (PROzac) 20 mg capsule   Yes Yes   Sig: Take 20 mg by mouth 2 (two) times a day     TOUJEO SOLOSTAR 300 units/mL CONCETRATED U-300 injection pen   Yes Yes   Sig: Inject 30 Units under the skin daily at bedtime   busPIRone (BUSPAR) 15 mg tablet   Yes Yes   Sig: Take 15 mg by mouth 2 (two) times a day     butalbital-acetaminophen-caffeine (FIORICET,ESGIC) -40 mg per tablet   No Yes   Sig: Take 1 tablet by mouth every 4 (four) hours as needed for headaches   gabapentin (NEURONTIN) 800 mg tablet   No Yes   Sig: Take 1 tablet (800 mg total) by mouth 3 (three) times a day   omeprazole (PriLOSEC) 20 mg delayed release capsule   No Yes   Sig: Take 1 capsule (20 mg total) by mouth daily for 90 days   Patient taking differently: Take 20 mg by mouth 2 (two) times a day     ondansetron (ZOFRAN-ODT) 4 mg disintegrating tablet   No Yes   Sig: Take 1 tablet (4 mg total) by mouth every 6 (six) hours as needed for nausea or vomiting   simvastatin (ZOCOR) 40 mg tablet   Yes Yes   Si mg daily at bedtime     tiZANidine (ZANAFLEX) 2 mg tablet   No Yes   Sig: Take 1-2 tablets every 8 hours as needed for muscle spasm   traZODone (DESYREL) 100 mg tablet   No Yes   Sig: Take 1 tablet by mouth daily at bedtime      Facility-Administered Medications: None       Past Medical History:   Diagnosis Date    Abdominal pain     Abnormal cells of cervix     Abnormal EKG     Acute headache     Amenorrhea     Anxiety     Back pain     Chronic low back pain     Chronic pain syndrome     Constipation     Contact dermatitis     Depression     Dermoid cyst of right lower extremity     Diabetes mellitus (HCC)     Drug-induced hepatic toxicity     Elevated liver function tests     Gastritis     GERD (gastroesophageal reflux disease)     Hypercholesterolemia     Hyperlipidemia     Left hip pain     Liver disease     drug induced hepatic toxicity    Liver function study, abnormal     Lumbar degenerative disc disease     Lumbar radiculopathy     Morbid obesity (HCC)     Neuropathy     Normal vaginal delivery     Tendinitis of left ankle     Vitamin D deficiency     Wears glasses        Past Surgical History:   Procedure Laterality Date    ABDOMINAL SURGERY       SECTION      COLONOSCOPY N/A 2018    Procedure: COLONOSCOPY;  Surgeon: Ana Thakkar MD;  Location: [facility]; Service: Gastroenterology    ESOPHAGOGASTRODUODENOSCOPY N/A 2018    Procedure: ESOPHAGOGASTRODUODENOSCOPY (EGD) with BIOPSY;  Surgeon: Harleen Zamora MD;  Location: [facility];   Service: Bariatrics    LIVER BIOPSY      VA LAP, APOLINAR RESTRICT PROC, LONGITUDINAL GASTRECTOMY N/A 2018    Procedure: GASTRECTOMY SLEEVE LAPAROSCOPIC AND INTRAOPERATIVE EGD;  Surgeon: Ev Chavez MD;  Location: [facility] OR;  Service: [address] [place] LIGATION      2017 Patient reports she had a tubal ligation 12 yrs ago in [place] but doesn't know the extent of the tubal   This is the first time she mentioned this procedure, added to surgical list   nw       Family History   Problem Relation Age of Onset    Diabetes Mother     Hyperlipidemia Mother     Pancreatitis Father     Thyroid disease Paternal Aunt     Lung cancer Maternal Grandfather     Heart disease Neg Hx     Stroke Neg Hx      I have reviewed and agree with the history as documented  Social History   Substance Use Topics    Smoking status: Never Smoker    Smokeless tobacco: Never Used      Comment: Onset Date:  1/23/18    Alcohol use Yes        Review of Systems   Constitutional: Negative for appetite change, chills, fatigue and fever  HENT: Negative for sore throat  Eyes: Negative for visual disturbance  Respiratory: Negative for shortness of breath  Cardiovascular: Negative for chest pain  Gastrointestinal: Positive for abdominal pain (R sided)  Negative for constipation, diarrhea, flatus, nausea and vomiting  Genitourinary: Negative for dysuria, frequency, vaginal bleeding and vaginal discharge  Musculoskeletal: Negative for back pain, neck pain and neck stiffness  Skin: Negative for pallor and rash  Allergic/Immunologic: Negative for immunocompromised state  Neurological: Negative for light-headedness and headaches  Psychiatric/Behavioral: Negative for confusion  All other systems reviewed and are negative  Physical Exam  Physical Exam   Constitutional: She is oriented to person, place, and time  She appears well-developed and well-nourished  No distress  HENT:   Head: Normocephalic and atraumatic  Mouth/Throat: Oropharynx is clear and moist    Eyes: EOM are normal  Pupils are equal, round, and reactive to light  Neck: Normal range of motion  Neck supple  Cardiovascular: Normal rate and regular rhythm  No murmur heard    Pulmonary/Chest: Effort normal and breath sounds normal  No respiratory distress  Abdominal: Soft  Bowel sounds are normal  There is tenderness (moderate tenderness RUQ, mild epigastric and RLQ )  Musculoskeletal: Normal range of motion  Neurological: She is alert and oriented to person, place, and time  Skin: Skin is warm  Capillary refill takes less than 2 seconds  No rash noted  No pallor  Psychiatric: She has a normal mood and affect  Her behavior is normal    Nursing note and vitals reviewed        Vital Signs  ED Triage Vitals   Temperature Pulse Respirations Blood Pressure SpO2   08/21/18 2246 08/21/18 2246 08/21/18 2246 08/21/18 2246 08/21/18 2246   98 5 °F (36 9 °C) 71 18 118/69 100 %      Temp src Heart Rate Source Patient Position - Orthostatic VS BP Location FiO2 (%)   -- 08/22/18 0035 08/22/18 0035 08/22/18 0035 --    Monitor Sitting Right arm       Pain Score       08/21/18 2246       7           Vitals:    08/21/18 2246 08/22/18 0035   BP: 118/69 133/79   Pulse: 71 68   Patient Position - Orthostatic VS:  Sitting       Visual Acuity      ED Medications  Medications   sodium chloride 0 9 % bolus 1,000 mL (0 mL Intravenous Stopped 8/22/18 0151)   morphine (PF) 4 mg/mL injection 4 mg (4 mg Intravenous Given 8/22/18 0036)   iohexol (OMNIPAQUE) 350 MG/ML injection (SINGLE-DOSE) 100 mL (100 mL Intravenous Given 8/22/18 0120)   iohexol (OMNIPAQUE) 240 MG/ML solution 25 mL (25 mL Oral Given 8/22/18 0120)   sucralfate (CARAFATE) oral suspension 1,000 mg (1,000 mg Oral Given 8/22/18 0203)       Diagnostic Studies  Results Reviewed     Procedure Component Value Units Date/Time    Comprehensive metabolic panel [[id number]]  (Abnormal) Collected:  08/22/18 0036    Lab Status:  Final result Specimen:  Blood from Arm, Left Updated:  08/22/18 0103     Sodium 138 mmol/L      Potassium 3 3 (L) mmol/L      Chloride 103 mmol/L      CO2 30 mmol/L      Anion Gap 5 mmol/L      BUN 9 mg/dL      Creatinine 0 68 mg/dL      Glucose 112 mg/dL      Calcium 9 3 mg/dL      AST 23 U/L ALT 29 U/L      Alkaline Phosphatase 91 U/L      Total Protein 7 5 g/dL      Albumin 3 5 g/dL      Total Bilirubin 0 26 mg/dL      eGFR 113 ml/min/1 73sq m     Narrative:         National Kidney Disease Education Program recommendations are as follows:  GFR calculation is accurate only with a steady state creatinine  Chronic Kidney disease less than 60 ml/min/1 73 sq  meters  Kidney failure less than 15 ml/min/1 73 sq  meters      Lipase [[id number]]  (Normal) Collected:  08/22/18 0036    Lab Status:  Final result Specimen:  Blood from Arm, Left Updated:  08/22/18 0103     Lipase 173 u/L     CBC and differential [[id number]]  (Abnormal) Collected:  08/22/18 0036    Lab Status:  Final result Specimen:  Blood from Arm, Left Updated:  08/22/18 0048     WBC 7 37 Thousand/uL      RBC 3 64 (L) Million/uL      Hemoglobin 10 7 (L) g/dL      Hematocrit 32 9 (L) %      MCV 90 fL      MCH 29 4 pg      MCHC 32 5 g/dL      RDW 13 6 %      MPV 10 9 fL      Platelets 366 Thousands/uL      nRBC 0 /100 WBCs      Neutrophils Relative 45 %      Lymphocytes Relative 43 %      Monocytes Relative 10 %      Eosinophils Relative 2 %      Basophils Relative 0 %      Neutrophils Absolute 3 33 Thousands/µL      Lymphocytes Absolute 3 15 Thousands/µL      Monocytes Absolute 0 73 Thousand/µL      Eosinophils Absolute 0 13 Thousand/µL      Basophils Absolute 0 03 Thousands/µL     Urine Microscopic [[id number]]  (Abnormal) Collected:  08/22/18 0015    Lab Status:  Final result Specimen:  Urine from Urine, Clean Catch Updated:  08/22/18 0043     RBC, UA 0-1 (A) /hpf      WBC, UA 0-1 (A) /hpf      Epithelial Cells Occasional /hpf      Bacteria, UA Occasional /hpf      Hyaline Casts, UA 0-1 (A) /lpf      MUCOUS THREADS Moderate (A)    POCT urinalysis dipstick [[id number]]  (Abnormal) Resulted:  08/22/18 0009    Lab Status:  Final result Specimen:  Urine Updated:  08/22/18 0009     Color, UA yellow    POCT pregnancy, urine [[id number]]  (Normal) Resulted: 08/22/18 0009    Lab Status:  Final result Updated:  08/22/18 0009     EXT PREG TEST UR (Ref: Negative) negative    ED Urine Macroscopic [[id number]]  (Abnormal) Collected:  08/22/18 0015    Lab Status:  Final result Specimen:  Urine Updated:  08/22/18 0007     Color, UA Yellow     Clarity, UA Clear     pH, UA 6 0     Leukocytes, UA Negative     Nitrite, UA Negative     Protein, UA 30 (1+) (A) mg/dl      Glucose, UA Negative mg/dl      Ketones, UA 15 (1+) (A) mg/dl      Urobilinogen, UA 4 0 (A) E U /dl      Bilirubin, UA Interference- unable to analyze (A)     Blood, UA Negative     Specific Gravity, UA >=1 030    Narrative:       CL[facility] RESULT                 CT abdomen pelvis with contrast   Final Result by Paulette Gomez DO (08/22 0134)      No acute inflammatory infectious process  Unchanged right pericardial enlarged lymph nodes  Workstation performed: [id number]                    Procedures  Procedures       Phone Contacts  ED Phone Contact    ED Course  ED Course as of Aug 22 0317   Tue Aug 21, 2018   2356 Pt seen and examined  40 yo female with 3 days of R sided abd pain RUQ > RLQ  Pt admits eating makes pain worse  Had sleeve by Dr Malick Llanes June 26 2018  Will give IVF, morphine and check labs, CT a/p - IV and oral      Wed Aug 22, 2018   0146 CT a/p - No acute inflammatory infectious process  Unchanged right pericardial enlarged lymph nodes  Status post gastric sleeve procedure unchanged from prior study  Gallbladder - No calcified gallstones  No pericholecystic inflammatory change                                  MDM  CritCare Time    Disposition  Final diagnoses:   Acute epigastric pain   RUQ abdominal pain   S/P laparoscopic sleeve gastrectomy     Time reflects when diagnosis was documented in both MDM as applicable and the Disposition within this note     Time User Action Codes Description Comment    8/22/2018  1:54 AM Jeanne Chung Add [R10 13] Acute epigastric pain     8/22/2018  1:54 AM Carey BREEN Add [R10 11] RUQ abdominal pain     8/22/2018  1:54 AM Orestes Banuelos Add [Q75 50] S/P laparoscopic sleeve gastrectomy       ED Disposition     ED Disposition Condition Comment    Discharge  Saranya Signs discharge to home/self care      Condition at discharge: Good        Follow-up Information     Follow up With Specialties Details Why Gina Ortega MD General Surgery, Bariatrics Schedule an appointment as soon as possible for a visit to discuss possible EGD [facility] [address]  [phone]            Discharge Medication List as of 8/22/2018  1:55 AM      START taking these medications    Details   sucralfate (CARAFATE) 1 g/10 mL suspension Take 10 mL (1 g total) by mouth 4 (four) times a day (with meals and at bedtime), Starting Wed 8/22/2018, Print         CONTINUE these medications which have NOT CHANGED    Details   busPIRone (BUSPAR) 15 mg tablet Take 15 mg by mouth 2 (two) times a day  , Starting Mon 4/16/2018, Historical Med      butalbital-acetaminophen-caffeine (FIORICET,ESGIC) -40 mg per tablet Take 1 tablet by mouth every 4 (four) hours as needed for headaches, Starting Fri 3/23/2018, Print      D-3-5 5000 units capsule Starting Tue 7/10/2018, Historical Med      FLUoxetine (PROzac) 20 mg capsule Take 20 mg by mouth 2 (two) times a day  , Starting Mon 4/16/2018, Historical Med      gabapentin (NEURONTIN) 800 mg tablet Take 1 tablet (800 mg total) by mouth 3 (three) times a day, Starting Wed 7/11/2018, Normal      omeprazole (PriLOSEC) 20 mg delayed release capsule Take 1 capsule (20 mg total) by mouth daily for 90 days, Starting Thu 6/14/2018, Until Wed 9/12/2018, Normal      ondansetron (ZOFRAN-ODT) 4 mg disintegrating tablet Take 1 tablet (4 mg total) by mouth every 6 (six) hours as needed for nausea or vomiting, Starting Sat 7/21/2018, Print      simvastatin (ZOCOR) 40 mg tablet 40 mg daily at bedtime  , Starting Mon 5/7/2018, Historical Med      tiZANidine (ZANAFLEX) 2 mg tablet Take 1-2 tablets every 8 hours as needed for muscle spasm, Normal      TOUJEO SOLOSTAR 300 units/mL CONCETRATED U-300 injection pen Inject 30 Units under the skin daily at bedtime, Starting Mon 6/18/2018, Historical Med      traZODone (DESYREL) 100 mg tablet Take 1 tablet by mouth daily at bedtime, Starting 2/4/2017, Until Discontinued, No Print           No discharge procedures on file      ED Provider  Electronically Signed by           Grzegorz Austin DO  08/22/18 1052

## 2018-08-24 ENCOUNTER — OFFICE VISIT (OUTPATIENT)
Dept: GASTROENTEROLOGY | Facility: MEDICAL CENTER | Age: 38
End: 2018-08-24
Payer: COMMERCIAL

## 2018-08-24 VITALS
DIASTOLIC BLOOD PRESSURE: 60 MMHG | TEMPERATURE: 97.8 F | SYSTOLIC BLOOD PRESSURE: 120 MMHG | WEIGHT: 197 LBS | HEART RATE: 97 BPM | HEIGHT: 62 IN | BODY MASS INDEX: 36.25 KG/M2

## 2018-08-24 DIAGNOSIS — R10.11 RIGHT UPPER QUADRANT ABDOMINAL PAIN: Primary | ICD-10-CM

## 2018-08-24 PROCEDURE — 99214 OFFICE O/P EST MOD 30 MIN: CPT | Performed by: INTERNAL MEDICINE

## 2018-08-24 RX ORDER — DICYCLOMINE HYDROCHLORIDE 10 MG/1
10 CAPSULE ORAL 3 TIMES DAILY PRN
Qty: 30 CAPSULE | Refills: 1 | Status: SHIPPED | OUTPATIENT
Start: 2018-08-24

## 2018-08-24 NOTE — PROGRESS NOTES
[facility] - Outpatient Follow-up Note  Godwin Miranda 41 y o  female MRN: [id number]  Encounter: [id number]          ASSESSMENT AND PLAN:      1  Right upper quadrant abdominal pain  - Likely to be functional pain  I discussed with the patient and  regarding the benefits and risks of amitriptyline  Patient has already been taking Buspar and trazodone  They are hesitating to take another antidepressant for the intermittent functional pain  - We will try bentyl to see whether that can relieve the pain  If patient does not respond, we will repeat EGD to rule out PUD  -  Follow up in 3 months      ______________________________________________________________________    SUBJECTIVE:  61-year-old female presented for follow-up on right side abdominal pain  Patient received sleeve gastrectomy in June  She lost 45 lbs  She received 3 CT scans due to abdominal pain after the surgery with negative findings  She had chronic right side abdominal pressure on and off  She went to the emergency room last week for similar pain  Repeat CT scan was negative  She had endoscopy prior to her sleeve gastrectomy which was normal  She had a colonoscopy with me before sleeve gastrectomy which was also normal  She had right upper quadrant ultrasound last year for the pain with no gallstone disease  Pain is not associated with food intake  Patient could not tell the trigger  Pain usually subsides  Patient was initially seen for abnormal LFTs by us  It is likely secondary to DILI when she was taking herbal supplements but her LFTs has normalized after she stopped taking the herbal supplement  REVIEW OF SYSTEMS IS OTHERWISE NEGATIVE        Historical Information   Past Medical History:   Diagnosis Date    Abdominal pain     Abnormal cells of cervix     Abnormal EKG     Acute headache     Amenorrhea     Anxiety     Back pain     Chronic low back pain     Chronic pain syndrome  Constipation     Contact dermatitis     Depression     Dermoid cyst of right lower extremity     Diabetes mellitus (HCC)     Drug-induced hepatic toxicity     Elevated liver function tests     Gastritis     GERD (gastroesophageal reflux disease)     Hypercholesterolemia     Hyperlipidemia     Left hip pain     Liver disease     drug induced hepatic toxicity    Liver function study, abnormal     Lumbar degenerative disc disease     Lumbar radiculopathy     Morbid obesity (HCC)     Neuropathy     Normal vaginal delivery     Tendinitis of left ankle     Vitamin D deficiency     Wears glasses      Past Surgical History:   Procedure Laterality Date    ABDOMINAL SURGERY       SECTION      COLONOSCOPY N/A 2018    Procedure: COLONOSCOPY;  Surgeon: Faiza Georges MD;  Location[facility]; Service: Gastroenterology    ESOPHAGOGASTRODUODENOSCOPY N/A 2018    Procedure: ESOPHAGOGASTRODUODENOSCOPY (EGD) with BIOPSY;  Surgeon: Charley Waller MD;  Location: [place] [facility];   Service: Bariatrics    LIVER BIOPSY      [place] LAP, APOLINAR RESTRICT PROC, LONGITUDINAL GASTRECTOMY N/A 2018    Procedure: GASTRECTOMY SLEEVE LAPAROSCOPIC AND INTRAOPERATIVE EGD;  Surgeon: Yadira Luz MD;  Location: [facility] OR;  Service: [place] [facility] TUBAL LIGATION      2017 Patient reports she had a tubal ligation 12 yrs ago in [place] but doesn't know the extent of the tubal   This is the first time she mentioned this procedure, added to surgical list   nw     Social History   History   Alcohol Use    Yes     History   Drug Use No     History   Smoking Status    Never Smoker   Smokeless Tobacco    Never Used     Comment: Onset Date:  18     Family History   Problem Relation Age of Onset    Diabetes Mother     Hyperlipidemia Mother     Pancreatitis Father     Thyroid disease Paternal Aunt     Lung cancer Maternal Grandfather     Heart disease Neg Hx     Stroke Neg Hx Meds/Allergies       Current Outpatient Prescriptions:     busPIRone (BUSPAR) 15 mg tablet    butalbital-acetaminophen-caffeine (FIORICET,ESGIC) -40 mg per tablet    D-3-5 5000 units capsule    dicyclomine (BENTYL) 10 mg capsule    FLUoxetine (PROzac) 20 mg capsule    gabapentin (NEURONTIN) 800 mg tablet    omeprazole (PriLOSEC) 20 mg delayed release capsule    ondansetron (ZOFRAN-ODT) 4 mg disintegrating tablet    simvastatin (ZOCOR) 40 mg tablet    sucralfate (CARAFATE) 1 g/10 mL suspension    tiZANidine (ZANAFLEX) 2 mg tablet    TOUJEO SOLOSTAR 300 units/mL CONCETRATED U-300 injection pen    traZODone (DESYREL) 100 mg tablet    Allergies   Allergen Reactions    Atorvastatin      Elevated liver levels    Other      Herbal Life,,,caused elevated liver levels           Objective     Blood pressure 120/60, pulse 97, temperature 97 8 °F (36 6 °C), temperature source Tympanic, height 5' 2" (1 575 m), weight 89 4 kg (197 lb), last menstrual period 07/31/2018, not currently breastfeeding  Body mass index is 36 03 kg/m²  PHYSICAL EXAM:      General Appearance:   Alert, cooperative, no distress   HEENT:   Normocephalic, atraumatic, anicteric      Neck:  Supple, symmetrical, trachea midline   Lungs:   Clear to auscultation bilaterally; no rales, rhonchi or wheezing; respirations unlabored    Heart[de-identified]   Regular rate and rhythm; no murmur, rub, or gallop  Abdomen:   Soft, non-tender, non-distended; normal bowel sounds; no masses, no organomegaly    Genitalia:   Deferred    Rectal:   Deferred    Extremities:  No cyanosis, clubbing or edema    Pulses:  2+ and symmetric    Skin:  No jaundice, rashes, or lesions    Lymph nodes:  No palpable cervical lymphadenopathy        Lab Results:   No visits with results within 1 Day(s) from this visit     Latest known visit with results is:   Admission on 08/21/2018, Discharged on 08/22/2018   Component Date Value    Color, UA 08/22/2018 yellow     EXT PREG TEST UR (Ref: N* 08/22/2018 negative     Color, UA 08/22/2018 Yellow     Clarity, UA 08/22/2018 Clear     pH, UA 08/22/2018 6 0     Leukocytes, UA 08/22/2018 Negative     Nitrite, UA 08/22/2018 Negative     Protein, UA 08/22/2018 30 (1+)*    Glucose, UA 08/22/2018 Negative     Ketones, UA 08/22/2018 15 (1+)*    Urobilinogen, UA 08/22/2018 4 0*    Bilirubin, UA 08/22/2018 Interference- unable to analyze*    Blood, UA 08/22/2018 Negative     Specific Gravity, UA 08/22/2018 >=1 030     RBC, UA 08/22/2018 0-1*    WBC, UA 08/22/2018 0-1*    Epithelial Cells 08/22/2018 Occasional     Bacteria, UA 08/22/2018 Occasional     Hyaline Casts, UA 08/22/2018 0-1*    MUCOUS THREADS 08/22/2018 Moderate*    WBC 08/22/2018 7 37     RBC 08/22/2018 3 64*    Hemoglobin 08/22/2018 10 7*    Hematocrit 08/22/2018 32 9*    MCV 08/22/2018 90     MCH 08/22/2018 29 4     MCHC 08/22/2018 32 5     RDW 08/22/2018 13 6     MPV 08/22/2018 10 9     Platelets 67/01/5922 225     nRBC 08/22/2018 0     Neutrophils Relative 08/22/2018 45     Lymphocytes Relative 08/22/2018 43     Monocytes Relative 08/22/2018 10     Eosinophils Relative 08/22/2018 2     Basophils Relative 08/22/2018 0     Neutrophils Absolute 08/22/2018 3 33     Lymphocytes Absolute 08/22/2018 3 15     Monocytes Absolute 08/22/2018 0 73     Eosinophils Absolute 08/22/2018 0 13     Basophils Absolute 08/22/2018 0 03     Sodium 08/22/2018 138     Potassium 08/22/2018 3 3*    Chloride 08/22/2018 103     CO2 08/22/2018 30     Anion Gap 08/22/2018 5     BUN 08/22/2018 9     Creatinine 08/22/2018 0 68     Glucose 08/22/2018 112     Calcium 08/22/2018 9 3     AST 08/22/2018 23     ALT 08/22/2018 29     Alkaline Phosphatase 08/22/2018 91     Total Protein 08/22/2018 7 5     Albumin 08/22/2018 3 5     Total Bilirubin 08/22/2018 0 26     eGFR 08/22/2018 113     Lipase 08/22/2018 173          Radiology Results:   Ct Abdomen Pelvis With Contrast    Result Date: 8/22/2018  Narrative: CT ABDOMEN AND PELVIS WITH IV CONTRAST INDICATION:   R sided abd pain, sleeve june 26 2012 by Dr Mango Love - lost 45 lbs  COMPARISON:  August 7, 2018  TECHNIQUE:  CT examination of the abdomen and pelvis was performed  Axial, sagittal, and coronal 2D reformatted images were created from the source data and submitted for interpretation  Radiation dose length product (DLP) for this visit:  1089 mGy-cm   This examination, like all CT scans performed in the [facility], was performed utilizing techniques to minimize radiation dose exposure, including the use of iterative reconstruction and automated exposure control  IV Contrast:  25 mL of iohexol (OMNIPAQUE) 100 mL of iohexol (OMNIPAQUE) Enteric Contrast:  Enteric contrast was administered  FINDINGS: ABDOMEN LOWER CHEST:  Unchanged right pericardial lymph nodes are visualized  LIVER/BILIARY TREE:  Unremarkable  GALLBLADDER:  No calcified gallstones  No pericholecystic inflammatory change  SPLEEN:  Unremarkable  PANCREAS:  Unremarkable  ADRENAL GLANDS:  Unremarkable  KIDNEYS/URETERS:  Unchanged fatty lesion in the left upper pole STOMACH AND BOWEL:  Status post gastric sleeve procedure unchanged from prior study  APPENDIX:  No findings to suggest appendicitis  ABDOMINOPELVIC CAVITY:  No ascites or free intraperitoneal air  No lymphadenopathy  VESSELS:  Unremarkable for patient's age  PELVIS REPRODUCTIVE ORGANS:  Unremarkable for patient's age  URINARY BLADDER:  Unremarkable  ABDOMINAL WALL/INGUINAL REGIONS:  Small fluid containing umbilical wall hernia unchanged from prior study  OSSEOUS STRUCTURES:  No acute fracture or destructive osseous lesion  Impression: No acute inflammatory infectious process  Unchanged right pericardial enlarged lymph nodes   Workstation performed: [id number]     Ct Abdomen Pelvis With Contrast    Result Date: 8/7/2018  Narrative: CT ABDOMEN AND PELVIS WITH IV CONTRAST INDICATION: abd pain  COMPARISON:  7/19/2018 TECHNIQUE:  CT examination of the abdomen and pelvis was performed  Axial, sagittal, and coronal 2D reformatted images were created from the source data and submitted for interpretation  Radiation dose length product (DLP) for this visit:  729 mGy-cm   This examination, like all CT scans performed in the [facility], was performed utilizing techniques to minimize radiation dose exposure, including the use of iterative reconstruction and automated exposure control  IV Contrast:  25 mL of iohexol (OMNIPAQUE) 100 mL of iohexol (OMNIPAQUE) Enteric Contrast:  Enteric contrast was administered  FINDINGS: ABDOMEN LOWER CHEST:  Lung bases appear clear  Right pericardial probable enlarged lymph nodes redemonstrated measuring up to 12 x 9 mm, stable  LIVER/BILIARY TREE:  Unremarkable  GALLBLADDER:  No calcified gallstones  No pericholecystic inflammatory change  SPLEEN:  Unremarkable  PANCREAS:  Unremarkable  ADRENAL GLANDS:  Unremarkable  KIDNEYS/URETERS:  Tiny fatty lesion left upper renal pole on 2/24 appears stable and remains consistent with angiomyolipoma  STOMACH AND BOWEL:  Status post gastric sleeve procedure appearing stable  APPENDIX:  No findings to suggest appendicitis  ABDOMINOPELVIC CAVITY:  No ascites or free intraperitoneal air  No lymphadenopathy  VESSELS:  Unremarkable for patient's age  PELVIS REPRODUCTIVE ORGANS:  Unremarkable for patient's age  URINARY BLADDER:  Unremarkable  ABDOMINAL WALL/INGUINAL REGIONS:  Unremarkable  OSSEOUS STRUCTURES:  No acute fracture or destructive osseous lesion  Impression: 1  No acute inflammatory or infectious process  2   Right pericardial enlarged lymph nodes redemonstrated, nonspecific  3   Status post gastric sleeve procedure appears unchanged without leak   Workstation performed: [id number]

## 2018-08-27 ENCOUNTER — HOSPITAL ENCOUNTER (OUTPATIENT)
Dept: NON INVASIVE DIAGNOSTICS | Facility: HOSPITAL | Age: 38
Discharge: HOME | End: 2018-08-27

## 2018-08-27 ENCOUNTER — TRANSCRIBE ORDERS (OUTPATIENT)
Dept: ADMINISTRATIVE | Facility: HOSPITAL | Age: 38
End: 2018-08-27

## 2018-08-27 DIAGNOSIS — Z90.2 ACQUIRED ABSENCE OF LUNG: ICD-10-CM

## 2018-08-27 DIAGNOSIS — Z90.2 ACQUIRED ABSENCE OF LUNG: Primary | ICD-10-CM

## 2018-08-30 ENCOUNTER — OFFICE VISIT (OUTPATIENT)
Dept: PAIN MEDICINE | Facility: CLINIC | Age: 38
End: 2018-08-30
Payer: COMMERCIAL

## 2018-08-30 VITALS
TEMPERATURE: 98.2 F | DIASTOLIC BLOOD PRESSURE: 64 MMHG | BODY MASS INDEX: 35.7 KG/M2 | HEIGHT: 62 IN | SYSTOLIC BLOOD PRESSURE: 124 MMHG | HEART RATE: 60 BPM | WEIGHT: 194 LBS

## 2018-08-30 DIAGNOSIS — M54.50 CHRONIC BILATERAL LOW BACK PAIN WITHOUT SCIATICA: ICD-10-CM

## 2018-08-30 DIAGNOSIS — M79.18 MYOFASCIAL PAIN SYNDROME: ICD-10-CM

## 2018-08-30 DIAGNOSIS — M54.16 LUMBAR RADICULOPATHY: ICD-10-CM

## 2018-08-30 DIAGNOSIS — M51.36 LUMBAR DEGENERATIVE DISC DISEASE: Primary | ICD-10-CM

## 2018-08-30 DIAGNOSIS — M47.816 LUMBAR SPONDYLOSIS: ICD-10-CM

## 2018-08-30 DIAGNOSIS — M79.18 MYOFASCIAL PAIN: ICD-10-CM

## 2018-08-30 DIAGNOSIS — Z98.84 S/P LAPAROSCOPIC SLEEVE GASTRECTOMY: ICD-10-CM

## 2018-08-30 DIAGNOSIS — G89.4 CHRONIC PAIN DISORDER: ICD-10-CM

## 2018-08-30 DIAGNOSIS — G89.29 CHRONIC BILATERAL LOW BACK PAIN WITHOUT SCIATICA: ICD-10-CM

## 2018-08-30 PROCEDURE — 99214 OFFICE O/P EST MOD 30 MIN: CPT | Performed by: NURSE PRACTITIONER

## 2018-08-30 RX ORDER — TIZANIDINE 2 MG/1
TABLET ORAL
Qty: 180 TABLET | Refills: 2 | Status: SHIPPED | OUTPATIENT
Start: 2018-08-30

## 2018-08-30 RX ORDER — GABAPENTIN 800 MG/1
800 TABLET ORAL 3 TIMES DAILY
Qty: 90 TABLET | Refills: 2 | Status: SHIPPED | OUTPATIENT
Start: 2018-08-30

## 2018-08-30 RX ORDER — DOCUSATE SODIUM 100 MG
CAPSULE ORAL
COMMUNITY
Start: 2018-08-06

## 2018-08-30 NOTE — PROGRESS NOTES
Assessment:  1  Lumbar degenerative disc disease    2  Myofascial pain syndrome    3  Chronic pain disorder    4  Chronic bilateral low back pain without sciatica    5  S/P laparoscopic sleeve gastrectomy    6  Lumbar spondylosis    7  Lumbar radiculopathy    8  Myofascial pain        Plan:  1  We will schedule the patient for a bilateral L3-5 medial branch blocks with intention of moving forward towards radiofrequency ablation if there is an appropriate diagnostic response  The initial blocks will be performed with 2% lidocaine and if an appropriate response is obtained upon review of the patient's pain diary, a confirmatory block will be scheduled  Complete risks and benefits including bleeding, infection, tissue reaction, nerve injury and allergic reaction were discussed  The approach was demonstrated using models and literature was provided  I did discuss with the patient that this procedure will only help with her axial lumbosacral back pain Verbal consent was obtained  2   The patient does complain of bilateral lower extremity pain, numbness and tingling, which is likely secondary to diabetic neuropathy as her EMG does show mild peripheral neuropathy with no evidence of lumbar radiculopathy  She did not have favorable results to previous LESI therefore I do not believe it would be beneficial to repeat these injections  The patient was agreeable and verbalized an understanding  3   The patient will continue on gabapentin 800 milligrams t i d  for neuropathic pain complaints as prescribed  This medication was refilled today  4   The patient can continue on tizanidine 2 milligrams 1-2 tablets every 8 hours as needed for myofascial pain  This medication was refilled today  5   I will avoid Duloxetine secondary to the patient being on fluoxetine  6  I will avoid NSAIDs secondary to bariatric surgery  7  The patient will continue with her home exercise program  8    If patient's lower extremity symptoms continue to worsen, can consider updating MRI of lumbar spine and surgical referral  9  The patient will follow-up in 12 weeks for medication prescription refill and reevaluation  The patient was advised to contact the office should their symptoms worsen in the interim  The patient was agreeable and verbalized an understanding  [id number] Prescription Drug Monitoring Program report was reviewed and was appropriate     History of Present Illness: The patient is mostly Scottish-speaking  We did offer  services, however she wish to have her  interpret this office visit  The patient is a 41 y o  female with a history of diabetes last seen on 7/11/18 who presents for a follow up office visit in regards to chronic pain secondary to degenerative disc disease at L4-5 and spondylosis  There is also partial sacralization of the L5 vertebrae  She has not had favorable responses to recent LESI  She does have an EMG of the lower extremities which does show mild peripheral neuropathy, likely secondary to diabetes  She has recently had bariatric surgery and is status post laparoscopic sleeve gastrectomy and states there were no complications  She is hopeful that this will help her diabetes, however I did explain that the neuropathy in her legs may possibly be permanent  The patient currently reports at this office visit that her lumbosacral axial back pain is her biggest complaint  Does complain of numbness and tingling in the bilateral lower extremities  She denies any bowel or bladder incontinence or saddle anesthesia  She rates her pain as 7-8/10 on the numeric pain rating scale  She states the pain is constant in nature and bothersome throughout the entirety of the day  She characterizes the pain as sharp, cramping, pressure like and pins and needles      Current pain medications includes:  Gabapentin 800 milligrams t i d and, tizanidine 2 milligrams 1-2 tablets every 8 hours as needed for myofascial pain  The patient reports that this regimen is providing moderate pain relief  The patient is reporting no side effects from this pain medication regimen  She was previously prescribed diclofenac, however this medication has been discontinued as she recently had a laparoscopic sleeve gastrectomy  I have personally reviewed and/or updated the patient's past medical history, past surgical history, family history, social history, current medications, allergies, and vital signs today  Review of Systems:    Review of Systems   Respiratory: Negative for shortness of breath  Cardiovascular: Negative for chest pain  Gastrointestinal: Negative for constipation, diarrhea, nausea and vomiting  Musculoskeletal: Negative for arthralgias, gait problem, joint swelling and myalgias  Skin: Negative for rash  Neurological: Negative for dizziness, seizures and weakness  All other systems reviewed and are negative          Past Medical History:   Diagnosis Date    Abdominal pain     Abnormal cells of cervix     Abnormal EKG     Acute headache     Amenorrhea     Anxiety     Back pain     Chronic low back pain     Chronic pain syndrome     Constipation     Contact dermatitis     Depression     Dermoid cyst of right lower extremity     Diabetes mellitus (HCC)     Drug-induced hepatic toxicity     Elevated liver function tests     Gastritis     GERD (gastroesophageal reflux disease)     Hypercholesterolemia     Hyperlipidemia     Left hip pain     Liver disease     drug induced hepatic toxicity    Liver function study, abnormal     Lumbar degenerative disc disease     Lumbar radiculopathy     Morbid obesity (HCC)     Neuropathy     Normal vaginal delivery     Tendinitis of left ankle     Vitamin D deficiency     Wears glasses        Past Surgical History:   Procedure Laterality Date    ABDOMINAL SURGERY       SECTION      COLONOSCOPY N/A 5/1/2018    Procedure: COLONOSCOPY;  Surgeon: Bela Flor MD;  Location: [facility] GI [facility]; Service: Gastroenterology    ESOPHAGOGASTRODUODENOSCOPY N/A 2/7/2018    Procedure: ESOPHAGOGASTRODUODENOSCOPY (EGD) with BIOPSY;  Surgeon: Lauren Hinson MD;  Location: [facility]; Service: Bariatrics    LIVER BIOPSY      [place] LAP, APOLINAR RESTRICT PROC, LONGITUDINAL GASTRECTOMY N/A 6/26/2018    Procedure: GASTRECTOMY SLEEVE LAPAROSCOPIC AND INTRAOPERATIVE EGD;  Surgeon: Amadeo Fernandes MD;  [facility] OR;  Service: [facility] TUBAL LIGATION      11/17/2017 Patient reports she had a tubal ligation 12 yrs ago in [place] but doesn't know the extent of the tubal   This is the first time she mentioned this procedure, added to surgical list   nw       Family History   Problem Relation Age of Onset    Diabetes Mother     Hyperlipidemia Mother     Pancreatitis Father     Thyroid disease Paternal Aunt     Lung cancer Maternal Grandfather     Heart disease Neg Hx     Stroke Neg Hx        Social History     Occupational History    Not on file       Social History Main Topics    Smoking status: Never Smoker    Smokeless tobacco: Never Used      Comment: Onset Date:  1/23/18    Alcohol use Yes    Drug use: No    Sexual activity: Not on file         Current Outpatient Prescriptions:     busPIRone (BUSPAR) 15 mg tablet, Take 15 mg by mouth 2 (two) times a day  , Disp: , Rfl:     butalbital-acetaminophen-caffeine (FIORICET,ESGIC) -40 mg per tablet, Take 1 tablet by mouth every 4 (four) hours as needed for headaches, Disp: 30 tablet, Rfl: 0    D-3-5 5000 units capsule, , Disp: , Rfl:     dicyclomine (BENTYL) 10 mg capsule, Take 1 capsule (10 mg total) by mouth 3 (three) times a day as needed (right side abdominal pain), Disp: 30 capsule, Rfl: 1    FLUoxetine (PROzac) 20 mg capsule, Take 20 mg by mouth 2 (two) times a day  , Disp: , Rfl:     gabapentin (NEURONTIN) 800 mg tablet, Take 1 tablet (800 mg total) by mouth 3 (three) times a day, Disp: 90 tablet, Rfl: 2    omeprazole (PriLOSEC) 20 mg delayed release capsule, Take 1 capsule (20 mg total) by mouth daily for 90 days (Patient taking differently: Take 20 mg by mouth 2 (two) times a day  ), Disp: 90 capsule, Rfl: 3    ondansetron (ZOFRAN-ODT) 4 mg disintegrating tablet, Take 1 tablet (4 mg total) by mouth every 6 (six) hours as needed for nausea or vomiting, Disp: 20 tablet, Rfl: 0    simvastatin (ZOCOR) 40 mg tablet, 40 mg daily at bedtime  , Disp: , Rfl:     STOOL SOFTENER 100 MG capsule, , Disp: , Rfl:     sucralfate (CARAFATE) 1 g/10 mL suspension, Take 10 mL (1 g total) by mouth 4 (four) times a day (with meals and at bedtime), Disp: 420 mL, Rfl: 0    tiZANidine (ZANAFLEX) 2 mg tablet, Take 1-2 tablets every 8 hours as needed for muscle spasm, Disp: 180 tablet, Rfl: 2    TOUJEO SOLOSTAR 300 units/mL CONCETRATED U-300 injection pen, Inject 30 Units under the skin daily at bedtime, Disp: , Rfl:     traZODone (DESYREL) 100 mg tablet, Take 1 tablet by mouth daily at bedtime, Disp: 30 tablet, Rfl: 0    Allergies   Allergen Reactions    Atorvastatin      Elevated liver levels    Other      Herbal Life,,,caused elevated liver levels       Physical Exam:    /64   Pulse 60   Temp 98 2 °F (36 8 °C)   Ht 5' 2" (1 575 m)   Wt 88 kg (194 lb)   LMP 07/31/2018 (Approximate)   BMI 35 48 kg/m²     Constitutional:Endomorphic body habitus  Eyes:anicteric  HEENT:grossly intact  Neck:supple, symmetric, trachea midline and no masses   Pulmonary:even and unlabored  Cardiovascular:No edema or pitting edema present  Skin:Normal without rashes or lesions and well hydrated  Psychiatric:Mood and affect appropriate  Neurologic:Cranial Nerves II-XII grossly intact  Musculoskeletal:Slightly antalgic but steady without the use of assistive devices   lumbar facet loading with bilateral rotation does reproduce the patient's pain complaint  Lumbar Spine Exam    Appearance:  Normal lordosis  Palpation/Tenderness:  left lumbar paraspinal tenderness  right lumbar paraspinal tenderness  Sensory:   Decreased sensation to light touch in the distal aspect of the bilateral lower extremities, more so on the right than left    Range of Motion:  Flexion:  Minimally limited  with pain  Extension:  Moderately limited  with pain  Rotation - Left:  Moderately limited  with pain  Rotation - Right:  Moderately limited  with pain  Motor Strength:   Strength 4/5 in all muscle groups of the bilateral lower extremities  Special Tests:  Positive bilateral seated straight leg raise      Imaging  FL spine and pain procedure    (Results Pending)         Orders Placed This Encounter   Procedures    [place] spine and pain procedure

## 2018-09-05 ENCOUNTER — HOSPITAL ENCOUNTER (EMERGENCY)
Facility: HOSPITAL | Age: 38
Discharge: HOME | End: 2018-09-05
Attending: EMERGENCY MEDICINE | Admitting: EMERGENCY MEDICINE
Payer: COMMERCIAL

## 2018-09-05 VITALS
TEMPERATURE: 97.8 F | DIASTOLIC BLOOD PRESSURE: 68 MMHG | RESPIRATION RATE: 14 BRPM | SYSTOLIC BLOOD PRESSURE: 118 MMHG | HEART RATE: 70 BPM | OXYGEN SATURATION: 99 %

## 2018-09-05 DIAGNOSIS — E86.0 DEHYDRATION: Primary | ICD-10-CM

## 2018-09-05 DIAGNOSIS — R10.84 GENERALIZED ABDOMINAL PAIN: ICD-10-CM

## 2018-09-05 DIAGNOSIS — E11.9 DIABETES (HCC): ICD-10-CM

## 2018-09-05 DIAGNOSIS — R51.9 HEADACHE: ICD-10-CM

## 2018-09-05 DIAGNOSIS — E66.01 MORBID OBESITY (HCC): ICD-10-CM

## 2018-09-05 LAB
ALBUMIN SERPL BCP-MCNC: 3.8 G/DL (ref 3.5–5)
ALP SERPL-CCNC: 81 U/L (ref 46–116)
ALT SERPL W P-5'-P-CCNC: 25 U/L (ref 12–78)
ANION GAP SERPL CALCULATED.3IONS-SCNC: 5 MMOL/L (ref 4–13)
APTT PPP: 33 SECONDS (ref 24–36)
AST SERPL W P-5'-P-CCNC: 17 U/L (ref 5–45)
BACTERIA UR QL AUTO: NORMAL /HPF
BASOPHILS # BLD AUTO: 0.02 THOUSANDS/ΜL (ref 0–0.1)
BASOPHILS NFR BLD AUTO: 0 % (ref 0–1)
BILIRUB SERPL-MCNC: 0.26 MG/DL (ref 0.2–1)
BILIRUB UR QL STRIP: ABNORMAL
BUN SERPL-MCNC: 13 MG/DL (ref 5–25)
CALCIUM SERPL-MCNC: 9.4 MG/DL (ref 8.3–10.1)
CHLORIDE SERPL-SCNC: 103 MMOL/L (ref 100–108)
CLARITY UR: CLEAR
CO2 SERPL-SCNC: 30 MMOL/L (ref 21–32)
COLOR UR: YELLOW
CREAT SERPL-MCNC: 0.76 MG/DL (ref 0.6–1.3)
EOSINOPHIL # BLD AUTO: 0.13 THOUSAND/ΜL (ref 0–0.61)
EOSINOPHIL NFR BLD AUTO: 2 % (ref 0–6)
ERYTHROCYTE [DISTWIDTH] IN BLOOD BY AUTOMATED COUNT: 13 % (ref 11.6–15.1)
EXT PREG TEST URINE: NEGATIVE
GFR SERPL CREATININE-BSD FRML MDRD: 101 ML/MIN/1.73SQ M
GLUCOSE SERPL-MCNC: 96 MG/DL (ref 65–140)
GLUCOSE UR STRIP-MCNC: NEGATIVE MG/DL
HCT VFR BLD AUTO: 34.1 % (ref 34.8–46.1)
HGB BLD-MCNC: 11.1 G/DL (ref 11.5–15.4)
HGB UR QL STRIP.AUTO: NEGATIVE
IMM GRANULOCYTES # BLD AUTO: 0.02 THOUSAND/UL (ref 0–0.2)
IMM GRANULOCYTES NFR BLD AUTO: 0 % (ref 0–2)
INR PPP: 1.02 (ref 0.86–1.17)
KETONES UR STRIP-MCNC: ABNORMAL MG/DL
LACTATE SERPL-SCNC: 0.8 MMOL/L (ref 0.5–2)
LEUKOCYTE ESTERASE UR QL STRIP: NEGATIVE
LIPASE SERPL-CCNC: 180 U/L (ref 73–393)
LYMPHOCYTES # BLD AUTO: 2.79 THOUSANDS/ΜL (ref 0.6–4.47)
LYMPHOCYTES NFR BLD AUTO: 39 % (ref 14–44)
MAGNESIUM SERPL-MCNC: 2.1 MG/DL (ref 1.6–2.6)
MCH RBC QN AUTO: 29.9 PG (ref 26.8–34.3)
MCHC RBC AUTO-ENTMCNC: 32.6 G/DL (ref 31.4–37.4)
MCV RBC AUTO: 92 FL (ref 82–98)
MONOCYTES # BLD AUTO: 0.58 THOUSAND/ΜL (ref 0.17–1.22)
MONOCYTES NFR BLD AUTO: 8 % (ref 4–12)
NEUTROPHILS # BLD AUTO: 3.59 THOUSANDS/ΜL (ref 1.85–7.62)
NEUTS SEG NFR BLD AUTO: 51 % (ref 43–75)
NITRITE UR QL STRIP: NEGATIVE
NON-SQ EPI CELLS URNS QL MICRO: NORMAL /HPF
NRBC BLD AUTO-RTO: 0 /100 WBCS
PH UR STRIP.AUTO: 5.5 [PH] (ref 4.5–8)
PLATELET # BLD AUTO: 246 THOUSANDS/UL (ref 149–390)
PMV BLD AUTO: 10.6 FL (ref 8.9–12.7)
POTASSIUM SERPL-SCNC: 3.8 MMOL/L (ref 3.5–5.3)
PROT SERPL-MCNC: 8.1 G/DL (ref 6.4–8.2)
PROT UR STRIP-MCNC: ABNORMAL MG/DL
PROTHROMBIN TIME: 13.5 SECONDS (ref 11.8–14.2)
RBC # BLD AUTO: 3.71 MILLION/UL (ref 3.81–5.12)
RBC #/AREA URNS AUTO: NORMAL /HPF
SODIUM SERPL-SCNC: 138 MMOL/L (ref 136–145)
SP GR UR STRIP.AUTO: 1.02 (ref 1–1.03)
UROBILINOGEN UR QL STRIP.AUTO: 4 E.U./DL
WBC # BLD AUTO: 7.13 THOUSAND/UL (ref 4.31–10.16)
WBC #/AREA URNS AUTO: NORMAL /HPF

## 2018-09-05 PROCEDURE — 83690 ASSAY OF LIPASE: CPT | Performed by: NURSE PRACTITIONER

## 2018-09-05 PROCEDURE — 85610 PROTHROMBIN TIME: CPT | Performed by: NURSE PRACTITIONER

## 2018-09-05 PROCEDURE — 83605 ASSAY OF LACTIC ACID: CPT | Performed by: NURSE PRACTITIONER

## 2018-09-05 PROCEDURE — 85025 COMPLETE CBC W/AUTO DIFF WBC: CPT | Performed by: NURSE PRACTITIONER

## 2018-09-05 PROCEDURE — 80053 COMPREHEN METABOLIC PANEL: CPT | Performed by: NURSE PRACTITIONER

## 2018-09-05 PROCEDURE — 81001 URINALYSIS AUTO W/SCOPE: CPT

## 2018-09-05 PROCEDURE — 83735 ASSAY OF MAGNESIUM: CPT | Performed by: NURSE PRACTITIONER

## 2018-09-05 PROCEDURE — 80307 DRUG TEST PRSMV CHEM ANLYZR: CPT | Performed by: NURSE PRACTITIONER

## 2018-09-05 PROCEDURE — 99284 EMERGENCY DEPT VISIT MOD MDM: CPT

## 2018-09-05 PROCEDURE — 96365 THER/PROPH/DIAG IV INF INIT: CPT

## 2018-09-05 PROCEDURE — 93005 ELECTROCARDIOGRAM TRACING: CPT

## 2018-09-05 PROCEDURE — 81025 URINE PREGNANCY TEST: CPT | Performed by: EMERGENCY MEDICINE

## 2018-09-05 PROCEDURE — 85730 THROMBOPLASTIN TIME PARTIAL: CPT | Performed by: NURSE PRACTITIONER

## 2018-09-05 PROCEDURE — 96375 TX/PRO/DX INJ NEW DRUG ADDON: CPT

## 2018-09-05 PROCEDURE — 36415 COLL VENOUS BLD VENIPUNCTURE: CPT | Performed by: NURSE PRACTITIONER

## 2018-09-05 PROCEDURE — 81003 URINALYSIS AUTO W/O SCOPE: CPT

## 2018-09-05 RX ORDER — DIPHENHYDRAMINE HYDROCHLORIDE 50 MG/ML
25 INJECTION INTRAMUSCULAR; INTRAVENOUS ONCE
Status: COMPLETED | OUTPATIENT
Start: 2018-09-05 | End: 2018-09-05

## 2018-09-05 RX ORDER — SUCRALFATE ORAL 1 G/10ML
1 SUSPENSION ORAL 4 TIMES DAILY
Qty: 420 ML | Refills: 0 | Status: SHIPPED | OUTPATIENT
Start: 2018-09-05

## 2018-09-05 RX ORDER — SUCRALFATE ORAL 1 G/10ML
1000 SUSPENSION ORAL ONCE
Status: COMPLETED | OUTPATIENT
Start: 2018-09-05 | End: 2018-09-05

## 2018-09-05 RX ORDER — ACETAMINOPHEN 325 MG/1
650 TABLET ORAL ONCE
Status: COMPLETED | OUTPATIENT
Start: 2018-09-05 | End: 2018-09-05

## 2018-09-05 RX ORDER — METOCLOPRAMIDE HYDROCHLORIDE 5 MG/ML
10 INJECTION INTRAMUSCULAR; INTRAVENOUS ONCE
Status: COMPLETED | OUTPATIENT
Start: 2018-09-05 | End: 2018-09-05

## 2018-09-05 RX ORDER — MAGNESIUM SULFATE HEPTAHYDRATE 40 MG/ML
2 INJECTION, SOLUTION INTRAVENOUS ONCE
Status: COMPLETED | OUTPATIENT
Start: 2018-09-05 | End: 2018-09-05

## 2018-09-05 RX ADMIN — SUCRALFATE 1000 MG: 1 SUSPENSION ORAL at 20:52

## 2018-09-05 RX ADMIN — DIPHENHYDRAMINE HYDROCHLORIDE 25 MG: 50 INJECTION, SOLUTION INTRAMUSCULAR; INTRAVENOUS at 20:35

## 2018-09-05 RX ADMIN — METOCLOPRAMIDE 10 MG: 5 INJECTION, SOLUTION INTRAMUSCULAR; INTRAVENOUS at 20:35

## 2018-09-05 RX ADMIN — SODIUM CHLORIDE 1000 ML: 0.9 INJECTION, SOLUTION INTRAVENOUS at 20:43

## 2018-09-05 RX ADMIN — ACETAMINOPHEN 650 MG: 325 TABLET, FILM COATED ORAL at 22:10

## 2018-09-05 RX ADMIN — MAGNESIUM SULFATE HEPTAHYDRATE 2 G: 40 INJECTION, SOLUTION INTRAVENOUS at 20:44

## 2018-09-06 ENCOUNTER — OFFICE VISIT (OUTPATIENT)
Dept: BARIATRICS | Facility: CLINIC | Age: 38
End: 2018-09-06

## 2018-09-06 VITALS
WEIGHT: 192.5 LBS | HEART RATE: 78 BPM | BODY MASS INDEX: 34.11 KG/M2 | RESPIRATION RATE: 18 BRPM | TEMPERATURE: 97.2 F | HEIGHT: 63 IN | DIASTOLIC BLOOD PRESSURE: 72 MMHG | SYSTOLIC BLOOD PRESSURE: 122 MMHG

## 2018-09-06 DIAGNOSIS — R10.84 GENERALIZED ABDOMINAL PAIN: ICD-10-CM

## 2018-09-06 DIAGNOSIS — E66.01 SEVERE OBESITY (BMI 35.0-39.9) WITH COMORBIDITY (HCC): Primary | ICD-10-CM

## 2018-09-06 LAB
AMPHETAMINES SERPL QL SCN: NEGATIVE
ATRIAL RATE: 60 BPM
BARBITURATES UR QL: NEGATIVE
BENZODIAZ UR QL: NEGATIVE
COCAINE UR QL: NEGATIVE
METHADONE UR QL: NEGATIVE
OPIATES UR QL SCN: NEGATIVE
P AXIS: 15 DEGREES
PCP UR QL: NEGATIVE
PR INTERVAL: 120 MS
QRS AXIS: 30 DEGREES
QRSD INTERVAL: 84 MS
QT INTERVAL: 432 MS
QTC INTERVAL: 432 MS
T WAVE AXIS: 12 DEGREES
THC UR QL: NEGATIVE
VENTRICULAR RATE: 60 BPM

## 2018-09-06 PROCEDURE — 93010 ELECTROCARDIOGRAM REPORT: CPT | Performed by: INTERNAL MEDICINE

## 2018-09-06 PROCEDURE — 99024 POSTOP FOLLOW-UP VISIT: CPT | Performed by: SURGERY

## 2018-09-06 NOTE — DISCHARGE INSTRUCTIONS
Your testing was negative for an acute process  You appear to be dehydrated  You are to increase water intake  You are to take the carafate as prescribed  Follow up with bariatrics tomorrow  Follow up with GI as scheduled  Dolor abdominal, cuidados ambulatorios   INFORMACIÓN GENERAL:   El dolor abdominal  puede ser sordo, molesto o [place]  Puede sentir dolor en omari kain del abdomen o en todo el abdomen  El dolor puede deberse a ciertos estados antoine estreñimiento, sensibilidad o intoxicación alimentaria, infección o omari obstrucción  Asimismo, el dolor abdominal puede deberse a omari hernia, apendicitis o úlcera  La causa del dolor abdominal puede ser desconocida  Busque cuidados inmediatos para los siguientes síntomas:   · [place] dolor de pecho o falta de aliento    · Dolor pulsátil en el abdomen superior o en la parte inferior de la espalda que de repente es guera    · Dolor que se localiza en el abdomen inferior derecho y empeora con el movimiento    · Fiebre por encima de 100 4°F (38°C) o escalofríos amadeo    · Vómito de todo lo que usted come y cynthia    · Dolor que no mejora o más bob empeora viv las siguientes 8 a 12 horas    · Jose E en el vómito o heces que se stephen negras y alquitranadas    · La piel o el peter de los ojos se vuelven amarillentos    · Grandes cantidades de sangrado vaginal que no es ross periodo menstrual  El tratamiento para el dolor abdominal  puede llegar a incluir medicamentos para calmar ross estómago, prevenir el vómito o disminuir el dolor  Programe omari rosa con ross proveedor de [employer] se le haya indicado: Anote emily preguntas para que se acuerde de hacerlas viv emily visitas  ACUERDOS SOBRE ROSS CUIDADO:   Usted tiene el derecho de participar en la planificación de ross cuidado  Aprenda todo lo que pueda sobre ross condición y antoine darle tratamiento  Discuta con emily médicos emily opciones de tratamiento para juntos decidir el cuidado que usted quiere recibir   Usted siempre tiene Russell Mom a rechazar stephen tratamiento  Esta información es sólo para uso en educación  Stephen intención no es darle un consejo médico sobre enfermedades o tratamientos  Colsulte con stephen Benuel Odom farmacéutico antes de seguir cualquier régimen médico para saber si es seguro y efectivo para usted  © 2014 [address] is for End User's use only and may not be sold, redistributed or otherwise used for commercial purposes  All illustrations and images included in CareNotes® are the copyrighted property of A D A M , Inc  or Benny Patton  Deshidratación   LO QUE NECESITA SABER:   La deshidratación es [employer] se produce cuando el cuerpo no tiene suficiente líquido  Usted podría deshidratarse si no juan suficiente agua o pierde demasiado líquido  La pérdida de líquido también podría provocar la pérdida de electrolitos (minerales), antoine el sodio  INSTRUCCIONES SOBRE EL [facility]:   Regrese a la patrice de emergencias si:   · Usted sufre omari convulsión  · Usted está confundido o no puede pensar con claridad  · Usted está muy soñoliento, u otra persona no puede despertarlo  · Usted se siente mareado o se desmaya al ponerse de pie  · Usted no puede orinar  · Usted tiene dificultad para respirar  · Stephen ritmo cardíaco es acelerado o irregular  · Tiene las beth o los pies fríos o palidez en la juliano  Pregúntele a stephen Clotilde Punch vitaminas y minerales son adecuados para usted  · Usted tiene dificultad para jerri líquidos porque tiene vómitos  · Emily síntomas empeoran  · Usted tiene fiebre  · Usted se siente muy débil o cansado  · Usted tiene preguntas o inquietudes acerca de stephen condición o cuidado  Acuda a emily consultas de control con stephen médico según le indicaron  Anote emily preguntas para que se acuerde de hacerlas viv emliy visitas  Prevenga o controle la deshidratación:   · [place] líquidos antoine se le haya indicado    Los líquidos que contienen agua, azúcar y minerales pueden contribuir a que stephen organismo retenga líquido y evitar que se deshidrate  Consuma líquido a lo monique del día y no solamente cuando tenga sed  Los hombres deben jerri aproximadamente 3 litros de líquido al día (13 vasos de ocho onzas)  Las mujeres deben jerri aproximadamente 2 litros de líquido al día (9 vasos de ocho onzas o 250 ml)  Mara incluso más líquido si realiza actividades al [employer], si se encuentra al sol viv un período prolongado de [place] o está practicando STEINKREUZ  · Manténgase fresco   Limite la cantidad de tiempo que pasa al aire nona viv las horas mas calurosas del día  Vístase con [place]uel [place] y fresca  · Mantenga un registro de la frecuencia con que orina  Si orina menos de lo usual o stephen orina es más Daniel, mara más líquidos  © 2017 [facility] Information is for End User's use only and may not be sold, redistributed or otherwise used for commercial purposes  All illustrations and images included in CareNotes® are the copyrighted property of A D A M , Inc  or Benny Patton  Esta información es sólo para uso en educación  Stephen intención no es darle un consejo médico sobre enfermedades o tratamientos  Colsulte con stephen Kaylyn Sullivan farmacéutico antes de seguir cualquier régimen médico para saber si es seguro y efectivo para usted

## 2018-09-06 NOTE — ED NOTES
Patient states the medications have not changed since the last time she was here       Cliff Gonzales, GREGORY  09/05/18 2002

## 2018-09-06 NOTE — PROGRESS NOTES
FOLLOW UP VISIT - BARIATRIC SURGERY  Juan Kelly 41 y o  female MRN: [id number]  Unit/Bed#:  Encounter: [id number]      HPI:  Juan Kelly is a 41 y o  female who presents with a history of sleeve gastrectomy in 2018  Here for follow-up of an ER visit for abdominal pain  Review of Systems    Historical Information   Past Medical History:   Diagnosis Date    Abdominal pain     Abnormal cells of cervix     Abnormal EKG     Acute headache     Amenorrhea     Anxiety     Back pain     Chronic low back pain     Chronic pain syndrome     Constipation     Contact dermatitis     Depression     Dermoid cyst of right lower extremity     Diabetes mellitus (HCC)     Drug-induced hepatic toxicity     Elevated liver function tests     Gastritis     GERD (gastroesophageal reflux disease)     Hypercholesterolemia     Hyperlipidemia     Left hip pain     Liver disease     drug induced hepatic toxicity    Liver function study, abnormal     Lumbar degenerative disc disease     Lumbar radiculopathy     Morbid obesity (HCC)     Neuropathy     Normal vaginal delivery     Postgastrectomy malabsorption     Tendinitis of left ankle     Vitamin D deficiency     Wears glasses      Past Surgical History:   Procedure Laterality Date    ABDOMINAL SURGERY       SECTION      COLONOSCOPY N/A 2018    Procedure: COLONOSCOPY;  Surgeon: Terrance Harris MD;  Location: [facility]; Service: Gastroenterology    ESOPHAGOGASTRODUODENOSCOPY N/A 2018    Procedure: ESOPHAGOGASTRODUODENOSCOPY (EGD) with BIOPSY;  Surgeon: Jigna Casey MD;  Location: [facility];   Service: Bariatrics    LIVER BIOPSY      MS LAP, APOLINAR RESTRICT PROC, LONGITUDINAL GASTRECTOMY N/A 2018    Procedure: GASTRECTOMY SLEEVE LAPAROSCOPIC AND INTRAOPERATIVE EGD;  Surgeon: Boone Bowen MD;  Location: [facility] OR;  Service: [address] [place] LIGATION      2017 Patient reports she had a tubal ligation 12 yrs ago in OR but doesn't know the extent of the tubal   This is the first time she mentioned this procedure, added to surgical list   nw     Social History   History   Alcohol Use    Yes     History   Drug Use No     History   Smoking Status    Never Smoker   Smokeless Tobacco    Never Used     Family History: non-contributory    Meds/Allergies   all medications and allergies reviewed  Allergies   Allergen Reactions    Atorvastatin      Elevated liver levels    Other      Herbal Life,,,caused elevated liver levels       Objective       Current Vitals:   Blood Pressure: 122/72 (09/06/18 1132)  Pulse: 78 (09/06/18 1132)  Temperature: (!) 97 2 °F (36 2 °C) (09/06/18 1132)  Respirations: 18 (09/06/18 1132)  Height: 5' 2 5" (158 8 cm) (09/06/18 1132)  Weight - Scale: 87 3 kg (192 lb 8 oz) (09/06/18 1132)        Invasive Devices          No matching active lines, drains, or airways          Physical Exam   Constitutional: She is oriented to person, place, and time  She appears well-developed  No distress  Abdominal: Soft  Bowel sounds are normal  She exhibits no distension and no mass  There is no tenderness  There is no rebound and no guarding  Abdomen is obese  Benign  Incisions are well healed  Neurological: She is alert and oriented to person, place, and time  Psychiatric: She has a normal mood and affect  Her behavior is normal  Judgment and thought content normal    Vitals reviewed  Lab Results: I have personally reviewed pertinent lab results  Imaging: I have personally reviewed pertinent reports  EKG, Pathology, and Other Studies: I have personally reviewed pertinent reports  Assessment/PLAN:    41 y o  female with a history of status post laparoscopic sleeve gastrectomy in June of 2018  She has lost 41% excess weight loss so far and 16% total body weight    She was seen a couple weeks ago and she was complaining of heartburn and epigastric discomfort  We have prescribed PPI and Carafate but unfortunately she has not been taking it  She is not exercising or taking her vitamins either  She tells me that she is drinking only 16 to 20 oz of water  I have instructed her again to increase her PPI to twice a day intake and Carafate  I have explained to her that she has to take her supplements, increase her fluid intake and her physical activity  I had a detailed discussion with her stressing the fact that long-term success is largely dependent on compliance and abidance to the recommendations of the program as well as participation within the support groups        She will follow up with us as scheduled for her 3 month appointment at the end of this month    Esteban Horton MD  9/6/2018  11:53 AM

## 2018-09-06 NOTE — ED PROVIDER NOTES
History  Chief Complaint   Patient presents with    Dizziness     pt reports dizziness and "almost passing out", abdominal pain and headache  symptoms x4 days  This is a 41year old female who had gastric sleeve surgery in June 2018 by Dr Dara Lorenz who comes to the ED for the 4th time in 6 weeks with c/o abdominal pain  She states that she has abdominal pain and finds it very difficult to eat as she has pain  She states she never started the carafate that was prescribed for her because she went to a GI doctor and they told her that they did not find anything different than the ED so she did not take it  She states she does take omeprazole  She states she had some diarrhea today  Pt denies that she has called Dr Dara Lorenz in the last 6 weeks regarding abdominal pain  She states she has seen [facility]  She also c/o headache and dizziness and almost passed out today  She states she has headaches and is on fioricet but this headache is different than her normal headaches  She states that she is nauseated but no vomiting   + photophobia  She   She is diabetic and was just recently started on zanaflex and gabapentin 8/30/18  History provided by:  Patient, spouse and medical records  History limited by:  Acuity of condition   used: No    Dizziness   Quality:  Lightheadedness  Severity:  Moderate  Onset quality:  Gradual  Duration:  2 days  Timing:  Constant  Chronicity:  New  Associated symptoms: diarrhea, headaches and nausea    Associated symptoms: no vomiting    Risk factors: new medications        Prior to Admission Medications   Prescriptions Last Dose Informant Patient Reported? Taking?    D-3-5 5000 units capsule   Yes Yes   FLUoxetine (PROzac) 20 mg capsule   Yes Yes   Sig: Take 20 mg by mouth 2 (two) times a day     STOOL SOFTENER 100 MG capsule   Yes Yes   TOUJEO SOLOSTAR 300 units/mL CONCETRATED U-300 injection pen   Yes Yes   Sig: Inject 30 Units under the skin daily at bedtime   busPIRone (BUSPAR) 15 mg tablet   Yes Yes   Sig: Take 15 mg by mouth 2 (two) times a day     butalbital-acetaminophen-caffeine (FIORICET,ESGIC) -40 mg per tablet   No Yes   Sig: Take 1 tablet by mouth every 4 (four) hours as needed for headaches   dicyclomine (BENTYL) 10 mg capsule   No Yes   Sig: Take 1 capsule (10 mg total) by mouth 3 (three) times a day as needed (right side abdominal pain)   gabapentin (NEURONTIN) 800 mg tablet   No Yes   Sig: Take 1 tablet (800 mg total) by mouth 3 (three) times a day   omeprazole (PriLOSEC) 20 mg delayed release capsule   No Yes   Sig: Take 1 capsule (20 mg total) by mouth daily for 90 days   Patient taking differently: Take 20 mg by mouth 2 (two) times a day     ondansetron (ZOFRAN-ODT) 4 mg disintegrating tablet   No Yes   Sig: Take 1 tablet (4 mg total) by mouth every 6 (six) hours as needed for nausea or vomiting   simvastatin (ZOCOR) 40 mg tablet   Yes Yes   Si mg daily at bedtime     sucralfate (CARAFATE) 1 g/10 mL suspension   No Yes   Sig: Take 10 mL (1 g total) by mouth 4 (four) times a day (with meals and at bedtime)   tiZANidine (ZANAFLEX) 2 mg tablet   No Yes   Sig: Take 1-2 tablets every 8 hours as needed for muscle spasm   traZODone (DESYREL) 100 mg tablet   No Yes   Sig: Take 1 tablet by mouth daily at bedtime      Facility-Administered Medications: None       Past Medical History:   Diagnosis Date    Abdominal pain     Abnormal cells of cervix     Abnormal EKG     Acute headache     Amenorrhea     Anxiety     Back pain     Chronic low back pain     Chronic pain syndrome     Constipation     Contact dermatitis     Depression     Dermoid cyst of right lower extremity     Diabetes mellitus (HCC)     Drug-induced hepatic toxicity     Elevated liver function tests     Gastritis     GERD (gastroesophageal reflux disease)     Hypercholesterolemia     Hyperlipidemia     Left hip pain     Liver disease     drug induced hepatic toxicity    Liver function study, abnormal     Lumbar degenerative disc disease     Lumbar radiculopathy     Morbid obesity (HCC)     Neuropathy     Normal vaginal delivery     Tendinitis of left ankle     Vitamin D deficiency     Wears glasses        Past Surgical History:   Procedure Laterality Date    ABDOMINAL SURGERY       SECTION      COLONOSCOPY N/A 2018    Procedure: COLONOSCOPY;  Surgeon: Anika Yusuf MD;  Location: [facility]; Service: Gastroenterology    ESOPHAGOGASTRODUODENOSCOPY N/A 2018    Procedure: ESOPHAGOGASTRODUODENOSCOPY (EGD) with BIOPSY;  Surgeon: Anabela Brooks MD;  Location: [facility]; Service: Bariatrics    LIVER BIOPSY      AL LAP, APOLINAR RESTRICT PROC, LONGITUDINAL GASTRECTOMY N/A 2018    Procedure: GASTRECTOMY SLEEVE LAPAROSCOPIC AND INTRAOPERATIVE EGD;  Surgeon: Ramos Sahni MD;  Location: [facility];  Service: [facility] TUBAL LIGATION      2017 Patient reports she had a tubal ligation 12 yrs ago in [address] [place] [address] Road but doesn't know the extent of the tubal   This is the first time she mentioned this procedure, added to surgical list   nw       Family History   Problem Relation Age of Onset    Diabetes Mother     Hyperlipidemia Mother     Pancreatitis Father     Thyroid disease Paternal Aunt     Lung cancer Maternal Grandfather     Heart disease Neg Hx     Stroke Neg Hx      I have reviewed and agree with the history as documented  Social History   Substance Use Topics    Smoking status: Never Smoker    Smokeless tobacco: Never Used      Comment: Onset Date:  18    Alcohol use Yes        Review of Systems   Constitutional: Negative  HENT: Negative  Eyes: Negative  Respiratory: Negative  Cardiovascular: Negative  Gastrointestinal: Positive for abdominal pain, diarrhea and nausea  Negative for vomiting  Endocrine: Negative  Genitourinary: Negative      Musculoskeletal: Positive for back pain  Skin: Negative  Neurological: Positive for dizziness and headaches  Hematological: Negative  Psychiatric/Behavioral: Negative  Physical Exam  Physical Exam   Constitutional: She is oriented to person, place, and time  She appears well-developed and well-nourished  No distress  HENT:   Head: Normocephalic and atraumatic  Eyes: EOM are normal  Pupils are equal, round, and reactive to light  Neck: Normal range of motion  Neck supple  Cardiovascular: Normal rate, regular rhythm and normal heart sounds  Pulmonary/Chest: Effort normal and breath sounds normal    Abdominal: Soft  Bowel sounds are normal  She exhibits no distension  There is tenderness  Musculoskeletal: Normal range of motion  She exhibits no edema, tenderness or deformity  Neurological: She is alert and oriented to person, place, and time  She displays normal reflexes  No cranial nerve deficit or sensory deficit  She exhibits normal muscle tone  Coordination normal    Skin: Skin is warm and dry  Capillary refill takes less than 2 seconds  She is not diaphoretic  Psychiatric: She has a normal mood and affect  Her behavior is normal  Judgment and thought content normal    Nursing note and vitals reviewed        Vital Signs  ED Triage Vitals   Temperature Pulse Respirations Blood Pressure SpO2   09/05/18 1947 09/05/18 1947 09/05/18 1947 09/05/18 1949 09/05/18 1947   97 8 °F (36 6 °C) 64 18 130/66 100 %      Temp Source Heart Rate Source Patient Position - Orthostatic VS BP Location FiO2 (%)   09/05/18 1947 09/05/18 1947 09/05/18 2020 09/05/18 2020 --   Temporal Monitor Lying - Orthostatic VS Right arm       Pain Score       09/05/18 2210       9           Vitals:    09/05/18 2027 09/05/18 2055 09/05/18 2100 09/05/18 2200   BP: 118/65 139/70 139/70 118/68   Pulse: 69 78 70 70   Patient Position - Orthostatic VS: Standing - Orthostatic VS Lying Lying Lying       Visual Acuity  Visual Acuity      Most Recent Value L Pupil Size (mm)  3   R Pupil Size (mm)  3          ED Medications  Medications   sodium chloride 0 9 % bolus 1,000 mL (0 mL Intravenous Stopped 9/5/18 2152)   diphenhydrAMINE (BENADRYL) injection 25 mg (25 mg Intravenous Given 9/5/18 2035)   magnesium sulfate 2 g/50 mL IVPB (premix) 2 g (0 g Intravenous Stopped 9/5/18 2137)   metoclopramide (REGLAN) injection 10 mg (10 mg Intravenous Given 9/5/18 2035)   sucralfate (CARAFATE) oral suspension 1,000 mg (1,000 mg Oral Given 9/5/18 2052)   acetaminophen (TYLENOL) tablet 650 mg (650 mg Oral Given 9/5/18 2210)       Diagnostic Studies  Results Reviewed     Procedure Component Value Units Date/Time    Lactic acid, plasma [[id number]]  (Normal) Collected:  09/05/18 2032    Lab Status:  Final result Specimen:  Blood from Arm, Left Updated:  09/05/18 2107     LACTIC ACID 0 8 mmol/L     Narrative:         Result may be elevated if tourniquet was used during collection  Comprehensive metabolic panel [[id number]] Collected:  09/05/18 2032    Lab Status:  Final result Specimen:  Blood from Arm, Left Updated:  09/05/18 2100     Sodium 138 mmol/L      Potassium 3 8 mmol/L      Chloride 103 mmol/L      CO2 30 mmol/L      ANION GAP 5 mmol/L      BUN 13 mg/dL      Creatinine 0 76 mg/dL      Glucose 96 mg/dL      Calcium 9 4 mg/dL      AST 17 U/L      ALT 25 U/L      Alkaline Phosphatase 81 U/L      Total Protein 8 1 g/dL      Albumin 3 8 g/dL      Total Bilirubin 0 26 mg/dL      eGFR 101 ml/min/1 73sq m     Narrative:         National Kidney Disease Education Program recommendations are as follows:  GFR calculation is accurate only with a steady state creatinine  Chronic Kidney disease less than 60 ml/min/1 73 sq  meters  Kidney failure less than 15 ml/min/1 73 sq  meters      Magnesium [[id number]]  (Normal) Collected:  09/05/18 2032    Lab Status:  Final result Specimen:  Blood from Arm, Left Updated:  09/05/18 2100     Magnesium 2 1 mg/dL     Lipase [[id number]]  (Normal) Collected: 09/05/18 2032    Lab Status:  Final result Specimen:  Blood from Arm, Left Updated:  09/05/18 2100     Lipase 180 u/L     Protime-INR [[id number]]  (Normal) Collected:  09/05/18 2032    Lab Status:  Final result Specimen:  Blood from Arm, Left Updated:  09/05/18 2058     Protime 13 5 seconds      INR 1 02    APTT [[id number]]  (Normal) Collected:  09/05/18 2032    Lab Status:  Final result Specimen:  Blood from Arm, Left Updated:  09/05/18 2058     PTT 33 seconds     CBC and differential [[id number]]  (Abnormal) Collected:  09/05/18 2032    Lab Status:  Final result Specimen:  Blood from Arm, Left Updated:  09/05/18 2047     WBC 7 13 Thousand/uL      RBC 3 71 (L) Million/uL      Hemoglobin 11 1 (L) g/dL      Hematocrit 34 1 (L) %      MCV 92 fL      MCH 29 9 pg      MCHC 32 6 g/dL      RDW 13 0 %      MPV 10 6 fL      Platelets 888 Thousands/uL      nRBC 0 /100 WBCs      Neutrophils Relative 51 %      Immat GRANS % 0 %      Lymphocytes Relative 39 %      Monocytes Relative 8 %      Eosinophils Relative 2 %      Basophils Relative 0 %      Neutrophils Absolute 3 59 Thousands/µL      Immature Grans Absolute 0 02 Thousand/uL      Lymphocytes Absolute 2 79 Thousands/µL      Monocytes Absolute 0 58 Thousand/µL      Eosinophils Absolute 0 13 Thousand/µL      Basophils Absolute 0 02 Thousands/µL     Urine Microscopic [[id number]]  (Normal) Collected:  09/05/18 2008    Lab Status:  Final result Specimen:  Urine from Urine, Clean Catch Updated:  09/05/18 2033     RBC, UA None Seen /hpf      WBC, UA None Seen /hpf      Epithelial Cells Occasional /hpf      Bacteria, UA Occasional /hpf     Rapid drug screen, urine [[id number]]     Lab Status:  No result Specimen:  Urine     POCT pregnancy, urine [[id number]]  (Normal) Resulted:  09/05/18 2000    Lab Status:  Final result Updated:  09/05/18 2000     EXT PREG TEST UR (Ref: Negative) NEGATIVE    POCT urinalysis dipstick [[id number]]  (Abnormal) Resulted:  09/05/18 2000    Lab Status:  Final result Specimen:  Urine from Urine, Other Updated:  09/05/18 2000    ED Urine Macroscopic [[id number]]  (Abnormal) Collected:  09/05/18 2008    Lab Status:  Final result Specimen:  Urine Updated:  09/05/18 1959     Color, UA Yellow     Clarity, UA Clear     pH, UA 5 5     Leukocytes, UA Negative     Nitrite, UA Negative     Protein, UA 30 (1+) (A) mg/dl      Glucose, UA Negative mg/dl      Ketones, UA 15 (1+) (A) mg/dl      Urobilinogen, UA 4 0 (A) E U /dl      Bilirubin, UA Interference- unable to analyze (A)     Blood, UA Negative     Specific Gravity, UA 1 025    Narrative:       CLINITEK RESULT                 No orders to display              Procedures  ECG 12 Lead Documentation  Date/Time: 9/5/2018 8:42 PM  Performed by: Modesto Bedolla  Authorized by: Modesto Bedolla     Indications / Diagnosis:  Dizziness, near syncope   ECG reviewed by me, the ED Provider: yes (Dr Sridhar Crabtree )    Patient location:  ED  Previous ECG:     Previous ECG:  Compared to current    Similarity:  No change    Comparison to cardiac monitor: No    Interpretation:     Interpretation: normal    Rate:     ECG rate:  60    ECG rate assessment: normal    Rhythm:     Rhythm: sinus rhythm             Phone Contacts  ED Phone Contact    ED Course  ED Course as of Sep 05 2305   Wed Sep 05, 2018   2043 Orthostatic BP's negative  2119 Labs reviewed  Labs are unremarkable, however urine does appear to show some dehydration  IVF infusing  Will reassess  2159 Pt is being helped up out of bed to go to bathroom  Pt states that the carafate helped a little and states she still has a slight headache  Will give tylenol  2239 Reviewed case with Dr Naseem Story from bariatrics and feels that pt has stable labs and is able to be d/c from bariatric standpoint  Pt is to call the office tomorrow for appointment with him tomorrow  Pt will be given carafate for stomach as she never did fill it from ED visit 8/22  MDM  Number of Diagnoses or Management Options  Diagnosis management comments: Differential diagnosis:  GERD  Gastritis  Near syncope - related to dehydration or intracranial abnormality  Headache - secondary to above    Plan  Labs  Orthostatic BP's  IVF  ua  uds  uahcg  CT head         In note of August 2018 by GI it is noted that they have prescribed Bentyl and pt is to trial this medication and if no relief, she will need repeat EGD to rule out PUD  In another note of August 2018 pt is noted to have chronic pain and sees pain management  I am very reluctant in ordering further Cat scans on this patient as she has had 4 abdominal cat scans in the last 6 weeks  Pt has a history of headaches  Will give tylenol in addition to IVF and other medications and reassess  Amount and/or Complexity of Data Reviewed  Clinical lab tests: ordered and reviewed  Tests in the radiology section of CPT®: ordered and reviewed  Review and summarize past medical records: yes      CritCare Time    Disposition  Final diagnoses:   Dehydration   Morbid obesity ([facility] [address] )   Headache   Generalized abdominal pain   Diabetes ([facility] [address] )     Time reflects when diagnosis was documented in both MDM as applicable and the Disposition within this note     Time User Action Codes Description Comment    9/5/2018 10:05 PM Cliff Rajendra [E86 0] Dehydration     9/5/2018 10:05 PM Veda Falling Add [E66 01] Morbid obesity ([facility] [address] )     9/5/2018 10:06 PM Veda Falling Add [R51] Headache     9/5/2018 10:06 PM Veda Falling Add [R10 84] Generalized abdominal pain     9/5/2018 10:08 PM Vdea Falling Add [E11 9] Diabetes [facility])       ED Disposition     ED Disposition Condition Comment    Discharge  Rylie Flower discharge to home/self care      Condition at discharge: good        Follow-up Information     Follow up With Specialties Details Why Contact Info Additional Dewayne Moritz, MD General Surgery In 1 day  [address], [address] In 1 day  [facility] [address] [facility]  [phone]       Osito No PA-C Family Medicine, Physician Assistant  If symptoms worsen [address] [facility]  [address]  [address] [place] [address]  [phone]          follow up with [address] Emergency Medicine   [address] [facility]  [phone] [place] [facility], [address] [facility] [place], [address]          Discharge Medication List as of 9/5/2018 10:44 PM      START taking these medications    Details   !! sucralfate (CARAFATE) 1 g/10 mL suspension Take 10 mL (1 g total) by mouth 4 (four) times a day, Starting Wed 9/5/2018, Print       !! - Potential duplicate medications found  Please discuss with provider        CONTINUE these medications which have NOT CHANGED    Details   busPIRone (BUSPAR) 15 mg tablet Take 15 mg by mouth 2 (two) times a day  , Starting Mon 4/16/2018, Historical Med      butalbital-acetaminophen-caffeine (FIORICET,ESGIC) -40 mg per tablet Take 1 tablet by mouth every 4 (four) hours as needed for headaches, Starting Fri 3/23/2018, Print      D-3-5 5000 units capsule Starting Tue 7/10/2018, Historical Med      dicyclomine (BENTYL) 10 mg capsule Take 1 capsule (10 mg total) by mouth 3 (three) times a day as needed (right side abdominal pain), Starting Fri 8/24/2018, Normal      FLUoxetine (PROzac) 20 mg capsule Take 20 mg by mouth 2 (two) times a day  , Starting Mon 4/16/2018, Historical Med      gabapentin (NEURONTIN) 800 mg tablet Take 1 tablet (800 mg total) by mouth 3 (three) times a day, Starting Thu 8/30/2018, Normal      omeprazole (PriLOSEC) 20 mg delayed release capsule Take 1 capsule (20 mg total) by mouth daily for 90 days, Starting Thu 6/14/2018, Until Wed 9/12/2018, Normal      ondansetron (ZOFRAN-ODT) 4 mg disintegrating tablet Take 1 tablet (4 mg total) by mouth every 6 (six) hours as needed for nausea or vomiting, Starting Sat 7/21/2018, Print      simvastatin (ZOCOR) 40 mg tablet 40 mg daily at bedtime  , Starting Mon 5/7/2018, Historical Med      STOOL SOFTENER 100 MG capsule Starting Mon 8/6/2018, Historical Med      !! sucralfate (CARAFATE) 1 g/10 mL suspension Take 10 mL (1 g total) by mouth 4 (four) times a day (with meals and at bedtime), Starting Wed 8/22/2018, Print      tiZANidine (ZANAFLEX) 2 mg tablet Take 1-2 tablets every 8 hours as needed for muscle spasm, Normal      TOUJEO SOLOSTAR 300 units/mL CONCETRATED U-300 injection pen Inject 30 Units under the skin daily at bedtime, Starting Mon 6/18/2018, Historical Med      traZODone (DESYREL) 100 mg tablet Take 1 tablet by mouth daily at bedtime, Starting 2/4/2017, Until Discontinued, No Print       !! - Potential duplicate medications found  Please discuss with provider  No discharge procedures on file      ED Provider  Electronically Signed by           MIMA Kan  09/05/18 8757

## 2018-09-12 ENCOUNTER — ANNUAL EXAM (OUTPATIENT)
Dept: OBGYN CLINIC | Facility: CLINIC | Age: 38
End: 2018-09-12
Payer: COMMERCIAL

## 2018-09-12 VITALS
DIASTOLIC BLOOD PRESSURE: 62 MMHG | BODY MASS INDEX: 35.15 KG/M2 | SYSTOLIC BLOOD PRESSURE: 118 MMHG | WEIGHT: 191 LBS | HEIGHT: 62 IN

## 2018-09-12 DIAGNOSIS — Z01.419 ENCOUNTER FOR GYNECOLOGICAL EXAMINATION (GENERAL) (ROUTINE) WITHOUT ABNORMAL FINDINGS: Primary | ICD-10-CM

## 2018-09-12 DIAGNOSIS — R87.612 LGSIL ON PAP SMEAR OF CERVIX: ICD-10-CM

## 2018-09-12 PROCEDURE — 88175 CYTOPATH C/V AUTO FLUID REDO: CPT | Performed by: PATHOLOGY

## 2018-09-12 PROCEDURE — 87624 HPV HI-RISK TYP POOLED RSLT: CPT | Performed by: OBSTETRICS & GYNECOLOGY

## 2018-09-12 PROCEDURE — 88141 CYTOPATH C/V INTERPRET: CPT | Performed by: PATHOLOGY

## 2018-09-12 PROCEDURE — 99395 PREV VISIT EST AGE 18-39: CPT | Performed by: OBSTETRICS & GYNECOLOGY

## 2018-09-12 NOTE — PATIENT INSTRUCTIONS
Visita de bienestar para los adultos   LO QUE NECESITA SABER:   ¿Qué es omari visita de bienestar? Stella Sers de bienestar es cuando usted acude con un médico para que le lan exámenes de detección de problemas de [place]  También puede obtener asesoramiento sobre cómo mantenerse saludable  Anote emily preguntas para que se acuerde de hacerlas  Pregunte a solano médico con qué frecuencia debería realizarse omari visita de bienestar  ¿Qupe sucede en omari visita de bienestar? Solano médico le preguntará sobre solano keyana y solano historia familiar [id number] [facility]  [place] incluye presión arterial iraida, enfermedades del corazón y cáncer  El médico le preguntará si tiene síntomas que le preocupen, si [facility] y [place] de ánimo  También se le preguntará acerca del uso de medicamentos, suplementos, alimentos y alcohol  Es posible que le lan cualquiera de lo siguiente:  · Solano peso  será revisado  Es posible que [employer] midan solano altura para calcular solano índice de masa corporal [facility])  El [facility] indica si tiene un peso saludable  · Se verificarán solano presión arterial  y frecuencia cardíaca  También pueden revisar solano temperatura  · Exámenes de [facility] y [facility]  se podría realizar  Se podrían realizar exámenes de saad para revisar los niveles de [facility]  Los niveles anormales de colesterol aumentan el riesgo de enfermedad del corazón y accidente cerebrovascular  Puede que también necesite omari prueba de saad u orina para revisar si tiene diabetes si usted está en mayor riesgo  Las pruebas de orina pueden hacerse para buscar signos de omari infección o omari enfermedad renal      · Un examen físico  incluye la comprobación de emily latidos del corazón y los pulmones con un estetoscopio  Solano médico también podría revisarle la piel para buscar daños causados por el sol  · Pruebas de detección  podría recomendarse  Se realiza un examen de detección para detectar enfermedades que pueden no causar síntomas   Los exámenes de Gem 'JOEY' Us necesite dependen de solano edad, género, antecedentes familiares y hábitos de sadie  Por ejemplo, podrían recomendarle la exploración selectiva colorrectal si tiene 48 años o más  ¿Qué exámenes de detección necesito si soy omari mounika? · El examen de Papanicolaou  se utiliza para detectar cáncer de darlene uterino  El examen del Papanicolaou por lo general se realiza entre 3 a 5 años dependiendo de solano edad  Puede necesitarlo más a menudo si usted ha tenido [employer] de la prueba de Papanicolaou en el pasado  Pregunte a solano médico con qué frecuencia debería realizarse un examen de Papanicolaou  · Omari mamografía  es omari radiografía de emily senos para detectar cáncer de mama  Los expertos recomiendan [address] cada 2 años empezando a los 48 años de [place]  Es probable que usted necesite [address] a los 52 años o antes si tiene riesgo alto de cáncer de seno  Hable con solano médico sobre cuándo debe empezar con meily mamografías y con cuánta frecuencia las necesita  ¿Qué vacunas podría necesitar? · Debe recibir Jayme vacuna contra la influenza  todos los [place]  La vacuna contra la influenza protege de la gripe  Varios tipos de virus causan la influenza  Debido a que los virus [place] con el [place]mitry, es necesaria la producción de nuevas vacunas cada año  · Debe recibir Jayme vacuna de refuerzo contra el tétanos-difteria (Td)  cada 10 años  Esta vacuna protege contra el tétanos y la difteria  El tétanos es omari infección severa que puede causar trismo y espasmos musculares dolorosos  La difteria es omari infección bacterial grave que produce omari cubierta gruesa en la parte de atrás de la boca y garganta  · Debe recibir la vacuna contra el virus del papiloma humano (VPH)  si usted es mounika y Naturita 19 y 32 años o es hombre y Naturita 23 y 24 años y nunca la recibió  Esta vacuna protege contra la infección por VPH   El virus del papiloma humano o VPH es la infección más común que se transmite por contacto sexual  El virus del papiloma humano también podría provocar cáncer vaginal, del pene y del ano  · Debe recibir la vacuna antineumocócica  si tiene más de 72 años  La vacuna antineumocócica es omari inyección que se administra para protegerlo contra omari enfermedad neumocócica  La enfermedad neumocócica es omari infección causada por la bacteria neumocócica  La infección puede causar neumonía, meningitis o omari infección del oído  · Debe recibir la vacuna contra la culebrilla  si tiene [id number] [facility] o [place], incluso si kelly tenido culebrilla antes  La vacuna contra la culebrilla (herpes zóster) es omari inyección usada para proteger contra el virus zóster que causa la varicela  Lucinda es el mismo virus que causa la varicela  La culebrilla es un sarpullido doloroso que se desarrolla en personas que tuvieron varicela o kelly estado expuestas al virus  ¿Cómo puedo comer de manera saludable? [place] es un modelo para planear comidas sanas  Muestra los tipos y cantidades de alimentos que deberían ir en un plato  Annamarie Wilberforce y verduras representan alrededor de la mitad de solano plato y los granos y proteínas representan la otra mitad  Se incluye omari porción de productos lácteos al lado del plato  La cantidad de calorías y [address] de las porciones que usted necesita dependen de solano edad, [place], peso y altura  Los ejemplos de alimentos saludables son:  · Consuma omari variedad de verduras  antoine las de color macy oscuro, hadley y [place]  Usted también puede incluir verduras enlatadas bajas en sodio (sal) y verduras congeladas sin mantequilla ni salsas [id number]  · Consuma omari variedad de fruta frescas , las frutas enlatadas en 100% de jugo , fruta [place] y fadumo secos  · Incluya granos enteros  Por lo menos la mitad de los granos que usted consume deben ser granos de kelle integral  Por ejemplo, panes de grano entero, pasta integral, arroz integral y cereales de grano entero antoine la reza      · Consuma omari variedad de alimentos con proteínas antoine mariscos (pescado y crustáceos), Edy Nailer y carne de ave sin piel (pavo y ninoska)  Ejemplos de valerie magras incluyen pierna, paleta o lomo de puerco y mitul, solomillo o, lomo de res y carne [place] de res extra [place]  Otros alimentos ricos en proteínas son los huevos y sustitutos de Rule, frijoles, chícharos, productos de soya, nueces y semillas  · Elija productos lácteos bajos en grasa [employer] o del 1% o yogur, queso y requesón bajos en grasa  · Limite las grasas poco saludables,  antoine la [place], la margarina dura y la [place]  ¿Qué cantidad de actividad física necesito? Realice omari actividad física por lo menos 30 minutos al día, la mayoría de los días de la [place]  Algunos de los ejercicios incluyen caminar, montar en bicicleta, bailar y nadar  Usted también puede realizar más actividad física usando las escaleras en vez de los ascensores o estacionarse más lejos cuando Annye Mouse a las tiendas  Incluya ejercicios para fortalecer los músculos 2 días a la semana  El ejercicio regular proporciona muchos beneficios para la keyana  Henrene Perish a controlar solano peso y [employer] riesgo de diabetes tipo 2, presión arterial iraida, enfermedad del corazón y accidente cerebrovascular  El ejercicio [employer] puede ayudar a mejorar solano estado de ánimo  Pregunte a solano médico acerca del mejor plan de ejercicio para usted  ¿Cuáles son Everlene Evener generales de keyana y seguridad que todd seguir? · No fume  La nicotina y otras sustancias químicas que contienen los cigarrillos y cigarros pueden dañar los pulmones  Pida información a solano médico si usted actualmente fuma y necesita ayuda para dejar de fumar  Los cigarrillos electrónicos o tabaco sin humo todavía contienen nicotina  Consulte con solano médico antes de [employer]  · Limite el consumo de alcohol  Un trago equivale a 12 onzas de cerveza, 5 onzas de vino o 1 onza y ½ de licor      · Baje de peso, si es necesario  El sobrepeso aumenta el riesgo de ciertas condiciones de [place]  Estos incluyen enfermedad del corazón, presión arterial iraida, diabetes tipo 2 y ciertos tipos de cáncer  · Proteja solano piel  No tome el sol ni use oly de bronceado  Use protector solar con un SPF 13 o mayor  Aplíquese el bloqueador por lo menos 15 minutos antes de que vaya a estar al [employer]  Vuelva a aplicarse la crema solar cada 2 horas  Use ropa protectora, sombrero y lentes para el sol cuando se encuentre afuera  · Conduzca con seguridad  Use siempre solano cinturón de seguridad  Asegúrese que todos en el [place] usan el cinturón de seguridad  Un cinturón de seguridad puede salvar solano sadie en laura de un accidente automovilístico  No use el celular cuando esté manejando  [place] puede hacer que se distraiga y causar un accidente  Es mejor que pare y se orille si necesita hacer omari Raymond Julita un texto  · Practique el sexo seguro  Use condones de látex si es sexualmente [place] y tiene más de [place] and [place]  Solano médico puede recomendar exámenes de detección de infecciones de transmisión sexual (ITS)  · Use un gulshan, un chaleco salvavidas y unos implementos de protección  Siempre use un gulshan al [employer] en bicicleta o motocicleta, esquiar o jugar deportes que podrían causar omari lesión en la jackeline  Use implementos de protección cuando practique deportes  Use un chaleco salvavidas cuando esté en un bote o practicando actividades acuáticas  ACUERDOS SOBRE SOLANO CUIDADO:   Usted tiene el derecho de ayudar a planear solano cuidado  Aprenda todo lo que pueda sobre solano condición y antoine darle tratamiento  Discuta emily opciones de tratamiento con emily médicos para decidir el cuidado que usted desea recibir  Usted siempre tiene el derecho de rechazar el tratamiento  Esta información es sólo para uso en educación  Solano intención no es darle un consejo médico sobre enfermedades o tratamientos   Colsulte con solano médico, enfermera o farmacéutico antes de seguir cualquier régimen médico para saber si es seguro y efectivo para usted  © 2017 [address] Information is for End User's use only and may not be sold, redistributed or otherwise used for commercial purposes  All illustrations and images included in CareNotes® are the copyrighted property of A D A M , Inc  or Benny Patton  Autoexamen del seno para las mujeres   CUIDADO AMBULATORIO:   Un autoexamen de seno  es omari manera de revisar si emily mamas tienen protuberancias u otros cambios  Los autoexámenes de seno regulares pueden ayudarla a conocer cómo se stephen y se sienten emily senos normalmente  La mayoría de las protuberancias o cambios en el seno  no son cáncer, rtea usted siempre debería ir con solano médico para que la revise  Por qué usted debe hacerse un autoexamen de seno:  El cáncer de seno es el tipo de cáncer más común en las mujeres  Aún si a usted le [employer], todavía puede seguir revisándose regularmente  Si usted sabe cómo se sienten y se stephen emily senos normalmente, eso podría ayudarle a determinar cuándo comunicarse con solano médico  Es posible que omari mamografía no detecte algún cáncer  Usted podría encontrar omari protuberancia viv un autoexamen de seno que no se detectó con solano mamografía  Cuándo debería usted realizarse un autoexamen de seno:  Pelon solano calendario para que recuerde hacerse un autoexamen del seno en omari fecha regular  Omari forma fácil de recordar es realizar el autoexamen de seno en el mismo día cada mes  Si usted tiene [place], es posible que lo mejor sea que se wu un autoexamen 1 semana después de que terminó solano periodo  [place] es cuando emily senos podrían estar menos inflamados, abultados o sensibles  Usted puede realizarse autoexámenes del seno regulares incluso si está dando de lactar o si tiene implantes en el seno  Pregúntele a solano Quenten Mighty vitaminas y minerales son adecuados para usted     · Usted encuentra algún tipo de bulto o cambio en emily senos  · Usted tiene [place] [employer], o le sale líquido de emily pezones  · Usted tiene preguntas o inquietudes acerca de solano condición o cuidado  Cómo realizar un autoexamen de seno:   · Observe emily senos en un twila  Observe el tamaño y la forma de cada seno y del pezón  Revise si hay inflamación, protuberancias, hoyuelos, piel descamada u otros cambios en la piel  Vigile los cambios en el pezón, antoine cuando tiene dolor o que comienza a hundirse  Apriete cuidadosamente ambos pezones y revise si sale líquido (que no sea [place]) de ellos  Si usted detecta cualquiera de estos u otros cambios en emily senos, comuníquese con solano médico  Revise emily senos mientras está sentada o henley en las 3 siguientes posiciones:    ¨ Cuelgue emily brazos en emily costados  ¨ Levante emily beth y [place] detrás de solano jackeline  ¨ Ejerza presión firme con emily beth sobre emily caderas  Inclínese un poco hacia adelante mientras observa emily senos en el twila  · Acuéstese y palpe emily senos  Cuando usted se [place], el tejido de emily senos se extiende uniformemente sobre solano pecho  [place] facilita que usted sienta protuberancias o cualquier cosa que podría no ser normal para emily senos  Realice un autoexamen con un seno a la vez  ¨ Coloque omari almohada pequeña o omari toalla debajo de solano hombro aleksandra  Coloque solano brazo aleksandra detrás de solano jackeline  ¨ Use los 3 dedos medios de solano mano derecha  Use las yemas de los dedos, en la parte superior  La yema del dedo es la parte más sensible de solano dedo  ¨ Christina círculos pequeños para sentir el tejido del seno  Use las yemas de emily dedos para hacer círculos pequeños empalmados sobre emily senos y [place]  Use presión ligera, media y firme  Ann, presione ligeramente  Después, presione con omari presión media para sentir un poco más profundo en solano seno  Por último, use presión firme para sentir solano seno en lo más profundo      ¨ Examine Liana Denney completa de solano seno  Examine el área del seno desde arriba hasta abajo donde usted siente las [place]  Lisa Sarna pequeños con las yemas de los dedos, comenzando en la parte media de solano axila  Lisa Sarna subiendo y [place] el área del seno  Continúe hacia solano seno, hasta llegar al [employer]  Examine toda el área desde la axila hasta el centro de solano pecho (Saint[place] Ditty)  Deténgase en el centro de solano pecho  ¨ Mueva la almohada o toalla hacia solano hombro derecho y coloque solano brazo derecho detrás de solano jackeline  Use las yemas de los 3 dedos medios de solano mano izquierda y repita los pasos anteriores para realizar un autoexamen en solano seno derecho  Qué más se puede hacer para la revisión de problemas en el seno o del cáncer de seno:  Algunos expertos sugieren que las mujeres de 36 años de edad y mayores deberían realizarse omari mamografía cada año  Otros sugieren [employer] 48 y 76 años de edad se lan omari mamografía cada 2 años  Consulte con solano médico sobre cuándo usted debería [employer]  Acuda a emily consultas de control con solano médico según le indicaron  Solano médico puede observarla e indicarle si usted está realizando solano autoexamen correctamente  Anote emily preguntas para que se acuerde de hacerlas viv emily visitas  © 2017 [facility] Information is for End User's use only and may not be sold, redistributed or otherwise used for commercial purposes  All illustrations and images included in CareNotes® are the copyrighted property of A D A M , Inc  or Benny Patton  Esta información es sólo para uso en educación  Solano intención no es darle un consejo médico sobre enfermedades o tratamientos  Colsulte con solano Stormy Binder farmacéutico antes de seguir cualquier régimen médico para saber si es seguro y efectivo para usted

## 2018-09-12 NOTE — PROGRESS NOTES
Assessment        Diagnoses and all orders for this visit:    Encounter for gynecological examination (general) (routine) without abnormal findings    LGSIL on Pap smear of cervix  -     Liquid-based pap, diagnostic                  Plan      All questions answered  Await pap smear results  Breast self exam technique reviewed and patient encouraged to perform self-exam monthly  Contraception: tubal ligation  Educational material distributed  Follow up in 1 year  Follow up as needed  Thin prep Pap smear  Discussed with patient possible referral to [facility] for tubal reversal versus IVF which she declines at this point  Follow-up with cervical cytology and HPV screening  If either testing are abnormal, she will need a colposcopy  Luis Antonio Mcgrath is a 41 y o  female who presents for annual exam     Patient is here for routine annual gyn exam   She had questions regarding possibly a tubal reversal   She had a tub[facility] ligation in [facility] at the age of 23  She now has a new partner  Current contraception: tubal ligation  History of abnormal Pap smear: yes - LGSIL, benign colposcopy 10/37/17 HPV pos      Menstrual History:  OB History      Para Term  AB Living    2 2 2 0 0 2    SAB TAB Ectopic Multiple Live Births    0 0 0 0 2        Patient's last menstrual period was 2018 (exact date)  The following portions of the patient's history were reviewed and updated as appropriate: allergies, current medications, past family history, past medical history, past social history, past surgical history and problem list         Review of Systems   Constitutional: Negative  HENT: Negative  Eyes: Negative  Respiratory: Negative  Cardiovascular: Negative  Gastrointestinal: Negative  Endocrine: Negative  Genitourinary:        As noted in HPI   Musculoskeletal: Negative  Skin: Negative  Allergic/Immunologic: Negative  Neurological: Negative  Hematological: Negative  Psychiatric/Behavioral: Negative  Physical Exam   Constitutional: She is oriented to person, place, and time  She appears well-developed and well-nourished  Genitourinary: There is no rash or lesion on the right labia  There is no rash or lesion on the left labia  No bleeding in the vagina  No vaginal discharge found  Right adnexum does not display mass, does not display tenderness and does not display fullness  Left adnexum does not display mass, does not display tenderness and does not display fullness  Cervix does not exhibit motion tenderness, lesion or polyp  Uterus is not enlarged or tender  HENT:   Head: Normocephalic  Neck: No thyromegaly present  Cardiovascular: Normal rate, regular rhythm and normal heart sounds  No murmur heard  Pulmonary/Chest: Effort normal and breath sounds normal  No respiratory distress  She has no wheezes  She has no rales  Right breast exhibits no mass, no nipple discharge, no skin change and no tenderness  Left breast exhibits no mass, no nipple discharge, no skin change and no tenderness  Abdominal: Soft  She exhibits no distension and no mass  There is no tenderness  There is no rebound and no guarding  Musculoskeletal: She exhibits no edema or tenderness  Neurological: She is alert and oriented to person, place, and time  Skin: Skin is warm and dry  Psychiatric: She has a normal mood and affect

## 2018-09-13 LAB
HPV HR 12 DNA CVX QL NAA+PROBE: POSITIVE
HPV16 DNA CVX QL NAA+PROBE: NEGATIVE
HPV18 DNA CVX QL NAA+PROBE: NEGATIVE

## 2018-09-17 ENCOUNTER — OFFICE VISIT (OUTPATIENT)
Dept: FAMILY MEDICINE CLINIC | Facility: CLINIC | Age: 38
End: 2018-09-17
Payer: COMMERCIAL

## 2018-09-17 ENCOUNTER — TELEPHONE (OUTPATIENT)
Dept: OBGYN CLINIC | Facility: CLINIC | Age: 38
End: 2018-09-17

## 2018-09-17 VITALS
BODY MASS INDEX: 34.2 KG/M2 | WEIGHT: 193 LBS | TEMPERATURE: 96.8 F | DIASTOLIC BLOOD PRESSURE: 70 MMHG | SYSTOLIC BLOOD PRESSURE: 110 MMHG | HEIGHT: 63 IN | HEART RATE: 89 BPM | RESPIRATION RATE: 18 BRPM | OXYGEN SATURATION: 96 %

## 2018-09-17 DIAGNOSIS — IMO0002 UNCONTROLLED TYPE 2 DIABETES WITH NEUROPATHY: ICD-10-CM

## 2018-09-17 DIAGNOSIS — R10.84 GENERALIZED ABDOMINAL PAIN: Primary | ICD-10-CM

## 2018-09-17 LAB
LAB AP GYN PRIMARY INTERPRETATION: NORMAL
LAB AP LMP: NORMAL
Lab: NORMAL
PATH INTERP SPEC-IMP: NORMAL

## 2018-09-17 PROCEDURE — 99214 OFFICE O/P EST MOD 30 MIN: CPT | Performed by: PHYSICIAN ASSISTANT

## 2018-09-17 NOTE — PATIENT INSTRUCTIONS
Advised to continue with increase clear liquids until urine is clear  Most recent hemoglobin A1c done in August is 7 2  Follow up with Dr Diandra Reza as scheduled on September 20th  Follow-up with the GI specialist on October 11th as scheduled  Follow up here if symptoms do not completely resolve over the next several weeks or anything increases

## 2018-09-17 NOTE — PROGRESS NOTES
Assessment/Plan:    ER note has been reviewed  Patient Instructions   Advised to continue with increase clear liquids until urine is clear  Most recent hemoglobin A1c done in August is 7 2  Follow up with Dr Andres Aguayo as scheduled on September 20th  Follow-up with the GI specialist on October 11th as scheduled  Follow up here if symptoms do not completely resolve over the next several weeks or anything increases  M*Modal software was used to dictate this note  It may contain errors with dictating incorrect words/spelling  Please contact provider directly for any questions  Diagnoses and all orders for this visit:    Generalized abdominal pain    Uncontrolled type 2 diabetes with neuropathy (HCC)          Subjective:      Patient ID: Antoine Casarez is a 41 y o  female  Patient presents today with her significant other who helps with translation is [place]  She presents today for follow-up from a recent ER visit on September 5th because of dizziness, headache and abdominal pain/diarrhea  She still has occasional dizziness but overall the abdominal pain and diarrhea have resolved  She has been trying to drink water regularly  Appetite is back to normal   She states her blood sugar last night was around 140  She continues follow-up with Dr Andres Aguayo  She has noticed improvement over diabetes since she has had bariatric surgery in June  She does have a follow-up appointment to GI specialist on October 11th and Dr Andres Aguayo on September 20th  The following portions of the patient's history were reviewed and updated as appropriate:   She  has a past medical history of Abdominal pain; Abnormal cells of cervix; Abnormal EKG; Abnormal Pap smear of cervix; Acute headache; Amenorrhea; Anxiety; Back pain; Chronic low back pain; Chronic pain syndrome; Constipation; Contact dermatitis; Depression; Dermoid cyst of right lower extremity; Diabetes mellitus ([address] );  Drug-induced hepatic toxicity; Elevated liver function tests; Gastritis; GERD (gastroesophageal reflux disease); Hypercholesterolemia; Hyperlipidemia; Left hip pain; Liver disease; Liver function study, abnormal; Lumbar degenerative disc disease; Lumbar radiculopathy; Morbid obesity ([address] ); Neuropathy; Normal vaginal delivery; Postgastrectomy malabsorption; Tendinitis of left ankle; Vitamin D deficiency; and Wears glasses  She   Patient Active Problem List    Diagnosis Date Noted    Constipation 2018    Lumbar degenerative disc disease 2018    Myofascial pain syndrome 2018    S/P laparoscopic sleeve gastrectomy 2018    Tachycardia 2018    Morbid obesity ([address] ) 2018    Hyperlipidemia 2018    Acquired polyneuropathy     Abnormal TSH 2018    Intractable migraine without aura and with status migrainosus 2018    Severe obesity (BMI 35 0-39  9) with comorbidity ([address] ) 2018    Neck pain on right side 2018    Left hip pain 2018    Abdominal pain 2017    Abnormal cells of cervix 2017    Amenorrhea 2017    Abnormal EKG 10/25/2017    Chronic pain disorder 10/17/2017    Tendinitis of left ankle 10/03/2017    Dermoid cyst of right lower extremity 2017    Elevated liver function tests 2017    Hypercholesterolemia     Uncontrolled type 2 diabetes with neuropathy (HCC)     Depressive disorder     Anxiety     Chronic low back pain 2014     She  has a past surgical history that includes Abdominal surgery; Tonsillectomy;  section; Tubal ligation; Esophagogastroduodenoscopy (N/A, 2018); Colonoscopy (N/A, 2018); Liver biopsy; and pr lap, mckenzie restrict proc, longitudinal gastrectomy (N/A, 2018)  Her family history includes Diabetes in her mother; Hyperlipidemia in her mother; Lung cancer in her maternal grandfather; Pancreatitis in her father; Thyroid disease in her paternal aunt    She  reports that she has never smoked  She has never used smokeless tobacco  She reports that she does not drink alcohol or use drugs  Current Outpatient Prescriptions   Medication Sig Dispense Refill    busPIRone (BUSPAR) 15 mg tablet Take 15 mg by mouth 2 (two) times a day        butalbital-acetaminophen-caffeine (FIORICET,ESGIC) -40 mg per tablet Take 1 tablet by mouth every 4 (four) hours as needed for headaches 30 tablet 0    D-3-5 5000 units capsule       dicyclomine (BENTYL) 10 mg capsule Take 1 capsule (10 mg total) by mouth 3 (three) times a day as needed (right side abdominal pain) 30 capsule 1    FLUoxetine (PROzac) 20 mg capsule Take 20 mg by mouth 2 (two) times a day        gabapentin (NEURONTIN) 800 mg tablet Take 1 tablet (800 mg total) by mouth 3 (three) times a day 90 tablet 2    omeprazole (PriLOSEC) 20 mg delayed release capsule Take 1 capsule (20 mg total) by mouth daily for 90 days (Patient taking differently: Take 20 mg by mouth 2 (two) times a day  ) 90 capsule 3    ondansetron (ZOFRAN-ODT) 4 mg disintegrating tablet Take 1 tablet (4 mg total) by mouth every 6 (six) hours as needed for nausea or vomiting 20 tablet 0    simvastatin (ZOCOR) 40 mg tablet 40 mg daily at bedtime        STOOL SOFTENER 100 MG capsule       sucralfate (CARAFATE) 1 g/10 mL suspension Take 10 mL (1 g total) by mouth 4 (four) times a day (with meals and at bedtime) 420 mL 0    sucralfate (CARAFATE) 1 g/10 mL suspension Take 10 mL (1 g total) by mouth 4 (four) times a day 420 mL 0    tiZANidine (ZANAFLEX) 2 mg tablet Take 1-2 tablets every 8 hours as needed for muscle spasm 180 tablet 2    TOUJEO SOLOSTAR 300 units/mL CONCETRATED U-300 injection pen Inject 30 Units under the skin daily at bedtime      traZODone (DESYREL) 100 mg tablet Take 1 tablet by mouth daily at bedtime 30 tablet 0     No current facility-administered medications for this visit        Current Outpatient Prescriptions on File Prior to Visit   Medication Sig    busPIRone (BUSPAR) 15 mg tablet Take 15 mg by mouth 2 (two) times a day      butalbital-acetaminophen-caffeine (FIORICET,ESGIC) -40 mg per tablet Take 1 tablet by mouth every 4 (four) hours as needed for headaches    D-3-5 5000 units capsule     dicyclomine (BENTYL) 10 mg capsule Take 1 capsule (10 mg total) by mouth 3 (three) times a day as needed (right side abdominal pain)    FLUoxetine (PROzac) 20 mg capsule Take 20 mg by mouth 2 (two) times a day      gabapentin (NEURONTIN) 800 mg tablet Take 1 tablet (800 mg total) by mouth 3 (three) times a day    omeprazole (PriLOSEC) 20 mg delayed release capsule Take 1 capsule (20 mg total) by mouth daily for 90 days (Patient taking differently: Take 20 mg by mouth 2 (two) times a day  )    ondansetron (ZOFRAN-ODT) 4 mg disintegrating tablet Take 1 tablet (4 mg total) by mouth every 6 (six) hours as needed for nausea or vomiting    simvastatin (ZOCOR) 40 mg tablet 40 mg daily at bedtime      STOOL SOFTENER 100 MG capsule     sucralfate (CARAFATE) 1 g/10 mL suspension Take 10 mL (1 g total) by mouth 4 (four) times a day (with meals and at bedtime)    sucralfate (CARAFATE) 1 g/10 mL suspension Take 10 mL (1 g total) by mouth 4 (four) times a day    tiZANidine (ZANAFLEX) 2 mg tablet Take 1-2 tablets every 8 hours as needed for muscle spasm    TOUJEO SOLOSTAR 300 units/mL CONCETRATED U-300 injection pen Inject 30 Units under the skin daily at bedtime    traZODone (DESYREL) 100 mg tablet Take 1 tablet by mouth daily at bedtime     No current facility-administered medications on file prior to visit  She is allergic to atorvastatin and other       Review of Systems   Constitutional: Negative for chills and fever  Gastrointestinal: Negative for abdominal pain, diarrhea, nausea and vomiting     Neurological:        As stated in HPI         Objective:      /70 (BP Location: Right arm, Patient Position: Sitting, Cuff Size: Large)   Pulse 89   Temp (!) 96 8 °F (36 °C) (Tympanic)   Resp 18   Ht 5' 2 5" (1 588 m)   Wt 87 5 kg (193 lb)   LMP 09/05/2018 (Exact Date)   SpO2 96%   Breastfeeding? No   BMI 34 74 kg/m²          Physical Exam   Constitutional: She appears well-developed and well-nourished  No distress  HENT:   Head: Normocephalic and atraumatic  Right Ear: External ear normal    Left Ear: External ear normal    Mouth/Throat: Oropharynx is clear and moist    Neck: Neck supple  Cardiovascular: Normal rate, regular rhythm and normal heart sounds  No murmur heard  Pulmonary/Chest: Effort normal and breath sounds normal  No respiratory distress  She has no wheezes  She has no rales  Abdominal: Soft  Bowel sounds are normal  There is no tenderness  Lymphadenopathy:     She has no cervical adenopathy

## 2018-09-17 NOTE — TELEPHONE ENCOUNTER
----- Message from Giselle Rain MD sent at 9/17/2018 11:36 AM EDT -----  Please call patient with abnormal results   She will need another COLPOSCOPY

## 2018-09-18 ENCOUNTER — OFFICE VISIT (OUTPATIENT)
Dept: OBGYN CLINIC | Facility: CLINIC | Age: 38
End: 2018-09-18
Payer: COMMERCIAL

## 2018-09-18 ENCOUNTER — HOSPITAL ENCOUNTER (OUTPATIENT)
Dept: RADIOLOGY | Facility: CLINIC | Age: 38
Discharge: HOME | End: 2018-09-18
Attending: ANESTHESIOLOGY | Admitting: ANESTHESIOLOGY
Payer: COMMERCIAL

## 2018-09-18 ENCOUNTER — TELEPHONE (OUTPATIENT)
Dept: OBGYN CLINIC | Facility: CLINIC | Age: 38
End: 2018-09-18

## 2018-09-18 VITALS
DIASTOLIC BLOOD PRESSURE: 64 MMHG | TEMPERATURE: 98.7 F | OXYGEN SATURATION: 99 % | RESPIRATION RATE: 18 BRPM | HEART RATE: 88 BPM | SYSTOLIC BLOOD PRESSURE: 118 MMHG

## 2018-09-18 VITALS — SYSTOLIC BLOOD PRESSURE: 120 MMHG | DIASTOLIC BLOOD PRESSURE: 80 MMHG | WEIGHT: 191.8 LBS | BODY MASS INDEX: 34.52 KG/M2

## 2018-09-18 DIAGNOSIS — N89.8 VAGINAL DISCHARGE: Primary | ICD-10-CM

## 2018-09-18 DIAGNOSIS — M47.816 LUMBAR SPONDYLOSIS: ICD-10-CM

## 2018-09-18 PROCEDURE — 64493 INJ PARAVERT F JNT L/S 1 LEV: CPT | Performed by: ANESTHESIOLOGY

## 2018-09-18 PROCEDURE — 87210 SMEAR WET MOUNT SALINE/INK: CPT | Performed by: OBSTETRICS & GYNECOLOGY

## 2018-09-18 PROCEDURE — 99214 OFFICE O/P EST MOD 30 MIN: CPT | Performed by: OBSTETRICS & GYNECOLOGY

## 2018-09-18 PROCEDURE — 64494 INJ PARAVERT F JNT L/S 2 LEV: CPT | Performed by: ANESTHESIOLOGY

## 2018-09-18 RX ORDER — LIDOCAINE HYDROCHLORIDE 10 MG/ML
10 INJECTION, SOLUTION EPIDURAL; INFILTRATION; INTRACAUDAL; PERINEURAL ONCE
Status: COMPLETED | OUTPATIENT
Start: 2018-09-18 | End: 2018-09-18

## 2018-09-18 RX ADMIN — LIDOCAINE HYDROCHLORIDE 9 ML: 10 INJECTION, SOLUTION EPIDURAL; INFILTRATION; INTRACAUDAL; PERINEURAL at 11:22

## 2018-09-18 RX ADMIN — LIDOCAINE HYDROCHLORIDE 3 ML: 20 INJECTION, SOLUTION EPIDURAL; INFILTRATION; INTRACAUDAL; PERINEURAL at 11:27

## 2018-09-18 NOTE — H&P
History of Present Illness: The patient is a 41 y o  female who presents with complaints of back pain  Patient Active Problem List   Diagnosis    Hypercholesterolemia    Uncontrolled type 2 diabetes with neuropathy (HCC)    Depressive disorder    Anxiety    Neck pain on right side    Abdominal pain    Abnormal cells of cervix    Abnormal EKG    Amenorrhea    Chronic pain disorder    Chronic low back pain    Dermoid cyst of right lower extremity    Elevated liver function tests    Left hip pain    Tendinitis of left ankle    Severe obesity (BMI 35 0-39  9) with comorbidity ([address] )    Intractable migraine without aura and with status migrainosus    Abnormal TSH    Acquired polyneuropathy    Morbid obesity (HCC)    Hyperlipidemia    Tachycardia    S/P laparoscopic sleeve gastrectomy    Lumbar degenerative disc disease    Myofascial pain syndrome    Constipation       Past Medical History:   Diagnosis Date    Abdominal pain     Abnormal cells of cervix     Abnormal EKG     Abnormal Pap smear of cervix     Acute headache     Amenorrhea     Anxiety     Back pain     Chronic low back pain     Chronic pain syndrome     Constipation     Contact dermatitis     Depression     Dermoid cyst of right lower extremity     Diabetes mellitus (HCC)     Drug-induced hepatic toxicity     Elevated liver function tests     Gastritis     GERD (gastroesophageal reflux disease)     Hypercholesterolemia     Hyperlipidemia     Left hip pain     Liver disease     drug induced hepatic toxicity    Liver function study, abnormal     Lumbar degenerative disc disease     Lumbar radiculopathy     Morbid obesity (HCC)     Neuropathy     Normal vaginal delivery     Postgastrectomy malabsorption     Tendinitis of left ankle     Vitamin D deficiency     Wears glasses        Past Surgical History:   Procedure Laterality Date    ABDOMINAL SURGERY       SECTION      COLONOSCOPY N/A 5/1/2018    Procedure: COLONOSCOPY;  Surgeon: Yuliet Pardo MD;  [facility]; Service: Gastroenterology    ESOPHAGOGASTRODUODENOSCOPY N/A 2/7/2018    Procedure: ESOPHAGOGASTRODUODENOSCOPY (EGD) with BIOPSY;  Surgeon: Ghassan Chowdary MD;  Location: [facility];   Service: Bariatrics    LIVER BIOPSY      VT LAP, APOLINAR RESTRICT PROC, LONGITUDINAL GASTRECTOMY N/A 6/26/2018    Procedure: GASTRECTOMY SLEEVE LAPAROSCOPIC AND INTRAOPERATIVE EGD;  Surgeon: Zena Reilly MD;  Location: [facility];  Service: [address] [place] LIGATION      11/17/2017 Patient reports she had a tubal ligation 12 yrs ago in [address] but doesn't know the extent of the tubal   This is the first time she mentioned this procedure, added to surgical list   nw         Current Outpatient Prescriptions:     busPIRone (BUSPAR) 15 mg tablet, Take 15 mg by mouth 2 (two) times a day  , Disp: , Rfl:     butalbital-acetaminophen-caffeine (FIORICET,ESGIC) -40 mg per tablet, Take 1 tablet by mouth every 4 (four) hours as needed for headaches, Disp: 30 tablet, Rfl: 0    D-3-5 5000 units capsule, , Disp: , Rfl:     dicyclomine (BENTYL) 10 mg capsule, Take 1 capsule (10 mg total) by mouth 3 (three) times a day as needed (right side abdominal pain), Disp: 30 capsule, Rfl: 1    FLUoxetine (PROzac) 20 mg capsule, Take 20 mg by mouth 2 (two) times a day  , Disp: , Rfl:     gabapentin (NEURONTIN) 800 mg tablet, Take 1 tablet (800 mg total) by mouth 3 (three) times a day, Disp: 90 tablet, Rfl: 2    omeprazole (PriLOSEC) 20 mg delayed release capsule, Take 1 capsule (20 mg total) by mouth daily for 90 days (Patient taking differently: Take 20 mg by mouth 2 (two) times a day  ), Disp: 90 capsule, Rfl: 3    ondansetron (ZOFRAN-ODT) 4 mg disintegrating tablet, Take 1 tablet (4 mg total) by mouth every 6 (six) hours as needed for nausea or vomiting, Disp: 20 tablet, Rfl: 0    simvastatin (ZOCOR) 40 mg tablet, 40 mg daily at bedtime , Disp: , Rfl:     STOOL SOFTENER 100 MG capsule, , Disp: , Rfl:     sucralfate (CARAFATE) 1 g/10 mL suspension, Take 10 mL (1 g total) by mouth 4 (four) times a day (with meals and at bedtime), Disp: 420 mL, Rfl: 0    sucralfate (CARAFATE) 1 g/10 mL suspension, Take 10 mL (1 g total) by mouth 4 (four) times a day, Disp: 420 mL, Rfl: 0    tiZANidine (ZANAFLEX) 2 mg tablet, Take 1-2 tablets every 8 hours as needed for muscle spasm, Disp: 180 tablet, Rfl: 2    TOUJEO SOLOSTAR 300 units/mL CONCETRATED U-300 injection pen, Inject 30 Units under the skin daily at bedtime, Disp: , Rfl:     traZODone (DESYREL) 100 mg tablet, Take 1 tablet by mouth daily at bedtime, Disp: 30 tablet, Rfl: 0    Current Facility-Administered Medications:     lidocaine (PF) (XYLOCAINE-MPF) 1 % injection 10 mL, 10 mL, Intra-articular, Once, Morgan Babcock,     lidocaine (PF) (XYLOCAINE-MPF) 2 % injection 4 mL, 4 mL, Intra-articular, Once, Morgan Babcock, DO    Allergies   Allergen Reactions    Atorvastatin      Elevated liver levels    Other      Her[employer] Life,,,caused elevated liver levels       Physical Exam:   Vitals:    09/18/18 1104   BP: 122/64   Pulse: 80   Resp: 18   Temp: 98 7 °F (37 1 °C)   SpO2: 100%     General: Awake, Alert, Oriented x 3, Mood and affect appropriate  Respiratory: Respirations even and unlabored  Cardiovascular: Peripheral pulses intact; no edema  Musculoskeletal Exam:   Bilateral lumbar paraspinals tender to palpation  ASA Score: 2    Patient/Chart Verification  Patient ID Verified: Verbal  ID Band Applied: No  Consents Confirmed: Procedural  H&P( within 30 days) Verified: To be obtained in the Pre-Procedure area  Interval H&P(within 24 hr) Complete (required for Outpatients and Surgery Admit only): To be obtained in the Pre-Procedure area  Allergies Reviewed:  Yes  Anticoag/NSAID held?: No  Currently on antibiotics?: No  Pregnancy denied?: Yes  Pre-op Lab/Test Results Available: N/A  Pregnancy Lab Collected: N/A comment    Assessment:   1   Lumbar spondylosis        Plan: B/L L3-5 MBB

## 2018-09-18 NOTE — PROGRESS NOTES
Assessment/Plan:      Diagnoses and all orders for this visit:    Vaginal discharge          Subjective:     Patient ID: Elisa Conklin is a 41 y o  female  Vaginal Discharge   The patient's primary symptoms include vaginal discharge  The patient's pertinent negatives include no genital odor or pelvic pain  This is a new problem  The current episode started in the past 7 days  The problem occurs constantly  The problem has been unchanged  The patient is experiencing no pain  She is not pregnant  Pertinent negatives include no abdominal pain, back pain, chills, constipation, diarrhea, dysuria, fever, flank pain, hematuria, nausea, rash or urgency  The vaginal discharge was clear and watery  There has been no bleeding  She has not been passing clots  She has not been passing tissue  Nothing aggravates the symptoms  She has tried nothing for the symptoms  She is sexually active  No, her partner does not have an STD  She uses tubal ligation for contraception  Her menstrual history has been irregular  Her past medical history is significant for a gynecological surgery and ovarian cysts  Review of Systems   Constitutional: Negative  Negative for chills and fever  Respiratory: Negative  Cardiovascular: Negative  Gastrointestinal: Negative  Negative for abdominal pain, blood in stool, constipation, diarrhea and nausea  Genitourinary: Positive for vaginal discharge  Negative for difficulty urinating, dysuria, flank pain, hematuria, pelvic pain and urgency  Musculoskeletal: Negative for back pain  Skin: Negative  Negative for rash and wound  Neurological: Negative  Objective:     Physical Exam   Constitutional: She appears well-developed and well-nourished  Abdominal: Soft  She exhibits no distension and no mass  There is no tenderness  There is no rebound and no guarding  Genitourinary: Uterus normal  There is no rash, tenderness, lesion or injury on the right labia   There is no rash, tenderness, lesion or injury on the left labia  Uterus is not deviated, not enlarged, not fixed and not tender  Cervix exhibits no motion tenderness, no discharge and no friability  Right adnexum displays no mass, no tenderness and no fullness  Left adnexum displays no mass, no tenderness and no fullness  No erythema, tenderness or bleeding in the vagina  No foreign body in the vagina  Injury: A wet mount is performed  Vaginal discharge found  Skin: Skin is warm and dry  Psychiatric: She has a normal mood and affect   Her behavior is normal        Wet mount:   Negative for the usual vaginal infections

## 2018-09-18 NOTE — TELEPHONE ENCOUNTER
Patient's friend Femi Long called with patient there, for result of pap and need for colpo  Patient has language barrier and didn't understand prior call  Explained result  Explained colpo  Patient has appt on 10/4

## 2018-09-18 NOTE — DISCHARGE INSTRUCTIONS

## 2018-09-23 ENCOUNTER — HOSPITAL ENCOUNTER (EMERGENCY)
Facility: HOSPITAL | Age: 38
Discharge: HOME | End: 2018-09-23
Attending: EMERGENCY MEDICINE | Admitting: EMERGENCY MEDICINE
Payer: COMMERCIAL

## 2018-09-23 ENCOUNTER — APPOINTMENT (EMERGENCY)
Dept: RADIOLOGY | Facility: HOSPITAL | Age: 38
End: 2018-09-23
Payer: COMMERCIAL

## 2018-09-23 VITALS
BODY MASS INDEX: 34.52 KG/M2 | HEART RATE: 67 BPM | DIASTOLIC BLOOD PRESSURE: 78 MMHG | TEMPERATURE: 98 F | SYSTOLIC BLOOD PRESSURE: 130 MMHG | OXYGEN SATURATION: 100 % | RESPIRATION RATE: 16 BRPM | WEIGHT: 191.8 LBS

## 2018-09-23 DIAGNOSIS — R07.9 CHEST PAIN WITH LOW RISK FOR CARDIAC ETIOLOGY: Primary | ICD-10-CM

## 2018-09-23 DIAGNOSIS — M94.0 COSTOCHONDRITIS: ICD-10-CM

## 2018-09-23 LAB
ALBUMIN SERPL BCP-MCNC: 3.5 G/DL (ref 3.5–5)
ALP SERPL-CCNC: 66 U/L (ref 46–116)
ALT SERPL W P-5'-P-CCNC: 21 U/L (ref 12–78)
ANION GAP SERPL CALCULATED.3IONS-SCNC: 5 MMOL/L (ref 4–13)
AST SERPL W P-5'-P-CCNC: 16 U/L (ref 5–45)
ATRIAL RATE: 67 BPM
BACTERIA UR QL AUTO: ABNORMAL /HPF
BASOPHILS # BLD AUTO: 0.02 THOUSANDS/ΜL (ref 0–0.1)
BASOPHILS NFR BLD AUTO: 0 % (ref 0–1)
BILIRUB SERPL-MCNC: 0.44 MG/DL (ref 0.2–1)
BILIRUB UR QL STRIP: ABNORMAL
BUN SERPL-MCNC: 9 MG/DL (ref 5–25)
CALCIUM SERPL-MCNC: 8.9 MG/DL (ref 8.3–10.1)
CHLORIDE SERPL-SCNC: 105 MMOL/L (ref 100–108)
CLARITY UR: CLEAR
CO2 SERPL-SCNC: 31 MMOL/L (ref 21–32)
COLOR UR: YELLOW
CREAT SERPL-MCNC: 0.69 MG/DL (ref 0.6–1.3)
DEPRECATED D DIMER PPP: 432 NG/ML (FEU) (ref 0–424)
EOSINOPHIL # BLD AUTO: 0.12 THOUSAND/ΜL (ref 0–0.61)
EOSINOPHIL NFR BLD AUTO: 2 % (ref 0–6)
ERYTHROCYTE [DISTWIDTH] IN BLOOD BY AUTOMATED COUNT: 13 % (ref 11.6–15.1)
EXT PREG TEST URINE: NEGATIVE
GFR SERPL CREATININE-BSD FRML MDRD: 112 ML/MIN/1.73SQ M
GLUCOSE SERPL-MCNC: 125 MG/DL (ref 65–140)
GLUCOSE UR STRIP-MCNC: NEGATIVE MG/DL
HCT VFR BLD AUTO: 34.1 % (ref 34.8–46.1)
HGB BLD-MCNC: 10.9 G/DL (ref 11.5–15.4)
HGB UR QL STRIP.AUTO: NEGATIVE
IMM GRANULOCYTES # BLD AUTO: 0.02 THOUSAND/UL (ref 0–0.2)
IMM GRANULOCYTES NFR BLD AUTO: 0 % (ref 0–2)
KETONES UR STRIP-MCNC: NEGATIVE MG/DL
LEUKOCYTE ESTERASE UR QL STRIP: NEGATIVE
LIPASE SERPL-CCNC: 153 U/L (ref 73–393)
LYMPHOCYTES # BLD AUTO: 1.83 THOUSANDS/ΜL (ref 0.6–4.47)
LYMPHOCYTES NFR BLD AUTO: 24 % (ref 14–44)
MCH RBC QN AUTO: 29.6 PG (ref 26.8–34.3)
MCHC RBC AUTO-ENTMCNC: 32 G/DL (ref 31.4–37.4)
MCV RBC AUTO: 93 FL (ref 82–98)
MONOCYTES # BLD AUTO: 0.72 THOUSAND/ΜL (ref 0.17–1.22)
MONOCYTES NFR BLD AUTO: 9 % (ref 4–12)
NEUTROPHILS # BLD AUTO: 5.01 THOUSANDS/ΜL (ref 1.85–7.62)
NEUTS SEG NFR BLD AUTO: 65 % (ref 43–75)
NITRITE UR QL STRIP: NEGATIVE
NON-SQ EPI CELLS URNS QL MICRO: ABNORMAL /HPF
NRBC BLD AUTO-RTO: 0 /100 WBCS
P AXIS: 39 DEGREES
PH UR STRIP.AUTO: 7 [PH] (ref 4.5–8)
PLATELET # BLD AUTO: 214 THOUSANDS/UL (ref 149–390)
PMV BLD AUTO: 10.2 FL (ref 8.9–12.7)
POTASSIUM SERPL-SCNC: 4 MMOL/L (ref 3.5–5.3)
PR INTERVAL: 124 MS
PROT SERPL-MCNC: 7.6 G/DL (ref 6.4–8.2)
PROT UR STRIP-MCNC: ABNORMAL MG/DL
QRS AXIS: 39 DEGREES
QRSD INTERVAL: 76 MS
QT INTERVAL: 396 MS
QTC INTERVAL: 418 MS
RBC # BLD AUTO: 3.68 MILLION/UL (ref 3.81–5.12)
RBC #/AREA URNS AUTO: ABNORMAL /HPF
SODIUM SERPL-SCNC: 141 MMOL/L (ref 136–145)
SP GR UR STRIP.AUTO: 1.02 (ref 1–1.03)
T WAVE AXIS: 0 DEGREES
TROPONIN I SERPL-MCNC: <0.02 NG/ML
UROBILINOGEN UR QL STRIP.AUTO: >=8 E.U./DL
VENTRICULAR RATE: 67 BPM
WBC # BLD AUTO: 7.72 THOUSAND/UL (ref 4.31–10.16)
WBC #/AREA URNS AUTO: ABNORMAL /HPF

## 2018-09-23 PROCEDURE — 81025 URINE PREGNANCY TEST: CPT | Performed by: PHYSICIAN ASSISTANT

## 2018-09-23 PROCEDURE — 81001 URINALYSIS AUTO W/SCOPE: CPT

## 2018-09-23 PROCEDURE — 71046 X-RAY EXAM CHEST 2 VIEWS: CPT

## 2018-09-23 PROCEDURE — 93010 ELECTROCARDIOGRAM REPORT: CPT | Performed by: INTERNAL MEDICINE

## 2018-09-23 PROCEDURE — 83690 ASSAY OF LIPASE: CPT | Performed by: PHYSICIAN ASSISTANT

## 2018-09-23 PROCEDURE — 84484 ASSAY OF TROPONIN QUANT: CPT | Performed by: PHYSICIAN ASSISTANT

## 2018-09-23 PROCEDURE — 85379 FIBRIN DEGRADATION QUANT: CPT | Performed by: PHYSICIAN ASSISTANT

## 2018-09-23 PROCEDURE — 85025 COMPLETE CBC W/AUTO DIFF WBC: CPT | Performed by: PHYSICIAN ASSISTANT

## 2018-09-23 PROCEDURE — 96374 THER/PROPH/DIAG INJ IV PUSH: CPT

## 2018-09-23 PROCEDURE — 99285 EMERGENCY DEPT VISIT HI MDM: CPT

## 2018-09-23 PROCEDURE — 80053 COMPREHEN METABOLIC PANEL: CPT | Performed by: PHYSICIAN ASSISTANT

## 2018-09-23 PROCEDURE — 96375 TX/PRO/DX INJ NEW DRUG ADDON: CPT

## 2018-09-23 PROCEDURE — 36415 COLL VENOUS BLD VENIPUNCTURE: CPT | Performed by: PHYSICIAN ASSISTANT

## 2018-09-23 RX ORDER — LEVOTHYROXINE SODIUM 0.1 MG/1
100 TABLET ORAL DAILY
COMMUNITY

## 2018-09-23 RX ORDER — ACETAMINOPHEN 325 MG/1
975 TABLET ORAL EVERY 8 HOURS PRN
Qty: 30 TABLET | Refills: 0 | Status: SHIPPED | OUTPATIENT
Start: 2018-09-23 | End: 2018-09-28

## 2018-09-23 RX ORDER — KETOROLAC TROMETHAMINE 30 MG/ML
15 INJECTION, SOLUTION INTRAMUSCULAR; INTRAVENOUS ONCE
Status: COMPLETED | OUTPATIENT
Start: 2018-09-23 | End: 2018-09-23

## 2018-09-23 RX ORDER — ACETAMINOPHEN 325 MG/1
975 TABLET ORAL ONCE
Status: COMPLETED | OUTPATIENT
Start: 2018-09-23 | End: 2018-09-23

## 2018-09-23 RX ORDER — LIDOCAINE 50 MG/G
1 PATCH TOPICAL ONCE
Status: DISCONTINUED | OUTPATIENT
Start: 2018-09-23 | End: 2018-09-23 | Stop reason: HOSPADM

## 2018-09-23 RX ORDER — MAGNESIUM HYDROXIDE/ALUMINUM HYDROXICE/SIMETHICONE 120; 1200; 1200 MG/30ML; MG/30ML; MG/30ML
30 SUSPENSION ORAL ONCE
Status: COMPLETED | OUTPATIENT
Start: 2018-09-23 | End: 2018-09-23

## 2018-09-23 RX ORDER — LIDOCAINE 50 MG/G
1 PATCH TOPICAL EVERY 12 HOURS
Qty: 6 PATCH | Refills: 0 | Status: SHIPPED | OUTPATIENT
Start: 2018-09-23

## 2018-09-23 RX ADMIN — KETOROLAC TROMETHAMINE 15 MG: 30 INJECTION, SOLUTION INTRAMUSCULAR at 11:55

## 2018-09-23 RX ADMIN — FAMOTIDINE 20 MG: 10 INJECTION, SOLUTION INTRAVENOUS at 11:56

## 2018-09-23 RX ADMIN — ALUMINUM HYDROXIDE, MAGNESIUM HYDROXIDE, AND SIMETHICONE 30 ML: 200; 200; 20 SUSPENSION ORAL at 11:29

## 2018-09-23 RX ADMIN — LIDOCAINE 1 PATCH: 50 PATCH TOPICAL at 12:46

## 2018-09-23 RX ADMIN — LIDOCAINE HYDROCHLORIDE 15 ML: 20 SOLUTION ORAL; TOPICAL at 11:29

## 2018-09-23 RX ADMIN — ACETAMINOPHEN 975 MG: 325 TABLET, FILM COATED ORAL at 11:28

## 2018-09-23 NOTE — ED ATTENDING ATTESTATION
[place] DECKER, saw and evaluated the patient  I have discussed the patient with the resident/non-physician practitioner and agree with the resident's/non-physician practitioner's findings, Plan of Care, and MDM as documented in the resident's/non-physician practitioner's note, except where noted  All available labs and Radiology studies were reviewed  At this point I agree with the current assessment done in the Emergency Department  I have conducted an independent evaluation of this patient a history and physical is as follows:    A 42 y/o female with pmhx of chronic low back pain, depression, DM, GERD, hypercholesterolemia and HLD; presents with chest pain that began yesterday  Pain is described as sharp in nature  Pain has been constant since onset  Pt does complain of increased pain with inspiration, however denies shortness of breath  Pt does also complain of abdominal pain and nausea, however states this is chronic in nature  Pt denies fever, chills, vomiting, diarrhea, peripheral edema and rashes  Physical Exam  General Appearance: alert and oriented, nad, non toxic appearing  Skin:  Warm, dry, intact  HEENT: atraumatic, normocephalic  Neck: Supple, trachea midline  Cardiac: RRR; no murmurs, rub, gallops  Pulmonary: lungs CTAB; no wheezes, rales, rhonchi  Left chest wall tender to palpation  Gastrointestinal: abdomen soft, nontender, nondistended; no guarding or rebound tenderness; good bowel sounds, no mass or bruits  Extremities:  no pedal edema, 2+ pulses; no calf tenderness, no clubbing, no cyanosis  Neuro:  no focal motor or sensory deficits, CN 2-12 grossly intact  Psych:  Normal mood and affect, normal judgement and insight    Assessment and Plan:  Chest pain, constant since onset yesterday  Reproducible on exam   Suspect muscle etiology, however given pt's risk factors will check labs including a troponin    Since symptoms have been ongoing since yesterday, a single troponin is appropriate  Will give toradol, tylenol and GI cocktail for symptomatic relief      Critical Care Time  CritCare Time    Procedures

## 2018-09-23 NOTE — ED PROVIDER NOTES
History  Chief Complaint   Patient presents with    Chest Pain     Chest pain that started last night  Pain has gotten worse today  Pt started Synthroid yesterday and feels like she has palpitations and is SOB  57-year-old female with past medical history including diabetes mellitus on insulin, gastric sleeve surgery 2 months ago, chronic back pain, migraine headache, and hypothyroid presents to the emergency department complaining of chest pain x1 day  A friend translates for her  Says it started yesterday, described as "many sharp pains "in her chest   She was doing nothing in particular, sitting down resting when this began  Has been constant since then  When asked about aggravating and alleviating factors, she denies any; says is just constant  However, she does endorse that it is worse with a deep breath when asked  No recent injury/trauma, but did have gastric sleeve surgery 2 months ago  When asked about leg pain or swelling, says that her left thigh has been hurting since she had a back injection several days ago  She indicates that it seems swollen, but this is not noted objectively on exam   Denies history of DVT/PE  No recent travel  Denies use of exogenous estrogen  When asked about shortness of breath, says "a little bit"  Also has chronic history of headaches and dizziness when standing up, has been seeing her family doctor for this, no change in her symptoms recently  Endorses nausea and abdominal pain, says it has been going on "a long time" and and has had multiple negative CT scans; follows with family doctor for this  No changes recently  Denies urinary symptoms  Differential diagnosis includes but is not limited to:  ACS, pneumonia, doubt aortic dissection, pneumothorax, pulmonary embolism, gastritis, musculoskeletal pain            Prior to Admission Medications   Prescriptions Last Dose Informant Patient Reported? Taking?    D-3-5 5000 units capsule  Self Yes No FLUoxetine (PROzac) 20 mg capsule  Self Yes No   Sig: Take 20 mg by mouth 2 (two) times a day     STOOL SOFTENER 100 MG capsule  Self Yes No   TOUJEO SOLOSTAR 300 units/mL CONCETRATED U-300 injection pen  Self Yes No   Sig: Inject 40 Units under the skin daily at bedtime     busPIRone (BUSPAR) 15 mg tablet  Self Yes No   Sig: Take 15 mg by mouth 2 (two) times a day     butalbital-acetaminophen-caffeine (FIORICET,ESGIC) -40 mg per tablet  Self No No   Sig: Take 1 tablet by mouth every 4 (four) hours as needed for headaches   dicyclomine (BENTYL) 10 mg capsule  Self No No   Sig: Take 1 capsule (10 mg total) by mouth 3 (three) times a day as needed (right side abdominal pain)   gabapentin (NEURONTIN) 800 mg tablet  Self No No   Sig: Take 1 tablet (800 mg total) by mouth 3 (three) times a day   levothyroxine 100 mcg tablet   Yes Yes   Sig: Take 100 mcg by mouth daily   omeprazole (PriLOSEC) 20 mg delayed release capsule  Self No No   Sig: Take 1 capsule (20 mg total) by mouth daily for 90 days   Patient taking differently: Take 20 mg by mouth 2 (two) times a day     simvastatin (ZOCOR) 40 mg tablet  Self Yes No   Si mg daily at bedtime     sucralfate (CARAFATE) 1 g/10 mL suspension  Self No No   Sig: Take 10 mL (1 g total) by mouth 4 (four) times a day (with meals and at bedtime)   sucralfate (CARAFATE) 1 g/10 mL suspension  Self No No   Sig: Take 10 mL (1 g total) by mouth 4 (four) times a day   tiZANidine (ZANAFLEX) 2 mg tablet  Self No No   Sig: Take 1-2 tablets every 8 hours as needed for muscle spasm   traZODone (DESYREL) 100 mg tablet  Self No No   Sig: Take 1 tablet by mouth daily at bedtime      Facility-Administered Medications: None       Past Medical History:   Diagnosis Date    Abdominal pain     Abnormal cells of cervix     Abnormal EKG     Abnormal Pap smear of cervix     Acute headache     Amenorrhea     Anxiety     Back pain     Chronic low back pain     Chronic pain syndrome     Constipation     Contact dermatitis     Depression     Dermoid cyst of right lower extremity     Diabetes mellitus (HCC)     Drug-induced hepatic toxicity     Elevated liver function tests     Gastritis     GERD (gastroesophageal reflux disease)     Hypercholesterolemia     Hyperlipidemia     Left hip pain     Liver disease     drug induced hepatic toxicity    Liver function study, abnormal     Lumbar degenerative disc disease     Lumbar radiculopathy     Morbid obesity (HCC)     Neuropathy     Normal vaginal delivery     Postgastrectomy malabsorption     Tendinitis of left ankle     Vitamin D deficiency     Wears glasses        Past Surgical History:   Procedure Laterality Date    ABDOMINAL SURGERY       SECTION      COLONOSCOPY N/A 2018    Procedure: COLONOSCOPY;  Surgeon: Bela Flor MD;  Location: [facility]; Service: Gastroenterology    ESOPHAGOGASTRODUODENOSCOPY N/A 2018    Procedure: ESOPHAGOGASTRODUODENOSCOPY (EGD) with BIOPSY;  Surgeon: Lauren Hinson MD;  Location: [facility]; Service: Bariatrics    LIVER BIOPSY      MA LAP, APOLINAR RESTRICT PROC, LONGITUDINAL GASTRECTOMY N/A 2018    Procedure: GASTRECTOMY SLEEVE LAPAROSCOPIC AND INTRAOPERATIVE EGD;  Surgeon: Amadeo Fernandes MD;  Location: [facility] OR;  Service: [facility] TUBAL LIGATION      2017 Patient reports she had a tubal ligation 12 yrs ago in [address] [facility] but doesn't know the extent of the tubal   This is the first time she mentioned this procedure, added to surgical list   nw       Family History   Problem Relation Age of Onset    Diabetes Mother     Hyperlipidemia Mother     Pancreatitis Father     Thyroid disease Paternal Aunt     Lung cancer Maternal Grandfather     Heart disease Neg Hx     Stroke Neg Hx      I have reviewed and agree with the history as documented      Social History   Substance Use Topics    Smoking status: Never Smoker    Smokeless tobacco: Never Used    Alcohol use No        Review of Systems   Constitutional: Negative for activity change, chills, fatigue and fever  HENT: Negative for congestion, ear pain, facial swelling, nosebleeds, postnasal drip, rhinorrhea, sinus pain, sinus pressure, sore throat and trouble swallowing  Eyes: Negative for visual disturbance  Respiratory: Positive for chest tightness and shortness of breath  Negative for cough  Cardiovascular: Negative for chest pain, palpitations and leg swelling  Gastrointestinal: Positive for abdominal pain and nausea  Negative for blood in stool, constipation, diarrhea and vomiting  Genitourinary: Negative for dysuria, flank pain, frequency and hematuria  Musculoskeletal: Negative for arthralgias, back pain, neck pain and neck stiffness  Skin: Negative for rash and wound  Neurological: Positive for dizziness and headaches  Negative for seizures, syncope, light-headedness and numbness  All other systems reviewed and are negative  Physical Exam  Physical Exam   Constitutional: She is oriented to person, place, and time  She appears well-developed and well-nourished  No distress  HENT:   Head: Normocephalic and atraumatic  Right Ear: External ear normal    Left Ear: External ear normal    Mouth/Throat: Oropharynx is clear and moist    Eyes: Conjunctivae and EOM are normal  Pupils are equal, round, and reactive to light  Neck: Normal range of motion  Neck supple  Cardiovascular: Normal rate, regular rhythm and normal heart sounds  Exam reveals no gallop and no friction rub  No murmur heard  Pulmonary/Chest: Effort normal and breath sounds normal  No respiratory distress  She has no wheezes  She has no rales  Chest is exquisitely tender in the center/right chest   Abdominal: Soft  She exhibits no distension  There is no tenderness  Musculoskeletal: Normal range of motion  Neurological: She is alert and oriented to person, place, and time   No cranial nerve deficit or sensory deficit  She exhibits normal muscle tone  Coordination normal    Skin: Skin is warm and dry  She is not diaphoretic  Psychiatric: She has a normal mood and affect  Her behavior is normal  Judgment and thought content normal    Vitals reviewed        Vital Signs  ED Triage Vitals [09/23/18 1046]   Temperature Pulse Respirations Blood Pressure SpO2   98 °F (36 7 °C) 70 16 115/64 100 %      Temp Source Heart Rate Source Patient Position - Orthostatic VS BP Location FiO2 (%)   Oral Monitor Lying Right arm --      Pain Score       8           Vitals:    09/23/18 1046 09/23/18 1205   BP: 115/64 130/78   Pulse: 70 67   Patient Position - Orthostatic VS: Lying Lying       Visual Acuity      ED Medications  Medications   ketorolac (TORADOL) injection 15 mg (15 mg Intravenous Given 9/23/18 1155)   acetaminophen (TYLENOL) tablet 975 mg (975 mg Oral Given 9/23/18 1128)   famotidine (PEPCID) injection 20 mg (20 mg Intravenous Given 9/23/18 1156)   lidocaine viscous (XYLOCAINE) 2 % mucosal solution 15 mL (15 mL Swish & Spit Given 9/23/18 1129)   aluminum-magnesium hydroxide-simethicone (MYLANTA) 200-200-20 mg/5 mL oral suspension 30 mL (30 mL Oral Given 9/23/18 1129)       Diagnostic Studies  Results Reviewed     Procedure Component Value Units Date/Time    Urine Microscopic [[id number]]  (Abnormal) Collected:  09/23/18 1304    Lab Status:  Final result Specimen:  Urine from Urine, Clean Catch Updated:  09/23/18 1329     RBC, UA None Seen /hpf      WBC, UA 1-2 (A) /hpf      Epithelial Cells Occasional /hpf      Bacteria, UA None Seen /hpf     ED Urine Macroscopic [[id number]]  (Abnormal) Collected:  09/23/18 1304    Lab Status:  Final result Specimen:  Urine Updated:  09/23/18 1257     Color, UA Yellow     Clarity, UA Clear     pH, UA 7 0     Leukocytes, UA Negative     Nitrite, UA Negative     Protein, UA 30 (1+) (A) mg/dl      Glucose, UA Negative mg/dl      Ketones, UA Negative mg/dl      Urobilinogen, UA >=8 0 (A) E U /dl      Bilirubin, UA Interference- unable to analyze (A)     Blood, UA Negative     Specific Gravity, UA 1 020    Narrative:       CLINITEK RESULT    Troponin I [[id number]]  (Normal) Collected:  09/23/18 1135    Lab Status:  Final result Specimen:  Blood from Arm, Right Updated:  09/23/18 1206     Troponin I <0 02 ng/mL     D-dimer, quantitative [[id number]]  (Abnormal) Collected:  09/23/18 1135    Lab Status:  Final result Specimen:  Blood from Arm, Right Updated:  09/23/18 1201     D-Dimer, Quant 432 (H) ng/ml (FEU)     Comprehensive metabolic panel [[id number]] Collected:  09/23/18 1135    Lab Status:  Final result Specimen:  Blood from Arm, Right Updated:  09/23/18 1201     Sodium 141 mmol/L      Potassium 4 0 mmol/L      Chloride 105 mmol/L      CO2 31 mmol/L      ANION GAP 5 mmol/L      BUN 9 mg/dL      Creatinine 0 69 mg/dL      Glucose 125 mg/dL      Calcium 8 9 mg/dL      AST 16 U/L      ALT 21 U/L      Alkaline Phosphatase 66 U/L      Total Protein 7 6 g/dL      Albumin 3 5 g/dL      Total Bilirubin 0 44 mg/dL      eGFR 112 ml/min/1 73sq m     Narrative:         National Kidney Disease Education Program recommendations are as follows:  GFR calculation is accurate only with a steady state creatinine  Chronic Kidney disease less than 60 ml/min/1 73 sq  meters  Kidney failure less than 15 ml/min/1 73 sq  meters      Lipase [[id number]]  (Normal) Collected:  09/23/18 1135    Lab Status:  Final result Specimen:  Blood from Arm, Right Updated:  09/23/18 1201     Lipase 153 u/L     POCT pregnancy, urine [[id number]]  (Normal) Resulted:  09/23/18 1153    Lab Status:  Final result Updated:  09/23/18 1153     EXT PREG TEST UR (Ref: Negative) NEGATIVE    CBC and differential [[id number]]  (Abnormal) Collected:  09/23/18 1135    Lab Status:  Final result Specimen:  Blood from Arm, Right Updated:  09/23/18 1142     WBC 7 72 Thousand/uL      RBC 3 68 (L) Million/uL      Hemoglobin 10 9 (L) g/dL      Hematocrit 34 1 (L) % MCV 93 fL      MCH 29 6 pg      MCHC 32 0 g/dL      RDW 13 0 %      MPV 10 2 fL      Platelets 988 Thousands/uL      nRBC 0 /100 WBCs      Neutrophils Relative 65 %      Immat GRANS % 0 %      Lymphocytes Relative 24 %      Monocytes Relative 9 %      Eosinophils Relative 2 %      Basophils Relative 0 %      Neutrophils Absolute 5 01 Thousands/µL      Immature Grans Absolute 0 02 Thousand/uL      Lymphocytes Absolute 1 83 Thousands/µL      Monocytes Absolute 0 72 Thousand/µL      Eosinophils Absolute 0 12 Thousand/µL      Basophils Absolute 0 02 Thousands/µL                  XR chest 2 views   ED Interpretation by Stephanie Fox PA-C (09/23 1216)   No acute cardiopulmonary abnormality      Final Result by Tammy Urena MD (09/23 1611)      No acute cardiopulmonary disease  Workstation performed: [id number]                    Procedures  Procedures       Phone Contacts  ED Phone Contact    ED Course  ED Course as of Sep 23 2017   Darnell Patel Sep 23, 2018   1121 Given subjective L leg swelling, pleuritic nature of chest pain, and gastric sleeve in July, will check d-dimer  Otherwise, check trop, basic labs, CXR, EKG  No need for delta trop/EKG based on duration of sx  Pain is reproducible, so will treat with Toradol  Also has some mild epigastric tenderness, though states this is chronic, but maybe has some component of gastritis/esophagitis as this started after taking her Synthroid yesterday, so will give GI cocktail as well  1205 Standard cut-off is 500  Clinically, suspicion is low  Will consider this result WNL  D-DIMER QUANTITATIVE: (!) 432   1215 Baseline Hemoglobin: (!) 10 9   1215 RBC: (!) 3 68   1215 HCT: (!) 34 1   1226 No elevation in LFTs to suggest biliary source of pain    1236 Patient reports no significant relief with treatment here in the emergency department    However, chest pain is not concerning for serious etiology given the reproducible nature on exam   Would not recommend NSAIDs to go home with given her history of gastric sleeve and gastritis  Will discharge with instructions for Tylenol and Lidoderm patch topically  Follow up PCP  HEART Risk Score      Most Recent Value   History  0 Filed at: 09/23/2018 1230   ECG  0 Filed at: 09/23/2018 1230   Age  0 Filed at: 09/23/2018 1230   Risk Factors  1 Filed at: 09/23/2018 1230   Troponin  0 Filed at: 09/23/2018 1230   Heart Score Risk Calculator   History  0 Filed at: 09/23/2018 1230   ECG  0 Filed at: 09/23/2018 1230   Age  0 Filed at: 09/23/2018 1230   Risk Factors  1 Filed at: 09/23/2018 1230   Troponin  0 Filed at: 09/23/2018 1230   HEART Score  1 Filed at: 09/23/2018 1230   HEART Score  1 Filed at: 09/23/2018 1230                            MDM  Number of Diagnoses or Management Options  Chest pain with low risk for cardiac etiology: new and requires workup  Costochondritis: new and requires workup  Diagnosis management comments: 61-year-old female presents for evaluation of chest pain x1 day  Cardiac workup here was unremarkable  D-dimer was 432, and using the standard eyes reference range of less than 500 as a normal value, this is considered within normal limits  EKG unchanged  Chest x-ray normal  Based on the reproducibility of the pain on exam, most likely musculoskeletal in nature  One dose of Toradol was given here, but will not prescribe anti-inflammatories to go home with given her history of gastric sleeve  Gave instructions for Tylenol 975 mg every 8 hours as well as Lidoderm patches  Follow up with PCP  Return precautions given  Patient understood and agreed with treatment plan and had no questions         Amount and/or Complexity of Data Reviewed  Clinical lab tests: reviewed and ordered  Tests in the radiology section of CPT®: ordered and reviewed  Tests in the medicine section of CPT®: ordered and reviewed      CritCare Time    Disposition  Final diagnoses:   Chest pain with low risk for cardiac etiology   Costochondritis     Time reflects when diagnosis was documented in both MDM as applicable and the Disposition within this note     Time User Action Codes Description Comment    9/23/2018 12:25 PM Mariel Brandon Add [R07 89] Chest wall pain     9/23/2018 12:25 PM Lidia Lazar Add [R07 9] Chest pain with low risk for cardiac etiology     9/23/2018 12:25 PM Lidia Lazar Modify [R07 9] Chest pain with low risk for cardiac etiology     9/23/2018 12:25 PM Lidia Lazar Remove [R07 89] Chest wall pain     9/23/2018 12:26 PM Lidia Lazra Add [M94 0] Costochondritis       ED Disposition     ED Disposition Condition Comment    Discharge  Emmie [employer] discharge to home/self care      Condition at discharge: Good        Follow-up Information     Follow up With Specialties Details Why Contact Info    Telma Ortega PA-C Family Medicine, Physician Assistant  Call tomorrow for next available appointment [address] [place] [address]  [phone]            Discharge Medication List as of 9/23/2018 12:53 PM      START taking these medications    Details   acetaminophen (TYLENOL) 325 mg tablet Take 3 tablets (975 mg total) by mouth every 8 (eight) hours as needed for mild pain for up to 5 days, Starting Sun 9/23/2018, Until Fri 9/28/2018, Normal      lidocaine (LIDODERM) 5 % Apply 1 patch topically every 12 (twelve) hours Remove & Discard patch within 12 hours or as directed by MD, Starting Sun 9/23/2018, Normal         CONTINUE these medications which have NOT CHANGED    Details   levothyroxine 100 mcg tablet Take 100 mcg by mouth daily, Historical Med      busPIRone (BUSPAR) 15 mg tablet Take 15 mg by mouth 2 (two) times a day  , Starting Mon 4/16/2018, Historical Med      butalbital-acetaminophen-caffeine (FIORICET,ESGIC) -40 mg per tablet Take 1 tablet by mouth every 4 (four) hours as needed for headaches, Starting Fri 3/23/2018, Print      D-3-5 7592 units capsule Starting Tue 7/10/2018, Historical Med      dicyclomine (BENTYL) 10 mg capsule Take 1 capsule (10 mg total) by mouth 3 (three) times a day as needed (right side abdominal pain), Starting Fri 8/24/2018, Normal      FLUoxetine (PROzac) 20 mg capsule Take 20 mg by mouth 2 (two) times a day  , Starting Mon 4/16/2018, Historical Med      gabapentin (NEURONTIN) 800 mg tablet Take 1 tablet (800 mg total) by mouth 3 (three) times a day, Starting Thu 8/30/2018, Normal      omeprazole (PriLOSEC) 20 mg delayed release capsule Take 1 capsule (20 mg total) by mouth daily for 90 days, Starting Thu 6/14/2018, Until Wed 9/12/2018, Normal      simvastatin (ZOCOR) 40 mg tablet 40 mg daily at bedtime  , Starting Mon 5/7/2018, Historical Med      STOOL SOFTENER 100 MG capsule Starting Mon 8/6/2018, Historical Med      !! sucralfate (CARAFATE) 1 g/10 mL suspension Take 10 mL (1 g total) by mouth 4 (four) times a day (with meals and at bedtime), Starting Wed 8/22/2018, Print      !! sucralfate (CARAFATE) 1 g/10 mL suspension Take 10 mL (1 g total) by mouth 4 (four) times a day, Starting Wed 9/5/2018, Print      tiZANidine (ZANAFLEX) 2 mg tablet Take 1-2 tablets every 8 hours as needed for muscle spasm, Normal      TOUJEO SOLOSTAR 300 units/mL CONCETRATED U-300 injection pen Inject 40 Units under the skin daily at bedtime  , Starting Mon 6/18/2018, Historical Med      traZODone (DESYREL) 100 mg tablet Take 1 tablet by mouth daily at bedtime, Starting Sat 2/4/2017, No Print       !! - Potential duplicate medications found  Please discuss with provider  No discharge procedures on file      ED Provider  Electronically Signed by           Steph Padron PA-C  09/23/18 2017

## 2018-09-23 NOTE — DISCHARGE INSTRUCTIONS
Costocondritis   LO QUE NECESITA SABER:   La costocondritis es omari condición que causa dolor en el cartílago que conecta emily costillas a solano esternón  El cartílago es el tejido tita y flexible que protege a los [place]  INSTRUCCIONES SOBRE EL [facility]:   Medicamentos:  · Acetaminofeno:  Lucinda medicamento disminuye el dolor  El acetaminofeno puede obtener sin receta médica  Pregunte la cantidad y la frecuencia con que debe tomarlos  [address]  El acetaminofén puede causar daño en el hígado cuando no se cynthia de forma correcta  · Humphrey emily medicamentos antoine se le haya indicado  Consulte con solano médico si usted vinny que solano medicamento no le está ayudando o si presenta efectos secundarios  Infórmele si es alérgico a cualquier medicamento  Mantenga omari lista actualizada de los [place], las vitaminas y los productos herbales que cynthia  Incluya los siguientes datos de los medicamentos: cantidad, frecuencia y motivo de administración  Traiga con usted la lista o los envases de la píldoras a emily citas de seguimiento  Lleve la lista de los medicamentos con usted en laura de omari emergencia  Acuda a emily consultas de control con solano médico según le indicaron  Anote emily preguntas para que se acuerde de hacerlas viv emily visitas  Descanse:  Es posible que necesite descansar más  Aprenda cuáles movimientos y [employer] causan dolor y deje de Truth or consequences  No levante objetos, antoine un bolso o omari mochila, si esto le causa dolor  Evite las [employer] y levantar pesas hasta que solano dolor disminuya o desaparezca  Pregúntele a solano médico cuáles actividades son [place] para usted mientras se recupera  Calor:  La aplicación de calor facilita la reducción del dolor en algunos pacientes  Aplíquese calor en el área lesionada viv 20 a 30 minutos cada 2 horas viv la cantidad de [employer] indiquen  Hielo:  El hielo ayuda a disminuir la inflamación y el dolor   El hielo Tia chung contribuir a evitar el daño de los tejidos  Use un paquete de hielo o ponga hielo molido dentro de [employer]  Cúbralo con omari toalla y colóquelo en el área adolorida por 15 a 20 minutos cada hora, o antoine se le indique  Ejercicios de estiramiento:  El estiramiento suave podría aliviar emily síntomas  Párese en omari mechelle y ponga emily beth en el memo de la mechelle al nivel de las orejas u Katherleen Coon un paso hacia adelante y estire stephen pecho suavemente  Christina lo mismo con las beth a un nivel más alto [place]  Pregúntele a stephen Lara [employer]el vitaminas y minerales son adecuados para usted  · Usted tiene fiebre  · Las áreas dolorosas en stephen pecho se stephen inflamadas, enrojecidas o se sienten calientes al tacto  · Usted no puede dormir a causa del dolor  · Usted tiene preguntas o inquietudes acerca de stephen condición o cuidado  © 2017 [facility] Information is for End User's use only and may not be sold, redistributed or otherwise used for commercial purposes  All illustrations and images included in CareNotes® are the copyrighted property of A D A M , Inc  or Benny Patton  Esta información es sólo para uso en educación  Stephen intención no es darle un consejo médico sobre enfermedades o tratamientos  Colsulte con stephen Alben Miguel farmacéutico antes de seguir cualquier régimen médico para saber si es seguro y efectivo para usted

## 2018-09-23 NOTE — ED NOTES
Pt ambulatory to BR, to provide urine sample, and returns to ED 17 without incident       Mari Gregory, RN  09/23/18 0790

## 2018-09-23 NOTE — ED PROCEDURE NOTE
Procedure  ECG 12 Lead Documentation  Date/Time: 9/23/2018 12:31 PM  Performed by: Cecilia Yusuf by: Luz Marina Duval     ECG reviewed by me, the ED Provider: yes    Patient location:  ED  Previous ECG:     Previous ECG:  Compared to current    Comparison ECG info:  NSR, T wave inversion lead III only    Similarity:  No change  Interpretation:     Interpretation: non-specific    Rate:     ECG rate assessment: normal    Rhythm:     Rhythm: sinus rhythm    Ectopy:     Ectopy: none    QRS:     QRS axis:  Normal  Conduction:     Conduction: normal    ST segments:     ST segments:  Normal  T waves:     T waves: inverted      Inverted:  III  Comments:      No change from previous                     Chelsea Hankins PA-C  09/23/18 9073

## 2018-09-26 ENCOUNTER — TELEPHONE (OUTPATIENT)
Dept: PAIN MEDICINE | Facility: MEDICAL CENTER | Age: 38
End: 2018-09-26

## 2018-09-26 NOTE — TELEPHONE ENCOUNTER
Aware   Will await results of pain diary, however since the patient did not get any relief will likely not proceed with medial branch block 2    Will follow-up at next office visit

## 2018-09-26 NOTE — TELEPHONE ENCOUNTER
Pt called and left a voicemail today @ 9:40a stating that she is having a lot of leg pain since her procedure that she had on 9/18   Pt can be reached at [phone]     Pt has a f/u 11/21 w/ Darrion Cowart

## 2018-09-26 NOTE — TELEPHONE ENCOUNTER
S/w BF and reviewed and inquired to see if they had completed and dropped off the pain diary  He stated she didn't  Reiterated that it was a block and that her pain would return  But Ether Ashlee would need the diary to review and advise  He stated that she didn't get any relief from the block but would drop off the diary later

## 2018-09-27 ENCOUNTER — TELEPHONE (OUTPATIENT)
Dept: PAIN MEDICINE | Facility: CLINIC | Age: 38
End: 2018-09-27

## 2018-09-27 NOTE — TELEPHONE ENCOUNTER
The patient states that she did not have any relief from bilateral L3, L4, and L5 medial branch block 1 per telephone conversation yesterday, however upon review of her pain diary the patient did have a favorable result to medial branch blocks    If the patient would like to move forward with confirmatory medial branch block 2 , please call the patient to schedule

## 2018-09-27 NOTE — TELEPHONE ENCOUNTER
Attempted to call pt and got a message stating "the person you are trying to call cannot accept calls at this time "  Will try again later

## 2018-09-27 NOTE — TELEPHONE ENCOUNTER
Attempted to call pt and got a message stating "the person you are trying to call cannot accept calls at this time "  Will try again tomorrow

## 2018-10-08 ENCOUNTER — TELEPHONE (OUTPATIENT)
Dept: BARIATRICS | Facility: CLINIC | Age: 38
End: 2018-10-08

## 2018-10-08 PROBLEM — K91.2 POSTSURGICAL MALABSORPTION: Status: ACTIVE | Noted: 2018-10-08

## 2018-11-13 ENCOUNTER — TELEPHONE (OUTPATIENT)
Dept: OBGYN CLINIC | Facility: CLINIC | Age: 38
End: 2018-11-13

## 2018-11-13 NOTE — TELEPHONE ENCOUNTER
Dennis Puckett spoke to patient on the phone  She was due for a one f/u for pap smear  She states she went to another facility on 9/12/18 and had it done

## 2019-03-12 ENCOUNTER — TELEPHONE (OUTPATIENT)
Dept: PAIN MEDICINE | Facility: CLINIC | Age: 39
End: 2019-03-12

## 2019-03-26 NOTE — TELEPHONE ENCOUNTER
Requesting to have records refaxed, please put to [facility]  This way they get to her please  Thank you      C/B #: [phone]

## 2019-10-31 ENCOUNTER — TELEPHONE (OUTPATIENT)
Dept: BARIATRICS | Facility: CLINIC | Age: 39
End: 2019-10-31

## 2019-10-31 NOTE — TELEPHONE ENCOUNTER
lm for patient to schedule overdue f/u apt         ----- Message from Nando Tovar RN sent at 10/31/2019  4:05 AM EDT -----  Regardin year f/u appointment  Please contact patient to schedule a 1 year follow up appointment  Thank You

## 2020-11-05 ENCOUNTER — TELEPHONE (OUTPATIENT)
Dept: BARIATRICS | Facility: CLINIC | Age: 40
End: 2020-11-05

## 2024-03-14 ENCOUNTER — TELEPHONE (OUTPATIENT)
Age: 44
End: 2024-03-14

## 2024-03-14 NOTE — TELEPHONE ENCOUNTER
Pt call in she relocated to [place] and found  a new doctor to remove her stomach skin issue. Pt is asking to release her Medical Records from Dr. Garcia and FAX/MAIL over to [facility] [address] [address] [address]  P # [phone]  FAX # [phone]    Pt new address is [address] [place] [id number]    [facility] was called and notified.

## (undated) DEVICE — SINGLE-USE BIOPSY FORCEPS: Brand: RADIAL JAW 4

## (undated) DEVICE — URETERAL CATHETER ADAPTOR TIP

## (undated) DEVICE — GLOVE SRG BIOGEL 7.5

## (undated) DEVICE — ENDOPATH XCEL BLADELESS TROCARS WITH STABILITY SLEEVES: Brand: ENDOPATH XCEL

## (undated) DEVICE — SCD SEQUENTIAL COMPRESSION COMFORT SLEEVE MEDIUM KNEE LENGTH: Brand: KENDALL SCD

## (undated) DEVICE — TIBURON LAPAROSCOPIC ABDOMINAL DRAPE: Brand: CONVERTORS

## (undated) DEVICE — POWER SHELL SIGNIA

## (undated) DEVICE — 3000CC GUARDIAN II: Brand: GUARDIAN

## (undated) DEVICE — TRAVELKIT CONTAINS FIRST STEP KIT (200ML EP-4 KIT) AND SOILED SCOPE BAG - 1 KIT: Brand: TRAVELKIT CONTAINS FIRST STEP KIT AND SOILED SCOPE BAG

## (undated) DEVICE — COVIDIEN ENDO GIA PURPLE (MED) RELOAD 60MM

## (undated) DEVICE — SEAMGUARD STPL REINF ENDO GIA ULTRA UNV 60 BLACK

## (undated) DEVICE — ENDOPATH 5MM CURVED SCISSORS WITH MONOPOLAR CAUTERY: Brand: ENDOPATH

## (undated) DEVICE — WEBRIL 6 IN UNSTERILE

## (undated) DEVICE — ENDOPATH XCEL UNIVERSAL TROCAR STABLILITY SLEEVES: Brand: ENDOPATH XCEL

## (undated) DEVICE — HARMONIC ACE +7 LAPAROSCOPIC SHEARS ADVANCED HEMOSTASIS 5MM DIAMETER 36CM SHAFT LENGTH  FOR USE WITH GRAY HAND PIECE ONLY: Brand: HARMONIC ACE

## (undated) DEVICE — 5 MM CURVED DISSECTORS WITH MONOPOLAR CAUTERY: Brand: ENDOPATH

## (undated) DEVICE — IRRIG ENDO FLO TUBING

## (undated) DEVICE — ADHESIVE SKIN CLSR DERMABOND NX

## (undated) DEVICE — SURGICAL GOWN, XL SMARTSLEEVE: Brand: CONVERTORS

## (undated) DEVICE — ALLENTOWN LAP CHOLE APP PACK: Brand: CARDINAL HEALTH

## (undated) DEVICE — BLUE HEAT SCOPE WARMER

## (undated) DEVICE — TUBING SUCTION 5MM X 12 FT

## (undated) DEVICE — COVIDIEN GIA BLACK (XTRA THICK) RELOAD

## (undated) DEVICE — NEEDLE HYPO 22G X 1-1/2 IN

## (undated) DEVICE — [HIGH FLOW INSUFFLATOR,  DO NOT USE IF PACKAGE IS DAMAGED,  KEEP DRY,  KEEP AWAY FROM SUNLIGHT,  PROTECT FROM HEAT AND RADIOACTIVE SOURCES.]: Brand: PNEUMOSURE

## (undated) DEVICE — TROCAR VISIPORT

## (undated) DEVICE — VISIGI 3D®  CALIBRATION SYSTEM  SIZE 36FR SLEEVE/STD: Brand: BOEHRINGER® VISIGI 3D™ SLEEVE GASTRECTOMY CALIBRATION SYSTEM, SIZE 36FR

## (undated) DEVICE — PMI DISPOSABLE PUNCTURE CLOSURE DEVICE / SUTURE GRASPER: Brand: PMI

## (undated) DEVICE — SUT MONOCRYL 4-0 PS-2 27 IN Y426H

## (undated) DEVICE — GLOVE SRG BIOGEL 8

## (undated) DEVICE — CHLORAPREP HI-LITE 26ML ORANGE

## (undated) DEVICE — TUBING SMOKE EVAC W/FILTRATION DEVICE PLUMEPORT ACTIV

## (undated) DEVICE — VIOLET BRAIDED (POLYGLACTIN 910), SYNTHETIC ABSORBABLE SUTURE: Brand: COATED VICRYL

## (undated) DEVICE — SEAMGUARD STPL REINF ENDO GIA ULTRA UNIV 60 PURPLE

## (undated) DEVICE — INTENDED FOR TISSUE SEPARATION, AND OTHER PROCEDURES THAT REQUIRE A SHARP SURGICAL BLADE TO PUNCTURE OR CUT.: Brand: BARD-PARKER SAFETY BLADES SIZE 15, STERILE

## (undated) DEVICE — COVIDIEN ENDO GIA PURPLE (MED) RELOAD 45MM